# Patient Record
Sex: MALE | Race: WHITE | Employment: FULL TIME | URBAN - METROPOLITAN AREA
[De-identification: names, ages, dates, MRNs, and addresses within clinical notes are randomized per-mention and may not be internally consistent; named-entity substitution may affect disease eponyms.]

---

## 2018-06-15 ENCOUNTER — APPOINTMENT (OUTPATIENT)
Dept: GENERAL RADIOLOGY | Age: 58
End: 2018-06-15
Payer: COMMERCIAL

## 2018-06-15 ENCOUNTER — HOSPITAL ENCOUNTER (OUTPATIENT)
Age: 58
Setting detail: OBSERVATION
Discharge: HOME | End: 2018-06-16
Attending: EMERGENCY MEDICINE | Admitting: ORTHOPAEDIC SURGERY
Payer: COMMERCIAL

## 2018-06-15 ENCOUNTER — ANESTHESIA (OUTPATIENT)
Dept: OPERATING ROOM | Age: 58
End: 2018-06-15
Payer: COMMERCIAL

## 2018-06-15 ENCOUNTER — ANESTHESIA EVENT (OUTPATIENT)
Dept: OPERATING ROOM | Age: 58
End: 2018-06-15
Payer: COMMERCIAL

## 2018-06-15 VITALS — DIASTOLIC BLOOD PRESSURE: 63 MMHG | TEMPERATURE: 98.1 F | SYSTOLIC BLOOD PRESSURE: 117 MMHG | OXYGEN SATURATION: 100 %

## 2018-06-15 DIAGNOSIS — S82.851A CLOSED TRIMALLEOLAR FRACTURE OF RIGHT ANKLE, INITIAL ENCOUNTER: ICD-10-CM

## 2018-06-15 DIAGNOSIS — S82.851A TRIMALLEOLAR FRACTURE OF ANKLE, CLOSED, RIGHT, INITIAL ENCOUNTER: Primary | ICD-10-CM

## 2018-06-15 DIAGNOSIS — S93.04XA DISLOCATION OF RIGHT ANKLE JOINT, INITIAL ENCOUNTER: ICD-10-CM

## 2018-06-15 LAB
ABSOLUTE EOS #: 0.1 K/UL (ref 0–0.4)
ABSOLUTE IMMATURE GRANULOCYTE: ABNORMAL K/UL (ref 0–0.3)
ABSOLUTE LYMPH #: 1.3 K/UL (ref 1–4.8)
ABSOLUTE MONO #: 0.6 K/UL (ref 0.1–1.3)
ANION GAP SERPL CALCULATED.3IONS-SCNC: 13 MMOL/L (ref 9–17)
BASOPHILS # BLD: 1 % (ref 0–2)
BASOPHILS ABSOLUTE: 0.1 K/UL (ref 0–0.2)
BUN BLDV-MCNC: 11 MG/DL (ref 6–20)
BUN/CREAT BLD: ABNORMAL (ref 9–20)
CALCIUM SERPL-MCNC: 8.5 MG/DL (ref 8.6–10.4)
CHLORIDE BLD-SCNC: 101 MMOL/L (ref 98–107)
CO2: 23 MMOL/L (ref 20–31)
CREAT SERPL-MCNC: 0.76 MG/DL (ref 0.7–1.2)
DIFFERENTIAL TYPE: ABNORMAL
EOSINOPHILS RELATIVE PERCENT: 1 % (ref 0–4)
GFR AFRICAN AMERICAN: >60 ML/MIN
GFR NON-AFRICAN AMERICAN: >60 ML/MIN
GFR SERPL CREATININE-BSD FRML MDRD: ABNORMAL ML/MIN/{1.73_M2}
GFR SERPL CREATININE-BSD FRML MDRD: ABNORMAL ML/MIN/{1.73_M2}
GLUCOSE BLD-MCNC: 115 MG/DL (ref 70–99)
HCT VFR BLD CALC: 44.5 % (ref 41–53)
HEMOGLOBIN: 15.3 G/DL (ref 13.5–17.5)
IMMATURE GRANULOCYTES: ABNORMAL %
INR BLD: 1
LYMPHOCYTES # BLD: 17 % (ref 24–44)
MCH RBC QN AUTO: 33.4 PG (ref 26–34)
MCHC RBC AUTO-ENTMCNC: 34.3 G/DL (ref 31–37)
MCV RBC AUTO: 97.3 FL (ref 80–100)
MONOCYTES # BLD: 7 % (ref 1–7)
NRBC AUTOMATED: ABNORMAL PER 100 WBC
PDW BLD-RTO: 13.6 % (ref 11.5–14.9)
PLATELET # BLD: 236 K/UL (ref 150–450)
PLATELET ESTIMATE: ABNORMAL
PMV BLD AUTO: 8.2 FL (ref 6–12)
POTASSIUM SERPL-SCNC: 4.8 MMOL/L (ref 3.7–5.3)
PROTHROMBIN TIME: 10.3 SEC (ref 9.7–12)
RBC # BLD: 4.58 M/UL (ref 4.5–5.9)
RBC # BLD: ABNORMAL 10*6/UL
SEG NEUTROPHILS: 74 % (ref 36–66)
SEGMENTED NEUTROPHILS ABSOLUTE COUNT: 5.9 K/UL (ref 1.3–9.1)
SODIUM BLD-SCNC: 137 MMOL/L (ref 135–144)
WBC # BLD: 8 K/UL (ref 3.5–11)
WBC # BLD: ABNORMAL 10*3/UL

## 2018-06-15 PROCEDURE — 2500000003 HC RX 250 WO HCPCS: Performed by: ORTHOPAEDIC SURGERY

## 2018-06-15 PROCEDURE — 99284 EMERGENCY DEPT VISIT MOD MDM: CPT

## 2018-06-15 PROCEDURE — 3209999900 FLUORO FOR SURGICAL PROCEDURES

## 2018-06-15 PROCEDURE — 73590 X-RAY EXAM OF LOWER LEG: CPT

## 2018-06-15 PROCEDURE — 80048 BASIC METABOLIC PNL TOTAL CA: CPT

## 2018-06-15 PROCEDURE — 73610 X-RAY EXAM OF ANKLE: CPT

## 2018-06-15 PROCEDURE — 96365 THER/PROPH/DIAG IV INF INIT: CPT

## 2018-06-15 PROCEDURE — 96376 TX/PRO/DX INJ SAME DRUG ADON: CPT

## 2018-06-15 PROCEDURE — 99152 MOD SED SAME PHYS/QHP 5/>YRS: CPT

## 2018-06-15 PROCEDURE — 73600 X-RAY EXAM OF ANKLE: CPT

## 2018-06-15 PROCEDURE — 96375 TX/PRO/DX INJ NEW DRUG ADDON: CPT

## 2018-06-15 PROCEDURE — C1769 GUIDE WIRE: HCPCS | Performed by: ORTHOPAEDIC SURGERY

## 2018-06-15 PROCEDURE — G0378 HOSPITAL OBSERVATION PER HR: HCPCS

## 2018-06-15 PROCEDURE — 7100000000 HC PACU RECOVERY - FIRST 15 MIN: Performed by: ORTHOPAEDIC SURGERY

## 2018-06-15 PROCEDURE — 96374 THER/PROPH/DIAG INJ IV PUSH: CPT

## 2018-06-15 PROCEDURE — 2500000003 HC RX 250 WO HCPCS: Performed by: ANESTHESIOLOGY

## 2018-06-15 PROCEDURE — 3700000000 HC ANESTHESIA ATTENDED CARE: Performed by: ORTHOPAEDIC SURGERY

## 2018-06-15 PROCEDURE — 99218 PR INITIAL OBSERVATION CARE/DAY 30 MINUTES: CPT | Performed by: ORTHOPAEDIC SURGERY

## 2018-06-15 PROCEDURE — 27822 TREATMENT OF ANKLE FRACTURE: CPT | Performed by: ORTHOPAEDIC SURGERY

## 2018-06-15 PROCEDURE — C1713 ANCHOR/SCREW BN/BN,TIS/BN: HCPCS | Performed by: ORTHOPAEDIC SURGERY

## 2018-06-15 PROCEDURE — 6370000000 HC RX 637 (ALT 250 FOR IP): Performed by: ORTHOPAEDIC SURGERY

## 2018-06-15 PROCEDURE — 6360000002 HC RX W HCPCS: Performed by: ANESTHESIOLOGY

## 2018-06-15 PROCEDURE — 6360000002 HC RX W HCPCS: Performed by: ORTHOPAEDIC SURGERY

## 2018-06-15 PROCEDURE — 3600000014 HC SURGERY LEVEL 4 ADDTL 15MIN: Performed by: ORTHOPAEDIC SURGERY

## 2018-06-15 PROCEDURE — 85610 PROTHROMBIN TIME: CPT

## 2018-06-15 PROCEDURE — 36415 COLL VENOUS BLD VENIPUNCTURE: CPT

## 2018-06-15 PROCEDURE — 94770 HC ETCO2 MONITOR DAILY: CPT

## 2018-06-15 PROCEDURE — 2720000010 HC SURG SUPPLY STERILE: Performed by: ORTHOPAEDIC SURGERY

## 2018-06-15 PROCEDURE — 2580000003 HC RX 258: Performed by: ORTHOPAEDIC SURGERY

## 2018-06-15 PROCEDURE — 7100000001 HC PACU RECOVERY - ADDTL 15 MIN: Performed by: ORTHOPAEDIC SURGERY

## 2018-06-15 PROCEDURE — 85025 COMPLETE CBC W/AUTO DIFF WBC: CPT

## 2018-06-15 PROCEDURE — 29515 APPLICATION SHORT LEG SPLINT: CPT

## 2018-06-15 PROCEDURE — 2780000010 HC IMPLANT OTHER: Performed by: ORTHOPAEDIC SURGERY

## 2018-06-15 PROCEDURE — 6360000002 HC RX W HCPCS: Performed by: EMERGENCY MEDICINE

## 2018-06-15 PROCEDURE — 3600000004 HC SURGERY LEVEL 4 BASE: Performed by: ORTHOPAEDIC SURGERY

## 2018-06-15 PROCEDURE — A6402 STERILE GAUZE <= 16 SQ IN: HCPCS | Performed by: ORTHOPAEDIC SURGERY

## 2018-06-15 PROCEDURE — 3700000001 HC ADD 15 MINUTES (ANESTHESIA): Performed by: ORTHOPAEDIC SURGERY

## 2018-06-15 DEVICE — SCREW BNE L20MM DIA2.7MM CORT S STL ST FULL THRD FOR SM: Type: IMPLANTABLE DEVICE | Site: ANKLE | Status: FUNCTIONAL

## 2018-06-15 DEVICE — SCREW BNE L18MM DIA2.7MM CORT S STL ST FULL THRD FOR SM: Type: IMPLANTABLE DEVICE | Site: ANKLE | Status: FUNCTIONAL

## 2018-06-15 DEVICE — WASHER ORTH DIA7MM FOR CANN SCR: Type: IMPLANTABLE DEVICE | Site: ANKLE | Status: FUNCTIONAL

## 2018-06-15 DEVICE — PLATE BNE L99MM 5 H R DST LAT FIBULAR S STL LOK COMPR FOR: Type: IMPLANTABLE DEVICE | Site: ANKLE | Status: FUNCTIONAL

## 2018-06-15 DEVICE — K WIRE FIX L150MM DIA1.6MM S STL THRD TRCR PNT: Type: IMPLANTABLE DEVICE | Site: ANKLE | Status: FUNCTIONAL

## 2018-06-15 DEVICE — SCREW BNE L14MM DIA3.5MM CORT S STL ST LOK FULL THRD: Type: IMPLANTABLE DEVICE | Site: ANKLE | Status: FUNCTIONAL

## 2018-06-15 DEVICE — SCREW BNE L14MM DIA3.5MM CORT S STL ST NONCANNULATED LOK: Type: IMPLANTABLE DEVICE | Site: ANKLE | Status: FUNCTIONAL

## 2018-06-15 DEVICE — SCREW BNE L14MM DIA2.7MM CORT S STL ST LOK FULL THRD T8: Type: IMPLANTABLE DEVICE | Site: ANKLE | Status: FUNCTIONAL

## 2018-06-15 DEVICE — SCREW BNE L12MM DIA3.5MM CORT S STL ST LOK FULL THRD: Type: IMPLANTABLE DEVICE | Site: ANKLE | Status: FUNCTIONAL

## 2018-06-15 DEVICE — SCREW BNE L50MM DIA4MM S STL CANN LNG HALF THRD SM HEX SOCK: Type: IMPLANTABLE DEVICE | Site: ANKLE | Status: FUNCTIONAL

## 2018-06-15 DEVICE — SCREW BNE L18MM DIA2.7MM CORT S STL ST LOK FULL THRD T8: Type: IMPLANTABLE DEVICE | Site: ANKLE | Status: FUNCTIONAL

## 2018-06-15 RX ORDER — NEOSTIGMINE METHYLSULFATE 1 MG/ML
INJECTION, SOLUTION INTRAVENOUS PRN
Status: DISCONTINUED | OUTPATIENT
Start: 2018-06-15 | End: 2018-06-15 | Stop reason: SDUPTHER

## 2018-06-15 RX ORDER — ROCURONIUM BROMIDE 10 MG/ML
INJECTION, SOLUTION INTRAVENOUS PRN
Status: DISCONTINUED | OUTPATIENT
Start: 2018-06-15 | End: 2018-06-15 | Stop reason: SDUPTHER

## 2018-06-15 RX ORDER — OXYCODONE HYDROCHLORIDE AND ACETAMINOPHEN 5; 325 MG/1; MG/1
1 TABLET ORAL PRN
Status: DISCONTINUED | OUTPATIENT
Start: 2018-06-15 | End: 2018-06-15 | Stop reason: HOSPADM

## 2018-06-15 RX ORDER — HYDRALAZINE HYDROCHLORIDE 20 MG/ML
5 INJECTION INTRAMUSCULAR; INTRAVENOUS EVERY 10 MIN PRN
Status: DISCONTINUED | OUTPATIENT
Start: 2018-06-15 | End: 2018-06-15 | Stop reason: HOSPADM

## 2018-06-15 RX ORDER — DIPHENHYDRAMINE HYDROCHLORIDE 50 MG/ML
12.5 INJECTION INTRAMUSCULAR; INTRAVENOUS
Status: DISCONTINUED | OUTPATIENT
Start: 2018-06-15 | End: 2018-06-15 | Stop reason: HOSPADM

## 2018-06-15 RX ORDER — MORPHINE SULFATE 2 MG/ML
1 INJECTION, SOLUTION INTRAMUSCULAR; INTRAVENOUS EVERY 5 MIN PRN
Status: DISCONTINUED | OUTPATIENT
Start: 2018-06-15 | End: 2018-06-15 | Stop reason: HOSPADM

## 2018-06-15 RX ORDER — KETOROLAC TROMETHAMINE 30 MG/ML
30 INJECTION, SOLUTION INTRAMUSCULAR; INTRAVENOUS EVERY 8 HOURS
Status: DISCONTINUED | OUTPATIENT
Start: 2018-06-15 | End: 2018-06-16

## 2018-06-15 RX ORDER — KETOROLAC TROMETHAMINE 30 MG/ML
INJECTION, SOLUTION INTRAMUSCULAR; INTRAVENOUS PRN
Status: DISCONTINUED | OUTPATIENT
Start: 2018-06-15 | End: 2018-06-15 | Stop reason: SDUPTHER

## 2018-06-15 RX ORDER — OXYCODONE HYDROCHLORIDE 5 MG/1
5 TABLET ORAL EVERY 4 HOURS PRN
Status: DISCONTINUED | OUTPATIENT
Start: 2018-06-15 | End: 2018-06-16 | Stop reason: HOSPADM

## 2018-06-15 RX ORDER — MEPERIDINE HYDROCHLORIDE 50 MG/ML
12.5 INJECTION INTRAMUSCULAR; INTRAVENOUS; SUBCUTANEOUS EVERY 5 MIN PRN
Status: DISCONTINUED | OUTPATIENT
Start: 2018-06-15 | End: 2018-06-15 | Stop reason: HOSPADM

## 2018-06-15 RX ORDER — ONDANSETRON 2 MG/ML
4 INJECTION INTRAMUSCULAR; INTRAVENOUS EVERY 6 HOURS PRN
Status: DISCONTINUED | OUTPATIENT
Start: 2018-06-15 | End: 2018-06-16 | Stop reason: HOSPADM

## 2018-06-15 RX ORDER — SODIUM CHLORIDE, SODIUM LACTATE, POTASSIUM CHLORIDE, CALCIUM CHLORIDE 600; 310; 30; 20 MG/100ML; MG/100ML; MG/100ML; MG/100ML
INJECTION, SOLUTION INTRAVENOUS CONTINUOUS
Status: DISCONTINUED | OUTPATIENT
Start: 2018-06-15 | End: 2018-06-15

## 2018-06-15 RX ORDER — SODIUM CHLORIDE, SODIUM LACTATE, POTASSIUM CHLORIDE, CALCIUM CHLORIDE 600; 310; 30; 20 MG/100ML; MG/100ML; MG/100ML; MG/100ML
INJECTION, SOLUTION INTRAVENOUS CONTINUOUS
Status: DISCONTINUED | OUTPATIENT
Start: 2018-06-15 | End: 2018-06-16

## 2018-06-15 RX ORDER — SODIUM CHLORIDE 0.9 % (FLUSH) 0.9 %
10 SYRINGE (ML) INJECTION EVERY 12 HOURS SCHEDULED
Status: DISCONTINUED | OUTPATIENT
Start: 2018-06-15 | End: 2018-06-16 | Stop reason: HOSPADM

## 2018-06-15 RX ORDER — BUPIVACAINE HYDROCHLORIDE 2.5 MG/ML
INJECTION, SOLUTION EPIDURAL; INFILTRATION; INTRACAUDAL PRN
Status: DISCONTINUED | OUTPATIENT
Start: 2018-06-15 | End: 2018-06-15 | Stop reason: HOSPADM

## 2018-06-15 RX ORDER — OXYCODONE HYDROCHLORIDE AND ACETAMINOPHEN 5; 325 MG/1; MG/1
2 TABLET ORAL PRN
Status: DISCONTINUED | OUTPATIENT
Start: 2018-06-15 | End: 2018-06-15 | Stop reason: HOSPADM

## 2018-06-15 RX ORDER — PROMETHAZINE HYDROCHLORIDE 25 MG/ML
6.25 INJECTION, SOLUTION INTRAMUSCULAR; INTRAVENOUS
Status: DISCONTINUED | OUTPATIENT
Start: 2018-06-15 | End: 2018-06-15 | Stop reason: HOSPADM

## 2018-06-15 RX ORDER — ASPIRIN 325 MG
325 TABLET ORAL DAILY
Status: DISCONTINUED | OUTPATIENT
Start: 2018-06-16 | End: 2018-06-16 | Stop reason: HOSPADM

## 2018-06-15 RX ORDER — ACETAMINOPHEN 500 MG
1000 TABLET ORAL EVERY 6 HOURS SCHEDULED
Status: DISCONTINUED | OUTPATIENT
Start: 2018-06-15 | End: 2018-06-16 | Stop reason: HOSPADM

## 2018-06-15 RX ORDER — ONDANSETRON 4 MG/1
4 TABLET, ORALLY DISINTEGRATING ORAL EVERY 8 HOURS PRN
Status: DISCONTINUED | OUTPATIENT
Start: 2018-06-15 | End: 2018-06-15

## 2018-06-15 RX ORDER — FENTANYL CITRATE 50 UG/ML
25 INJECTION, SOLUTION INTRAMUSCULAR; INTRAVENOUS EVERY 5 MIN PRN
Status: DISCONTINUED | OUTPATIENT
Start: 2018-06-15 | End: 2018-06-15 | Stop reason: HOSPADM

## 2018-06-15 RX ORDER — FENTANYL CITRATE 50 UG/ML
75 INJECTION, SOLUTION INTRAMUSCULAR; INTRAVENOUS ONCE
Status: COMPLETED | OUTPATIENT
Start: 2018-06-15 | End: 2018-06-15

## 2018-06-15 RX ORDER — ACETAMINOPHEN 325 MG/1
650 TABLET ORAL EVERY 4 HOURS PRN
Status: DISCONTINUED | OUTPATIENT
Start: 2018-06-15 | End: 2018-06-15

## 2018-06-15 RX ORDER — PROPOFOL 10 MG/ML
INJECTION, EMULSION INTRAVENOUS PRN
Status: DISCONTINUED | OUTPATIENT
Start: 2018-06-15 | End: 2018-06-15 | Stop reason: SDUPTHER

## 2018-06-15 RX ORDER — HYDROCODONE BITARTRATE AND ACETAMINOPHEN 5; 325 MG/1; MG/1
1 TABLET ORAL PRN
Status: DISCONTINUED | OUTPATIENT
Start: 2018-06-15 | End: 2018-06-15 | Stop reason: HOSPADM

## 2018-06-15 RX ORDER — MORPHINE SULFATE 2 MG/ML
2 INJECTION, SOLUTION INTRAMUSCULAR; INTRAVENOUS EVERY 5 MIN PRN
Status: DISCONTINUED | OUTPATIENT
Start: 2018-06-15 | End: 2018-06-15 | Stop reason: HOSPADM

## 2018-06-15 RX ORDER — MORPHINE SULFATE 2 MG/ML
4 INJECTION, SOLUTION INTRAMUSCULAR; INTRAVENOUS
Status: DISCONTINUED | OUTPATIENT
Start: 2018-06-15 | End: 2018-06-16 | Stop reason: HOSPADM

## 2018-06-15 RX ORDER — ONDANSETRON 2 MG/ML
INJECTION INTRAMUSCULAR; INTRAVENOUS PRN
Status: DISCONTINUED | OUTPATIENT
Start: 2018-06-15 | End: 2018-06-15 | Stop reason: SDUPTHER

## 2018-06-15 RX ORDER — MORPHINE SULFATE 2 MG/ML
2 INJECTION, SOLUTION INTRAMUSCULAR; INTRAVENOUS
Status: DISCONTINUED | OUTPATIENT
Start: 2018-06-15 | End: 2018-06-16 | Stop reason: HOSPADM

## 2018-06-15 RX ORDER — TRANEXAMIC ACID 100 MG/ML
INJECTION, SOLUTION INTRAVENOUS PRN
Status: DISCONTINUED | OUTPATIENT
Start: 2018-06-15 | End: 2018-06-15 | Stop reason: SDUPTHER

## 2018-06-15 RX ORDER — HYDROCODONE BITARTRATE AND ACETAMINOPHEN 5; 325 MG/1; MG/1
2 TABLET ORAL PRN
Status: DISCONTINUED | OUTPATIENT
Start: 2018-06-15 | End: 2018-06-15 | Stop reason: HOSPADM

## 2018-06-15 RX ORDER — FENTANYL CITRATE 50 UG/ML
INJECTION, SOLUTION INTRAMUSCULAR; INTRAVENOUS PRN
Status: DISCONTINUED | OUTPATIENT
Start: 2018-06-15 | End: 2018-06-15 | Stop reason: SDUPTHER

## 2018-06-15 RX ORDER — 0.9 % SODIUM CHLORIDE 0.9 %
500 INTRAVENOUS SOLUTION INTRAVENOUS
Status: DISCONTINUED | OUTPATIENT
Start: 2018-06-15 | End: 2018-06-15 | Stop reason: HOSPADM

## 2018-06-15 RX ORDER — SODIUM CHLORIDE 0.9 % (FLUSH) 0.9 %
10 SYRINGE (ML) INJECTION PRN
Status: DISCONTINUED | OUTPATIENT
Start: 2018-06-15 | End: 2018-06-16 | Stop reason: HOSPADM

## 2018-06-15 RX ORDER — GLYCOPYRROLATE 1 MG/5 ML
SYRINGE (ML) INTRAVENOUS PRN
Status: DISCONTINUED | OUTPATIENT
Start: 2018-06-15 | End: 2018-06-15 | Stop reason: SDUPTHER

## 2018-06-15 RX ORDER — DEXAMETHASONE SODIUM PHOSPHATE 4 MG/ML
INJECTION, SOLUTION INTRA-ARTICULAR; INTRALESIONAL; INTRAMUSCULAR; INTRAVENOUS; SOFT TISSUE PRN
Status: DISCONTINUED | OUTPATIENT
Start: 2018-06-15 | End: 2018-06-15 | Stop reason: SDUPTHER

## 2018-06-15 RX ORDER — PROPOFOL 10 MG/ML
INJECTION, EMULSION INTRAVENOUS CONTINUOUS PRN
Status: COMPLETED | OUTPATIENT
Start: 2018-06-15 | End: 2018-06-15

## 2018-06-15 RX ORDER — PROPOFOL 10 MG/ML
100 INJECTION, EMULSION INTRAVENOUS ONCE
Status: DISCONTINUED | OUTPATIENT
Start: 2018-06-15 | End: 2018-06-15

## 2018-06-15 RX ORDER — CLINDAMYCIN PHOSPHATE 150 MG/ML
INJECTION, SOLUTION INTRAVENOUS
Status: DISCONTINUED
Start: 2018-06-15 | End: 2018-06-15

## 2018-06-15 RX ADMIN — PROPOFOL 150 MG: 10 INJECTION, EMULSION INTRAVENOUS at 14:37

## 2018-06-15 RX ADMIN — FENTANYL CITRATE 75 MCG: 50 INJECTION INTRAMUSCULAR; INTRAVENOUS at 11:26

## 2018-06-15 RX ADMIN — DEXAMETHASONE SODIUM PHOSPHATE 4 MG: 4 INJECTION, SOLUTION INTRAMUSCULAR; INTRAVENOUS at 14:50

## 2018-06-15 RX ADMIN — PROPOFOL 10 MCG/KG/MIN: 10 INJECTION, EMULSION INTRAVENOUS at 10:49

## 2018-06-15 RX ADMIN — FENTANYL CITRATE 50 MCG: 50 INJECTION, SOLUTION INTRAMUSCULAR; INTRAVENOUS at 16:45

## 2018-06-15 RX ADMIN — TRANEXAMIC ACID 1000 MG: 100 INJECTION, SOLUTION INTRAVENOUS at 16:41

## 2018-06-15 RX ADMIN — KETOROLAC TROMETHAMINE 30 MG: 30 INJECTION, SOLUTION INTRAMUSCULAR at 16:23

## 2018-06-15 RX ADMIN — DEXTROSE 900 MG: 50 INJECTION, SOLUTION INTRAVENOUS at 14:38

## 2018-06-15 RX ADMIN — KETOROLAC TROMETHAMINE 30 MG: 30 INJECTION, SOLUTION INTRAMUSCULAR at 18:34

## 2018-06-15 RX ADMIN — ONDANSETRON 4 MG: 2 INJECTION INTRAMUSCULAR; INTRAVENOUS at 16:23

## 2018-06-15 RX ADMIN — VANCOMYCIN HYDROCHLORIDE 1000 MG: 1 INJECTION, POWDER, LYOPHILIZED, FOR SOLUTION INTRAVENOUS at 21:22

## 2018-06-15 RX ADMIN — FENTANYL CITRATE 50 MCG: 50 INJECTION, SOLUTION INTRAMUSCULAR; INTRAVENOUS at 15:05

## 2018-06-15 RX ADMIN — FENTANYL CITRATE 100 MCG: 50 INJECTION, SOLUTION INTRAMUSCULAR; INTRAVENOUS at 14:37

## 2018-06-15 RX ADMIN — OXYCODONE HYDROCHLORIDE 5 MG: 5 TABLET ORAL at 21:22

## 2018-06-15 RX ADMIN — SODIUM CHLORIDE, POTASSIUM CHLORIDE, SODIUM LACTATE AND CALCIUM CHLORIDE: 600; 310; 30; 20 INJECTION, SOLUTION INTRAVENOUS at 14:15

## 2018-06-15 RX ADMIN — ROCURONIUM BROMIDE 40 MG: 10 INJECTION INTRAVENOUS at 14:37

## 2018-06-15 RX ADMIN — Medication 0.2 MG: at 16:31

## 2018-06-15 RX ADMIN — NEOSTIGMINE METHYLSULFATE 1 MG: 1 INJECTION, SOLUTION INTRAVENOUS at 16:30

## 2018-06-15 RX ADMIN — FENTANYL CITRATE 50 MCG: 50 INJECTION, SOLUTION INTRAMUSCULAR; INTRAVENOUS at 16:15

## 2018-06-15 RX ADMIN — FENTANYL CITRATE 75 MCG: 50 INJECTION INTRAMUSCULAR; INTRAVENOUS at 09:08

## 2018-06-15 RX ADMIN — SODIUM CHLORIDE, POTASSIUM CHLORIDE, SODIUM LACTATE AND CALCIUM CHLORIDE: 600; 310; 30; 20 INJECTION, SOLUTION INTRAVENOUS at 18:34

## 2018-06-15 RX ADMIN — FENTANYL CITRATE 50 MCG: 50 INJECTION, SOLUTION INTRAMUSCULAR; INTRAVENOUS at 17:00

## 2018-06-15 RX ADMIN — FENTANYL CITRATE 50 MCG: 50 INJECTION, SOLUTION INTRAMUSCULAR; INTRAVENOUS at 16:25

## 2018-06-15 ASSESSMENT — PULMONARY FUNCTION TESTS
PIF_VALUE: 2
PIF_VALUE: 18
PIF_VALUE: 17
PIF_VALUE: 16
PIF_VALUE: 16
PIF_VALUE: 2
PIF_VALUE: 2
PIF_VALUE: 17
PIF_VALUE: 16
PIF_VALUE: 17
PIF_VALUE: 17
PIF_VALUE: 20
PIF_VALUE: 2
PIF_VALUE: 18
PIF_VALUE: 18
PIF_VALUE: 2
PIF_VALUE: 2
PIF_VALUE: 16
PIF_VALUE: 2
PIF_VALUE: 16
PIF_VALUE: 16
PIF_VALUE: 18
PIF_VALUE: 16
PIF_VALUE: 17
PIF_VALUE: 18
PIF_VALUE: 16
PIF_VALUE: 18
PIF_VALUE: 7
PIF_VALUE: 17
PIF_VALUE: 2
PIF_VALUE: 16
PIF_VALUE: 17
PIF_VALUE: 2
PIF_VALUE: 18
PIF_VALUE: 17
PIF_VALUE: 16
PIF_VALUE: 16
PIF_VALUE: 19
PIF_VALUE: 1
PIF_VALUE: 17
PIF_VALUE: 18
PIF_VALUE: 16
PIF_VALUE: 1
PIF_VALUE: 17
PIF_VALUE: 18
PIF_VALUE: 17
PIF_VALUE: 16
PIF_VALUE: 2
PIF_VALUE: 16
PIF_VALUE: 1
PIF_VALUE: 2
PIF_VALUE: 17
PIF_VALUE: 17
PIF_VALUE: 18
PIF_VALUE: 2
PIF_VALUE: 2
PIF_VALUE: 17
PIF_VALUE: 2
PIF_VALUE: 16
PIF_VALUE: 17
PIF_VALUE: 17
PIF_VALUE: 20
PIF_VALUE: 2
PIF_VALUE: 1
PIF_VALUE: 19
PIF_VALUE: 16
PIF_VALUE: 19
PIF_VALUE: 17
PIF_VALUE: 2
PIF_VALUE: 17
PIF_VALUE: 16
PIF_VALUE: 18
PIF_VALUE: 2
PIF_VALUE: 17
PIF_VALUE: 2
PIF_VALUE: 35
PIF_VALUE: 17
PIF_VALUE: 18
PIF_VALUE: 17
PIF_VALUE: 16
PIF_VALUE: 2
PIF_VALUE: 18
PIF_VALUE: 16
PIF_VALUE: 25
PIF_VALUE: 2
PIF_VALUE: 16
PIF_VALUE: 1
PIF_VALUE: 2
PIF_VALUE: 16
PIF_VALUE: 16
PIF_VALUE: 18
PIF_VALUE: 16
PIF_VALUE: 2
PIF_VALUE: 17
PIF_VALUE: 2
PIF_VALUE: 22
PIF_VALUE: 15
PIF_VALUE: 17
PIF_VALUE: 16
PIF_VALUE: 18
PIF_VALUE: 16
PIF_VALUE: 18
PIF_VALUE: 17
PIF_VALUE: 15
PIF_VALUE: 16
PIF_VALUE: 1
PIF_VALUE: 16
PIF_VALUE: 16
PIF_VALUE: 2
PIF_VALUE: 18
PIF_VALUE: 2
PIF_VALUE: 18
PIF_VALUE: 16
PIF_VALUE: 2
PIF_VALUE: 1
PIF_VALUE: 2
PIF_VALUE: 17
PIF_VALUE: 2
PIF_VALUE: 3
PIF_VALUE: 18
PIF_VALUE: 17
PIF_VALUE: 15
PIF_VALUE: 16
PIF_VALUE: 2
PIF_VALUE: 17
PIF_VALUE: 16
PIF_VALUE: 17
PIF_VALUE: 18
PIF_VALUE: 16
PIF_VALUE: 1
PIF_VALUE: 2
PIF_VALUE: 18
PIF_VALUE: 16
PIF_VALUE: 2
PIF_VALUE: 17
PIF_VALUE: 1

## 2018-06-15 ASSESSMENT — PAIN DESCRIPTION - PAIN TYPE
TYPE: ACUTE PAIN
TYPE: ACUTE PAIN

## 2018-06-15 ASSESSMENT — PAIN DESCRIPTION - LOCATION
LOCATION: ANKLE
LOCATION: ANKLE

## 2018-06-15 ASSESSMENT — ENCOUNTER SYMPTOMS
ABDOMINAL PAIN: 0
VOMITING: 0
COUGH: 0
SHORTNESS OF BREATH: 0
NAUSEA: 0

## 2018-06-15 ASSESSMENT — PAIN SCALES - GENERAL
PAINLEVEL_OUTOF10: 10
PAINLEVEL_OUTOF10: 0
PAINLEVEL_OUTOF10: 0
PAINLEVEL_OUTOF10: 1
PAINLEVEL_OUTOF10: 4
PAINLEVEL_OUTOF10: 8
PAINLEVEL_OUTOF10: 1
PAINLEVEL_OUTOF10: 8
PAINLEVEL_OUTOF10: 7

## 2018-06-15 ASSESSMENT — PAIN DESCRIPTION - DESCRIPTORS: DESCRIPTORS: ACHING

## 2018-06-15 ASSESSMENT — PAIN DESCRIPTION - ORIENTATION
ORIENTATION: RIGHT
ORIENTATION: RIGHT

## 2018-06-15 ASSESSMENT — LIFESTYLE VARIABLES: SMOKING_STATUS: 1

## 2018-06-16 VITALS
DIASTOLIC BLOOD PRESSURE: 67 MMHG | TEMPERATURE: 97.9 F | SYSTOLIC BLOOD PRESSURE: 112 MMHG | OXYGEN SATURATION: 98 % | BODY MASS INDEX: 25.4 KG/M2 | HEIGHT: 67 IN | WEIGHT: 161.82 LBS | HEART RATE: 68 BPM | RESPIRATION RATE: 20 BRPM

## 2018-06-16 LAB
ANION GAP SERPL CALCULATED.3IONS-SCNC: 9 MMOL/L (ref 9–17)
BUN BLDV-MCNC: 10 MG/DL (ref 6–20)
BUN/CREAT BLD: ABNORMAL (ref 9–20)
CALCIUM SERPL-MCNC: 8.4 MG/DL (ref 8.6–10.4)
CHLORIDE BLD-SCNC: 98 MMOL/L (ref 98–107)
CO2: 27 MMOL/L (ref 20–31)
CREAT SERPL-MCNC: 0.85 MG/DL (ref 0.7–1.2)
GFR AFRICAN AMERICAN: >60 ML/MIN
GFR NON-AFRICAN AMERICAN: >60 ML/MIN
GFR SERPL CREATININE-BSD FRML MDRD: ABNORMAL ML/MIN/{1.73_M2}
GFR SERPL CREATININE-BSD FRML MDRD: ABNORMAL ML/MIN/{1.73_M2}
GLUCOSE BLD-MCNC: 128 MG/DL (ref 70–99)
HCT VFR BLD CALC: 37.5 % (ref 41–53)
HEMOGLOBIN: 12.9 G/DL (ref 13.5–17.5)
MCH RBC QN AUTO: 33.4 PG (ref 26–34)
MCHC RBC AUTO-ENTMCNC: 34.3 G/DL (ref 31–37)
MCV RBC AUTO: 97.5 FL (ref 80–100)
NRBC AUTOMATED: ABNORMAL PER 100 WBC
PDW BLD-RTO: 13.5 % (ref 11.5–14.9)
PLATELET # BLD: 209 K/UL (ref 150–450)
PMV BLD AUTO: 8.6 FL (ref 6–12)
POTASSIUM SERPL-SCNC: 4.2 MMOL/L (ref 3.7–5.3)
RBC # BLD: 3.85 M/UL (ref 4.5–5.9)
SODIUM BLD-SCNC: 134 MMOL/L (ref 135–144)
WBC # BLD: 7.7 K/UL (ref 3.5–11)

## 2018-06-16 PROCEDURE — G0378 HOSPITAL OBSERVATION PER HR: HCPCS

## 2018-06-16 PROCEDURE — 6360000002 HC RX W HCPCS: Performed by: ORTHOPAEDIC SURGERY

## 2018-06-16 PROCEDURE — 36415 COLL VENOUS BLD VENIPUNCTURE: CPT

## 2018-06-16 PROCEDURE — 97530 THERAPEUTIC ACTIVITIES: CPT

## 2018-06-16 PROCEDURE — 2580000003 HC RX 258: Performed by: ORTHOPAEDIC SURGERY

## 2018-06-16 PROCEDURE — 85027 COMPLETE CBC AUTOMATED: CPT

## 2018-06-16 PROCEDURE — 96376 TX/PRO/DX INJ SAME DRUG ADON: CPT

## 2018-06-16 PROCEDURE — 96366 THER/PROPH/DIAG IV INF ADDON: CPT

## 2018-06-16 PROCEDURE — 6370000000 HC RX 637 (ALT 250 FOR IP): Performed by: ORTHOPAEDIC SURGERY

## 2018-06-16 PROCEDURE — 96375 TX/PRO/DX INJ NEW DRUG ADDON: CPT

## 2018-06-16 PROCEDURE — 97161 PT EVAL LOW COMPLEX 20 MIN: CPT

## 2018-06-16 PROCEDURE — 99024 POSTOP FOLLOW-UP VISIT: CPT | Performed by: ORTHOPAEDIC SURGERY

## 2018-06-16 PROCEDURE — 80048 BASIC METABOLIC PNL TOTAL CA: CPT

## 2018-06-16 PROCEDURE — 97116 GAIT TRAINING THERAPY: CPT

## 2018-06-16 PROCEDURE — 96365 THER/PROPH/DIAG IV INF INIT: CPT

## 2018-06-16 RX ORDER — HYDROCODONE BITARTRATE AND ACETAMINOPHEN 7.5; 325 MG/1; MG/1
1 TABLET ORAL
Qty: 40 TABLET | Refills: 0 | Status: SHIPPED | OUTPATIENT
Start: 2018-06-16 | End: 2018-06-23

## 2018-06-16 RX ADMIN — OXYCODONE HYDROCHLORIDE 5 MG: 5 TABLET ORAL at 03:45

## 2018-06-16 RX ADMIN — ACETAMINOPHEN 1000 MG: 500 TABLET, FILM COATED ORAL at 12:33

## 2018-06-16 RX ADMIN — MORPHINE SULFATE 2 MG: 2 INJECTION, SOLUTION INTRAMUSCULAR; INTRAVENOUS at 01:12

## 2018-06-16 RX ADMIN — ASPIRIN 325 MG ORAL TABLET 325 MG: 325 PILL ORAL at 08:04

## 2018-06-16 RX ADMIN — VANCOMYCIN HYDROCHLORIDE 1000 MG: 1 INJECTION, POWDER, LYOPHILIZED, FOR SOLUTION INTRAVENOUS at 07:58

## 2018-06-16 RX ADMIN — ACETAMINOPHEN 1000 MG: 500 TABLET, FILM COATED ORAL at 04:02

## 2018-06-16 RX ADMIN — OXYCODONE HYDROCHLORIDE 5 MG: 5 TABLET ORAL at 11:47

## 2018-06-16 RX ADMIN — KETOROLAC TROMETHAMINE 30 MG: 30 INJECTION, SOLUTION INTRAMUSCULAR at 03:45

## 2018-06-16 RX ADMIN — OXYCODONE HYDROCHLORIDE 5 MG: 5 TABLET ORAL at 08:04

## 2018-06-16 RX ADMIN — OXYCODONE HYDROCHLORIDE 5 MG: 5 TABLET ORAL at 15:57

## 2018-06-16 RX ADMIN — SODIUM CHLORIDE, POTASSIUM CHLORIDE, SODIUM LACTATE AND CALCIUM CHLORIDE: 600; 310; 30; 20 INJECTION, SOLUTION INTRAVENOUS at 03:44

## 2018-06-16 ASSESSMENT — PAIN SCALES - GENERAL
PAINLEVEL_OUTOF10: 0
PAINLEVEL_OUTOF10: 6
PAINLEVEL_OUTOF10: 9
PAINLEVEL_OUTOF10: 7
PAINLEVEL_OUTOF10: 7
PAINLEVEL_OUTOF10: 6
PAINLEVEL_OUTOF10: 7
PAINLEVEL_OUTOF10: 8
PAINLEVEL_OUTOF10: 7
PAINLEVEL_OUTOF10: 8
PAINLEVEL_OUTOF10: 5
PAINLEVEL_OUTOF10: 0
PAINLEVEL_OUTOF10: 7
PAINLEVEL_OUTOF10: 6
PAINLEVEL_OUTOF10: 7

## 2018-06-16 ASSESSMENT — PAIN DESCRIPTION - DESCRIPTORS
DESCRIPTORS: ACHING;THROBBING
DESCRIPTORS: ACHING;THROBBING

## 2018-06-16 ASSESSMENT — PAIN DESCRIPTION - ORIENTATION
ORIENTATION: RIGHT

## 2018-06-16 ASSESSMENT — PAIN DESCRIPTION - LOCATION
LOCATION: ANKLE

## 2018-06-16 ASSESSMENT — PAIN DESCRIPTION - PAIN TYPE: TYPE: ACUTE PAIN;SURGICAL PAIN

## 2018-12-09 ENCOUNTER — ON CAMPUS - OUTPATIENT (OUTPATIENT)
Dept: URBAN - METROPOLITAN AREA HOSPITAL 77 | Facility: HOSPITAL | Age: 58
End: 2018-12-09
Payer: COMMERCIAL

## 2018-12-09 DIAGNOSIS — Z72.0 TOBACCO USE: ICD-10-CM

## 2018-12-09 DIAGNOSIS — J18.9 PNEUMONIA, UNSPECIFIED ORGANISM: ICD-10-CM

## 2018-12-09 DIAGNOSIS — R79.89 OTHER SPECIFIED ABNORMAL FINDINGS OF BLOOD CHEMISTRY: ICD-10-CM

## 2018-12-09 DIAGNOSIS — R11.2 NAUSEA WITH VOMITING, UNSPECIFIED: ICD-10-CM

## 2018-12-09 DIAGNOSIS — R10.9 UNSPECIFIED ABDOMINAL PAIN: ICD-10-CM

## 2018-12-09 PROCEDURE — 99243 OFF/OP CNSLTJ NEW/EST LOW 30: CPT | Performed by: INTERNAL MEDICINE

## 2018-12-09 PROCEDURE — 99203 OFFICE O/P NEW LOW 30 MIN: CPT | Performed by: INTERNAL MEDICINE

## 2018-12-10 ENCOUNTER — ON CAMPUS - OUTPATIENT (OUTPATIENT)
Dept: URBAN - METROPOLITAN AREA HOSPITAL 77 | Facility: HOSPITAL | Age: 58
End: 2018-12-10

## 2018-12-10 DIAGNOSIS — Z72.0 TOBACCO USE: ICD-10-CM

## 2018-12-10 DIAGNOSIS — R11.2 NAUSEA WITH VOMITING, UNSPECIFIED: ICD-10-CM

## 2018-12-10 DIAGNOSIS — J18.9 PNEUMONIA, UNSPECIFIED ORGANISM: ICD-10-CM

## 2018-12-10 DIAGNOSIS — F10.10 ALCOHOL ABUSE, UNCOMPLICATED: ICD-10-CM

## 2018-12-10 DIAGNOSIS — R79.89 OTHER SPECIFIED ABNORMAL FINDINGS OF BLOOD CHEMISTRY: ICD-10-CM

## 2018-12-10 DIAGNOSIS — R10.9 UNSPECIFIED ABDOMINAL PAIN: ICD-10-CM

## 2018-12-10 PROCEDURE — 99213 OFFICE O/P EST LOW 20 MIN: CPT | Performed by: NURSE PRACTITIONER

## 2021-05-06 ENCOUNTER — HOSPITAL ENCOUNTER (OUTPATIENT)
Dept: GENERAL RADIOLOGY | Facility: HOSPITAL | Age: 61
Discharge: HOME | End: 2021-05-06

## 2021-05-06 ENCOUNTER — TRANSCRIBE ORDERS (OUTPATIENT)
Dept: ADMINISTRATIVE | Facility: HOSPITAL | Age: 61
End: 2021-05-06

## 2021-05-06 DIAGNOSIS — Z02.71 ENCOUNTER FOR DISABILITY DETERMINATION: Primary | ICD-10-CM

## 2021-05-06 DIAGNOSIS — Z02.71 ENCOUNTER FOR DISABILITY DETERMINATION: ICD-10-CM

## 2021-05-06 PROCEDURE — 73560 X-RAY EXAM OF KNEE 1 OR 2: CPT

## 2021-05-06 PROCEDURE — 73600 X-RAY EXAM OF ANKLE: CPT

## 2022-05-21 ENCOUNTER — HOSPITAL ENCOUNTER (EMERGENCY)
Facility: HOSPITAL | Age: 62
Discharge: HOME | End: 2022-05-21
Attending: EMERGENCY MEDICINE | Admitting: EMERGENCY MEDICINE

## 2022-05-21 ENCOUNTER — APPOINTMENT (OUTPATIENT)
Dept: CT IMAGING | Facility: HOSPITAL | Age: 62
End: 2022-05-21

## 2022-05-21 VITALS
SYSTOLIC BLOOD PRESSURE: 167 MMHG | OXYGEN SATURATION: 100 % | RESPIRATION RATE: 18 BRPM | HEIGHT: 69 IN | WEIGHT: 145 LBS | TEMPERATURE: 98.2 F | DIASTOLIC BLOOD PRESSURE: 78 MMHG | HEART RATE: 71 BPM | BODY MASS INDEX: 21.48 KG/M2

## 2022-05-21 DIAGNOSIS — R56.9 SEIZURE: Primary | ICD-10-CM

## 2022-05-21 DIAGNOSIS — R11.2 NAUSEA AND VOMITING, UNSPECIFIED VOMITING TYPE: ICD-10-CM

## 2022-05-21 LAB
ANION GAP SERPL CALCULATED.3IONS-SCNC: 25 MMOL/L (ref 5–15)
BASOPHILS # BLD AUTO: 0.1 10*3/MM3 (ref 0–0.2)
BASOPHILS NFR BLD AUTO: 1.3 % (ref 0–1.5)
BUN SERPL-MCNC: 8 MG/DL (ref 8–23)
BUN/CREAT SERPL: 8.8 (ref 7–25)
CALCIUM SPEC-SCNC: 9.1 MG/DL (ref 8.6–10.5)
CHLORIDE SERPL-SCNC: 97 MMOL/L (ref 98–107)
CO2 SERPL-SCNC: 17 MMOL/L (ref 22–29)
CREAT SERPL-MCNC: 0.91 MG/DL (ref 0.76–1.27)
DEPRECATED RDW RBC AUTO: 57.8 FL (ref 37–54)
EGFRCR SERPLBLD CKD-EPI 2021: 95.3 ML/MIN/1.73
EOSINOPHIL # BLD AUTO: 0 10*3/MM3 (ref 0–0.4)
EOSINOPHIL NFR BLD AUTO: 0.2 % (ref 0.3–6.2)
ERYTHROCYTE [DISTWIDTH] IN BLOOD BY AUTOMATED COUNT: 16.4 % (ref 12.3–15.4)
GLUCOSE SERPL-MCNC: 260 MG/DL (ref 65–99)
HCT VFR BLD AUTO: 46.5 % (ref 37.5–51)
HGB BLD-MCNC: 15.9 G/DL (ref 13–17.7)
HOLD SPECIMEN: NORMAL
HOLD SPECIMEN: NORMAL
LYMPHOCYTES # BLD AUTO: 1.1 10*3/MM3 (ref 0.7–3.1)
LYMPHOCYTES NFR BLD AUTO: 10 % (ref 19.6–45.3)
MCH RBC QN AUTO: 34.6 PG (ref 26.6–33)
MCHC RBC AUTO-ENTMCNC: 34.1 G/DL (ref 31.5–35.7)
MCV RBC AUTO: 101.5 FL (ref 79–97)
MONOCYTES # BLD AUTO: 0.5 10*3/MM3 (ref 0.1–0.9)
MONOCYTES NFR BLD AUTO: 4.7 % (ref 5–12)
NEUTROPHILS NFR BLD AUTO: 83.8 % (ref 42.7–76)
NEUTROPHILS NFR BLD AUTO: 9.1 10*3/MM3 (ref 1.7–7)
NRBC BLD AUTO-RTO: 0.1 /100 WBC (ref 0–0.2)
PLATELET # BLD AUTO: 212 10*3/MM3 (ref 140–450)
PMV BLD AUTO: 8.2 FL (ref 6–12)
POTASSIUM SERPL-SCNC: 3.7 MMOL/L (ref 3.5–5.2)
RBC # BLD AUTO: 4.58 10*6/MM3 (ref 4.14–5.8)
SODIUM SERPL-SCNC: 139 MMOL/L (ref 136–145)
WBC NRBC COR # BLD: 10.8 10*3/MM3 (ref 3.4–10.8)
WHOLE BLOOD HOLD COAG: NORMAL
WHOLE BLOOD HOLD SPECIMEN: NORMAL

## 2022-05-21 PROCEDURE — 25010000002 MIDAZOLAM PER 1 MG: Performed by: EMERGENCY MEDICINE

## 2022-05-21 PROCEDURE — 80048 BASIC METABOLIC PNL TOTAL CA: CPT | Performed by: EMERGENCY MEDICINE

## 2022-05-21 PROCEDURE — 96374 THER/PROPH/DIAG INJ IV PUSH: CPT

## 2022-05-21 PROCEDURE — 99283 EMERGENCY DEPT VISIT LOW MDM: CPT

## 2022-05-21 PROCEDURE — 70450 CT HEAD/BRAIN W/O DYE: CPT

## 2022-05-21 PROCEDURE — 85025 COMPLETE CBC W/AUTO DIFF WBC: CPT | Performed by: EMERGENCY MEDICINE

## 2022-05-21 PROCEDURE — 99284 EMERGENCY DEPT VISIT MOD MDM: CPT

## 2022-05-21 RX ORDER — MIDAZOLAM HYDROCHLORIDE 1 MG/ML
2 INJECTION INTRAMUSCULAR; INTRAVENOUS ONCE
Status: COMPLETED | OUTPATIENT
Start: 2022-05-21 | End: 2022-05-21

## 2022-05-21 RX ORDER — SODIUM CHLORIDE 0.9 % (FLUSH) 0.9 %
10 SYRINGE (ML) INJECTION AS NEEDED
Status: DISCONTINUED | OUTPATIENT
Start: 2022-05-21 | End: 2022-05-21 | Stop reason: HOSPADM

## 2022-05-21 RX ORDER — ONDANSETRON 4 MG/1
4 TABLET, ORALLY DISINTEGRATING ORAL EVERY 8 HOURS PRN
Qty: 12 TABLET | Refills: 0 | Status: SHIPPED | OUTPATIENT
Start: 2022-05-21

## 2022-05-21 RX ORDER — PANTOPRAZOLE SODIUM 40 MG/1
40 TABLET, DELAYED RELEASE ORAL DAILY
Qty: 14 TABLET | Refills: 0 | Status: SHIPPED | OUTPATIENT
Start: 2022-05-21

## 2022-05-21 RX ADMIN — MIDAZOLAM 2 MG: 1 INJECTION INTRAMUSCULAR; INTRAVENOUS at 17:05

## 2022-05-21 NOTE — ED PROVIDER NOTES
Subjective   Patient is a 61-year-old male who reportedly had a seizure at home witnessed by family.  The patient confusion for approximately 20 to 30 minutes.  Patient states he does have a history of seizures but its been 15 years since he had his last seizure.  Patient states he does drink heavily approximately 3 times per week as well.  He has no complaints at this time other than mild nausea.          Review of Systems   Constitutional: Negative for chills and fever.   HENT: Negative for congestion and sore throat.    Eyes: Negative for visual disturbance.   Respiratory: Negative for cough, chest tightness and shortness of breath.    Cardiovascular: Negative for chest pain and palpitations.   Gastrointestinal: Positive for nausea. Negative for abdominal pain, diarrhea and vomiting.   Endocrine: Negative for polyuria.   Genitourinary: Negative for dysuria and flank pain.   Musculoskeletal: Negative for back pain.   Neurological: Positive for weakness. Negative for dizziness, light-headedness and headaches.   Psychiatric/Behavioral: Negative for confusion.   A complete review of systems was obtained and is otherwise negative    No past medical history on file.    Allergies   Allergen Reactions   • Penicillins Unknown - Low Severity     Childhood allergy; pt is unsure of reaction       No past surgical history on file.    No family history on file.    Social History     Socioeconomic History   • Marital status:            Objective   Physical Exam  Neurologic exam is nonfocal.  HEENT exam shows TMs to be clear.  Oropharynx clear moist.  Sclera is nonicteric.  Neck has no adenopathy JVD or bruits.  Lungs are clear.  Heart has a regular rate rhythm without murmur rub or gallop.  Chest is nontender.  Abdomen is soft nontender.  Patient has normal bowel sounds without rebound or guarding.  Back has no CVA tenderness.  Extremity exam is unremarkable.  Procedures           ED Course      Results for orders placed  or performed during the hospital encounter of 05/21/22   Basic Metabolic Panel    Specimen: Blood   Result Value Ref Range    Glucose 260 (H) 65 - 99 mg/dL    BUN 8 8 - 23 mg/dL    Creatinine 0.91 0.76 - 1.27 mg/dL    Sodium 139 136 - 145 mmol/L    Potassium 3.7 3.5 - 5.2 mmol/L    Chloride 97 (L) 98 - 107 mmol/L    CO2 17.0 (L) 22.0 - 29.0 mmol/L    Calcium 9.1 8.6 - 10.5 mg/dL    BUN/Creatinine Ratio 8.8 7.0 - 25.0    Anion Gap 25.0 (H) 5.0 - 15.0 mmol/L    eGFR 95.3 >60.0 mL/min/1.73   CBC Auto Differential    Specimen: Blood   Result Value Ref Range    WBC 10.80 3.40 - 10.80 10*3/mm3    RBC 4.58 4.14 - 5.80 10*6/mm3    Hemoglobin 15.9 13.0 - 17.7 g/dL    Hematocrit 46.5 37.5 - 51.0 %    .5 (H) 79.0 - 97.0 fL    MCH 34.6 (H) 26.6 - 33.0 pg    MCHC 34.1 31.5 - 35.7 g/dL    RDW 16.4 (H) 12.3 - 15.4 %    RDW-SD 57.8 (H) 37.0 - 54.0 fl    MPV 8.2 6.0 - 12.0 fL    Platelets 212 140 - 450 10*3/mm3    Neutrophil % 83.8 (H) 42.7 - 76.0 %    Lymphocyte % 10.0 (L) 19.6 - 45.3 %    Monocyte % 4.7 (L) 5.0 - 12.0 %    Eosinophil % 0.2 (L) 0.3 - 6.2 %    Basophil % 1.3 0.0 - 1.5 %    Neutrophils, Absolute 9.10 (H) 1.70 - 7.00 10*3/mm3    Lymphocytes, Absolute 1.10 0.70 - 3.10 10*3/mm3    Monocytes, Absolute 0.50 0.10 - 0.90 10*3/mm3    Eosinophils, Absolute 0.00 0.00 - 0.40 10*3/mm3    Basophils, Absolute 0.10 0.00 - 0.20 10*3/mm3    nRBC 0.1 0.0 - 0.2 /100 WBC   Green Top (Gel)   Result Value Ref Range    Extra Tube Done    Lavender Top   Result Value Ref Range    Extra Tube hold    Gold Top - SST   Result Value Ref Range    Extra Tube Hold for add-ons.    Light Blue Top   Result Value Ref Range    Extra Tube Hold for add-ons.      CT Head Without Contrast    Result Date: 5/21/2022  No acute intracranial abnormality.  Electronically Signed By-Xiang Tomas MD On:5/21/2022 4:24 PM This report was finalized on [id number] by  Xiang Tomas MD.                                               MDM  Number of Diagnoses or  Management Options  Diagnosis management comments: Patient has a nonfocal neurologic exam.  CT scan of his head without contrast shows no abnormality.  Patient did vomit 1 time and was given Zofran.  On reexam he feels improved but there is no evidence of acute infectious process or metabolic abnormality.  Patient will be discharged.  Will be placed on Protonix and Zofran.  Will follow with his MD for recheck this week.       Amount and/or Complexity of Data Reviewed  Clinical lab tests: reviewed  Tests in the radiology section of CPT®: reviewed    Risk of Complications, Morbidity, and/or Mortality  Presenting problems: high  Diagnostic procedures: high  Management options: high    Patient Progress  Patient progress: stable      Final diagnoses:   Seizure (HCC)   Nausea and vomiting, unspecified vomiting type       ED Disposition  ED Disposition     ED Disposition   Discharge    Condition   Stable    Comment   --             No follow-up provider specified.       Medication List      New Prescriptions    ondansetron ODT 4 MG disintegrating tablet  Commonly known as: ZOFRAN-ODT  Place 1 tablet on the tongue Every 8 (Eight) Hours As Needed for Vomiting.     pantoprazole 40 MG EC tablet  Commonly known as: PROTONIX  Take 1 tablet by mouth Daily.           Where to Get Your Medications      Information about where to get these medications is not yet available    Ask your nurse or doctor about these medications  · ondansetron ODT 4 MG disintegrating tablet  · pantoprazole 40 MG EC tablet          Carson Sanchez MD  05/21/22 1459

## 2024-04-22 ENCOUNTER — APPOINTMENT (OUTPATIENT)
Dept: CT IMAGING | Facility: HOSPITAL | Age: 64
End: 2024-04-22
Payer: MEDICARE

## 2024-04-22 ENCOUNTER — APPOINTMENT (OUTPATIENT)
Dept: GENERAL RADIOLOGY | Facility: HOSPITAL | Age: 64
End: 2024-04-22
Payer: MEDICARE

## 2024-04-22 ENCOUNTER — HOSPITAL ENCOUNTER (INPATIENT)
Facility: HOSPITAL | Age: 64
LOS: 8 days | Discharge: HOME | End: 2024-05-01
Attending: EMERGENCY MEDICINE | Admitting: INTERNAL MEDICINE
Payer: MEDICARE

## 2024-04-22 DIAGNOSIS — F10.10 CHRONIC ALCOHOL ABUSE: ICD-10-CM

## 2024-04-22 DIAGNOSIS — G40.901 STATUS EPILEPTICUS: Primary | ICD-10-CM

## 2024-04-22 DIAGNOSIS — J01.00 ACUTE NON-RECURRENT MAXILLARY SINUSITIS: ICD-10-CM

## 2024-04-22 DIAGNOSIS — K76.82 HEPATIC ENCEPHALOPATHY: ICD-10-CM

## 2024-04-22 DIAGNOSIS — F10.939 ALCOHOL WITHDRAWAL SYNDROME WITH COMPLICATION: ICD-10-CM

## 2024-04-22 DIAGNOSIS — F19.90 SUBSTANCE USE DISORDER: ICD-10-CM

## 2024-04-22 DIAGNOSIS — G40.909 SEIZURE DISORDER: ICD-10-CM

## 2024-04-22 LAB
ALBUMIN SERPL-MCNC: 4.7 G/DL (ref 3.5–5.2)
ALBUMIN/GLOB SERPL: 1.2 G/DL
ALP SERPL-CCNC: 117 U/L (ref 39–117)
ALT SERPL W P-5'-P-CCNC: 24 U/L (ref 1–41)
AMMONIA BLD-SCNC: 88 UMOL/L (ref 16–60)
AMPHET+METHAMPHET UR QL: NEGATIVE
ANION GAP SERPL CALCULATED.3IONS-SCNC: 20 MMOL/L (ref 5–15)
AST SERPL-CCNC: 34 U/L (ref 1–40)
BARBITURATES UR QL SCN: NEGATIVE
BASOPHILS # BLD MANUAL: 0.16 10*3/MM3 (ref 0–0.2)
BASOPHILS NFR BLD MANUAL: 1 % (ref 0–1.5)
BENZODIAZ UR QL SCN: NEGATIVE
BILIRUB SERPL-MCNC: 0.9 MG/DL (ref 0–1.2)
BUN SERPL-MCNC: 10 MG/DL (ref 8–23)
BUN/CREAT SERPL: 8.3 (ref 7–25)
CALCIUM SPEC-SCNC: 9.2 MG/DL (ref 8.6–10.5)
CANNABINOIDS SERPL QL: NEGATIVE
CHLORIDE SERPL-SCNC: 93 MMOL/L (ref 98–107)
CK SERPL-CCNC: 119 U/L (ref 20–200)
CO2 SERPL-SCNC: 22 MMOL/L (ref 22–29)
COCAINE UR QL: NEGATIVE
CREAT SERPL-MCNC: 1.2 MG/DL (ref 0.76–1.27)
DEPRECATED RDW RBC AUTO: 57.1 FL (ref 37–54)
EGFRCR SERPLBLD CKD-EPI 2021: 67.5 ML/MIN/1.73
ERYTHROCYTE [DISTWIDTH] IN BLOOD BY AUTOMATED COUNT: 14.5 % (ref 12.3–15.4)
ETHANOL UR QL: <0.01 %
GLOBULIN UR ELPH-MCNC: 4 GM/DL
GLUCOSE SERPL-MCNC: 237 MG/DL (ref 65–99)
HCT VFR BLD AUTO: 48.9 % (ref 37.5–51)
HGB BLD-MCNC: 15.8 G/DL (ref 13–17.7)
HOLD SPECIMEN: NORMAL
LYMPHOCYTES # BLD MANUAL: 5.65 10*3/MM3 (ref 0.7–3.1)
LYMPHOCYTES NFR BLD MANUAL: 14 % (ref 5–12)
MACROCYTES BLD QL SMEAR: ABNORMAL
MAGNESIUM SERPL-MCNC: 2 MG/DL (ref 1.6–2.4)
MCH RBC QN AUTO: 34.3 PG (ref 26.6–33)
MCHC RBC AUTO-ENTMCNC: 32.3 G/DL (ref 31.5–35.7)
MCV RBC AUTO: 106.1 FL (ref 79–97)
METHADONE UR QL SCN: NEGATIVE
MONOCYTES # BLD: 2.26 10*3/MM3 (ref 0.1–0.9)
NEUTROPHILS # BLD AUTO: 8.07 10*3/MM3 (ref 1.7–7)
NEUTROPHILS NFR BLD MANUAL: 50 % (ref 42.7–76)
OPIATES UR QL: NEGATIVE
OXYCODONE UR QL SCN: NEGATIVE
PHOSPHATE SERPL-MCNC: 5.7 MG/DL (ref 2.5–4.5)
PLAT MORPH BLD: NORMAL
PLATELET # BLD AUTO: 503 10*3/MM3 (ref 140–450)
PMV BLD AUTO: 9.2 FL (ref 6–12)
POTASSIUM SERPL-SCNC: 4.2 MMOL/L (ref 3.5–5.2)
PROT SERPL-MCNC: 8.7 G/DL (ref 6–8.5)
RBC # BLD AUTO: 4.61 10*6/MM3 (ref 4.14–5.8)
SCAN SLIDE: NORMAL
SODIUM SERPL-SCNC: 135 MMOL/L (ref 136–145)
VARIANT LYMPHS NFR BLD MANUAL: 3 % (ref 0–5)
VARIANT LYMPHS NFR BLD MANUAL: 32 % (ref 19.6–45.3)
WBC MORPH BLD: NORMAL
WBC NRBC COR # BLD AUTO: 16.14 10*3/MM3 (ref 3.4–10.8)
WHOLE BLOOD HOLD COAG: NORMAL

## 2024-04-22 PROCEDURE — 80307 DRUG TEST PRSMV CHEM ANLYZR: CPT | Performed by: EMERGENCY MEDICINE

## 2024-04-22 PROCEDURE — 25010000002 LABETALOL 5 MG/ML SOLUTION: Performed by: EMERGENCY MEDICINE

## 2024-04-22 PROCEDURE — 80053 COMPREHEN METABOLIC PANEL: CPT | Performed by: EMERGENCY MEDICINE

## 2024-04-22 PROCEDURE — 99291 CRITICAL CARE FIRST HOUR: CPT

## 2024-04-22 PROCEDURE — 85025 COMPLETE CBC W/AUTO DIFF WBC: CPT | Performed by: EMERGENCY MEDICINE

## 2024-04-22 PROCEDURE — 82140 ASSAY OF AMMONIA: CPT | Performed by: EMERGENCY MEDICINE

## 2024-04-22 PROCEDURE — 81003 URINALYSIS AUTO W/O SCOPE: CPT | Performed by: STUDENT IN AN ORGANIZED HEALTH CARE EDUCATION/TRAINING PROGRAM

## 2024-04-22 PROCEDURE — 82550 ASSAY OF CK (CPK): CPT | Performed by: EMERGENCY MEDICINE

## 2024-04-22 PROCEDURE — 25010000002 LEVETRIRACETAM PER 10 MG: Performed by: EMERGENCY MEDICINE

## 2024-04-22 PROCEDURE — 85007 BL SMEAR W/DIFF WBC COUNT: CPT | Performed by: EMERGENCY MEDICINE

## 2024-04-22 PROCEDURE — 84100 ASSAY OF PHOSPHORUS: CPT | Performed by: EMERGENCY MEDICINE

## 2024-04-22 PROCEDURE — 70450 CT HEAD/BRAIN W/O DYE: CPT

## 2024-04-22 PROCEDURE — 82077 ASSAY SPEC XCP UR&BREATH IA: CPT | Performed by: EMERGENCY MEDICINE

## 2024-04-22 PROCEDURE — 71045 X-RAY EXAM CHEST 1 VIEW: CPT

## 2024-04-22 PROCEDURE — 25010000002 LORAZEPAM PER 2 MG: Performed by: EMERGENCY MEDICINE

## 2024-04-22 PROCEDURE — 83735 ASSAY OF MAGNESIUM: CPT | Performed by: EMERGENCY MEDICINE

## 2024-04-22 PROCEDURE — 25010000002 THIAMINE PER 100 MG: Performed by: EMERGENCY MEDICINE

## 2024-04-22 RX ORDER — LORAZEPAM 2 MG/ML
1 INJECTION INTRAMUSCULAR ONCE
Status: COMPLETED | OUTPATIENT
Start: 2024-04-22 | End: 2024-04-22

## 2024-04-22 RX ORDER — LABETALOL HYDROCHLORIDE 5 MG/ML
40 INJECTION, SOLUTION INTRAVENOUS ONCE
Status: COMPLETED | OUTPATIENT
Start: 2024-04-22 | End: 2024-04-22

## 2024-04-22 RX ORDER — PANTOPRAZOLE SODIUM 40 MG/10ML
40 INJECTION, POWDER, LYOPHILIZED, FOR SOLUTION INTRAVENOUS ONCE
Status: COMPLETED | OUTPATIENT
Start: 2024-04-22 | End: 2024-04-22

## 2024-04-22 RX ORDER — LEVETIRACETAM 500 MG/5ML
1000 INJECTION, SOLUTION, CONCENTRATE INTRAVENOUS ONCE
Status: COMPLETED | OUTPATIENT
Start: 2024-04-22 | End: 2024-04-22

## 2024-04-22 RX ORDER — LORAZEPAM 2 MG/ML
INJECTION INTRAMUSCULAR
Status: ACTIVE
Start: 2024-04-22 | End: 2024-04-23

## 2024-04-22 RX ADMIN — Medication 40 MG: at 22:11

## 2024-04-22 RX ADMIN — THIAMINE HYDROCHLORIDE 250 MG: 100 INJECTION, SOLUTION INTRAMUSCULAR; INTRAVENOUS at 23:07

## 2024-04-22 RX ADMIN — LORAZEPAM 1 MG: 2 INJECTION INTRAMUSCULAR; INTRAVENOUS at 21:48

## 2024-04-22 RX ADMIN — PANTOPRAZOLE SODIUM 40 MG: 40 INJECTION, POWDER, FOR SOLUTION INTRAVENOUS at 23:08

## 2024-04-22 RX ADMIN — LEVETIRACETAM 1000 MG: 500 INJECTION, SOLUTION INTRAVENOUS at 21:53

## 2024-04-22 RX ADMIN — LORAZEPAM 1 MG: 2 INJECTION INTRAMUSCULAR; INTRAVENOUS at 21:43

## 2024-04-23 ENCOUNTER — APPOINTMENT (OUTPATIENT)
Dept: NEUROLOGY | Facility: HOSPITAL | Age: 64
End: 2024-04-23
Payer: MEDICARE

## 2024-04-23 ENCOUNTER — APPOINTMENT (OUTPATIENT)
Dept: GENERAL RADIOLOGY | Facility: HOSPITAL | Age: 64
End: 2024-04-23
Payer: MEDICARE

## 2024-04-23 ENCOUNTER — APPOINTMENT (OUTPATIENT)
Dept: ULTRASOUND IMAGING | Facility: HOSPITAL | Age: 64
End: 2024-04-23
Payer: MEDICARE

## 2024-04-23 PROBLEM — I21.9 MYOCARDIAL INFARCTION: Status: ACTIVE | Noted: 2020-10-08

## 2024-04-23 PROBLEM — F10.20 ALCOHOLISM: Status: ACTIVE | Noted: 2020-03-19

## 2024-04-23 PROBLEM — I71.20 ANEURYSM OF THORACIC AORTA: Status: RESOLVED | Noted: 2019-11-08 | Resolved: 2024-04-23

## 2024-04-23 PROBLEM — I71.20 ANEURYSM OF THORACIC AORTA: Status: ACTIVE | Noted: 2019-11-08

## 2024-04-23 PROBLEM — J44.9 CHRONIC OBSTRUCTIVE LUNG DISEASE: Chronic | Status: ACTIVE | Noted: 2020-10-08

## 2024-04-23 PROBLEM — F41.9 ANXIETY: Status: ACTIVE | Noted: 2020-03-19

## 2024-04-23 PROBLEM — I21.9 MYOCARDIAL INFARCTION: Status: RESOLVED | Noted: 2020-10-08 | Resolved: 2024-04-23

## 2024-04-23 PROBLEM — I10 HYPERTENSIVE DISORDER: Status: ACTIVE | Noted: 2020-11-23

## 2024-04-23 PROBLEM — K76.0 STEATOSIS OF LIVER: Status: ACTIVE | Noted: 2020-10-09

## 2024-04-23 PROBLEM — K76.0 STEATOSIS OF LIVER: Chronic | Status: ACTIVE | Noted: 2020-10-09

## 2024-04-23 PROBLEM — I10 HYPERTENSIVE DISORDER: Chronic | Status: ACTIVE | Noted: 2020-11-23

## 2024-04-23 PROBLEM — G40.909 SEIZURE DISORDER: Chronic | Status: ACTIVE | Noted: 2024-04-23

## 2024-04-23 PROBLEM — G40.901 STATUS EPILEPTICUS: Status: ACTIVE | Noted: 2024-04-23

## 2024-04-23 PROBLEM — E72.20 HYPERAMMONEMIA: Status: ACTIVE | Noted: 2024-04-23

## 2024-04-23 PROBLEM — F41.9 ANXIETY: Chronic | Status: ACTIVE | Noted: 2020-03-19

## 2024-04-23 PROBLEM — F10.20 ALCOHOLISM: Chronic | Status: ACTIVE | Noted: 2020-03-19

## 2024-04-23 PROBLEM — J44.9 CHRONIC OBSTRUCTIVE LUNG DISEASE: Status: ACTIVE | Noted: 2020-10-08

## 2024-04-23 LAB
ALBUMIN SERPL-MCNC: 4 G/DL (ref 3.5–5.2)
ALBUMIN/GLOB SERPL: 1.2 G/DL
ALP SERPL-CCNC: 101 U/L (ref 39–117)
ALT SERPL W P-5'-P-CCNC: 18 U/L (ref 1–41)
AMMONIA BLD-SCNC: 44 UMOL/L (ref 16–60)
ANION GAP SERPL CALCULATED.3IONS-SCNC: 7 MMOL/L (ref 5–15)
ARTERIAL PATENCY WRIST A: POSITIVE
ARTERIAL PATENCY WRIST A: POSITIVE
AST SERPL-CCNC: 25 U/L (ref 1–40)
ATMOSPHERIC PRESS: ABNORMAL MM[HG]
ATMOSPHERIC PRESS: ABNORMAL MM[HG]
BASE EXCESS BLDA CALC-SCNC: 3.8 MMOL/L (ref 0–3)
BASE EXCESS BLDA CALC-SCNC: 3.9 MMOL/L (ref 0–3)
BASOPHILS # BLD AUTO: 0.05 10*3/MM3 (ref 0–0.2)
BASOPHILS NFR BLD AUTO: 0.4 % (ref 0–1.5)
BDY SITE: ABNORMAL
BDY SITE: ABNORMAL
BILIRUB SERPL-MCNC: 0.4 MG/DL (ref 0–1.2)
BILIRUB UR QL STRIP: NEGATIVE
BUN SERPL-MCNC: 9 MG/DL (ref 8–23)
BUN/CREAT SERPL: 10.6 (ref 7–25)
CALCIUM SPEC-SCNC: 8.1 MG/DL (ref 8.6–10.5)
CHLORIDE SERPL-SCNC: 97 MMOL/L (ref 98–107)
CHOLEST SERPL-MCNC: 174 MG/DL (ref 0–200)
CLARITY UR: CLEAR
CO2 BLDA-SCNC: 35 MMOL/L (ref 22–29)
CO2 BLDA-SCNC: 37.1 MMOL/L (ref 22–29)
CO2 SERPL-SCNC: 29 MMOL/L (ref 22–29)
COLOR UR: YELLOW
CREAT SERPL-MCNC: 0.85 MG/DL (ref 0.76–1.27)
DEPRECATED RDW RBC AUTO: 55.5 FL (ref 37–54)
EGFRCR SERPLBLD CKD-EPI 2021: 97 ML/MIN/1.73
EOSINOPHIL # BLD AUTO: 0.01 10*3/MM3 (ref 0–0.4)
EOSINOPHIL NFR BLD AUTO: 0.1 % (ref 0.3–6.2)
ERYTHROCYTE [DISTWIDTH] IN BLOOD BY AUTOMATED COUNT: 14.4 % (ref 12.3–15.4)
FOLATE SERPL-MCNC: >20 NG/ML (ref 4.78–24.2)
GLOBULIN UR ELPH-MCNC: 3.4 GM/DL
GLUCOSE BLDC GLUCOMTR-MCNC: 138 MG/DL (ref 70–105)
GLUCOSE SERPL-MCNC: 172 MG/DL (ref 65–99)
GLUCOSE UR STRIP-MCNC: NEGATIVE MG/DL
HBA1C MFR BLD: 6.5 % (ref 4.8–5.6)
HCO3 BLDA-SCNC: 33 MMOL/L (ref 21–28)
HCO3 BLDA-SCNC: 34.6 MMOL/L (ref 21–28)
HCT VFR BLD AUTO: 44.4 % (ref 37.5–51)
HDLC SERPL-MCNC: 74 MG/DL (ref 40–60)
HEMODILUTION: NO
HEMODILUTION: NO
HGB BLD-MCNC: 14.6 G/DL (ref 13–17.7)
HGB UR QL STRIP.AUTO: NEGATIVE
IMM GRANULOCYTES # BLD AUTO: 0.08 10*3/MM3 (ref 0–0.05)
IMM GRANULOCYTES NFR BLD AUTO: 0.6 % (ref 0–0.5)
KETONES UR QL STRIP: NEGATIVE
LDLC SERPL CALC-MCNC: 84 MG/DL (ref 0–100)
LDLC/HDLC SERPL: 1.12 {RATIO}
LEUKOCYTE ESTERASE UR QL STRIP.AUTO: NEGATIVE
LYMPHOCYTES # BLD AUTO: 0.79 10*3/MM3 (ref 0.7–3.1)
LYMPHOCYTES NFR BLD AUTO: 5.5 % (ref 19.6–45.3)
MAGNESIUM SERPL-MCNC: 2.2 MG/DL (ref 1.6–2.4)
MCH RBC QN AUTO: 34.6 PG (ref 26.6–33)
MCHC RBC AUTO-ENTMCNC: 32.9 G/DL (ref 31.5–35.7)
MCV RBC AUTO: 105.2 FL (ref 79–97)
MODALITY: ABNORMAL
MODALITY: ABNORMAL
MONOCYTES # BLD AUTO: 0.93 10*3/MM3 (ref 0.1–0.9)
MONOCYTES NFR BLD AUTO: 6.5 % (ref 5–12)
NEUTROPHILS NFR BLD AUTO: 12.38 10*3/MM3 (ref 1.7–7)
NEUTROPHILS NFR BLD AUTO: 86.9 % (ref 42.7–76)
NITRITE UR QL STRIP: NEGATIVE
NRBC BLD AUTO-RTO: 0 /100 WBC (ref 0–0.2)
PCO2 BLDA: 66.6 MM HG (ref 35–48)
PCO2 BLDA: 79.9 MM HG (ref 35–48)
PH BLDA: 7.25 PH UNITS (ref 7.35–7.45)
PH BLDA: 7.3 PH UNITS (ref 7.35–7.45)
PH UR STRIP.AUTO: 6.5 [PH] (ref 5–8)
PHOSPHATE SERPL-MCNC: 4 MG/DL (ref 2.5–4.5)
PLATELET # BLD AUTO: 339 10*3/MM3 (ref 140–450)
PMV BLD AUTO: 9 FL (ref 6–12)
PO2 BLDA: 122.7 MM HG (ref 83–108)
PO2 BLDA: 74 MM HG (ref 83–108)
POTASSIUM SERPL-SCNC: 5.8 MMOL/L (ref 3.5–5.2)
PROT SERPL-MCNC: 7.4 G/DL (ref 6–8.5)
PROT UR QL STRIP: ABNORMAL
RBC # BLD AUTO: 4.22 10*6/MM3 (ref 4.14–5.8)
SAO2 % BLDCOA: 92.4 % (ref 94–98)
SAO2 % BLDCOA: 97.8 % (ref 94–98)
SODIUM SERPL-SCNC: 133 MMOL/L (ref 136–145)
SP GR UR STRIP: <=1.005 (ref 1–1.03)
TRIGL SERPL-MCNC: 87 MG/DL (ref 0–150)
TSH SERPL DL<=0.05 MIU/L-ACNC: 0.48 UIU/ML (ref 0.27–4.2)
URINE MYOGLOBIN, QUALITATIVE: POSITIVE
UROBILINOGEN UR QL STRIP: ABNORMAL
VIT B12 BLD-MCNC: 447 PG/ML (ref 211–946)
VLDLC SERPL-MCNC: 16 MG/DL (ref 5–40)
WBC NRBC COR # BLD AUTO: 14.24 10*3/MM3 (ref 3.4–10.8)

## 2024-04-23 PROCEDURE — 82140 ASSAY OF AMMONIA: CPT | Performed by: STUDENT IN AN ORGANIZED HEALTH CARE EDUCATION/TRAINING PROGRAM

## 2024-04-23 PROCEDURE — 85025 COMPLETE CBC W/AUTO DIFF WBC: CPT | Performed by: STUDENT IN AN ORGANIZED HEALTH CARE EDUCATION/TRAINING PROGRAM

## 2024-04-23 PROCEDURE — 25010000002 CALCIUM GLUCONATE-NACL 1-0.675 GM/50ML-% SOLUTION: Performed by: STUDENT IN AN ORGANIZED HEALTH CARE EDUCATION/TRAINING PROGRAM

## 2024-04-23 PROCEDURE — 94799 UNLISTED PULMONARY SVC/PX: CPT

## 2024-04-23 PROCEDURE — 82948 REAGENT STRIP/BLOOD GLUCOSE: CPT | Performed by: STUDENT IN AN ORGANIZED HEALTH CARE EDUCATION/TRAINING PROGRAM

## 2024-04-23 PROCEDURE — 36600 WITHDRAWAL OF ARTERIAL BLOOD: CPT | Performed by: INTERNAL MEDICINE

## 2024-04-23 PROCEDURE — 63710000001 INSULIN REGULAR HUMAN PER 5 UNITS: Performed by: STUDENT IN AN ORGANIZED HEALTH CARE EDUCATION/TRAINING PROGRAM

## 2024-04-23 PROCEDURE — 25010000002 CEFTRIAXONE PER 250 MG: Performed by: EMERGENCY MEDICINE

## 2024-04-23 PROCEDURE — 99222 1ST HOSP IP/OBS MODERATE 55: CPT

## 2024-04-23 PROCEDURE — 80061 LIPID PANEL: CPT | Performed by: STUDENT IN AN ORGANIZED HEALTH CARE EDUCATION/TRAINING PROGRAM

## 2024-04-23 PROCEDURE — 82607 VITAMIN B-12: CPT | Performed by: STUDENT IN AN ORGANIZED HEALTH CARE EDUCATION/TRAINING PROGRAM

## 2024-04-23 PROCEDURE — 25010000002 PHENOBARBITAL PER 120 MG: Performed by: STUDENT IN AN ORGANIZED HEALTH CARE EDUCATION/TRAINING PROGRAM

## 2024-04-23 PROCEDURE — 82746 ASSAY OF FOLIC ACID SERUM: CPT | Performed by: STUDENT IN AN ORGANIZED HEALTH CARE EDUCATION/TRAINING PROGRAM

## 2024-04-23 PROCEDURE — 76705 ECHO EXAM OF ABDOMEN: CPT

## 2024-04-23 PROCEDURE — 84100 ASSAY OF PHOSPHORUS: CPT | Performed by: STUDENT IN AN ORGANIZED HEALTH CARE EDUCATION/TRAINING PROGRAM

## 2024-04-23 PROCEDURE — 74018 RADEX ABDOMEN 1 VIEW: CPT

## 2024-04-23 PROCEDURE — 25010000002 PHENOBARBITAL PER 120 MG

## 2024-04-23 PROCEDURE — 25010000002 LEVETRIRACETAM PER 10 MG: Performed by: STUDENT IN AN ORGANIZED HEALTH CARE EDUCATION/TRAINING PROGRAM

## 2024-04-23 PROCEDURE — 83874 ASSAY OF MYOGLOBIN: CPT | Performed by: STUDENT IN AN ORGANIZED HEALTH CARE EDUCATION/TRAINING PROGRAM

## 2024-04-23 PROCEDURE — 80053 COMPREHEN METABOLIC PANEL: CPT | Performed by: STUDENT IN AN ORGANIZED HEALTH CARE EDUCATION/TRAINING PROGRAM

## 2024-04-23 PROCEDURE — 83036 HEMOGLOBIN GLYCOSYLATED A1C: CPT | Performed by: STUDENT IN AN ORGANIZED HEALTH CARE EDUCATION/TRAINING PROGRAM

## 2024-04-23 PROCEDURE — 95819 EEG AWAKE AND ASLEEP: CPT

## 2024-04-23 PROCEDURE — 82803 BLOOD GASES ANY COMBINATION: CPT

## 2024-04-23 PROCEDURE — 36600 WITHDRAWAL OF ARTERIAL BLOOD: CPT

## 2024-04-23 PROCEDURE — 25010000002 THIAMINE PER 100 MG: Performed by: STUDENT IN AN ORGANIZED HEALTH CARE EDUCATION/TRAINING PROGRAM

## 2024-04-23 PROCEDURE — 82803 BLOOD GASES ANY COMBINATION: CPT | Performed by: INTERNAL MEDICINE

## 2024-04-23 PROCEDURE — 25010000002 ENOXAPARIN PER 10 MG: Performed by: STUDENT IN AN ORGANIZED HEALTH CARE EDUCATION/TRAINING PROGRAM

## 2024-04-23 PROCEDURE — 83735 ASSAY OF MAGNESIUM: CPT | Performed by: STUDENT IN AN ORGANIZED HEALTH CARE EDUCATION/TRAINING PROGRAM

## 2024-04-23 PROCEDURE — 84443 ASSAY THYROID STIM HORMONE: CPT | Performed by: STUDENT IN AN ORGANIZED HEALTH CARE EDUCATION/TRAINING PROGRAM

## 2024-04-23 RX ORDER — PHENOBARBITAL 32.4 MG/1
32.4 TABLET ORAL ONCE
Qty: 1 TABLET | Refills: 0 | Status: DISCONTINUED | OUTPATIENT
Start: 2024-04-24 | End: 2024-04-23

## 2024-04-23 RX ORDER — PHENOBARBITAL SODIUM 130 MG/ML
65 INJECTION, SOLUTION INTRAMUSCULAR; INTRAVENOUS ONCE
Qty: 1 ML | Refills: 0 | Status: DISCONTINUED | OUTPATIENT
Start: 2024-04-24 | End: 2024-04-23

## 2024-04-23 RX ORDER — PHENOBARBITAL 32.4 MG/1
32.4 TABLET ORAL ONCE
Status: COMPLETED | OUTPATIENT
Start: 2024-04-25 | End: 2024-04-25

## 2024-04-23 RX ORDER — ACETAMINOPHEN 650 MG/1
650 SUPPOSITORY RECTAL EVERY 4 HOURS PRN
Status: DISCONTINUED | OUTPATIENT
Start: 2024-04-23 | End: 2024-05-01 | Stop reason: HOSPADM

## 2024-04-23 RX ORDER — PHENOBARBITAL 32.4 MG/1
32.4 TABLET ORAL ONCE
Qty: 1 TABLET | Refills: 0 | Status: DISCONTINUED | OUTPATIENT
Start: 2024-04-25 | End: 2024-04-23

## 2024-04-23 RX ORDER — SODIUM CHLORIDE 0.9 % (FLUSH) 0.9 %
10 SYRINGE (ML) INJECTION EVERY 12 HOURS SCHEDULED
Status: DISCONTINUED | OUTPATIENT
Start: 2024-04-23 | End: 2024-05-01 | Stop reason: HOSPADM

## 2024-04-23 RX ORDER — SODIUM CHLORIDE 0.9 % (FLUSH) 0.9 %
10 SYRINGE (ML) INJECTION AS NEEDED
Status: DISCONTINUED | OUTPATIENT
Start: 2024-04-23 | End: 2024-05-01 | Stop reason: HOSPADM

## 2024-04-23 RX ORDER — POLYETHYLENE GLYCOL 3350 17 G/17G
17 POWDER, FOR SOLUTION ORAL DAILY PRN
Status: DISCONTINUED | OUTPATIENT
Start: 2024-04-23 | End: 2024-05-01 | Stop reason: HOSPADM

## 2024-04-23 RX ORDER — LEVETIRACETAM 500 MG/5ML
500 INJECTION, SOLUTION, CONCENTRATE INTRAVENOUS EVERY 12 HOURS SCHEDULED
Status: DISCONTINUED | OUTPATIENT
Start: 2024-04-23 | End: 2024-05-01 | Stop reason: HOSPADM

## 2024-04-23 RX ORDER — AMOXICILLIN 250 MG
2 CAPSULE ORAL 2 TIMES DAILY
Status: DISCONTINUED | OUTPATIENT
Start: 2024-04-23 | End: 2024-05-01 | Stop reason: HOSPADM

## 2024-04-23 RX ORDER — ONDANSETRON 4 MG/1
4 TABLET, ORALLY DISINTEGRATING ORAL EVERY 6 HOURS PRN
Status: DISCONTINUED | OUTPATIENT
Start: 2024-04-23 | End: 2024-05-01 | Stop reason: HOSPADM

## 2024-04-23 RX ORDER — PHENOBARBITAL SODIUM 130 MG/ML
65 INJECTION, SOLUTION INTRAMUSCULAR; INTRAVENOUS ONCE
Qty: 1 ML | Refills: 0 | Status: DISCONTINUED | OUTPATIENT
Start: 2024-04-23 | End: 2024-04-23

## 2024-04-23 RX ORDER — PHENOBARBITAL SODIUM 65 MG/ML
65 INJECTION, SOLUTION INTRAMUSCULAR; INTRAVENOUS ONCE
Status: COMPLETED | OUTPATIENT
Start: 2024-04-24 | End: 2024-04-24

## 2024-04-23 RX ORDER — BISACODYL 5 MG/1
5 TABLET, DELAYED RELEASE ORAL DAILY PRN
Status: DISCONTINUED | OUTPATIENT
Start: 2024-04-23 | End: 2024-05-01 | Stop reason: HOSPADM

## 2024-04-23 RX ORDER — THIAMINE HYDROCHLORIDE 100 MG/ML
200 INJECTION, SOLUTION INTRAMUSCULAR; INTRAVENOUS EVERY 8 HOURS SCHEDULED
Status: COMPLETED | OUTPATIENT
Start: 2024-04-23 | End: 2024-04-27

## 2024-04-23 RX ORDER — CALCIUM GLUCONATE 20 MG/ML
1000 INJECTION, SOLUTION INTRAVENOUS ONCE
Status: COMPLETED | OUTPATIENT
Start: 2024-04-23 | End: 2024-04-23

## 2024-04-23 RX ORDER — LACTULOSE 10 G/15ML
30 SOLUTION ORAL 3 TIMES DAILY
Status: DISCONTINUED | OUTPATIENT
Start: 2024-04-23 | End: 2024-04-23

## 2024-04-23 RX ORDER — HYDRALAZINE HYDROCHLORIDE 20 MG/ML
10 INJECTION INTRAMUSCULAR; INTRAVENOUS EVERY 4 HOURS PRN
Status: DISCONTINUED | OUTPATIENT
Start: 2024-04-23 | End: 2024-05-01 | Stop reason: HOSPADM

## 2024-04-23 RX ORDER — PHENOBARBITAL 32.4 MG/1
32.4 TABLET ORAL ONCE
Status: COMPLETED | OUTPATIENT
Start: 2024-04-24 | End: 2024-04-24

## 2024-04-23 RX ORDER — LACTULOSE 10 G/15ML
30 SOLUTION ORAL DAILY
Status: DISCONTINUED | OUTPATIENT
Start: 2024-04-24 | End: 2024-04-28

## 2024-04-23 RX ORDER — CEFDINIR 300 MG/1
300 CAPSULE ORAL EVERY 12 HOURS SCHEDULED
Status: COMPLETED | OUTPATIENT
Start: 2024-04-24 | End: 2024-04-27

## 2024-04-23 RX ORDER — SODIUM CHLORIDE 9 MG/ML
40 INJECTION, SOLUTION INTRAVENOUS AS NEEDED
Status: DISCONTINUED | OUTPATIENT
Start: 2024-04-23 | End: 2024-05-01 | Stop reason: HOSPADM

## 2024-04-23 RX ORDER — DEXTROSE MONOHYDRATE 25 G/50ML
25 INJECTION, SOLUTION INTRAVENOUS ONCE
Status: COMPLETED | OUTPATIENT
Start: 2024-04-23 | End: 2024-04-23

## 2024-04-23 RX ORDER — IPRATROPIUM BROMIDE AND ALBUTEROL SULFATE 2.5; .5 MG/3ML; MG/3ML
3 SOLUTION RESPIRATORY (INHALATION) EVERY 6 HOURS PRN
Status: DISCONTINUED | OUTPATIENT
Start: 2024-04-23 | End: 2024-05-01 | Stop reason: HOSPADM

## 2024-04-23 RX ORDER — NITROGLYCERIN 0.4 MG/1
0.4 TABLET SUBLINGUAL
Status: DISCONTINUED | OUTPATIENT
Start: 2024-04-23 | End: 2024-05-01 | Stop reason: HOSPADM

## 2024-04-23 RX ORDER — ACETAMINOPHEN 325 MG/1
650 TABLET ORAL EVERY 4 HOURS PRN
Status: DISCONTINUED | OUTPATIENT
Start: 2024-04-23 | End: 2024-05-01 | Stop reason: HOSPADM

## 2024-04-23 RX ORDER — BISACODYL 10 MG
10 SUPPOSITORY, RECTAL RECTAL DAILY PRN
Status: DISCONTINUED | OUTPATIENT
Start: 2024-04-23 | End: 2024-05-01 | Stop reason: HOSPADM

## 2024-04-23 RX ORDER — PHENOBARBITAL SODIUM 65 MG/ML
65 INJECTION, SOLUTION INTRAMUSCULAR; INTRAVENOUS ONCE
Status: COMPLETED | OUTPATIENT
Start: 2024-04-23 | End: 2024-04-23

## 2024-04-23 RX ORDER — ONDANSETRON 2 MG/ML
4 INJECTION INTRAMUSCULAR; INTRAVENOUS EVERY 6 HOURS PRN
Status: DISCONTINUED | OUTPATIENT
Start: 2024-04-23 | End: 2024-05-01 | Stop reason: HOSPADM

## 2024-04-23 RX ORDER — ENOXAPARIN SODIUM 100 MG/ML
40 INJECTION SUBCUTANEOUS DAILY
Status: DISCONTINUED | OUTPATIENT
Start: 2024-04-23 | End: 2024-05-01 | Stop reason: HOSPADM

## 2024-04-23 RX ADMIN — CEFTRIAXONE 2000 MG: 2 INJECTION, POWDER, FOR SOLUTION INTRAMUSCULAR; INTRAVENOUS at 00:53

## 2024-04-23 RX ADMIN — THIAMINE HYDROCHLORIDE 200 MG: 100 INJECTION, SOLUTION INTRAMUSCULAR; INTRAVENOUS at 14:55

## 2024-04-23 RX ADMIN — ENOXAPARIN SODIUM 40 MG: 100 INJECTION SUBCUTANEOUS at 02:53

## 2024-04-23 RX ADMIN — THIAMINE HYDROCHLORIDE 200 MG: 100 INJECTION, SOLUTION INTRAMUSCULAR; INTRAVENOUS at 21:18

## 2024-04-23 RX ADMIN — THIAMINE HYDROCHLORIDE 200 MG: 100 INJECTION, SOLUTION INTRAMUSCULAR; INTRAVENOUS at 07:51

## 2024-04-23 RX ADMIN — SENNOSIDES AND DOCUSATE SODIUM 2 TABLET: 8.6; 5 TABLET ORAL at 09:06

## 2024-04-23 RX ADMIN — INSULIN HUMAN 10 UNITS: 100 INJECTION, SOLUTION PARENTERAL at 07:49

## 2024-04-23 RX ADMIN — PHENOBARBITAL SODIUM 211.9 MG: 130 INJECTION INTRAMUSCULAR at 05:31

## 2024-04-23 RX ADMIN — DEXTROSE MONOHYDRATE 25 G: 25 INJECTION, SOLUTION INTRAVENOUS at 07:49

## 2024-04-23 RX ADMIN — PHENOBARBITAL SODIUM 211.9 MG: 130 INJECTION INTRAMUSCULAR at 08:13

## 2024-04-23 RX ADMIN — CALCIUM GLUCONATE 1000 MG: 20 INJECTION, SOLUTION INTRAVENOUS at 07:44

## 2024-04-23 RX ADMIN — FOLIC ACID 1 MG: 5 INJECTION, SOLUTION INTRAMUSCULAR; INTRAVENOUS; SUBCUTANEOUS at 02:53

## 2024-04-23 RX ADMIN — LACTULOSE 30 G: 20 SOLUTION ORAL at 08:59

## 2024-04-23 RX ADMIN — Medication 10 ML: at 09:06

## 2024-04-23 RX ADMIN — LEVETIRACETAM 500 MG: 100 INJECTION, SOLUTION INTRAVENOUS at 02:53

## 2024-04-23 RX ADMIN — Medication 10 ML: at 21:18

## 2024-04-23 RX ADMIN — SODIUM BICARBONATE 50 MEQ: 84 INJECTION INTRAVENOUS at 07:49

## 2024-04-23 RX ADMIN — PHENOBARBITAL SODIUM 283.4 MG: 130 INJECTION INTRAMUSCULAR at 02:44

## 2024-04-23 RX ADMIN — LEVETIRACETAM 500 MG: 100 INJECTION, SOLUTION INTRAVENOUS at 21:18

## 2024-04-23 RX ADMIN — Medication 10 ML: at 00:54

## 2024-04-23 RX ADMIN — ENOXAPARIN SODIUM 40 MG: 100 INJECTION SUBCUTANEOUS at 17:45

## 2024-04-23 RX ADMIN — PHENOBARBITAL SODIUM 65 MG: 65 INJECTION INTRAMUSCULAR; INTRAVENOUS at 18:32

## 2024-04-23 NOTE — CONSULTS
"  Referring Provider: KAREL Olvera   Reason for Consultation: \"Patient threw away medication\"       Chief complaint \" Patient threw away medication.\"     Subjective .     History of present illness:  The patient is a 63 y.o. male who was admitted secondary to seizure.     PMH: hypertension, COPD, anxiety, steatosis of liver, seizure disorder, alcohol abuse.     Psych was consulted due to patient throwing away medication.     I saw the patient this afternoon. He would open his eyes, but was mute, and was not able to participate in the interview.     I called his wife, Lili, for collateral information.     She states that he has been drinking heavily for some time. She reports he drinks about one fifth per day. She reports that he threw his medications away about a year ago, stating he \"didn't need them\". She reports that he has stopped attending his doctor appointments. 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 She reports irritability, stating he will \"go off\" in a second. She states he has had some memory loss over the last few months. He will ask her questions she has recently answered for him, but he doesn't remember. She isn't sure if he is depressed, but does report his alcohol use has increased.     The memory lapse is likely alcohol related, but unclear at this time.     She states the patient has tried to do rehab for chemical dependency in the past, but checked himself out after 3 days. She states he hasn't expressed a desire to quit drinking.       Review of Systems   Review of systems could not be obtained due to   patient confusion.    The following portions of the patient's history were reviewed and updated as appropriate: allergies, current medications, past family history, past medical history, past social history, past surgical history and problem list.    History    Past psychiatric history: alcohol abuse     Past Medical " History:   Diagnosis Date    Alcoholism 03/19/2020    Aneurysm of thoracic aorta 11/08/2019    3.9 cm      Anxiety 03/19/2020    Chronic obstructive lung disease 10/08/2020    xray [facility] 12/9/18      Hypertensive disorder 11/23/2020    Myocardial infarction 10/08/2020    angioplasty      Seizure disorder 04/23/2024    Steatosis of liver 10/09/2020          Family History   Problem Relation Age of Onset    No Known Problems Mother     No Known Problems Father         Social History     Substance Use Topics    Alcohol use: Yes    Drug use: Never          No medications prior to admission.        Scheduled Meds:  [START ON 4/24/2024] cefdinir, 300 mg, Nasogastric, Q12H  enoxaparin, 40 mg, Subcutaneous, Daily  folic acid 1 mg in sodium chloride 0.9 % 50 mL IVPB, 1 mg, Intravenous, Daily  lactulose, 30 g, Oral, TID  levETIRAcetam, 500 mg, Intravenous, Q12H  PHENobarbital, 65 mg, Intravenous, Once   Followed by  [START ON 4/24/2024] PHENobarbital, 65 mg, Intravenous, Once   Followed by  [START ON 4/24/2024] PHENobarbital, 32.4 mg, Oral, Once   Followed by  [START ON 4/25/2024] PHENobarbital, 32.4 mg, Oral, Once   Followed by  [START ON 4/25/2024] PHENobarbital, 32.4 mg, Oral, Once  senna-docusate sodium, 2 tablet, Oral, BID  sodium chloride, 10 mL, Intravenous, Q12H  thiamine (B-1) IV, 200 mg, Intravenous, Q8H   Followed by  [START ON 4/28/2024] thiamine, 100 mg, Oral, Daily         Continuous Infusions:       PRN Meds:    acetaminophen **OR** acetaminophen    senna-docusate sodium **AND** polyethylene glycol **AND** bisacodyl **AND** bisacodyl    Calcium Replacement - Follow Nurse / BPA Driven Protocol    hydrALAZINE    ipratropium-albuterol    Magnesium Standard Dose Replacement - Follow Nurse / BPA Driven Protocol    nitroglycerin    ondansetron ODT **OR** ondansetron    Phosphorus Replacement - Follow Nurse / BPA Driven Protocol    Potassium Replacement - Follow Nurse / BPA Driven Protocol    sodium chloride    sodium  "chloride      Allergies:  Penicillins      Objective     Vital Signs   /94   Pulse 95   Temp 98.9 °F (37.2 °C) (Oral)   Resp 26   Ht 175.3 cm (69\")   Wt 66.2 kg (146 lb)   SpO2 96%   BMI 21.56 kg/m²     Physical Exam:    Musculoskeletal:   Muscle strength and tone: JOSÉ  Abnormal Movements: tremor  Gait: JOSÉ    Mental Status Exam:   Hygiene:   fair  Cooperation:   unable to cooperate   Eye Contact:  Closed  Psychomotor Behavior:  Restless  Affect:  Restricted  Mood: fluctates  Hopelessness: JOSÉ  Speech:  Mute  Thought Process:  Unable to demonstrate  Thought Content:  Unable to demonstrate  Suicidal:   JOSÉ  Homicidal:   JOSÉ  Hallucinations:  Not demonstrated today  Delusion:  Unable to demonstrate  Memory:  Unable to evaluate  Orientation:  Unable to evaluate  Reliability:  poor  Insight:  Poor  Judgement:  Poor  Impulse Control:  Impaired  Physical/Medical Issues:  Yes          Medications and allergies reviewed.    Result Review:  I have personally reviewed the results from the time of this admission to 4/23/2024 13:42 EDT and agree with these findings:  [x]  Laboratory  []  Microbiology  []  Radiology  [x]  EKG/Telemetry   []  Cardiology/Vascular   []  Pathology  []  Old records  []  Other:      Assessment & Plan       Status epilepticus    Alcoholism    Anxiety    Chronic obstructive lung disease    Hypertensive disorder    Steatosis of liver    Seizure disorder    Hyperammonemia     Assessment: alcohol withdrawal delirium   Treatment Plan: Patient presents with alcohol withdrawal with delirium. Patient was unable to participate in interview to assess for possibly depression or readiness for alcohol cessation.     Will reevaluate patient when he is more alert and able to participate.     Continue supportive treatment and CIWA protocol.     Will continue to follow.   Treatment Plan discussed with: Patient, Family, and nursing     I discussed the patients findings and my recommendations with patient, " family, and nursing staff    I have reviewed and approved the behavioral health treatment plans and problem list. Yes  Thank you for the consult   Referring MD has access to consult report and progress notes in EMR     KAREL Randhawa  04/23/24  13:42 EDT
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
## 2024-04-23 NOTE — PROCEDURES
Inpatient EEG.     Patient Name:   Emiliano Bateman  :    1960  Referring Physician:  Dr. Bobby  Sedation:   Phenobarbital per protocol     Clinical History: 62 yo M with alcoholism, admitted for multiple convulsive seizures, altered mental status.     Medications: Post load and maintenance dosing of levetiracetam and on CIWA with phenobarb.     Procedure:  This is a digitally recorded multi-montage EEG with leads placed according to the international 10/20 system.    Recording time:  29 minutes    Activation:  --Photic stimulation was not done.   --Hyperventilation was not done due to the state of the patient.     Findings:  There is no sustained normal posterior dominant rhythm. The study is notable for diffuse generalized slowing in delta/theta frequencies. There are occasional regions of possible cortical irritability (sharps) in the left frontocentral head regions. Artifact cannot be fully excluded.     Impression:    (Abnormal, diffuse slowing, possible focal sharps)    This is an abnormal EEG due to the absence of normal background rhythm and the presence of diffuse generalized slowing, and superimposed possible cortical irritability. This finding is nonspecific and reflective of an encephalopathy of any etiology, which can include medication or substance exposure, metabolic contributors, or other processes impacting brain function. There is no captured electrographic seizures or status epilepticus during this study. Careful clinical correlation is recommended.       Delroy Cade DO, PhD  Neurology on call

## 2024-04-23 NOTE — CONSULTS
Reason for Consultation: Status epilepticus    Subjective .     History of present illness:  64 yo male with history of alcoholism, HTN, and prior head trauma, who presented to [facility] for LOC at home concerning for seizure, and generalized convulsive seizure in the ED as well. Patient was treated with Ativan and Keppra load and admitted to ICU. Neurology was consulted. Initial report indicated patient had recently discontinued all outpatient including anti-seizure medications, but on my discussion with patient's family at bedside, it seems he has never been on maintenance anticonvulsants. Family said he was staring, then shaking, and they helped him to the bed, and arms were shaking with eyes open, then he was incontinent. They say he shakes all the time, and needs help getting food or drinks ready. Family also said the patient drinks what sounds like daily, and that he had not discontinued drinking recently to their knowledge. Patient was told he may have Parkinson's by his physician, which led him to self-discontinue all medications about a year ago, per family, but they deny any pre-existing known anticonvulsants. Awaiting off-site pharmacy query regarding prior / recent active Rx's.     Patient was treated with Levetiracetam 1000mg, Lorazepam 2mg, and started on Phenobarbital     Review of Systems  Unable to obtain due to the state of the patient.     Hospital problem list, generated from chart    Status epilepticus    Alcoholism    Anxiety    Chronic obstructive lung disease    Hypertensive disorder    Steatosis of liver    Seizure disorder    Hyperammonemia    History  Past Medical History:   Diagnosis Date    Alcoholism 03/19/2020    Aneurysm of thoracic aorta 11/08/2019    3.9 cm      Anxiety 03/19/2020    Chronic obstructive lung disease 10/08/2020    xray CM 12/9/18      Hypertensive disorder 11/23/2020    Myocardial infarction 10/08/2020    angioplasty      Seizure disorder 04/23/2024     Steatosis of liver 10/09/2020   , History reviewed. No pertinent surgical history.,   Family History   Problem Relation Age of Onset    No Known Problems Mother     No Known Problems Father    ,   Social History     Substance Use Topics    Alcohol use: Yes    Drug use: Never   ,   No medications prior to admission.   , Scheduled Meds:  [START ON 4/24/2024] cefdinir, 300 mg, Nasogastric, Q12H  enoxaparin, 40 mg, Subcutaneous, Daily  folic acid 1 mg in sodium chloride 0.9 % 50 mL IVPB, 1 mg, Intravenous, Daily  lactulose, 30 g, Oral, TID  levETIRAcetam, 500 mg, Intravenous, Q12H  PHENobarbital, 65 mg, Intravenous, Once   Followed by  [START ON 4/24/2024] PHENobarbital, 65 mg, Intravenous, Once   Followed by  [START ON 4/24/2024] PHENobarbital, 32.4 mg, Oral, Once   Followed by  [START ON 4/25/2024] PHENobarbital, 32.4 mg, Oral, Once   Followed by  [START ON 4/25/2024] PHENobarbital, 32.4 mg, Oral, Once  senna-docusate sodium, 2 tablet, Oral, BID  sodium chloride, 10 mL, Intravenous, Q12H  thiamine (B-1) IV, 200 mg, Intravenous, Q8H   Followed by  [START ON 4/28/2024] thiamine, 100 mg, Oral, Daily    , Continuous Infusions:   , PRN Meds:    acetaminophen **OR** acetaminophen    senna-docusate sodium **AND** polyethylene glycol **AND** bisacodyl **AND** bisacodyl    Calcium Replacement - Follow Nurse / BPA Driven Protocol    hydrALAZINE    ipratropium-albuterol    Magnesium Standard Dose Replacement - Follow Nurse / BPA Driven Protocol    nitroglycerin    ondansetron ODT **OR** ondansetron    Phosphorus Replacement - Follow Nurse / BPA Driven Protocol    Potassium Replacement - Follow Nurse / BPA Driven Protocol    sodium chloride    sodium chloride and Allergies:  Penicillins    Objective     Vital Signs   Temp:  [98.4 °F (36.9 °C)-98.9 °F (37.2 °C)] 98.9 °F (37.2 °C)  Heart Rate:  [] 95  Resp:  [23-26] 26  BP: (130-210)/() 138/94    Intake & Output (last day)         04/22 0701  04/23 0700 04/23  0701  04/24 0700          Urine Unmeasured Occurrence 1 x              Physical Exam:     Mental status: Patient does not respond to voice, resting with non-labored breathing other than occasional snoring.   Cranial nerves: pupils equal, round, sluggish to light; No obvious facial droop or asymmetry.   Motor: Some paratonia versus mild to moderate cogwheeling in the LUE.   Reflexes: 2+/4 at bicep/triceps/brachioradialis/patella/Achilles bilaterally.   Other: 2 episodes of brief shaking in hands during my evaluation this morning, no alarm on Ceribell and events were self-limited. No significant change in vitals during these episodes.     Results Review:   I reviewed the patient's new clinical results.    Lab Results (last 24 hours)       Procedure Component Value Units Date/Time    Blood Gas, Arterial -Obtain on: Current Settings [[id number]]  (Abnormal) Collected: 04/23/24 1243    Specimen: Arterial Blood Updated: 04/23/24 1246     Site Left Radial     Nimesh's Test Positive     pH, Arterial 7.302 pH units      pCO2, Arterial 66.6 mm Hg      pO2, Arterial 74.0 mm Hg      HCO3, Arterial 33.0 mmol/L      Base Excess, Arterial 3.9 mmol/L      Comment: Serial Number: [id number]:  [id number]        O2 Saturation, Arterial 92.4 %      CO2 Content 35.0 mmol/L      Barometric Pressure for Blood Gas --     Comment: N/A        Modality Cannula     Hemodilution No    Myoglobin Screen, Urine - Straight Cath [[id number]] Collected: 04/23/24 1126    Specimen: Urine from Straight Cath Updated: 04/23/24 1129    Vitamin B12 [[id number]]  (Normal) Collected: 04/23/24 0530    Specimen: Blood Updated: 04/23/24 1106     Vitamin B-12 447 pg/mL     Narrative:      Results may be falsely increased if patient taking Biotin.      Folate [[id number]]  (Normal) Collected: 04/23/24 0530    Specimen: Blood Updated: 04/23/24 1106     Folate >20.00 ng/mL     Narrative:      Results may be falsely increased if patient taking Biotin.      POC Glucose Q1H  [[id number]]  (Abnormal) Collected: 04/23/24 1056    Specimen: Blood Updated: 04/23/24 1058     Glucose 138 mg/dL      Comment: Serial Number: [id number]:  [id number]       Blood Gas, Arterial - [[id number]]  (Abnormal) Collected: 04/23/24 1021    Specimen: Arterial Blood Updated: 04/23/24 1044     Site Right Radial     Nimesh's Test Positive     pH, Arterial 7.245 pH units      pCO2, Arterial 79.9 mm Hg      pO2, Arterial 122.7 mm Hg      HCO3, Arterial 34.6 mmol/L      Base Excess, Arterial 3.8 mmol/L      Comment: Serial Number: [id number]:  [id number]        O2 Saturation, Arterial 97.8 %      CO2 Content 37.1 mmol/L      Barometric Pressure for Blood Gas --     Comment: N/A        Modality Cannula     Hemodilution No    Hemoglobin A1c [[id number]]  (Abnormal) Collected: 04/23/24 0530    Specimen: Blood Updated: 04/23/24 0619     Hemoglobin A1C 6.50 %     Comprehensive Metabolic Panel [[id number]]  (Abnormal) Collected: 04/23/24 0530    Specimen: Blood Updated: 04/23/24 0612     Glucose 172 mg/dL      BUN 9 mg/dL      Creatinine 0.85 mg/dL      Sodium 133 mmol/L      Potassium 5.8 mmol/L      Chloride 97 mmol/L      CO2 29.0 mmol/L      Calcium 8.1 mg/dL      Total Protein 7.4 g/dL      Albumin 4.0 g/dL      ALT (SGPT) 18 U/L      AST (SGOT) 25 U/L      Alkaline Phosphatase 101 U/L      Total Bilirubin 0.4 mg/dL      Globulin 3.4 gm/dL      A/G Ratio 1.2 g/dL      BUN/Creatinine Ratio 10.6     Anion Gap 7.0 mmol/L      eGFR 97.0 mL/min/1.73     Narrative:      GFR Normal >60  Chronic Kidney Disease <60  Kidney Failure <15      Magnesium [[id number]]  (Normal) Collected: 04/23/24 0530    Specimen: Blood Updated: 04/23/24 0612     Magnesium 2.2 mg/dL     Phosphorus [[id number]]  (Normal) Collected: 04/23/24 0530    Specimen: Blood Updated: 04/23/24 0612     Phosphorus 4.0 mg/dL     TSH [[id number]]  (Normal) Collected: 04/23/24 0530    Specimen: Blood Updated: 04/23/24 0610     TSH 0.476 uIU/mL     Lipid Panel  [[id number]]  (Abnormal) Collected: 04/23/24 0530    Specimen: Blood Updated: 04/23/24 0608     Total Cholesterol 174 mg/dL      Triglycerides 87 mg/dL      HDL Cholesterol 74 mg/dL      LDL Cholesterol  84 mg/dL      VLDL Cholesterol 16 mg/dL      LDL/HDL Ratio 1.12    Narrative:      Cholesterol Reference Ranges  (U.S. Department of Health and Human Services ATP III Classifications)    Desirable          <200 mg/dL  Borderline High    200-239 mg/dL  High Risk          >240 mg/dL      Triglyceride Reference Ranges  (U.S. Department of Health and Human Services ATP III Classifications)    Normal           <150 mg/dL  Borderline High  150-199 mg/dL  High             200-499 mg/dL  Very High        >500 mg/dL    HDL Reference Ranges  (U.S. Department of Health and Human Services ATP III Classifications)    Low     <40 mg/dl (major risk factor for CHD)  High    >60 mg/dl ('negative' risk factor for CHD)        LDL Reference Ranges  (U.S. Department of Health and Human Services ATP III Classifications)    Optimal          <100 mg/dL  Near Optimal     100-129 mg/dL  Borderline High  130-159 mg/dL  High             160-189 mg/dL  Very High        >189 mg/dL    Ammonia [[id number]]  (Normal) Collected: 04/23/24 0530    Specimen: Blood Updated: 04/23/24 0605     Ammonia 44 umol/L     CBC & Differential [[id number]]  (Abnormal) Collected: 04/23/24 0530    Specimen: Blood Updated: 04/23/24 0558    Narrative:      The following orders were created for panel order CBC & Differential.  Procedure                               Abnormality         Status                     ---------                               -----------         ------                     CBC Auto Differential[[id number]]        Abnormal            Final result               Scan Slide[[id number]]                                                                    Please view results for these tests on the individual orders.    CBC Auto Differential [[id number]]   (Abnormal) Collected: 04/23/24 0530    Specimen: Blood Updated: 04/23/24 0558     WBC 14.24 10*3/mm3      RBC 4.22 10*6/mm3      Hemoglobin 14.6 g/dL      Hematocrit 44.4 %      .2 fL      MCH 34.6 pg      MCHC 32.9 g/dL      RDW 14.4 %      RDW-SD 55.5 fl      MPV 9.0 fL      Platelets 339 10*3/mm3      Neutrophil % 86.9 %      Lymphocyte % 5.5 %      Monocyte % 6.5 %      Eosinophil % 0.1 %      Basophil % 0.4 %      Immature Grans % 0.6 %      Neutrophils, Absolute 12.38 10*3/mm3      Lymphocytes, Absolute 0.79 10*3/mm3      Monocytes, Absolute 0.93 10*3/mm3      Eosinophils, Absolute 0.01 10*3/mm3      Basophils, Absolute 0.05 10*3/mm3      Immature Grans, Absolute 0.08 10*3/mm3      nRBC 0.0 /100 WBC     Urinalysis With Culture If Indicated - Urine, Clean Catch [[id number]]  (Abnormal) Collected: 04/22/24 2154    Specimen: Urine, Clean Catch Updated: 04/23/24 0103     Color, UA Yellow     Appearance, UA Clear     pH, UA 6.5     Specific Gravity, UA <=1.005     Glucose, UA Negative     Ketones, UA Negative     Bilirubin, UA Negative     Blood, UA Negative     Protein, UA Trace     Leuk Esterase, UA Negative     Nitrite, UA Negative     Urobilinogen, UA 1.0 E.U./dL    Narrative:      In absence of clinical symptoms, the presence of pyuria, bacteria, and/or nitrites on the urinalysis result does not correlate with infection.  Urine microscopic not indicated.    Extra Tubes [[id number]] Collected: 04/22/24 2151    Specimen: Blood, Venous Line Updated: 04/22/24 2300    Narrative:      The following orders were created for panel order Extra Tubes.  Procedure                               Abnormality         Status                     ---------                               -----------         ------                     Gold Top - SST[[id number]]                                   Final result                 Please view results for these tests on the individual orders.    Gold Top - SST [[id number]] Collected:  04/22/24 2151    Specimen: Blood Updated: 04/22/24 2300     Extra Tube Hold for add-ons.     Comment: Auto resulted.       Extra Tubes [[id number]] Collected: 04/22/24 2157    Specimen: Blood, Venous Line Updated: 04/22/24 2300    Narrative:      The following orders were created for panel order Extra Tubes.  Procedure                               Abnormality         Status                     ---------                               -----------         ------                     Light Blue Top[[id number]]                                   Final result                 Please view results for these tests on the individual orders.    Light Blue Top [[id number]] Collected: 04/22/24 2157    Specimen: Blood Updated: 04/22/24 2300     Extra Tube Hold for add-ons.     Comment: Auto resulted       Urine Drug Screen - Urine, Clean Catch [[id number]]  (Normal) Collected: 04/22/24 2154    Specimen: Urine, Clean Catch Updated: 04/22/24 2248     Amphet/Methamphet, Screen Negative     Barbiturates Screen, Urine Negative     Benzodiazepine Screen, Urine Negative     Cocaine Screen, Urine Negative     Opiate Screen Negative     THC, Screen, Urine Negative     Methadone Screen, Urine Negative     Oxycodone Screen, Urine Negative    Narrative:      Negative Thresholds Per Drugs Screened:    Amphetamines                 500 ng/ml  Barbiturates                 200 ng/ml  Benzodiazepines              100 ng/ml  Cocaine                      300 ng/ml  Methadone                    300 ng/ml  Opiates                      300 ng/ml  Oxycodone                    100 ng/ml  THC                           50 ng/ml    The Normal Value for all drugs tested is negative. This report includes final unconfirmed screening results to be used for medical treatment purposes only. Unconfirmed results must not be used for non-medical purposes such as employment or legal testing. Clinical consideration should be applied to any drug of abuse test, particularly  when unconfirmed results are used.          All urine drugs of abuse requests without chain of custody are for medical screening purposes only.  False positives are possible.      Comprehensive Metabolic Panel [[id number]]  (Abnormal) Collected: 04/22/24 2151    Specimen: Blood Updated: 04/22/24 2224     Glucose 237 mg/dL      BUN 10 mg/dL      Creatinine 1.20 mg/dL      Sodium 135 mmol/L      Potassium 4.2 mmol/L      Chloride 93 mmol/L      CO2 22.0 mmol/L      Calcium 9.2 mg/dL      Total Protein 8.7 g/dL      Albumin 4.7 g/dL      ALT (SGPT) 24 U/L      AST (SGOT) 34 U/L      Alkaline Phosphatase 117 U/L      Total Bilirubin 0.9 mg/dL      Globulin 4.0 gm/dL      A/G Ratio 1.2 g/dL      BUN/Creatinine Ratio 8.3     Anion Gap 20.0 mmol/L      eGFR 67.5 mL/min/1.73     Narrative:      GFR Normal >60  Chronic Kidney Disease <60  Kidney Failure <15      Magnesium [[id number]]  (Normal) Collected: 04/22/24 2151    Specimen: Blood Updated: 04/22/24 2224     Magnesium 2.0 mg/dL     Ethanol [[id number]] Collected: 04/22/24 2151    Specimen: Blood Updated: 04/22/24 2224     Ethanol % <0.010 %     Narrative:      Plasma Ethanol Clinical Symptoms:    ETOH (%)               Clinical Symptom  .01-.05              No apparent influence  .03-.12              Euphoria, Diminished judgment and attention   .09-.25              Impaired comprehension, Muscle incoordination  .18-.30              Confusion, Staggered gait, Slurred speech  .25-.40              Markedly decreased response to stimuli, unable to stand or                        walk, vomitting, sleep or stupor  .35-.50              Comatose, Anesthesia, Subnormal body temperature        CK [[id number]]  (Normal) Collected: 04/22/24 2151    Specimen: Blood Updated: 04/22/24 2224     Creatine Kinase 119 U/L     Phosphorus [[id number]]  (Abnormal) Collected: 04/22/24 2151    Specimen: Blood Updated: 04/22/24 2224     Phosphorus 5.7 mg/dL     CBC & Differential [[id number]]   (Abnormal) Collected: 04/22/24 2151    Specimen: Blood Updated: 04/22/24 2222    Narrative:      The following orders were created for panel order CBC & Differential.  Procedure                               Abnormality         Status                     ---------                               -----------         ------                     CBC Auto Differential[[id number]]        Abnormal            Final result               Scan Slide[[id number]]                                       Final result                 Please view results for these tests on the individual orders.    CBC Auto Differential [[id number]]  (Abnormal) Collected: 04/22/24 2151    Specimen: Blood Updated: 04/22/24 2222     WBC 16.14 10*3/mm3      RBC 4.61 10*6/mm3      Hemoglobin 15.8 g/dL      Hematocrit 48.9 %      .1 fL      MCH 34.3 pg      MCHC 32.3 g/dL      RDW 14.5 %      RDW-SD 57.1 fl      MPV 9.2 fL      Platelets 503 10*3/mm3     Scan Slide [[id number]] Collected: 04/22/24 2151    Specimen: Blood Updated: 04/22/24 2222     Scan Slide --     Comment: See Manual Differential Results       Manual Differential [[id number]]  (Abnormal) Collected: 04/22/24 2151    Specimen: Blood Updated: 04/22/24 2222     Neutrophil % 50.0 %      Lymphocyte % 32.0 %      Monocyte % 14.0 %      Basophil % 1.0 %      Atypical Lymphocyte % 3.0 %      Neutrophils Absolute 8.07 10*3/mm3      Lymphocytes Absolute 5.65 10*3/mm3      Monocytes Absolute 2.26 10*3/mm3      Basophils Absolute 0.16 10*3/mm3      Macrocytes Slight/1+     WBC Morphology Normal     Platelet Morphology Normal    Ammonia [[id number]]  (Abnormal) Collected: 04/22/24 2157    Specimen: Blood Updated: 04/22/24 2220     Ammonia 88 umol/L            Imaging Results (Last 24 Hours)       Procedure Component Value Units Date/Time    XR Abdomen KUB [[id number]] Collected: 04/23/24 0832     Updated: 04/23/24 0835    Narrative:      XR ABDOMEN KUB    Date of Exam: 4/23/2024 8:15 AM  EDT    Indication: NGT PLACEMENT    Comparison: 4/22/2024 radiographs    Findings:  Nasogastric tube distal tip overlies the stomach with sideport at the gastroesophageal junction. Nonobstructive bowel gas pattern. Included lung fields are unchanged. No acute osseous abnormality.      Impression:      Impression:  Nasogastric tube sideport overlies the gastroesophageal junction. Consider advancement up to 5 cm.      Electronically Signed: Paulo Rao MD    4/23/2024 8:33 AM EDT    Workstation ID: [id number]    US Liver [[id number]] Collected: 04/23/24 0713     Updated: 04/23/24 0717    Narrative:      US LIVER    Date of Exam: 4/23/2024 1:23 AM EDT    Indication: Elevated ammonia level.    Comparison: No comparisons available.    Technique: Grayscale and color Doppler ultrasound evaluation of the right upper quadrant was performed.      Findings:  Liver: No focal hepatic lesions seen within the imaged liver. Liver measures up to 16.9 cm. Hepatic vein is patent with phasic flow. Portal vein is patent with hepatopetal flow.    Common bile duct: Common bile duct measures up to 0.3 cm.      Impression:      Impression:  Limited right upper quadrant ultrasound of the liver and common bile duct demonstrates no sonographic abnormalities.        Electronically Signed: Juan Arzola MD    4/23/2024 7:15 AM EDT    Workstation ID: [id number]    XR Chest 1 View [[id number]] Collected: 04/22/24 2259     Updated: 04/22/24 2305    Narrative:      XR CHEST 1 VW    Date of Exam: 4/22/2024 10:48 PM EDT    Indication: seizure vomiting    Comparison: 5/24/2021    Findings:  Study is limited by overlying support and monitoring apparatus. The heart and mediastinal contours appear stable. Mild pulmonary vascular congestion noted. Lung volumes are relatively low compared to prior. No definite focal consolidation noted.   Multilevel degenerative changes noted of the spine.      Impression:      Impression:  Mild pulmonary vascular congestion  accentuated by relatively low lung volumes      Electronically Signed: Kapil Lopez MD    4/22/2024 11:02 PM EDT    Workstation ID: [id number]    CT Head Without Contrast [[id number]] Collected: 04/22/24 2228     Updated: 04/22/24 2233    Narrative:      CT HEAD WO CONTRAST    Date of Exam: 4/22/2024 10:10 PM EDT    Indication: seizures, status. hx recent parkinson's dx.    Comparison: Head CT 5/21/2022    Technique: Axial CT images were obtained of the head without contrast administration.  Coronal reconstructions were performed.  Automated exposure control and iterative reconstruction methods were used.      Findings:  Mild motion degradation. Within limitations no evidence of large territory loss of gray-white differentiation, acute intracranial hemorrhage, large mass lesion, midline shift or hydrocephalus. Mild global parenchymal volume loss similar to prior. No   large extra-axial collection. Orbits appear symmetric. No large mastoid effusion. Complete opacification of right maxillary sinus. Moderate opacification of left maxillary sinus with septated or frothy secretions. Negative for depressed skull fracture.      Impression:      Impression:  1. No acute intracranial abnormality by CT on this limited exam.  2. Moderate left and complete right opacification of maxillary sinuses. Correlate for clinical signs of acute sinusitis.      Electronically Signed: Kostas Nelson MD    4/22/2024 10:31 PM EDT    Workstation ID: [id number]               Assessment & Plan     Recurrent seizures without return to baseline   Encephalopathy  Alcoholism    Current delay to returning to interaction /  baseline may be due to acute medical therapies, but agree patient arrived altered after reported event concerning for seizure, then had another generailzed event. Ceribell in place and no alarms to indicate high concern for NCSE. As we have conflicting reports about the reported seizure history, I recommend continuing Keppra at  current dosing for now, while covering for alcohol withdrawal (which is very possible given the provided history) with CIWA/phenobarb, until any more info can be gleaned from prior medication history. Formal EEG pending.     Neurology following.     I discussed the patient's findings and my recommendations with family, nursing staff, and consulting provider    Medical Decision Making for this neurology consultation consists of the following:  Review of previous chart, including H/P, provider and nursing notes as applicable.  Review of medications and vitals.  Review of previous labs, neuroimaging, and additional relevant diagnostics, as applicable.   Interpretation of laboratory, imaging, and other diagnostic results, as applicable.   Total face-to-face/floor time: 60 minutes.       This note contains portions which were generated via Dragon dictation software (voice to written text).    Delroy Cade DO  04/23/24  13:14 EDT

## 2024-04-23 NOTE — ED NOTES
"Family reports the patient is a daily drinker, they are unsure of the last time he drank. They states he thew away all his medications a few weeks ago stating, \"I don't need them anymore.\" Tonight the patient threw up and when the family was cleaning him up they report he stood there sweating and had a blank stare. Family states he had a seizure at home. Pt does have a history of seizures and possibly parkinsons disease, family reports they were running test to confirm the diagnosis.  "

## 2024-04-23 NOTE — CASE MANAGEMENT/SOCIAL WORK
Discharge Planning Assessment  [facility]     Patient Name: Emiliano Bateman  MRN: [id number]  Today's Date: 4/23/2024    Admit Date: 4/22/2024    Plan: DC Plan: From home with spouse and daughter. Watch for IP ETOH treatment needs.   Discharge Needs Assessment       Row Name 04/23/24 1647       Living Environment    People in Home spouse    Name(s) of People in Home Lili Bateman    Current Living Arrangements home    Potentially Unsafe Housing Conditions none    In the past 12 months has the electric, gas, oil, or water company threatened to shut off services in your home? No    Primary Care Provided by self    Provides Primary Care For no one, unable/limited ability to care for self    Family Caregiver if Needed spouse    Family Caregiver Names Lili Bateman and adult daughter    Quality of Family Relationships helpful;involved;supportive    Able to Return to Prior Arrangements yes       Resource/Environmental Concerns    Resource/Environmental Concerns none    Transportation Concerns none       Transportation Needs    In the past 12 months, has lack of transportation kept you from medical appointments or from getting medications? no    In the past 12 months, has lack of transportation kept you from meetings, work, or from getting things needed for daily living? No       Food Insecurity    Within the past 12 months, you worried that your food would run out before you got the money to buy more. Never true    Within the past 12 months, the food you bought just didn't last and you didn't have money to get more. Never true       Transition Planning    Patient/Family Anticipates Transition to home with family    Patient/Family Anticipated Services at Transition other (see comments)  potential need for ETOH treatment IP.    Transportation Anticipated family or friend will provide       Discharge Needs Assessment    Readmission Within the Last 30 Days no previous admission in last 30 days    Equipment Currently Used  at Home walker, rolling;oxygen    Concerns to be Addressed discharge planning;substance/tobacco abuse/use    Anticipated Changes Related to Illness none    Equipment Needed After Discharge none    Provided Post Acute Provider List? N/A    Provided Post Acute Provider Quality & Resource List? N/A    Current Discharge Risk physical impairment;substance use/abuse                   Discharge Plan       Row Name 04/23/24 4361       Plan    Plan DC Plan: From home with spouse and daughter. Watch for IP ETOH treatment needs.    Provided Post Acute Provider List? N/A    Provided Post Acute Provider Quality & Resource List? N/A    Plan Comments CM spoke with patient spouse Lili at bedside to discuss admission assessment and discharge planning. Lili is confused. Patient confirms PCP and pharmacy. Lili confirms she is agreeable to enrolling patient in meds to bed program. CM enrolled patient and updated pharmacy in [facility]. Lili confirms medication for herself is difficult as they recently lost their Medicaid, but she is working to get it reinstated. Patient stopped taking all of his medication about one year ago per spouses report.Lili denies any additional needs for services or DME at this time. Lili will provide transportation when ready for DC. CM will continue to follow for any further needs and adjust discharge plan accordingly. DC Barriers: Cardiac monitoring, O2@10L nc, NGT, IV thiamine/folic acid/Keppra, CIWA elevated K+, Neurology consult, Psychiatry consult, Pending EEG results, and monitor labs.                 Expected Discharge Date and Time       Expected Discharge Date Expected Discharge Time    Apr 26, 2024            Demographic Summary       Row Name 04/23/24 9911       General Information    Admission Type inpatient    Arrived From emergency department;home    Required Notices Provided Important Message from Medicare    Referral Source admission list    Reason for Consult discharge  planning    Preferred Language English       Contact Information    Permission Granted to Share Info With                    Functional Status       Row Name 04/23/24 1646       Functional Status    Usual Activity Tolerance moderate    Current Activity Tolerance other (see comments)  JOSÉ    Functional Status Comments All admission information obtained from patient spouse Lili via telephone. Patient confused.       Physical Activity    On average, how many days per week do you engage in moderate to strenuous exercise (like a brisk walk)? 0 days    On average, how many minutes do you engage in exercise at this level? 0 min    Number of minutes of exercise per week 0       Functional Status, IADL    Medications independent    Meal Preparation assistive person    Housekeeping assistive person    Laundry assistive person    Shopping assistive person    IADL Comments Spouse states patient requires assistance with most tasks.       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    Conchita Bone RN      Office Phone: [phone]  Office Cell:     [phone]

## 2024-04-23 NOTE — H&P
"Critical Care History and Physical     Emiliano Bateman : 1960 MRN:[id number] LOS:0 ROOM:      Reason for admission: Status epilepticus     Assessment / Plan     Status epilepticus    --Known seizure disorder  -Low threshold for intubation  -Continue Keppra  -Seizure precautions  -Consult neurology    Alcoholism  Hyperammonemia  -Ultrasound liver  -Recommend complete cessation when medically appropriate  -Encourage lifestyle modifications  -CIWA protocol with phenobarbital  -Falls precautions  -Seizure precautions  -Thiamine, Folate  -Ammonia level 88  -Urine myoglobin  - ,Ethanol level negative  -Continuous cardiac monitoring  -Case management consult    ?  Parkinson's  -Consult psychiatry due to \"throwing away\" home medications with potential diagnosis    Leukocytosis    --Likely sinusitis  -WBC 16.14, trend  -Afebrile  -CXR reviewed   -UA and culture pending  -Blood cultures pending  -Continue ceftriaxone    Elevated blood glucose level  -Glucose 237  -A1C in a.m.  -Repeat BMP in am    COPD, not in exacerbation  -Continue home inhalers as tolerated  -Incentive spirometry  -Titrate O2 for sats > 90%    Essential Hypertension, Chronic  -Continue home medications as appropriate   - Monitor with routine vital signs     Anxiety/Depression  -Continue home meds as appropriate      Code Status (Patient has no pulse and is not breathing): CPR (Attempt to Resuscitate)  Medical Interventions (Patient has pulse or is breathing): Full Support       Nutrition:   NPO Diet NPO Type: Strict NPO     DVT prophylaxis:  Medical and mechanical DVT prophylaxis orders are present.         History of Present illness     Emiliano Bateman is a 63 y.o. male with PMH of Hypertension, COPD, anxiety, steatosis of liver, seizure disorder, and alcoholism presented to the hospital for tonic-clonic seizures, and was admitted with a principal diagnosis of Status epilepticus.  HPI information is limited due to patient postictal " "following seizure activity; information obtained from ER report and family at bedside.  Patient reportedly stopped taking all of his medications several days ago following a diagnosis of Parkinson's disease.  Patient has a known history of seizure disorder and had a generalized tonic-clonic seizure that lasted several minutes without return to normal mental status near [facility].  Patient was brought to our ER via ambulance where he had additional tonic-clonic seizure.  Patient was given Ativan and Keppra in the emergency department however continued to have \"twitching\".  Patient was brought to the intensive care unit for further evaluation and treatment.    ACP: Patient unable to answer questions at this time and wishes patient remain full code with full intervention; his wife is his decision-maker if he is unable.    Patient was seen and examined on 04/23/24 at 00:53 EDT .    Subjective / Review of systems     Review of Systems   Unable to perform ROS: Acuity of condition        Past Medical/Surgical/Social/Family History & Allergies     Past Medical History:   Diagnosis Date    Alcoholism 03/19/2020    Aneurysm of thoracic aorta 11/08/2019    3.9 cm      Anxiety 03/19/2020    Chronic obstructive lung disease 10/08/2020    xray [facility] 12/9/18      Hypertensive disorder 11/23/2020    Myocardial infarction 10/08/2020    angioplasty      Seizure disorder 04/23/2024    Steatosis of liver 10/09/2020      History reviewed. No pertinent surgical history.   Social History     Socioeconomic History    Marital status:    Substance and Sexual Activity    Alcohol use: Yes    Drug use: Never    Sexual activity: Defer      Family History   Problem Relation Age of Onset    No Known Problems Mother     No Known Problems Father       Allergies   Allergen Reactions    Penicillins Unknown - Low Severity     Childhood allergy; pt is unsure of reaction      Social Determinants of Health     Tobacco Use: High Risk " (7/31/2019)    Received from [facility]    Patient History     Smoking Tobacco Use: Every Day     Smokeless Tobacco Use: Unknown     Passive Exposure: Not on file   Alcohol Use: Not on file   Financial Resource Strain: Not on file   Food Insecurity: Not on file   Transportation Needs: Not on file   Physical Activity: Not on file   Stress: Not on file   Social Connections: Unknown (10/12/2023)    Family and Community Support     Help with Day-to-Day Activities: Not on file     Lonely or Isolated: Not on file   Interpersonal Safety: Unknown (10/12/2023)    Abuse Screen     Unsafe at Home or Work/School: Not on file     Feels Threatened by Someone?: Not on file     Does Anyone Keep You from Contacting Others or Doint Things Outside the Home?: Not on file     Physical Sign of Abuse Present: Not on file   Depression: Not on file   Housing Stability: Unknown (10/12/2023)    Housing Stability     Current Living Arrangements: Not on file     Potentially Unsafe Housing Conditions: Not on file   Utilities: Not on file   Health Literacy: Unknown (10/12/2023)    Education     Help with school or training?: Not on file     Preferred Language: Not on file   Employment: Unknown (10/12/2023)    Employment     Do you want help finding or keeping work or a job?: Not on file   Disabilities: Unknown (10/12/2023)    Disabilities     Concentrating, Remembering, or Making Decisions Difficulty: Not on file     Doing Errands Independently Difficulty: Not on file        Home Medications     Prior to Admission medications    Medication Sig Start Date End Date Taking? Authorizing Provider   ondansetron ODT (ZOFRAN-ODT) 4 MG disintegrating tablet Place 1 tablet on the tongue Every 8 (Eight) Hours As Needed for Vomiting. 5/21/22   Carson Sanchez MD   pantoprazole (PROTONIX) 40 MG EC tablet Take 1 tablet by mouth Daily. 5/21/22   Carson Sanchez MD        Objective / Physical Exam     Vital signs:  Temp: 98.4 °F (36.9 °C)  BP:  142/96  Heart Rate: 91  Resp: 23  SpO2: 97 %  Weight: 66.2 kg (146 lb)    Admission Weight: Weight: 66.2 kg (146 lb)    Physical Exam  Vitals and nursing note reviewed.   Constitutional:       Appearance: Normal appearance.   HENT:      Head: Normocephalic.      Nose: Nose normal.      Mouth/Throat:      Mouth: Mucous membranes are moist.      Pharynx: Oropharynx is clear.   Eyes:      Pupils: Pupils are equal, round, and reactive to light.   Cardiovascular:      Rate and Rhythm: Normal rate and regular rhythm.      Pulses: Normal pulses.      Heart sounds: Normal heart sounds.   Pulmonary:      Effort: Pulmonary effort is normal.      Breath sounds: Normal breath sounds.   Abdominal:      General: Bowel sounds are normal.      Palpations: Abdomen is soft.   Musculoskeletal:         General: Normal range of motion.      Cervical back: Normal range of motion.   Skin:     General: Skin is warm and dry.      Capillary Refill: Capillary refill takes 2 to 3 seconds.   Neurological:      General: No focal deficit present.      Mental Status: He is alert.      Comments: Postictal   Psychiatric:      Comments: Postictal          Labs     Results from last 7 days   Lab Units 04/22/24  2151   WBC 10*3/mm3 16.14*   HEMATOCRIT % 48.9   PLATELETS 10*3/mm3 503*      Results from last 7 days   Lab Units 04/22/24  2151   SODIUM mmol/L 135*   POTASSIUM mmol/L 4.2   CHLORIDE mmol/L 93*   CO2 mmol/L 22.0   BUN mg/dL 10   CREATININE mg/dL 1.20        Imaging     XR Chest 1 View    Result Date: 4/22/2024  Impression: Mild pulmonary vascular congestion accentuated by relatively low lung volumes Electronically Signed: Kapil Lopez MD  4/22/2024 11:02 PM EDT  Workstation ID: [id number]    CT Head Without Contrast    Result Date: 4/22/2024  Impression: 1. No acute intracranial abnormality by CT on this limited exam. 2. Moderate left and complete right opacification of maxillary sinuses. Correlate for clinical signs of acute sinusitis.  Electronically Signed: Kostas Nelson MD  4/22/2024 10:31 PM EDT  Workstation ID: [id number]       Chest X ray: My independent assessment showed infiltrates      Current Medications     Scheduled Meds:  cefTRIAXone, 2,000 mg, Intravenous, Once  cefTRIAXone, 2,000 mg, Intravenous, Q24H  enoxaparin, 40 mg, Subcutaneous, Daily  folic acid 1 mg in sodium chloride 0.9 % 50 mL IVPB, 1 mg, Intravenous, Daily  lactulose, 30 g, Oral, TID  levETIRAcetam, 500 mg, Intravenous, Q12H  PHENobarbital, 4 mg/kg (Ideal), Intravenous, Once   Followed by  PHENobarbital, 3 mg/kg (Ideal), Intravenous, Once   Followed by  PHENobarbital, 3 mg/kg (Ideal), Intravenous, Once  PHENobarbital, 65 mg, Intravenous, Once   Followed by  [START ON 4/24/2024] PHENobarbital, 65 mg, Intravenous, Once   Followed by  [START ON 4/24/2024] PHENobarbital, 32.4 mg, Oral, Once   Followed by  [START ON 4/25/2024] PHENobarbital, 32.4 mg, Oral, Once   Followed by  [START ON 4/25/2024] PHENobarbital, 32.4 mg, Oral, Once  senna-docusate sodium, 2 tablet, Oral, BID  sodium chloride, 10 mL, Intravenous, Q12H  thiamine (B-1) IV, 200 mg, Intravenous, Q8H   Followed by  [START ON 4/28/2024] thiamine, 100 mg, Oral, Daily         Continuous Infusions:        Plan discussed with RN. Reviewed all other data in the last 24 hours, including but not limited to vitals, labs, microbiology, imaging and pertinent notes from other providers.  Plan also discussed with patient and his family at the bedside.      KAREL Olvera   Critical Care  04/23/24   00:53 EDT

## 2024-04-24 ENCOUNTER — APPOINTMENT (OUTPATIENT)
Dept: GENERAL RADIOLOGY | Facility: HOSPITAL | Age: 64
End: 2024-04-24
Payer: MEDICARE

## 2024-04-24 LAB
ALBUMIN SERPL-MCNC: 3.6 G/DL (ref 3.5–5.2)
ALBUMIN/GLOB SERPL: 1.2 G/DL
ALP SERPL-CCNC: 87 U/L (ref 39–117)
ALT SERPL W P-5'-P-CCNC: 13 U/L (ref 1–41)
ANION GAP SERPL CALCULATED.3IONS-SCNC: 9 MMOL/L (ref 5–15)
AST SERPL-CCNC: 21 U/L (ref 1–40)
BASOPHILS # BLD AUTO: 0.07 10*3/MM3 (ref 0–0.2)
BASOPHILS NFR BLD AUTO: 0.7 % (ref 0–1.5)
BILIRUB SERPL-MCNC: 0.6 MG/DL (ref 0–1.2)
BUN SERPL-MCNC: 12 MG/DL (ref 8–23)
BUN/CREAT SERPL: 15.2 (ref 7–25)
CA-I SERPL ISE-MCNC: 1.22 MMOL/L (ref 1.2–1.3)
CALCIUM SPEC-SCNC: 9.2 MG/DL (ref 8.6–10.5)
CHLORIDE SERPL-SCNC: 97 MMOL/L (ref 98–107)
CO2 SERPL-SCNC: 31 MMOL/L (ref 22–29)
CREAT SERPL-MCNC: 0.79 MG/DL (ref 0.76–1.27)
DEPRECATED RDW RBC AUTO: 55.3 FL (ref 37–54)
EGFRCR SERPLBLD CKD-EPI 2021: 99.2 ML/MIN/1.73
EOSINOPHIL # BLD AUTO: 0.04 10*3/MM3 (ref 0–0.4)
EOSINOPHIL NFR BLD AUTO: 0.4 % (ref 0.3–6.2)
ERYTHROCYTE [DISTWIDTH] IN BLOOD BY AUTOMATED COUNT: 14.2 % (ref 12.3–15.4)
GLOBULIN UR ELPH-MCNC: 3.1 GM/DL
GLUCOSE SERPL-MCNC: 125 MG/DL (ref 65–99)
HCT VFR BLD AUTO: 43.1 % (ref 37.5–51)
HGB BLD-MCNC: 14.3 G/DL (ref 13–17.7)
IMM GRANULOCYTES # BLD AUTO: 0.03 10*3/MM3 (ref 0–0.05)
IMM GRANULOCYTES NFR BLD AUTO: 0.3 % (ref 0–0.5)
LYMPHOCYTES # BLD AUTO: 2.28 10*3/MM3 (ref 0.7–3.1)
LYMPHOCYTES NFR BLD AUTO: 21.8 % (ref 19.6–45.3)
MAGNESIUM SERPL-MCNC: 2.2 MG/DL (ref 1.6–2.4)
MCH RBC QN AUTO: 35 PG (ref 26.6–33)
MCHC RBC AUTO-ENTMCNC: 33.2 G/DL (ref 31.5–35.7)
MCV RBC AUTO: 105.4 FL (ref 79–97)
MONOCYTES # BLD AUTO: 0.99 10*3/MM3 (ref 0.1–0.9)
MONOCYTES NFR BLD AUTO: 9.5 % (ref 5–12)
NEUTROPHILS NFR BLD AUTO: 67.3 % (ref 42.7–76)
NEUTROPHILS NFR BLD AUTO: 7.03 10*3/MM3 (ref 1.7–7)
NRBC BLD AUTO-RTO: 0 /100 WBC (ref 0–0.2)
PHOSPHATE SERPL-MCNC: 2.1 MG/DL (ref 2.5–4.5)
PHOSPHATE SERPL-MCNC: 2.7 MG/DL (ref 2.5–4.5)
PLATELET # BLD AUTO: 262 10*3/MM3 (ref 140–450)
PMV BLD AUTO: 9.4 FL (ref 6–12)
POTASSIUM SERPL-SCNC: 4.1 MMOL/L (ref 3.5–5.2)
PROT SERPL-MCNC: 6.7 G/DL (ref 6–8.5)
RBC # BLD AUTO: 4.09 10*6/MM3 (ref 4.14–5.8)
SODIUM SERPL-SCNC: 137 MMOL/L (ref 136–145)
WBC NRBC COR # BLD AUTO: 10.44 10*3/MM3 (ref 3.4–10.8)

## 2024-04-24 PROCEDURE — 25010000002 LORAZEPAM PER 2 MG: Performed by: STUDENT IN AN ORGANIZED HEALTH CARE EDUCATION/TRAINING PROGRAM

## 2024-04-24 PROCEDURE — 83735 ASSAY OF MAGNESIUM: CPT | Performed by: STUDENT IN AN ORGANIZED HEALTH CARE EDUCATION/TRAINING PROGRAM

## 2024-04-24 PROCEDURE — 93010 ELECTROCARDIOGRAM REPORT: CPT | Performed by: STUDENT IN AN ORGANIZED HEALTH CARE EDUCATION/TRAINING PROGRAM

## 2024-04-24 PROCEDURE — 25010000002 ENOXAPARIN PER 10 MG: Performed by: STUDENT IN AN ORGANIZED HEALTH CARE EDUCATION/TRAINING PROGRAM

## 2024-04-24 PROCEDURE — 84100 ASSAY OF PHOSPHORUS: CPT | Performed by: STUDENT IN AN ORGANIZED HEALTH CARE EDUCATION/TRAINING PROGRAM

## 2024-04-24 PROCEDURE — 99231 SBSQ HOSP IP/OBS SF/LOW 25: CPT | Performed by: PSYCHIATRY & NEUROLOGY

## 2024-04-24 PROCEDURE — 25010000002 THIAMINE PER 100 MG: Performed by: STUDENT IN AN ORGANIZED HEALTH CARE EDUCATION/TRAINING PROGRAM

## 2024-04-24 PROCEDURE — 25010000002 THIAMINE HCL 200 MG/2ML SOLUTION: Performed by: STUDENT IN AN ORGANIZED HEALTH CARE EDUCATION/TRAINING PROGRAM

## 2024-04-24 PROCEDURE — 85025 COMPLETE CBC W/AUTO DIFF WBC: CPT | Performed by: STUDENT IN AN ORGANIZED HEALTH CARE EDUCATION/TRAINING PROGRAM

## 2024-04-24 PROCEDURE — 25810000003 SODIUM CHLORIDE 0.9 % SOLUTION: Performed by: STUDENT IN AN ORGANIZED HEALTH CARE EDUCATION/TRAINING PROGRAM

## 2024-04-24 PROCEDURE — 80053 COMPREHEN METABOLIC PANEL: CPT | Performed by: STUDENT IN AN ORGANIZED HEALTH CARE EDUCATION/TRAINING PROGRAM

## 2024-04-24 PROCEDURE — 25010000002 PHENOBARBITAL PER 120 MG

## 2024-04-24 PROCEDURE — 93005 ELECTROCARDIOGRAM TRACING: CPT

## 2024-04-24 PROCEDURE — 99232 SBSQ HOSP IP/OBS MODERATE 35: CPT

## 2024-04-24 PROCEDURE — 25010000002 LEVETRIRACETAM PER 10 MG: Performed by: STUDENT IN AN ORGANIZED HEALTH CARE EDUCATION/TRAINING PROGRAM

## 2024-04-24 PROCEDURE — 74018 RADEX ABDOMEN 1 VIEW: CPT

## 2024-04-24 PROCEDURE — 82330 ASSAY OF CALCIUM: CPT | Performed by: STUDENT IN AN ORGANIZED HEALTH CARE EDUCATION/TRAINING PROGRAM

## 2024-04-24 RX ORDER — RISPERIDONE 0.5 MG/1
0.25 TABLET, ORALLY DISINTEGRATING ORAL 2 TIMES DAILY
Status: DISCONTINUED | OUTPATIENT
Start: 2024-04-24 | End: 2024-04-25

## 2024-04-24 RX ORDER — AMLODIPINE BESYLATE 5 MG/1
5 TABLET ORAL
Status: DISCONTINUED | OUTPATIENT
Start: 2024-04-24 | End: 2024-04-28

## 2024-04-24 RX ORDER — LORAZEPAM 2 MG/ML
2 INJECTION INTRAMUSCULAR ONCE
Status: COMPLETED | OUTPATIENT
Start: 2024-04-24 | End: 2024-04-24

## 2024-04-24 RX ORDER — FOLIC ACID 1 MG/1
1 TABLET ORAL DAILY
Status: DISCONTINUED | OUTPATIENT
Start: 2024-04-25 | End: 2024-04-28

## 2024-04-24 RX ADMIN — PHENOBARBITAL 32.4 MG: 32.4 TABLET ORAL at 18:28

## 2024-04-24 RX ADMIN — Medication 10 ML: at 23:29

## 2024-04-24 RX ADMIN — CEFDINIR 300 MG: 300 CAPSULE ORAL at 23:29

## 2024-04-24 RX ADMIN — FOLIC ACID 1 MG: 5 INJECTION, SOLUTION INTRAMUSCULAR; INTRAVENOUS; SUBCUTANEOUS at 08:06

## 2024-04-24 RX ADMIN — SODIUM PHOSPHATE, MONOBASIC, MONOHYDRATE AND SODIUM PHOSPHATE, DIBASIC, ANHYDROUS 15 MMOL: 142; 276 INJECTION, SOLUTION INTRAVENOUS at 08:05

## 2024-04-24 RX ADMIN — ENOXAPARIN SODIUM 40 MG: 100 INJECTION SUBCUTANEOUS at 15:06

## 2024-04-24 RX ADMIN — LACTULOSE 30 G: 20 SOLUTION ORAL at 09:14

## 2024-04-24 RX ADMIN — LEVETIRACETAM 500 MG: 100 INJECTION, SOLUTION INTRAVENOUS at 08:04

## 2024-04-24 RX ADMIN — LORAZEPAM 2 MG: 2 INJECTION INTRAMUSCULAR; INTRAVENOUS at 03:08

## 2024-04-24 RX ADMIN — PHENOBARBITAL SODIUM 65 MG: 65 INJECTION INTRAMUSCULAR; INTRAVENOUS at 06:08

## 2024-04-24 RX ADMIN — Medication 10 ML: at 08:05

## 2024-04-24 RX ADMIN — AMLODIPINE BESYLATE 5 MG: 5 TABLET ORAL at 11:59

## 2024-04-24 RX ADMIN — RISPERIDONE 0.25 MG: 0.5 TABLET, ORALLY DISINTEGRATING ORAL at 23:28

## 2024-04-24 RX ADMIN — LEVETIRACETAM 500 MG: 100 INJECTION, SOLUTION INTRAVENOUS at 23:28

## 2024-04-24 RX ADMIN — CEFDINIR 300 MG: 300 CAPSULE ORAL at 09:14

## 2024-04-24 RX ADMIN — THIAMINE HYDROCHLORIDE 200 MG: 100 INJECTION, SOLUTION INTRAMUSCULAR; INTRAVENOUS at 14:50

## 2024-04-24 RX ADMIN — THIAMINE HYDROCHLORIDE 200 MG: 100 INJECTION, SOLUTION INTRAMUSCULAR; INTRAVENOUS at 06:08

## 2024-04-24 RX ADMIN — THIAMINE HYDROCHLORIDE 200 MG: 100 INJECTION, SOLUTION INTRAMUSCULAR; INTRAVENOUS at 23:28

## 2024-04-24 RX ADMIN — SENNOSIDES AND DOCUSATE SODIUM 2 TABLET: 8.6; 5 TABLET ORAL at 09:14

## 2024-04-24 NOTE — PLAN OF CARE
Goal Outcome Evaluation:  Plan of Care Reviewed With: patient, spouse     Patient remains confused and lethargic throughout the day, continuing to pull lines and attempt getting OOB.  Phos replaced.  Several bowel movements and adequate urine output.  NGT replaced this am, tube feeds ordered but not started to due to patient refusal to sit upright.  No seizure activity noted.       Progress: no change

## 2024-04-24 NOTE — PROGRESS NOTES
LOS: 1 day     Reason for consultation: Seizures, status    Subjective     Interval History: No seizures overnight, significant agitation, required restraints and Ativan. Psychiatry consulted for acute alcohol withdrawal and disengagement in personal healthcare.       Review of Systems:   Unable to obtain due to the state of the patient.     Active problems:    Status epilepticus    Alcoholism    Anxiety    Chronic obstructive lung disease    Hypertensive disorder    Steatosis of liver    Seizure disorder    Hyperammonemia      Medications:  amLODIPine, 5 mg, Nasogastric, Q24H  cefdinir, 300 mg, Nasogastric, Q12H  enoxaparin, 40 mg, Subcutaneous, Daily  [START ON 4/25/2024] folic acid, 1 mg, Nasogastric, Daily  lactulose, 30 g, Nasogastric, Daily  levETIRAcetam, 500 mg, Intravenous, Q12H  PHENobarbital, 32.4 mg, Oral, Once  [START ON 4/25/2024] PHENobarbital, 32.4 mg, Oral, Once  [START ON 4/25/2024] PHENobarbital, 32.4 mg, Oral, Once  senna-docusate sodium, 2 tablet, Oral, BID  sodium chloride, 10 mL, Intravenous, Q12H  thiamine (B-1) IV, 200 mg, Intravenous, Q8H   Followed by  [START ON 4/28/2024] thiamine, 100 mg, Oral, Daily    , Continuous Infusions:   , PRN Meds:    acetaminophen **OR** acetaminophen    senna-docusate sodium **AND** polyethylene glycol **AND** bisacodyl **AND** bisacodyl    Calcium Replacement - Follow Nurse / BPA Driven Protocol    hydrALAZINE    ipratropium-albuterol    Magnesium Standard Dose Replacement - Follow Nurse / BPA Driven Protocol    nitroglycerin    ondansetron ODT **OR** ondansetron    Phosphorus Replacement - Follow Nurse / BPA Driven Protocol    Potassium Replacement - Follow Nurse / BPA Driven Protocol    sodium chloride    sodium chloride and Allergies:  Penicillins    Objective     Vital Signs  Temp:  [98.1 °F (36.7 °C)-98.8 °F (37.1 °C)] 98.8 °F (37.1 °C)  Heart Rate:  [] 95  Resp:  [19-26] 26  BP: (117-161)/() 135/90  Intake & Output (last day)         04/23  0701  04/24 0700 04/24 0701 04/25 0700          Urine Unmeasured Occurrence 3 x     Stool Unmeasured Occurrence 2 x              Physical Exam:     Patient is resting in bed in soft restraints. No involuntary movements.      Results Review:     I reviewed the patient's new clinical results.    Lab Results (last 72 hours)       Procedure Component Value Units Date/Time    Phosphorus [[id number]]  (Abnormal) Collected: 04/24/24 0523    Specimen: Blood from Hand, Left Updated: 04/24/24 0611     Phosphorus 2.1 mg/dL     Magnesium [[id number]]  (Normal) Collected: 04/24/24 0523    Specimen: Blood from Hand, Left Updated: 04/24/24 0610     Magnesium 2.2 mg/dL     Comprehensive Metabolic Panel [[id number]]  (Abnormal) Collected: 04/24/24 0523    Specimen: Blood from Hand, Left Updated: 04/24/24 0610     Glucose 125 mg/dL      BUN 12 mg/dL      Creatinine 0.79 mg/dL      Sodium 137 mmol/L      Potassium 4.1 mmol/L      Comment: Slight hemolysis detected by analyzer. Result may be falsely elevated.        Chloride 97 mmol/L      CO2 31.0 mmol/L      Calcium 9.2 mg/dL      Total Protein 6.7 g/dL      Albumin 3.6 g/dL      ALT (SGPT) 13 U/L      AST (SGOT) 21 U/L      Alkaline Phosphatase 87 U/L      Total Bilirubin 0.6 mg/dL      Globulin 3.1 gm/dL      A/G Ratio 1.2 g/dL      BUN/Creatinine Ratio 15.2     Anion Gap 9.0 mmol/L      eGFR 99.2 mL/min/1.73     Narrative:      GFR Normal >60  Chronic Kidney Disease <60  Kidney Failure <15      Calcium, Ionized [[id number]]  (Normal) Collected: 04/24/24 0523    Specimen: Blood from Arm, Right Updated: 04/24/24 0551     Ionized Calcium 1.22 mmol/L     CBC & Differential [[id number]]  (Abnormal) Collected: 04/24/24 0523    Specimen: Blood from Hand, Left Updated: 04/24/24 0530    Narrative:      The following orders were created for panel order CBC & Differential.  Procedure                               Abnormality         Status                     ---------                                -----------         ------                     CBC Auto Differential[[id number]]        Abnormal            Final result               Scan Slide[[id number]]                                                                    Please view results for these tests on the individual orders.    CBC Auto Differential [[id number]]  (Abnormal) Collected: 04/24/24 0523    Specimen: Blood from Hand, Left Updated: 04/24/24 0530     WBC 10.44 10*3/mm3      RBC 4.09 10*6/mm3      Hemoglobin 14.3 g/dL      Hematocrit 43.1 %      .4 fL      MCH 35.0 pg      MCHC 33.2 g/dL      RDW 14.2 %      RDW-SD 55.3 fl      MPV 9.4 fL      Platelets 262 10*3/mm3      Neutrophil % 67.3 %      Lymphocyte % 21.8 %      Monocyte % 9.5 %      Eosinophil % 0.4 %      Basophil % 0.7 %      Immature Grans % 0.3 %      Neutrophils, Absolute 7.03 10*3/mm3      Lymphocytes, Absolute 2.28 10*3/mm3      Monocytes, Absolute 0.99 10*3/mm3      Eosinophils, Absolute 0.04 10*3/mm3      Basophils, Absolute 0.07 10*3/mm3      Immature Grans, Absolute 0.03 10*3/mm3      nRBC 0.0 /100 WBC     Myoglobin Screen, Urine - Straight Cath [[id number]]  (Abnormal) Collected: 04/23/24 1126    Specimen: Urine from Straight Cath Updated: 04/23/24 1626     Myoglobin, Qualitative Positive    Narrative:      Due to the similarities in the chemical properties of Hemoglobin and and Myoglobin, the presence of either will yield a positive Myoglobin Screen.    Blood Gas, Arterial -Obtain on: Current Settings [[id number]]  (Abnormal) Collected: 04/23/24 1243    Specimen: Arterial Blood Updated: 04/23/24 1246     Site Left Radial     Nimesh's Test Positive     pH, Arterial 7.302 pH units      pCO2, Arterial 66.6 mm Hg      pO2, Arterial 74.0 mm Hg      HCO3, Arterial 33.0 mmol/L      Base Excess, Arterial 3.9 mmol/L      Comment: Serial Number: [id number]:  [id number]        O2 Saturation, Arterial 92.4 %      CO2 Content 35.0 mmol/L      Barometric Pressure for Blood Gas --      Comment: N/A        Modality Cannula     Hemodilution No    Vitamin B12 [[id number]]  (Normal) Collected: 04/23/24 0530    Specimen: Blood Updated: 04/23/24 1106     Vitamin B-12 447 pg/mL     Narrative:      Results may be falsely increased if patient taking Biotin.      Folate [[id number]]  (Normal) Collected: 04/23/24 0530    Specimen: Blood Updated: 04/23/24 1106     Folate >20.00 ng/mL     Narrative:      Results may be falsely increased if patient taking Biotin.      POC Glucose Q1H [[id number]]  (Abnormal) Collected: 04/23/24 1056    Specimen: Blood Updated: 04/23/24 1058     Glucose 138 mg/dL      Comment: Serial Number: [id number]:  [id number]       Blood Gas, Arterial - [[id number]]  (Abnormal) Collected: 04/23/24 1021    Specimen: Arterial Blood Updated: 04/23/24 1044     Site Right Radial     Nimesh's Test Positive     pH, Arterial 7.245 pH units      pCO2, Arterial 79.9 mm Hg      pO2, Arterial 122.7 mm Hg      HCO3, Arterial 34.6 mmol/L      Base Excess, Arterial 3.8 mmol/L      Comment: Serial Number: [id number]:  [id number]        O2 Saturation, Arterial 97.8 %      CO2 Content 37.1 mmol/L      Barometric Pressure for Blood Gas --     Comment: N/A        Modality Cannula     Hemodilution No    Hemoglobin A1c [[id number]]  (Abnormal) Collected: 04/23/24 0530    Specimen: Blood Updated: 04/23/24 0619     Hemoglobin A1C 6.50 %     Comprehensive Metabolic Panel [[id number]]  (Abnormal) Collected: 04/23/24 0530    Specimen: Blood Updated: 04/23/24 0612     Glucose 172 mg/dL      BUN 9 mg/dL      Creatinine 0.85 mg/dL      Sodium 133 mmol/L      Potassium 5.8 mmol/L      Chloride 97 mmol/L      CO2 29.0 mmol/L      Calcium 8.1 mg/dL      Total Protein 7.4 g/dL      Albumin 4.0 g/dL      ALT (SGPT) 18 U/L      AST (SGOT) 25 U/L      Alkaline Phosphatase 101 U/L      Total Bilirubin 0.4 mg/dL      Globulin 3.4 gm/dL      A/G Ratio 1.2 g/dL      BUN/Creatinine Ratio 10.6     Anion Gap 7.0 mmol/L      eGFR 97.0  mL/min/1.73     Narrative:      GFR Normal >60  Chronic Kidney Disease <60  Kidney Failure <15      Magnesium [[id number]]  (Normal) Collected: 04/23/24 0530    Specimen: Blood Updated: 04/23/24 0612     Magnesium 2.2 mg/dL     Phosphorus [[id number]]  (Normal) Collected: 04/23/24 0530    Specimen: Blood Updated: 04/23/24 0612     Phosphorus 4.0 mg/dL     TSH [[id number]]  (Normal) Collected: 04/23/24 0530    Specimen: Blood Updated: 04/23/24 0610     TSH 0.476 uIU/mL     Lipid Panel [[id number]]  (Abnormal) Collected: 04/23/24 0530    Specimen: Blood Updated: 04/23/24 0608     Total Cholesterol 174 mg/dL      Triglycerides 87 mg/dL      HDL Cholesterol 74 mg/dL      LDL Cholesterol  84 mg/dL      VLDL Cholesterol 16 mg/dL      LDL/HDL Ratio 1.12    Narrative:      Cholesterol Reference Ranges  (U.[place] Department of Health and Human Services ATP III Classifications)    Desirable          <200 mg/dL  Borderline High    200-239 mg/dL  High Risk          >240 mg/dL      Triglyceride Reference Ranges  (U.S. Department of Health and Human Services ATP III Classifications)    Normal           <150 mg/dL  Borderline High  150-199 mg/dL  High             200-499 mg/dL  Very High        >500 mg/dL    HDL Reference Ranges  ([place].S. Department of Health and Human Services ATP III Classifications)    Low     <40 mg/dl (major risk factor for CHD)  High    >60 mg/dl ('negative' risk factor for CHD)        LDL Reference Ranges  (U.S. Department of Health and Human Services ATP III Classifications)    Optimal          <100 mg/dL  Near Optimal     100-129 mg/dL  Borderline High  130-159 mg/dL  High             160-189 mg/dL  Very High        >189 mg/dL    Ammonia [[id number]]  (Normal) Collected: 04/23/24 0530    Specimen: Blood Updated: 04/23/24 0605     Ammonia 44 umol/L     CBC & Differential [[id number]]  (Abnormal) Collected: 04/23/24 0530    Specimen: Blood Updated: 04/23/24 0558    Narrative:      The following orders were created for  panel order CBC & Differential.  Procedure                               Abnormality         Status                     ---------                               -----------         ------                     CBC Auto Differential[[id number]]        Abnormal            Final result               Scan Slide[[id number]]                                                                    Please view results for these tests on the individual orders.    CBC Auto Differential [[id number]]  (Abnormal) Collected: 04/23/24 0530    Specimen: Blood Updated: 04/23/24 0558     WBC 14.24 10*3/mm3      RBC 4.22 10*6/mm3      Hemoglobin 14.6 g/dL      Hematocrit 44.4 %      .2 fL      MCH 34.6 pg      MCHC 32.9 g/dL      RDW 14.4 %      RDW-SD 55.5 fl      MPV 9.0 fL      Platelets 339 10*3/mm3      Neutrophil % 86.9 %      Lymphocyte % 5.5 %      Monocyte % 6.5 %      Eosinophil % 0.1 %      Basophil % 0.4 %      Immature Grans % 0.6 %      Neutrophils, Absolute 12.38 10*3/mm3      Lymphocytes, Absolute 0.79 10*3/mm3      Monocytes, Absolute 0.93 10*3/mm3      Eosinophils, Absolute 0.01 10*3/mm3      Basophils, Absolute 0.05 10*3/mm3      Immature Grans, Absolute 0.08 10*3/mm3      nRBC 0.0 /100 WBC     Urinalysis With Culture If Indicated - Urine, Clean Catch [[id number]]  (Abnormal) Collected: 04/22/24 2154    Specimen: Urine, Clean Catch Updated: 04/23/24 0103     Color, UA Yellow     Appearance, UA Clear     pH, UA 6.5     Specific Gravity, UA <=1.005     Glucose, UA Negative     Ketones, UA Negative     Bilirubin, UA Negative     Blood, UA Negative     Protein, UA Trace     Leuk Esterase, UA Negative     Nitrite, UA Negative     Urobilinogen, UA 1.0 E.U./dL    Narrative:      In absence of clinical symptoms, the presence of pyuria, bacteria, and/or nitrites on the urinalysis result does not correlate with infection.  Urine microscopic not indicated.    Extra Tubes [[id number]] Collected: 04/22/24 2151    Specimen: Blood,  Venous Line Updated: 04/22/24 2300    Narrative:      The following orders were created for panel order Extra Tubes.  Procedure                               Abnormality         Status                     ---------                               -----------         ------                     Gold Top - SST[[id number]]                                   Final result                 Please view results for these tests on the individual orders.    Gold Top - SST [[id number]] Collected: 04/22/24 2151    Specimen: Blood Updated: 04/22/24 2300     Extra Tube Hold for add-ons.     Comment: Auto resulted.       Extra Tubes [[id number]] Collected: 04/22/24 2157    Specimen: Blood, Venous Line Updated: 04/22/24 2300    Narrative:      The following orders were created for panel order Extra Tubes.  Procedure                               Abnormality         Status                     ---------                               -----------         ------                     Light Blue Top[[id number]]                                   Final result                 Please view results for these tests on the individual orders.    Light Blue Top [[id number]] Collected: 04/22/24 2157    Specimen: Blood Updated: 04/22/24 2300     Extra Tube Hold for add-ons.     Comment: Auto resulted       Urine Drug Screen - Urine, Clean Catch [[id number]]  (Normal) Collected: 04/22/24 2154    Specimen: Urine, Clean Catch Updated: 04/22/24 2248     Amphet/Methamphet, Screen Negative     Barbiturates Screen, Urine Negative     Benzodiazepine Screen, Urine Negative     Cocaine Screen, Urine Negative     Opiate Screen Negative     THC, Screen, Urine Negative     Methadone Screen, Urine Negative     Oxycodone Screen, Urine Negative    Narrative:      Negative Thresholds Per Drugs Screened:    Amphetamines                 500 ng/ml  Barbiturates                 200 ng/ml  Benzodiazepines              100 ng/ml  Cocaine                      300 ng/ml  Methadone                     300 ng/ml  Opiates                      300 ng/ml  Oxycodone                    100 ng/ml  THC                           50 ng/ml    The Normal Value for all drugs tested is negative. This report includes final unconfirmed screening results to be used for medical treatment purposes only. Unconfirmed results must not be used for non-medical purposes such as employment or legal testing. Clinical consideration should be applied to any drug of abuse test, particularly when unconfirmed results are used.          All urine drugs of abuse requests without chain of custody are for medical screening purposes only.  False positives are possible.      Comprehensive Metabolic Panel [[id number]]  (Abnormal) Collected: 04/22/24 2151    Specimen: Blood Updated: 04/22/24 2224     Glucose 237 mg/dL      BUN 10 mg/dL      Creatinine 1.20 mg/dL      Sodium 135 mmol/L      Potassium 4.2 mmol/L      Chloride 93 mmol/L      CO2 22.0 mmol/L      Calcium 9.2 mg/dL      Total Protein 8.7 g/dL      Albumin 4.7 g/dL      ALT (SGPT) 24 U/L      AST (SGOT) 34 U/L      Alkaline Phosphatase 117 U/L      Total Bilirubin 0.9 mg/dL      Globulin 4.0 gm/dL      A/G Ratio 1.2 g/dL      BUN/Creatinine Ratio 8.3     Anion Gap 20.0 mmol/L      eGFR 67.5 mL/min/1.73     Narrative:      GFR Normal >60  Chronic Kidney Disease <60  Kidney Failure <15      Magnesium [[id number]]  (Normal) Collected: 04/22/24 2151    Specimen: Blood Updated: 04/22/24 2224     Magnesium 2.0 mg/dL     Ethanol [[id number]] Collected: 04/22/24 2151    Specimen: Blood Updated: 04/22/24 2224     Ethanol % <0.010 %     Narrative:      Plasma Ethanol Clinical Symptoms:    ETOH (%)               Clinical Symptom  .01-.05              No apparent influence  .03-.12              Euphoria, Diminished judgment and attention   .09-.25              Impaired comprehension, Muscle incoordination  .18-.30              Confusion, Staggered gait, Slurred speech  .25-.40               Markedly decreased response to stimuli, unable to stand or                        walk, vomitting, sleep or stupor  .35-.50              Comatose, Anesthesia, Subnormal body temperature        CK [[id number]]  (Normal) Collected: 04/22/24 2151    Specimen: Blood Updated: 04/22/24 2224     Creatine Kinase 119 U/L     Phosphorus [[id number]]  (Abnormal) Collected: 04/22/24 2151    Specimen: Blood Updated: 04/22/24 2224     Phosphorus 5.7 mg/dL     CBC & Differential [[id number]]  (Abnormal) Collected: 04/22/24 2151    Specimen: Blood Updated: 04/22/24 2222    Narrative:      The following orders were created for panel order CBC & Differential.  Procedure                               Abnormality         Status                     ---------                               -----------         ------                     CBC Auto Differential[[id number]]        Abnormal            Final result               Scan Slide[[id number]]                                       Final result                 Please view results for these tests on the individual orders.    CBC Auto Differential [[id number]]  (Abnormal) Collected: 04/22/24 2151    Specimen: Blood Updated: 04/22/24 2222     WBC 16.14 10*3/mm3      RBC 4.61 10*6/mm3      Hemoglobin 15.8 g/dL      Hematocrit 48.9 %      .1 fL      MCH 34.3 pg      MCHC 32.3 g/dL      RDW 14.5 %      RDW-SD 57.1 fl      MPV 9.2 fL      Platelets 503 10*3/mm3     Scan Slide [[id number]] Collected: 04/22/24 2151    Specimen: Blood Updated: 04/22/24 2222     Scan Slide --     Comment: See Manual Differential Results       Manual Differential [[id number]]  (Abnormal) Collected: 04/22/24 2151    Specimen: Blood Updated: 04/22/24 2222     Neutrophil % 50.0 %      Lymphocyte % 32.0 %      Monocyte % 14.0 %      Basophil % 1.0 %      Atypical Lymphocyte % 3.0 %      Neutrophils Absolute 8.07 10*3/mm3      Lymphocytes Absolute 5.65 10*3/mm3      Monocytes Absolute 2.26 10*3/mm3      Basophils Absolute  0.16 10*3/mm3      Macrocytes Slight/1+     WBC Morphology Normal     Platelet Morphology Normal    Ammonia [[id number]]  (Abnormal) Collected: 04/22/24 2157    Specimen: Blood Updated: 04/22/24 2220     Ammonia 88 umol/L            Imaging Results (Last 72 Hours)       Procedure Component Value Units Date/Time    XR Abdomen KUB [[id number]] Collected: 04/24/24 0855     Updated: 04/24/24 0858    Narrative:      XR ABDOMEN KUB    Date of Exam: 4/24/2024 8:42 AM EDT    Indication: NGT placement    Comparison: KUB 4/23/2024    Findings:  NG tube tip projects to the upper gastric body level. Sidehole projects past the level of the EG junction.    Mild to moderate generalized gas within large and small bowel. Small amount of air is seen within the stomach. No pneumatosis or gross free intraperitoneal air is identified. Imaged lung bases are clear. Heart size is normal.      Impression:      Impression:  NG tube extends to the proximal gastric body level. The sidehole is located 3 cm distal to the EG junction.      Electronically Signed: Ramona Truong MD    4/24/2024 8:56 AM EDT    Workstation ID: [id number]    XR Abdomen KUB [[id number]] Collected: 04/23/24 0832     Updated: 04/23/24 0835    Narrative:      XR ABDOMEN KUB    Date of Exam: 4/23/2024 8:15 AM EDT    Indication: NGT PLACEMENT    Comparison: 4/22/2024 radiographs    Findings:  Nasogastric tube distal tip overlies the stomach with sideport at the gastroesophageal junction. Nonobstructive bowel gas pattern. Included lung fields are unchanged. No acute osseous abnormality.      Impression:      Impression:  Nasogastric tube sideport overlies the gastroesophageal junction. Consider advancement up to 5 cm.      Electronically Signed: Paulo Rao MD    4/23/2024 8:33 AM EDT    Workstation ID: [id number]    US Liver [[id number]] Collected: 04/23/24 0713     Updated: 04/23/24 0717    Narrative:      US LIVER    Date of Exam: 4/23/2024 1:23 AM EDT    Indication: Elevated  ammonia level.    Comparison: No comparisons available.    Technique: Grayscale and color Doppler ultrasound evaluation of the right upper quadrant was performed.      Findings:  Liver: No focal hepatic lesions seen within the imaged liver. Liver measures up to 16.9 cm. Hepatic vein is patent with phasic flow. Portal vein is patent with hepatopetal flow.    Common bile duct: Common bile duct measures up to 0.3 cm.      Impression:      Impression:  Limited right upper quadrant ultrasound of the liver and common bile duct demonstrates no sonographic abnormalities.        Electronically Signed: Juan Arzola MD    4/23/2024 7:15 AM EDT    Workstation ID: [id number]    XR Chest 1 View [[id number]] Collected: 04/22/24 2259     Updated: 04/22/24 2305    Narrative:      XR CHEST 1 VW    Date of Exam: 4/22/2024 10:48 PM EDT    Indication: seizure vomiting    Comparison: 5/24/2021    Findings:  Study is limited by overlying support and monitoring apparatus. The heart and mediastinal contours appear stable. Mild pulmonary vascular congestion noted. Lung volumes are relatively low compared to prior. No definite focal consolidation noted.   Multilevel degenerative changes noted of the spine.      Impression:      Impression:  Mild pulmonary vascular congestion accentuated by relatively low lung volumes      Electronically Signed: Kapil Lopez MD    4/22/2024 11:02 PM EDT    Workstation ID: [id number]    CT Head Without Contrast [[id number]] Collected: 04/22/24 2228     Updated: 04/22/24 2233    Narrative:      CT HEAD WO CONTRAST    Date of Exam: 4/22/2024 10:10 PM EDT    Indication: seizures, status. hx recent parkinson's dx.    Comparison: Head CT 5/21/2022    Technique: Axial CT images were obtained of the head without contrast administration.  Coronal reconstructions were performed.  Automated exposure control and iterative reconstruction methods were used.      Findings:  Mild motion degradation. Within limitations no  evidence of large territory loss of gray-white differentiation, acute intracranial hemorrhage, large mass lesion, midline shift or hydrocephalus. Mild global parenchymal volume loss similar to prior. No   large extra-axial collection. Orbits appear symmetric. No large mastoid effusion. Complete opacification of right maxillary sinus. Moderate opacification of left maxillary sinus with septated or frothy secretions. Negative for depressed skull fracture.      Impression:      Impression:  1. No acute intracranial abnormality by CT on this limited exam.  2. Moderate left and complete right opacification of maxillary sinuses. Correlate for clinical signs of acute sinusitis.      Electronically Signed: Kostas Nelson MD    4/22/2024 10:31 PM EDT    Workstation ID: [id number]               Assessment & Plan     Alcohol withdrawal with seizures    No recurrence, no status on EEG. Agree with management to treat EtOH dependence. Typically no need for Keppra (or other antiseizure med) long-term in setting of withdrawal seizures. Family told me he had never been on meds for seizures. Unless more convincing history can be agreed upon and confirmed, he probably can stop Keppra. Defer CIWA to primary. No further neurological testing indicated at this time.     Regarding potential diagnosis of parkinson's, this would be an outpatient workup. It is difficult to determine from history if he truly has an independent parkinsonism, as the reported symptoms (tremors, even when not at rest, symmetric; sleep disturbance, etc.) are nonspecific. He can follow up with neurology clinic of his choice if desired. Parkinson's itself would not cause this current presentation.     Neurology will sign off, thank you for the consult. Please do not hesitate to call with questions or concerns.     Time: 25 minutes were spent on this encounter, to include face to face / floor time, coordination of care, review of records, and documentation.     This  note contains portions which were generated via Dragon Software (voice to written text).      Delroy Cade DO  04/24/24  12:53 EDT

## 2024-04-24 NOTE — SIGNIFICANT NOTE
04/24/24 1338   OTHER   Discipline physical therapist   Rehab Time/Intention   Session Not Performed unable to evaluate, medical status change  (spoke with RN re: PT orders, reports not appropriate for therapy today, combative/confused.  Will f/u 4/25 as appropriate.)   Recommendation   PT - Next Appointment 04/25/24

## 2024-04-24 NOTE — PROGRESS NOTES
[facility] MEDICINE SERVICE  TRANSFER OF CARE/ACCEPTANCE NOTE    PATIENT NAME: Emiliano Bateman  : 1960  MRN: [id number]     Active Hospital Problems    Diagnosis  POA    **Status epilepticus [G40.901]  Yes    Seizure disorder [G40.909]  Yes    Hyperammonemia [E72.20]  Yes    Hypertensive disorder [I10]  Yes    Steatosis of liver [K76.0]  Yes    Chronic obstructive lung disease [J44.9]  Yes    Alcoholism [F10.20]  Yes    Anxiety [F41.9]  Yes      Resolved Hospital Problems   No resolved problems to display.       Patient seen and examined by me on 24 at 1330.  63 year old male, lives at home with wife. PMH HTN, COPD, anxiety, seizures disorder, alcoholism presented to ED on  for status epilepticus. Was admitted to ICU under intensivist service with consults from psych and neurology.   On exam, remains with agitation with staff. NGT clamped and in bilateral wrist restraints due to pulling NGT out. Hospitalist service resume care as will be down graded to PCU.   Current VS: 90-/0-96% 1 lpm/NC.     Will continue current plan of care with support from psych. Neurology has signed off case as of . Will continue CIWA protocol with phenobarbital.     I have reviewed the H&P, diagnostic data and plan of care for Emiliano Bateman.  I will be taking over care of this patient during the current hospitalization.        Signature: Electronically signed by KAREL Anaya, 24, 15:50 EDT.  [facility] Hospitalist Team

## 2024-04-24 NOTE — PROGRESS NOTES
"  Chief complaint : ETOH withdrawal    Subjective .     History of present illness:  The patient is a 63 y.o. male who was admitted secondary to seizure.     PMH: hypertension, COPD, anxiety, steatosis of liver, seizure disorder, alcohol abuse.      Psych was consulted due to patient throwing away medication.     I saw the patient this afternoon. He would open his eyes, but was mute, and was not able to participate in the interview.      I called his wife, Lili, for collateral information.     She states that he has been drinking heavily for some time. She reports he drinks about one fifth per day. She reports that he threw his medications away about a year ago, stating he \"didn't need them\". She reports that he has stopped attending his doctor appointments. 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 She reports irritability, stating he will \"go off\" in a second. She states he has had some memory loss over the last few months. He will ask her questions she has recently answered for him, but he doesn't remember. She isn't sure if he is depressed, but does report his alcohol use has increased.      The memory lapse is likely alcohol related, but unclear at this time.      She states the patient has tried to do rehab for chemical dependency in the past, but checked himself out after 3 days. She states he hasn't expressed a desire to quit drinking. .    4/24/24: Patient seen today. He has required restraints. Notes reviewed state he has been more agitated and restless. He is still not able to participate in interview.     Review of Systems   Review of systems could not be obtained due to   patient confusion.    The following portions of the patient's history were reviewed and updated as appropriate: allergies, current medications, past family history, past medical history, past social history, past surgical history and problem list.    History    Past " "psychiatric history: alcohol abuse       No medications prior to admission.        Scheduled Meds:  amLODIPine, 5 mg, Nasogastric, Q24H  cefdinir, 300 mg, Nasogastric, Q12H  enoxaparin, 40 mg, Subcutaneous, Daily  [START ON 4/25/2024] folic acid, 1 mg, Nasogastric, Daily  lactulose, 30 g, Nasogastric, Daily  levETIRAcetam, 500 mg, Intravenous, Q12H  PHENobarbital, 32.4 mg, Oral, Once  [START ON 4/25/2024] PHENobarbital, 32.4 mg, Oral, Once  [START ON 4/25/2024] PHENobarbital, 32.4 mg, Oral, Once  senna-docusate sodium, 2 tablet, Oral, BID  sodium chloride, 10 mL, Intravenous, Q12H  thiamine (B-1) IV, 200 mg, Intravenous, Q8H   Followed by  [START ON 4/28/2024] thiamine, 100 mg, Oral, Daily         Continuous Infusions:       PRN Meds:    acetaminophen **OR** acetaminophen    senna-docusate sodium **AND** polyethylene glycol **AND** bisacodyl **AND** bisacodyl    Calcium Replacement - Follow Nurse / BPA Driven Protocol    hydrALAZINE    ipratropium-albuterol    Magnesium Standard Dose Replacement - Follow Nurse / BPA Driven Protocol    nitroglycerin    ondansetron ODT **OR** ondansetron    Phosphorus Replacement - Follow Nurse / BPA Driven Protocol    Potassium Replacement - Follow Nurse / BPA Driven Protocol    sodium chloride    sodium chloride      Allergies:  Penicillins      Objective     Vital Signs   BP (!) 161/106   Pulse 97   Temp 98.5 °F (36.9 °C) (Axillary)   Resp 26   Ht 175.3 cm (69\")   Wt 67.8 kg (149 lb 7.6 oz)   SpO2 98%   BMI 22.07 kg/m²     Physical Exam:    Musculoskeletal:   Muscle strength and tone: JOSÉ  Abnormal Movements: tremor  Gait: JOSÉ     Mental Status Exam:   Hygiene:   fair  Cooperation:   unable to cooperate   Eye Contact:  Closed  Psychomotor Behavior:  Restless  Affect:  Restricted  Mood: fluctates  Hopelessness: JOSÉ  Speech:  Mute  Thought Process:  Unable to demonstrate  Thought Content:  Unable to demonstrate  Suicidal:   JOSÉ  Homicidal:   JOSÉ  Hallucinations:  Not " demonstrated today  Delusion:  Unable to demonstrate  Memory:  Unable to evaluate  Orientation:  Unable to evaluate  Reliability:  poor  Insight:  Poor  Judgement:  Poor  Impulse Control:  Impaired  Physical/Medical Issues:  Yes            Medications and allergies reviewed.     Result Review:  I have personally reviewed the results from the time of this admission to 4/23/2024 13:42 EDT and agree with these findings:  [x]  Laboratory  []  Microbiology  []  Radiology  [x]  EKG/Telemetry   []  Cardiology/Vascular   []  Pathology  []  Old records  []  Other:       Assessment & Plan       Status epilepticus    Alcoholism    Anxiety    Chronic obstructive lung disease    Hypertensive disorder    Steatosis of liver    Seizure disorder    Hyperammonemia       Assessment: alcohol withdrawal delirium   Treatment Plan: Patient presents with alcohol withdrawal with delirium. Patient was unable to participate in interview to assess for possibly depression or readiness for alcohol cessation.      Will reevaluate patient when he is more alert and able to participate.      Continue supportive treatment and CIWA protocol.     Ordered EKG to assess QT measurement. If appropriate, will order risperidone BID for delirium.      Will continue to follow.   Treatment Plan discussed with: Patient    I discussed the patients findings and my recommendations with patient    I have reviewed and approved the behavioral health treatment plans and problem list. Yes     Referring MD has access to consult report and progress notes in EMR     KAREL Randhawa  04/24/24  11:34 EDT

## 2024-04-24 NOTE — CASE MANAGEMENT/SOCIAL WORK
Continued Stay Note   [facility]     Patient Name: Emiliano Bateman  MRN: [id number]  Today's Date: 4/24/2024    Admit Date: 4/22/2024    Plan: DC Plan: From home with spouse and daughter. Watch for IP ETOH treatment needs.   Discharge Plan       Row Name 04/24/24 1343       Plan    Plan DC Plan: From home with spouse and daughter. Watch for IP ETOH treatment needs.    Provided Post Acute Provider List? N/A    Provided Post Acute Provider Quality & Resource List? N/A    Plan Comments CM spoke with intensivist, nursing, and NP to obtain clinical upates in morning rounds. Patient had increased confusion overnight. Patient pulled out NGT, but it has now been replaced. Patient placed in restraints for medical safety. Patient downgraded to PCU level of care. CM will continue to follow for any further needs and adjust discharge plan accordingly. DC Barriers: Cardiac monitoring, confusion, O2@2L nc, NGT, IV electrolytes/thiamine/folic acid/keppra, PT/OT pending, and Restraints.                 Expected Discharge Date and Time       Expected Discharge Date Expected Discharge Time    Apr 27, 2024           Phone communication or documentation only- no physical contact with patient or family.      Conchita Bone RN    Office Phone: [phone]  Office Cell:     [phone]

## 2024-04-24 NOTE — CONSULTS
"Nutrition Services    Patient Name: Emiliano Bateman  YOB: 1960  MRN: [id number]  Admission date: 4/22/2024    Comment:  -- Begin Nutren 1.5 at 20mL/hr + 10mL/hr water flush       CLINICAL NUTRITION ASSESSMENT      Reason for Assessment 4/24: Consult for tube feeding      H&P      Past Medical History:   Diagnosis Date    Alcoholism 03/19/2020    Aneurysm of thoracic aorta 11/08/2019    3.9 cm      Anxiety 03/19/2020    Chronic obstructive lung disease 10/08/2020    xray CM 12/9/18      Hypertensive disorder 11/23/2020    Myocardial infarction 10/08/2020    angioplasty      Seizure disorder 04/23/2024    Steatosis of liver 10/09/2020       History reviewed. No pertinent surgical history.     Current Problems   Status epilepticus  -Neurology following  -S/P EEG 4/23    Alcoholism  Hyperammonemia  -CIWA protocol   -Case management consult     ?  Parkinson's  -Psychiatry following due to \"throwing away\" home medications      Leukocytosis     Elevated blood glucose level  -A1C 6.5% on 4/23/24     COPD     Essential Hypertension     Anxiety/Depression       Encounter Information        Trending Narrative     4/24: Admitted for  tonic-clonic seizures, and was admitted with a principal diagnosis of status epilepticus.  Patient discussed in AM rounds.  Noted with NG tube in place, though had to be replaced due to patient pulling out.  Not awake enough for PO intake at this time per RN.  MD consulted for tube feeding.  Noted with plans to transfer out of ICU level of care.  RD visited patient at bedside.  Patient not appropriate for interview.  Squirming in bed, does not sit up long before moving flat per RN at bedside.  RD to begin trickle tube feeding.  NFPE completed and not consistent with nutrition diagnosis of malnutrition at this time using AND/ASPEN criteria.         Anthropometrics        Current Height, Weight Height: 175.3 cm (69\")  Weight: 67.8 kg (149 lb 7.6 oz) (04/24/24 0515)       Usual Body " Weight (UBW) Unable to obtain from patient        Trending Weight Hx     This admission: 4/24: 149#             PTA: No recent weights to note     Wt Readings from Last 30 Encounters:   04/24/24 0515 67.8 kg (149 lb 7.6 oz)   04/22/24 2154 66.2 kg (146 lb)   05/21/22 1446 65.8 kg (145 lb)      BMI kg/m2 Body mass index is 22.07 kg/m².       Labs        Pertinent Labs Hypophosphatemia - replaced today      Results from last 7 days   Lab Units 04/24/24  0523 04/23/24  0530 04/22/24 2151   SODIUM mmol/L 137 133* 135*   POTASSIUM mmol/L 4.1 5.8* 4.2   CHLORIDE mmol/L 97* 97* 93*   CO2 mmol/L 31.0* 29.0 22.0   BUN mg/dL 12 9 10   CREATININE mg/dL 0.79 0.85 1.20   CALCIUM mg/dL 9.2 8.1* 9.2   BILIRUBIN mg/dL 0.6 0.4 0.9   ALK PHOS U/L 87 101 117   ALT (SGPT) U/L 13 18 24   AST (SGOT) U/L 21 25 34   GLUCOSE mg/dL 125* 172* 237*     Results from last 7 days   Lab Units 04/24/24 0523 04/23/24 0530 04/22/24 2151   MAGNESIUM mg/dL 2.2 2.2 2.0   PHOSPHORUS mg/dL 2.1* 4.0 5.7*   HEMOGLOBIN g/dL 14.3 14.6 15.8   HEMATOCRIT % 43.1 44.4 48.9   TRIGLYCERIDES mg/dL  --  87  --      Lab Results   Component Value Date    HGBA1C 6.50 (H) 04/23/2024        Medications    Scheduled Medications amLODIPine, 5 mg, Nasogastric, Q24H  cefdinir, 300 mg, Nasogastric, Q12H  enoxaparin, 40 mg, Subcutaneous, Daily  [START ON 4/25/2024] folic acid, 1 mg, Nasogastric, Daily  lactulose, 30 g, Nasogastric, Daily  levETIRAcetam, 500 mg, Intravenous, Q12H  PHENobarbital, 32.4 mg, Oral, Once  [START ON 4/25/2024] PHENobarbital, 32.4 mg, Oral, Once  [START ON 4/25/2024] PHENobarbital, 32.4 mg, Oral, Once  senna-docusate sodium, 2 tablet, Oral, BID  sodium chloride, 10 mL, Intravenous, Q12H  sodium phosphate, 15 mmol, Intravenous, Once  thiamine (B-1) IV, 200 mg, Intravenous, Q8H   Followed by  [START ON 4/28/2024] thiamine, 100 mg, Oral, Daily        Infusions      PRN Medications   acetaminophen **OR** acetaminophen    senna-docusate sodium **AND**  polyethylene glycol **AND** bisacodyl **AND** bisacodyl    Calcium Replacement - Follow Nurse / BPA Driven Protocol    hydrALAZINE    ipratropium-albuterol    Magnesium Standard Dose Replacement - Follow Nurse / BPA Driven Protocol    nitroglycerin    ondansetron ODT **OR** ondansetron    Phosphorus Replacement - Follow Nurse / BPA Driven Protocol    Potassium Replacement - Follow Nurse / BPA Driven Protocol    sodium chloride    sodium chloride     Physical Findings        Trending Physical   Appearance, NFPE 4/24: NFPE completed and not consistent with nutrition diagnosis of malnutrition at this time using AND/ASPEN criteria though knees very prominent.       --  Edema  No edema documented      Bowel Function Last documented BM 4/24 (today)     Tubes NG tube      Chewing/Swallowing No known issues      Skin MASD   No WOCN at this time      --  Estimated/Assessed Needs    Estimated 4/24/24   Energy Requirements    EST Needs, Method, Wt used 8258-8212 kcal/day (25-30 kcal/kg CBW 67.8 kg)       Protein Requirements    EST Needs, Method, Wt used  g/day (1.2-1.5 g/kg of CBW 67.8 kg)       Fluid Requirements     Estimated Needs (mL/day) 1 mL/kcal, will monitor hydration status      Fluid Deficit    Current Na Level (mEq/L)    Desired Na Level (mEq/L)    Estimated Fluid Deficit (L)       Current Nutrition Orders & Evaluation of Intake       Oral Nutrition     Food Allergies NKFA   Current PO Diet NPO Diet NPO Type: Strict NPO   Supplement None ordered    PO Evaluation     Trending % PO Intake 4/24: NPO   --  Enteral Nutrition    Enteral Route    Order, Modulars, Flushes    Residual/Tolerance    TF Observation         Parenteral Nutrition     TPN Route    Total # Days on TPN    TPN Order, Lipid Details    MVI & Trace Element Freq    TPN Observation       Nutritional Risk Screening        NRS-2002 Score          Nutrition Diagnosis         Nutrition Dx Problem 1 Inadequate energy intake related to clinical course as  evidenced by NPO.        Nutrition Dx Problem 2        Intervention Goal         Intervention Goal(s) Begin and tolerate EN  No weight loss      Nutrition Intervention        RD Action Order EN     Nutrition Prescription          Diet Prescription NPO   Supplement Prescription    --  Enteral Prescription Initial Goal:  *initial goal conservative d/t risk of RFS     Nutren 1.5 at 20mL/hr + 10mL/hr water flush      End Goal:    Nutren 1.5 at 60 mL/hr + water flush per clinical picture      Calories  1980 kcals (98% upper end)    Protein  90 g (in range)    Free water  1003 mL   Flushes  Will monitor hydration status      The above end goal rate is for 22 hrs/day to assume interruptions for ADLs. Water flushes adjusted based on clinical picture + Rx flushes/IV fluids          TPN Prescription      Monitor/Evaluation        Monitor Per protocol, I&O, Supplement intake, Pertinent labs, EN delivery/tolerance, Weight, Skin status, GI status, Symptoms, Swallow function       Electronically signed by:  Olive Maher RD  04/24/24 10:46 EDT

## 2024-04-24 NOTE — PLAN OF CARE
Goal Outcome Evaluation:  Plan of Care Reviewed With: patient        Progress: no change  Outcome Evaluation: patient had no seizures overnight. has been extremely agitated most of the night. was given a one-time dose of Ativan 2mg IV at 0308, which helped the agitation. patient more alert as the night went on, but is yet to follow any commands. vss. remains on 2L nc. patient was placed in restraints earlier in the shift d/t agitation, trying to get out of bed, pulling at all his lines and equipment. remains in restraints at this time. will continue to monitor.

## 2024-04-24 NOTE — PROGRESS NOTES
"Critical Care Progress Note     Emiliano Bateman : 1960 MRN:[id number] LOS:1  Rm: 2304/1     Principal Problem: Status epilepticus     Reason for follow up: All the medical problems listed below    Summary     Emiliano Bateman is a 63 y.o. male with PMH of Hypertension, COPD, anxiety, steatosis of liver, seizure disorder, and alcoholism presented to the hospital for tonic-clonic seizures, and was admitted with a principal diagnosis of Status epilepticus.  HPI information is limited due to patient postictal following seizure activity; information obtained from ER report and family at bedside.  Patient reportedly stopped taking all of his medications several days ago following a diagnosis of Parkinson's disease.  Patient has a known history of seizure disorder and had a generalized tonic-clonic seizure that lasted several minutes without return to normal mental status near [facility].  Patient was brought to our ER via ambulance where he had additional tonic-clonic seizure.  Patient was given Ativan and Keppra in the emergency department however continued to have \"twitching\".  Patient was brought to the intensive care unit for further evaluation and treatment.     Neurology and psychiatry consulted.  Patient had a Ceribell that did not alarm.  EEG completed with no captured seizures but was consistent with diffuse generalized slowing.  No seizures visualized since admission.  Downgrade to PCU, hospitalist team consulted.      Significant Events     24 : PT Eval.  Nutrition consulted for tube feeding, more awake but still not appropriate to assess for swallowing.  Neurology and Psychiatry following.  Started amlodipine.  Downgrade to PCU, consult hospitalist team.    Assessment / Plan     Recurrent seizures without return to baseline / Acute encephalopathy  Questionable history of seizures versus alcohol withdrawal seizures.  Ceribell did not alarm.  EEG with diffuse generalized slowing and " superimposed possible cortical irritability, no captured seizures.  Continue Keppra.  CT Head: No acute intracranial abnormalities, moderate left incomplete right opacification of maxillary sinuses.  Seizure precautions.  Neurology following.  Starting nutrition per NGT as patient not yet appropriate for oral tube feedings.  Ammonia 88 on admission, continue Lactulose daily.    Delirium tremens / History of alcohol abuse  [facility] protocol in place.    Safety/Seizure precautions in place.  Currently undergoing phenobarb taper.  Continue Thiamine / Folic acid.  Psychiatry following.  Recommend complete cessation of alcohol.    Putative Sinusitis  CT Head reviewed.  Continue omnicef per tube as ordered.    Possible Parkinson's disease  Reviewed previous documentation.  Possible REM movement disorder per Dr. Bobby.  Psychiatry following.    Essential hypertension: not well controlled.   Stopped taking all medications at home, Bps elevating, added Norvasc per tube.  Titrate medications as needed.    Anxiety/Depression: not on home medications, psychiatry consulted.  COPD: not in exacerbation, duonebs as needed.    Disposition:  Stable for downgrade out of ICU, still encephalopathic, discharge date likely greater than 3 days.  PT Eval.    Code status:   Code Status (Patient has no pulse and is not breathing): CPR (Attempt to Resuscitate)  Medical Interventions (Patient has pulse or is breathing): Full Support       Nutrition:   NPO Diet NPO Type: Strict NPO   Patient isn't on Tube Feeding     DVT prophylaxis:  Medical and mechanical DVT prophylaxis orders are present.         Subjective / Review of systems     Review of Systems   Unable to perform ROS: Mental status change        Objective / Physical Exam     Vital signs:  Temp: 98.5 °F (36.9 °C)  BP: (!) 161/106  Heart Rate: 97  Resp: 26  SpO2: 98 %  Weight: 67.8 kg (149 lb 7.6 oz)    Admission Weight: Weight: 66.2 kg (146 lb)  Current Weight: Weight: 67.8 kg (149 lb 7.6  oz)    Input/Output in last 24 hours:  No intake or output data in the 24 hours ending 04/24/24 1026   Net IO Since Admission: No IO data has been entered for this period [04/24/24 1026]     Physical Exam  Vitals and nursing note reviewed.   Constitutional:       General: He is sleeping. He is not in acute distress.     Appearance: He is normal weight. He is ill-appearing. He is not toxic-appearing or diaphoretic.      Interventions: He is restrained.      Comments: Drowsy, anxious/restless at times.  Compulsive, attempting to get out of bed and pull at tubes intermittently.   HENT:      Head: Normocephalic and atraumatic.      Nose: Nose normal. No congestion.      Comments: NGT in place.     Mouth/Throat:      Mouth: Mucous membranes are moist.      Pharynx: Oropharynx is clear. No oropharyngeal exudate.   Eyes:      General: No scleral icterus.     Extraocular Movements: Extraocular movements intact.      Conjunctiva/sclera: Conjunctivae normal.      Pupils: Pupils are equal, round, and reactive to light.   Cardiovascular:      Rate and Rhythm: Normal rate and regular rhythm.      Pulses: Normal pulses.      Heart sounds: Normal heart sounds.   Pulmonary:      Effort: Pulmonary effort is normal. No respiratory distress.      Breath sounds: Normal breath sounds.   Abdominal:      General: Bowel sounds are normal. There is no distension.      Palpations: Abdomen is soft.      Tenderness: There is no abdominal tenderness. There is no guarding or rebound.   Musculoskeletal:         General: No swelling.      Cervical back: Neck supple.      Right lower leg: No edema.      Left lower leg: No edema.   Skin:     General: Skin is warm and dry.      Findings: No rash.   Neurological:      Mental Status: He is lethargic.      Comments: Spontaneously moves all extremities, doesn't consistently follow directions, unable to assess orientation, no obvious focal deficits noted.   Psychiatric:      Comments: Anxious/restless at  times.          Radiology and Labs     Results from last 7 days   Lab Units 04/24/24  0523 04/23/24  0530 04/22/24  2151   WBC 10*3/mm3 10.44 14.24* 16.14*   HEMOGLOBIN g/dL 14.3 14.6 15.8   HEMATOCRIT % 43.1 44.4 48.9   PLATELETS 10*3/mm3 262 339 503*           Results from last 7 days   Lab Units 04/24/24  0523 04/23/24  0530 04/22/24  2151   SODIUM mmol/L 137 133* 135*   POTASSIUM mmol/L 4.1 5.8* 4.2   CHLORIDE mmol/L 97* 97* 93*   CO2 mmol/L 31.0* 29.0 22.0   BUN mg/dL 12 9 10   CREATININE mg/dL 0.79 0.85 1.20   GLUCOSE mg/dL 125* 172* 237*   MAGNESIUM mg/dL 2.2 2.2 2.0   PHOSPHORUS mg/dL 2.1* 4.0 5.7*      Results from last 7 days   Lab Units 04/24/24  0523 04/23/24  0530 04/22/24  2151   ALK PHOS U/L 87 101 117   AST (SGOT) U/L 21 25 34   ALT (SGPT) U/L 13 18 24     Results from last 7 days   Lab Units 04/23/24  1243 04/23/24  1021   PH, ARTERIAL pH units 7.302* 7.245*   PCO2, ARTERIAL mm Hg 66.6* 79.9*   PO2 ART mm Hg 74.0* 122.7*   O2 SATURATION ART % 92.4* 97.8   HCO3 ART mmol/L 33.0* 34.6*   BASE EXCESS ART mmol/L 3.9* 3.8*     XR Abdomen KUB    Result Date: 4/24/2024  Impression: NG tube extends to the proximal gastric body level. The sidehole is located 3 cm distal to the EG junction. Electronically Signed: Ramona Truong MD  4/24/2024 8:56 AM EDT  Workstation ID: [id number]    XR Abdomen KUB    Result Date: 4/23/2024  Impression: Nasogastric tube sideport overlies the gastroesophageal junction. Consider advancement up to 5 cm. Electronically Signed: Paulo Rao MD  4/23/2024 8:33 AM EDT  Workstation ID: [id number]    US Liver    Result Date: 4/23/2024  Impression: Limited right upper quadrant ultrasound of the liver and common bile duct demonstrates no sonographic abnormalities. Electronically Signed: Juan Arzola MD  4/23/2024 7:15 AM EDT  Workstation ID: [id number]    XR Chest 1 View    Result Date: 4/22/2024  Impression: Mild pulmonary vascular congestion accentuated by relatively low lung volumes  Electronically Signed: Kapil Lopez MD  4/22/2024 11:02 PM EDT  Workstation ID: [id number]    CT Head Without Contrast    Result Date: 4/22/2024  Impression: 1. No acute intracranial abnormality by CT on this limited exam. 2. Moderate left and complete right opacification of maxillary sinuses. Correlate for clinical signs of acute sinusitis. Electronically Signed: Kostas Nelson MD  4/22/2024 10:31 PM EDT  Workstation ID: [id number]       Current medications     Scheduled Meds:   amLODIPine, 5 mg, Nasogastric, Q24H  cefdinir, 300 mg, Nasogastric, Q12H  enoxaparin, 40 mg, Subcutaneous, Daily  [START ON 4/25/2024] folic acid, 1 mg, Nasogastric, Daily  lactulose, 30 g, Nasogastric, Daily  levETIRAcetam, 500 mg, Intravenous, Q12H  PHENobarbital, 32.4 mg, Oral, Once  [START ON 4/25/2024] PHENobarbital, 32.4 mg, Oral, Once  [START ON 4/25/2024] PHENobarbital, 32.4 mg, Oral, Once  senna-docusate sodium, 2 tablet, Oral, BID  sodium chloride, 10 mL, Intravenous, Q12H  thiamine (B-1) IV, 200 mg, Intravenous, Q8H   Followed by  [START ON 4/28/2024] thiamine, 100 mg, Oral, Daily        Continuous Infusions:          Plan discussed with RN. Reviewed all other data in the last 24 hours, including but not limited to vitals, labs, microbiology, imaging and pertinent notes from other providers.        KAREL Miles   Critical Care  04/24/24   10:26 EDT

## 2024-04-25 ENCOUNTER — APPOINTMENT (OUTPATIENT)
Dept: GENERAL RADIOLOGY | Facility: HOSPITAL | Age: 64
End: 2024-04-25
Payer: MEDICARE

## 2024-04-25 LAB
ALBUMIN SERPL-MCNC: 3.6 G/DL (ref 3.5–5.2)
ALBUMIN/GLOB SERPL: 1.1 G/DL
ALP SERPL-CCNC: 86 U/L (ref 39–117)
ALT SERPL W P-5'-P-CCNC: 14 U/L (ref 1–41)
ANION GAP SERPL CALCULATED.3IONS-SCNC: 14 MMOL/L (ref 5–15)
AST SERPL-CCNC: 24 U/L (ref 1–40)
BASOPHILS # BLD AUTO: 0.07 10*3/MM3 (ref 0–0.2)
BASOPHILS NFR BLD AUTO: 0.8 % (ref 0–1.5)
BILIRUB SERPL-MCNC: 0.6 MG/DL (ref 0–1.2)
BUN SERPL-MCNC: 11 MG/DL (ref 8–23)
BUN/CREAT SERPL: 15.9 (ref 7–25)
CA-I SERPL ISE-MCNC: 1.2 MMOL/L (ref 1.2–1.3)
CALCIUM SPEC-SCNC: 9.1 MG/DL (ref 8.6–10.5)
CHLORIDE SERPL-SCNC: 93 MMOL/L (ref 98–107)
CO2 SERPL-SCNC: 27 MMOL/L (ref 22–29)
CREAT SERPL-MCNC: 0.69 MG/DL (ref 0.76–1.27)
DEPRECATED RDW RBC AUTO: 51.8 FL (ref 37–54)
EGFRCR SERPLBLD CKD-EPI 2021: 103.3 ML/MIN/1.73
EOSINOPHIL # BLD AUTO: 0.14 10*3/MM3 (ref 0–0.4)
EOSINOPHIL NFR BLD AUTO: 1.7 % (ref 0.3–6.2)
ERYTHROCYTE [DISTWIDTH] IN BLOOD BY AUTOMATED COUNT: 13.8 % (ref 12.3–15.4)
GLOBULIN UR ELPH-MCNC: 3.3 GM/DL
GLUCOSE BLDC GLUCOMTR-MCNC: 123 MG/DL (ref 70–105)
GLUCOSE SERPL-MCNC: 124 MG/DL (ref 65–99)
HCT VFR BLD AUTO: 44.8 % (ref 37.5–51)
HGB BLD-MCNC: 14.8 G/DL (ref 13–17.7)
IMM GRANULOCYTES # BLD AUTO: 0.02 10*3/MM3 (ref 0–0.05)
IMM GRANULOCYTES NFR BLD AUTO: 0.2 % (ref 0–0.5)
LYMPHOCYTES # BLD AUTO: 2.6 10*3/MM3 (ref 0.7–3.1)
LYMPHOCYTES NFR BLD AUTO: 31.2 % (ref 19.6–45.3)
MAGNESIUM SERPL-MCNC: 2.3 MG/DL (ref 1.6–2.4)
MCH RBC QN AUTO: 33.5 PG (ref 26.6–33)
MCHC RBC AUTO-ENTMCNC: 33 G/DL (ref 31.5–35.7)
MCV RBC AUTO: 101.4 FL (ref 79–97)
MONOCYTES # BLD AUTO: 0.77 10*3/MM3 (ref 0.1–0.9)
MONOCYTES NFR BLD AUTO: 9.2 % (ref 5–12)
NEUTROPHILS NFR BLD AUTO: 4.73 10*3/MM3 (ref 1.7–7)
NEUTROPHILS NFR BLD AUTO: 56.9 % (ref 42.7–76)
NRBC BLD AUTO-RTO: 0 /100 WBC (ref 0–0.2)
PHOSPHATE SERPL-MCNC: 2.5 MG/DL (ref 2.5–4.5)
PLATELET # BLD AUTO: 257 10*3/MM3 (ref 140–450)
PMV BLD AUTO: 9.2 FL (ref 6–12)
POTASSIUM SERPL-SCNC: 3.8 MMOL/L (ref 3.5–5.2)
PROT SERPL-MCNC: 6.9 G/DL (ref 6–8.5)
QT INTERVAL: 386 MS
QTC INTERVAL: 469 MS
RBC # BLD AUTO: 4.42 10*6/MM3 (ref 4.14–5.8)
SODIUM SERPL-SCNC: 134 MMOL/L (ref 136–145)
WBC NRBC COR # BLD AUTO: 8.33 10*3/MM3 (ref 3.4–10.8)

## 2024-04-25 PROCEDURE — 25010000002 LORAZEPAM PER 2 MG: Performed by: INTERNAL MEDICINE

## 2024-04-25 PROCEDURE — 82330 ASSAY OF CALCIUM: CPT | Performed by: STUDENT IN AN ORGANIZED HEALTH CARE EDUCATION/TRAINING PROGRAM

## 2024-04-25 PROCEDURE — 85025 COMPLETE CBC W/AUTO DIFF WBC: CPT | Performed by: STUDENT IN AN ORGANIZED HEALTH CARE EDUCATION/TRAINING PROGRAM

## 2024-04-25 PROCEDURE — 82948 REAGENT STRIP/BLOOD GLUCOSE: CPT

## 2024-04-25 PROCEDURE — 36415 COLL VENOUS BLD VENIPUNCTURE: CPT | Performed by: STUDENT IN AN ORGANIZED HEALTH CARE EDUCATION/TRAINING PROGRAM

## 2024-04-25 PROCEDURE — 99232 SBSQ HOSP IP/OBS MODERATE 35: CPT

## 2024-04-25 PROCEDURE — 83735 ASSAY OF MAGNESIUM: CPT | Performed by: STUDENT IN AN ORGANIZED HEALTH CARE EDUCATION/TRAINING PROGRAM

## 2024-04-25 PROCEDURE — 84100 ASSAY OF PHOSPHORUS: CPT | Performed by: STUDENT IN AN ORGANIZED HEALTH CARE EDUCATION/TRAINING PROGRAM

## 2024-04-25 PROCEDURE — 25010000002 THIAMINE HCL 200 MG/2ML SOLUTION: Performed by: STUDENT IN AN ORGANIZED HEALTH CARE EDUCATION/TRAINING PROGRAM

## 2024-04-25 PROCEDURE — 25010000002 ENOXAPARIN PER 10 MG: Performed by: STUDENT IN AN ORGANIZED HEALTH CARE EDUCATION/TRAINING PROGRAM

## 2024-04-25 PROCEDURE — 25010000002 THIAMINE PER 100 MG: Performed by: STUDENT IN AN ORGANIZED HEALTH CARE EDUCATION/TRAINING PROGRAM

## 2024-04-25 PROCEDURE — 25010000002 LEVETRIRACETAM PER 10 MG: Performed by: STUDENT IN AN ORGANIZED HEALTH CARE EDUCATION/TRAINING PROGRAM

## 2024-04-25 PROCEDURE — 74018 RADEX ABDOMEN 1 VIEW: CPT

## 2024-04-25 PROCEDURE — 80053 COMPREHEN METABOLIC PANEL: CPT | Performed by: STUDENT IN AN ORGANIZED HEALTH CARE EDUCATION/TRAINING PROGRAM

## 2024-04-25 PROCEDURE — 25010000002 LORAZEPAM PER 2 MG

## 2024-04-25 RX ORDER — LORAZEPAM 2 MG/ML
1 INJECTION INTRAMUSCULAR EVERY 6 HOURS PRN
Status: DISCONTINUED | OUTPATIENT
Start: 2024-04-25 | End: 2024-04-27

## 2024-04-25 RX ORDER — LORAZEPAM 2 MG/ML
2 INJECTION INTRAMUSCULAR ONCE
Status: COMPLETED | OUTPATIENT
Start: 2024-04-25 | End: 2024-04-25

## 2024-04-25 RX ORDER — RISPERIDONE 0.5 MG/1
0.5 TABLET, ORALLY DISINTEGRATING ORAL 2 TIMES DAILY
Status: DISCONTINUED | OUTPATIENT
Start: 2024-04-25 | End: 2024-04-29

## 2024-04-25 RX ADMIN — LEVETIRACETAM 500 MG: 100 INJECTION, SOLUTION INTRAVENOUS at 08:01

## 2024-04-25 RX ADMIN — Medication 10 ML: at 20:36

## 2024-04-25 RX ADMIN — PHENOBARBITAL 32.4 MG: 32.4 TABLET ORAL at 20:26

## 2024-04-25 RX ADMIN — ENOXAPARIN SODIUM 40 MG: 100 INJECTION SUBCUTANEOUS at 15:34

## 2024-04-25 RX ADMIN — RISPERIDONE 0.25 MG: 0.5 TABLET, ORALLY DISINTEGRATING ORAL at 08:01

## 2024-04-25 RX ADMIN — FOLIC ACID 1 MG: 1 TABLET ORAL at 08:01

## 2024-04-25 RX ADMIN — THIAMINE HYDROCHLORIDE 200 MG: 100 INJECTION, SOLUTION INTRAMUSCULAR; INTRAVENOUS at 14:01

## 2024-04-25 RX ADMIN — LORAZEPAM 1 MG: 2 INJECTION INTRAMUSCULAR; INTRAVENOUS at 20:25

## 2024-04-25 RX ADMIN — CEFDINIR 300 MG: 300 CAPSULE ORAL at 08:01

## 2024-04-25 RX ADMIN — LORAZEPAM 2 MG: 2 INJECTION INTRAMUSCULAR; INTRAVENOUS at 08:01

## 2024-04-25 RX ADMIN — Medication 10 ML: at 08:02

## 2024-04-25 RX ADMIN — PHENOBARBITAL 32.4 MG: 32.4 TABLET ORAL at 05:06

## 2024-04-25 RX ADMIN — THIAMINE HYDROCHLORIDE 200 MG: 100 INJECTION, SOLUTION INTRAMUSCULAR; INTRAVENOUS at 05:06

## 2024-04-25 RX ADMIN — RISPERIDONE 0.5 MG: 0.5 TABLET, ORALLY DISINTEGRATING ORAL at 20:26

## 2024-04-25 RX ADMIN — CEFDINIR 300 MG: 300 CAPSULE ORAL at 20:26

## 2024-04-25 RX ADMIN — THIAMINE HYDROCHLORIDE 200 MG: 100 INJECTION, SOLUTION INTRAMUSCULAR; INTRAVENOUS at 20:25

## 2024-04-25 RX ADMIN — LEVETIRACETAM 500 MG: 100 INJECTION, SOLUTION INTRAVENOUS at 20:26

## 2024-04-25 RX ADMIN — AMLODIPINE BESYLATE 5 MG: 5 TABLET ORAL at 08:01

## 2024-04-25 NOTE — SIGNIFICANT NOTE
04/25/24 0934   OTHER   Discipline physical therapist   Rehab Time/Intention   Session Not Performed unable to evaluate, medical status change  (spoke with RN, reports pt still agitated, restless and not appropriate for therapy eval at this time, continuing to follow.)   Recommendation   PT - Next Appointment 04/26/24

## 2024-04-25 NOTE — PROGRESS NOTES
"  Chief complaint : ETOH withdrawal    Subjective .     History of present illness:  The patient is a 63 y.o. male who was admitted secondary to seizure.     PMH: hypertension, COPD, anxiety, steatosis of liver, seizure disorder, alcohol abuse.      Psych was consulted due to patient throwing away medication.     I saw the patient this afternoon. He would open his eyes, but was mute, and was not able to participate in the interview.      I called his wife, Lili, for collateral information.     She states that he has been drinking heavily for some time. She reports he drinks about one fifth per day. She reports that he threw his medications away about a year ago, stating he \"didn't need them\". She reports that he has stopped attending his doctor appointments. 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 She reports irritability, stating he will \"go off\" in a second. She states he has had some memory loss over the last few months. He will ask her questions she has recently answered for him, but he doesn't remember. She isn't sure if he is depressed, but does report his alcohol use has increased.      The memory lapse is likely alcohol related, but unclear at this time.      She states the patient has tried to do rehab for chemical dependency in the past, but checked himself out after 3 days. She states he hasn't expressed a desire to quit drinking. .    4/24/24: Patient seen today. He has required restraints. Notes reviewed state he has been more agitated and restless. He is still not able to participate in interview.     4/25/24: Nursing reports patient still agitated and combative this morning. Required PRN ativan at around 8am. Patient receiving phenobarbital for withdrawal.     Review of Systems   Review of systems could not be obtained due to   patient confusion.    The following portions of the patient's history were reviewed and updated as " "appropriate: allergies, current medications, past family history, past medical history, past social history, past surgical history and problem list.    History    Past psychiatric history: alcohol abuse       No medications prior to admission.        Scheduled Meds:  amLODIPine, 5 mg, Nasogastric, Q24H  cefdinir, 300 mg, Nasogastric, Q12H  enoxaparin, 40 mg, Subcutaneous, Daily  folic acid, 1 mg, Nasogastric, Daily  lactulose, 30 g, Nasogastric, Daily  levETIRAcetam, 500 mg, Intravenous, Q12H  PHENobarbital, 32.4 mg, Oral, Once  risperiDONE, 0.5 mg, Oral, BID  senna-docusate sodium, 2 tablet, Oral, BID  sodium chloride, 10 mL, Intravenous, Q12H  thiamine (B-1) IV, 200 mg, Intravenous, Q8H   Followed by  [START ON 4/28/2024] thiamine, 100 mg, Oral, Daily         Continuous Infusions:       PRN Meds:    acetaminophen **OR** acetaminophen    senna-docusate sodium **AND** polyethylene glycol **AND** bisacodyl **AND** bisacodyl    Calcium Replacement - Follow Nurse / BPA Driven Protocol    hydrALAZINE    ipratropium-albuterol    Magnesium Standard Dose Replacement - Follow Nurse / BPA Driven Protocol    nitroglycerin    ondansetron ODT **OR** ondansetron    Phosphorus Replacement - Follow Nurse / BPA Driven Protocol    Potassium Replacement - Follow Nurse / BPA Driven Protocol    sodium chloride    sodium chloride      Allergies:  Penicillins      Objective     Vital Signs   /86   Pulse 95   Temp 98.6 °F (37 °C) (Oral)   Resp 23   Ht 175.3 cm (69\")   Wt 67.8 kg (149 lb 7.6 oz)   SpO2 96%   BMI 22.07 kg/m²     Physical Exam:    Musculoskeletal:   Muscle strength and tone: JOSÉ  Abnormal Movements: tremor  Gait: JOSÉ     Mental Status Exam:   Hygiene:   fair  Cooperation:   unable to cooperate   Eye Contact:  Closed  Psychomotor Behavior:  Restless  Affect:  Restricted  Mood: fluctates  Hopelessness: JOSÉ  Speech:  Mute  Thought Process:  Unable to demonstrate  Thought Content:  Unable to demonstrate  Suicidal:   " JOSÉ  Homicidal:   JOSÉ  Hallucinations:  Not demonstrated today  Delusion:  Unable to demonstrate  Memory:  Unable to evaluate  Orientation:  Unable to evaluate  Reliability:  poor  Insight:  Poor  Judgement:  Poor  Impulse Control:  Impaired  Physical/Medical Issues:  Yes            Medications and allergies reviewed.     Result Review:  I have personally reviewed the results from the time of this admission to 4/23/2024 13:42 EDT and agree with these findings:  [x]  Laboratory  []  Microbiology  []  Radiology  [x]  EKG/Telemetry   []  Cardiology/Vascular   []  Pathology  []  Old records  []  Other:   .     Assessment & Plan       Status epilepticus    Alcoholism    Anxiety    Chronic obstructive lung disease    Hypertensive disorder    Steatosis of liver    Seizure disorder    Hyperammonemia       Assessment: alcohol withdrawal delirium   Treatment Plan: Patient presents with alcohol withdrawal with delirium. Patient was unable to participate in interview to assess for possibly depression or readiness for alcohol cessation.      Will reevaluate patient when he is more alert and able to participate.      Continue supportive treatment and CIWA protocol.     Will add lorazepam PRN agitation. Also increased risperidone to 0.5mg BID.      Will continue to follow.   Treatment Plan discussed with: Patient    I discussed the patients findings and my recommendations with patient    I have reviewed and approved the behavioral health treatment plans and problem list. Yes     Referring MD has access to consult report and progress notes in EMR     KAREL Randhawa  04/25/24  12:49 EDT

## 2024-04-25 NOTE — PROGRESS NOTES
[facility] MEDICINE SERVICE  DAILY PROGRESS NOTE    NAME: Emiliano Bateman  : 1960  MRN: [id number]      LOS: 2 days     PROVIDER OF SERVICE: Chase Strickland MD    Chief Complaint: Status epilepticus    Subjective:     Interval History: Patient is currently resting after receiving IV Ativan for agitation.    Review of Systems:   Review of Systems    Objective:     Vital Signs  Temp:  [97.8 °F (36.6 °C)-99.2 °F (37.3 °C)] 98.6 °F (37 °C)  Heart Rate:  [] 95  Resp:  [20-28] 23  BP: (104-165)/() 117/86  Flow (L/min):  [2] 2   Body mass index is 22.07 kg/m².    Physical Exam  Physical Exam  General Appearance: Agitated but resting comfortably  Head:  Normocephalic, without obvious abnormality, atraumatic  Eyes:  PERRL, conjunctiva/corneas clear, EOM's intact, fundi benign, both eyes  Ears:  Normal TM's and external ear canals, both ears  Nose: Nares normal, septum midline, mucosa normal, no drainage or sinus tenderness  Throat: Lips, mucosa, and tongue normal; teeth and gums normal  Neck: Supple, symmetrical, trachea midline, no adenopathy, thyroid: not enlarged, symmetric, no tenderness/mass/nodules, no carotid bruit or JVD  Lungs:   Clear to auscultation bilaterally, respirations unlabored  Heart:  Regular rate and rhythm, S1, S2 normal, no murmur, rub or gallop  Abdomen:  Soft, non-tender, bowel sounds active all four quadrants,  no masses, no organomegaly  Extremities: Extremities normal, atraumatic, no cyanosis or edema  Pulses: 2+ and symmetric  Skin: Skin color, texture, turgor normal, no rashes or lesions  Neurologic: Unable to perform      Scheduled Meds   amLODIPine, 5 mg, Nasogastric, Q24H  cefdinir, 300 mg, Nasogastric, Q12H  enoxaparin, 40 mg, Subcutaneous, Daily  folic acid, 1 mg, Nasogastric, Daily  lactulose, 30 g, Nasogastric, Daily  levETIRAcetam, 500 mg, Intravenous, Q12H  PHENobarbital, 32.4 mg, Oral, Once  risperiDONE, 0.25 mg, Oral, BID  senna-docusate sodium, 2 tablet,  Oral, BID  sodium chloride, 10 mL, Intravenous, Q12H  thiamine (B-1) IV, 200 mg, Intravenous, Q8H   Followed by  [START ON 4/28/2024] thiamine, 100 mg, Oral, Daily       PRN Meds     acetaminophen **OR** acetaminophen    senna-docusate sodium **AND** polyethylene glycol **AND** bisacodyl **AND** bisacodyl    Calcium Replacement - Follow Nurse / BPA Driven Protocol    hydrALAZINE    ipratropium-albuterol    Magnesium Standard Dose Replacement - Follow Nurse / BPA Driven Protocol    nitroglycerin    ondansetron ODT **OR** ondansetron    Phosphorus Replacement - Follow Nurse / BPA Driven Protocol    Potassium Replacement - Follow Nurse / BPA Driven Protocol    sodium chloride    sodium chloride   Infusions         Diagnostic Data    Results from last 7 days   Lab Units 04/25/24  0410   WBC 10*3/mm3 8.33   HEMOGLOBIN g/dL 14.8   HEMATOCRIT % 44.8   PLATELETS 10*3/mm3 257   GLUCOSE mg/dL 124*   CREATININE mg/dL 0.69*   BUN mg/dL 11   SODIUM mmol/L 134*   POTASSIUM mmol/L 3.8   AST (SGOT) U/L 24   ALT (SGPT) U/L 14   ALK PHOS U/L 86   BILIRUBIN mg/dL 0.6   ANION GAP mmol/L 14.0       XR Abdomen KUB    Result Date: 4/24/2024  Impression: NG tube extends to the proximal gastric body level. The sidehole is located 3 cm distal to the EG junction. Electronically Signed: Ramona Truong MD  4/24/2024 8:56 AM EDT  Workstation ID: [id number]       I reviewed the patient's new clinical results.    Assessment/Plan:     Active and Resolved Problems  Active Hospital Problems    Diagnosis  POA    **Status epilepticus [G40.901]  Yes    Seizure disorder [G40.909]  Yes    Hyperammonemia [E72.20]  Yes    Hypertensive disorder [I10]  Yes    Steatosis of liver [K76.0]  Yes    Chronic obstructive lung disease [J44.9]  Yes    Alcoholism [F10.20]  Yes    Anxiety [F41.9]  Yes      Resolved Hospital Problems   No resolved problems to display.       Status epilepticus with probable alcohol withdrawal seizures  -Patient was previously on Keppra prior  to admission and this has been continued although he may not need to continue Keppra on discharge as his seizures are most likely recurrent due to alcohol withdrawal symptoms  -Continue CIWA protocol with Ativan  -If patient can tolerate p.o. intake after SLP evaluation, I would add Librium for additional withdrawal symptom control    Hypertension  -Continue amlodipine    Anxiety with depression  -No acute issues  -Appreciate psychiatry evaluation    COPD  Continue nebulizer therapy as needed    Possible bacterial sinusitis  -Continue cefdinir    Dysphagia  -Patient had NG tube but has pulled out twice now  SLP evaluation hopefully today    DVT prophylaxis:  Medical and mechanical DVT prophylaxis orders are present.         Code status is   Code Status and Medical Interventions:   Ordered at: 04/23/24 0045     Code Status (Patient has no pulse and is not breathing):    CPR (Attempt to Resuscitate)     Medical Interventions (Patient has pulse or is breathing):    Full Support       Plan for disposition: Likely discharge on 4/29.    Time: 30 minutes    Signature: Electronically signed by Chase Strickland MD, 04/25/24, 12:39 EDT.  [facility] Hospitalist Team

## 2024-04-25 NOTE — PLAN OF CARE
Goal Outcome Evaluation:     Orders received for dysphagia evaluation.  ST attempt x 2 this date.  Pt did not awaken nor respond to verbal stimuli upon sternal rub.  Pt is not alert to participate in evaluation at this time. ST will f/u next date.  Thank you.

## 2024-04-25 NOTE — PROGRESS NOTES
Nutrition Services    Patient Name: Emiliano Bateman  YOB: 1960  MRN: [id number]  Admission date: 4/22/2024    PROGRESS NOTE      Encounter Information: Progress note to check on plan for nutrition. Pt with ongoing NPO status, but pulled out his NG today. ST saw pt for swallow eval, but pt would not respond to ST so eval was unable to be completed. At this point, it is uncertain how things will proceed with regard to nutrition -- may require NG to be replaced. Will keep current TF order active in case enteral access is secured this evening -- pt could re-start prior feeding if access obtained.       PO Diet: NPO Diet NPO Type: Strict NPO   PO Supplements: None    PO Intake:  NPO       Current nutrition support: Nutren 1.5 at 20mL/hr + 10mL/hr water flush    Nutrition support review: Not infusing -- no access at this time        Labs (reviewed below): Mild hyponatremia - not currently on water flushes due to lack of access; will monitor  Mild to moderate hyperglycemia - monitor diet vs TF resumes       GI Function:  Stool Output  Stool Unmeasured Occurrence: 1 (04/25/24 0735)  Bowel Incontinence: Yes (04/25/24 0735)  Stool Amount: small (04/25/24 0800)          Nutrition Intervention Updates: If enteral access obtained, re-start prior feeding: Nutren 1.5 at 20mL/hr + 10mL/hr water flush        Results from last 7 days   Lab Units 04/25/24  0410 04/24/24  0523 04/23/24  0530   SODIUM mmol/L 134* 137 133*   POTASSIUM mmol/L 3.8 4.1 5.8*   CHLORIDE mmol/L 93* 97* 97*   CO2 mmol/L 27.0 31.0* 29.0   BUN mg/dL 11 12 9   CREATININE mg/dL 0.69* 0.79 0.85   CALCIUM mg/dL 9.1 9.2 8.1*   BILIRUBIN mg/dL 0.6 0.6 0.4   ALK PHOS U/L 86 87 101   ALT (SGPT) U/L 14 13 18   AST (SGOT) U/L 24 21 25   GLUCOSE mg/dL 124* 125* 172*     Results from last 7 days   Lab Units 04/25/24  0410 04/24/24  1636 04/24/24  0523 04/23/24  0530   MAGNESIUM mg/dL 2.3  --  2.2 2.2   PHOSPHORUS mg/dL 2.5   < > 2.1* 4.0   HEMOGLOBIN g/dL  "14.8  --  14.3 14.6   HEMATOCRIT % 44.8  --  43.1 44.4   TRIGLYCERIDES mg/dL  --   --   --  87    < > = values in this interval not displayed.     No results found for: \"COVID19\"  Lab Results   Component Value Date    HGBA1C 6.50 (H) 04/23/2024       RD to follow up per protocol.    Electronically signed by:  Radha Ryder RD  04/25/24     "

## 2024-04-25 NOTE — PLAN OF CARE
Goal Outcome Evaluation:  Plan of Care Reviewed With: patient, spouse     Patient remains confused throughout the day, extremely agitated and combative this morning requiring one time Ativan.  Patient remained restless on and off throughout the day, pulled NGT out and pulling at other lines despite being restrains.  VSS on room air. PCU overflow awaiting transfer/discharge.     Progress: no change

## 2024-04-26 ENCOUNTER — APPOINTMENT (OUTPATIENT)
Dept: GENERAL RADIOLOGY | Facility: HOSPITAL | Age: 64
End: 2024-04-26
Payer: MEDICARE

## 2024-04-26 LAB
ALBUMIN SERPL-MCNC: 3.7 G/DL (ref 3.5–5.2)
ALBUMIN/GLOB SERPL: 1.2 G/DL
ALP SERPL-CCNC: 81 U/L (ref 39–117)
ALT SERPL W P-5'-P-CCNC: 14 U/L (ref 1–41)
ANION GAP SERPL CALCULATED.3IONS-SCNC: 13 MMOL/L (ref 5–15)
AST SERPL-CCNC: 29 U/L (ref 1–40)
BASOPHILS # BLD AUTO: 0.09 10*3/MM3 (ref 0–0.2)
BASOPHILS # BLD AUTO: 0.1 10*3/MM3 (ref 0–0.2)
BASOPHILS NFR BLD AUTO: 1.1 % (ref 0–1.5)
BASOPHILS NFR BLD AUTO: 1.3 % (ref 0–1.5)
BILIRUB SERPL-MCNC: 0.6 MG/DL (ref 0–1.2)
BUN SERPL-MCNC: 15 MG/DL (ref 8–23)
BUN/CREAT SERPL: 21.4 (ref 7–25)
CA-I SERPL ISE-MCNC: 1.21 MMOL/L (ref 1.2–1.3)
CA-I SERPL ISE-MCNC: 1.21 MMOL/L (ref 1.2–1.3)
CALCIUM SPEC-SCNC: 9.2 MG/DL (ref 8.6–10.5)
CHLORIDE SERPL-SCNC: 92 MMOL/L (ref 98–107)
CO2 SERPL-SCNC: 29 MMOL/L (ref 22–29)
CREAT SERPL-MCNC: 0.7 MG/DL (ref 0.76–1.27)
DEPRECATED RDW RBC AUTO: 50.8 FL (ref 37–54)
DEPRECATED RDW RBC AUTO: 51.8 FL (ref 37–54)
EGFRCR SERPLBLD CKD-EPI 2021: 102.9 ML/MIN/1.73
EOSINOPHIL # BLD AUTO: 0.16 10*3/MM3 (ref 0–0.4)
EOSINOPHIL # BLD AUTO: 0.18 10*3/MM3 (ref 0–0.4)
EOSINOPHIL NFR BLD AUTO: 2 % (ref 0.3–6.2)
EOSINOPHIL NFR BLD AUTO: 2.3 % (ref 0.3–6.2)
ERYTHROCYTE [DISTWIDTH] IN BLOOD BY AUTOMATED COUNT: 13.8 % (ref 12.3–15.4)
ERYTHROCYTE [DISTWIDTH] IN BLOOD BY AUTOMATED COUNT: 14 % (ref 12.3–15.4)
GLOBULIN UR ELPH-MCNC: 3.2 GM/DL
GLUCOSE BLDC GLUCOMTR-MCNC: 140 MG/DL (ref 70–105)
GLUCOSE BLDC GLUCOMTR-MCNC: 142 MG/DL (ref 70–105)
GLUCOSE SERPL-MCNC: 130 MG/DL (ref 65–99)
HCT VFR BLD AUTO: 43.8 % (ref 37.5–51)
HCT VFR BLD AUTO: 44.1 % (ref 37.5–51)
HGB BLD-MCNC: 15 G/DL (ref 13–17.7)
HGB BLD-MCNC: 15 G/DL (ref 13–17.7)
IMM GRANULOCYTES # BLD AUTO: 0.02 10*3/MM3 (ref 0–0.05)
IMM GRANULOCYTES # BLD AUTO: 0.03 10*3/MM3 (ref 0–0.05)
IMM GRANULOCYTES NFR BLD AUTO: 0.3 % (ref 0–0.5)
IMM GRANULOCYTES NFR BLD AUTO: 0.4 % (ref 0–0.5)
LYMPHOCYTES # BLD AUTO: 1.87 10*3/MM3 (ref 0.7–3.1)
LYMPHOCYTES # BLD AUTO: 2.26 10*3/MM3 (ref 0.7–3.1)
LYMPHOCYTES NFR BLD AUTO: 23.8 % (ref 19.6–45.3)
LYMPHOCYTES NFR BLD AUTO: 28.3 % (ref 19.6–45.3)
MAGNESIUM SERPL-MCNC: 2.3 MG/DL (ref 1.6–2.4)
MCH RBC QN AUTO: 34.2 PG (ref 26.6–33)
MCH RBC QN AUTO: 34.2 PG (ref 26.6–33)
MCHC RBC AUTO-ENTMCNC: 34 G/DL (ref 31.5–35.7)
MCHC RBC AUTO-ENTMCNC: 34.2 G/DL (ref 31.5–35.7)
MCV RBC AUTO: 100 FL (ref 79–97)
MCV RBC AUTO: 100.7 FL (ref 79–97)
MONOCYTES # BLD AUTO: 0.79 10*3/MM3 (ref 0.1–0.9)
MONOCYTES # BLD AUTO: 0.81 10*3/MM3 (ref 0.1–0.9)
MONOCYTES NFR BLD AUTO: 10.3 % (ref 5–12)
MONOCYTES NFR BLD AUTO: 9.9 % (ref 5–12)
NEUTROPHILS NFR BLD AUTO: 4.64 10*3/MM3 (ref 1.7–7)
NEUTROPHILS NFR BLD AUTO: 4.9 10*3/MM3 (ref 1.7–7)
NEUTROPHILS NFR BLD AUTO: 58.1 % (ref 42.7–76)
NEUTROPHILS NFR BLD AUTO: 62.2 % (ref 42.7–76)
NRBC BLD AUTO-RTO: 0 /100 WBC (ref 0–0.2)
NRBC BLD AUTO-RTO: 0 /100 WBC (ref 0–0.2)
PHOSPHATE SERPL-MCNC: 2.4 MG/DL (ref 2.5–4.5)
PHOSPHATE SERPL-MCNC: 2.6 MG/DL (ref 2.5–4.5)
PLATELET # BLD AUTO: 243 10*3/MM3 (ref 140–450)
PLATELET # BLD AUTO: 251 10*3/MM3 (ref 140–450)
PMV BLD AUTO: 9.6 FL (ref 6–12)
PMV BLD AUTO: 9.7 FL (ref 6–12)
POTASSIUM SERPL-SCNC: 3.5 MMOL/L (ref 3.5–5.2)
PROT SERPL-MCNC: 6.9 G/DL (ref 6–8.5)
RBC # BLD AUTO: 4.38 10*6/MM3 (ref 4.14–5.8)
RBC # BLD AUTO: 4.38 10*6/MM3 (ref 4.14–5.8)
SODIUM SERPL-SCNC: 134 MMOL/L (ref 136–145)
WBC NRBC COR # BLD AUTO: 7.87 10*3/MM3 (ref 3.4–10.8)
WBC NRBC COR # BLD AUTO: 7.98 10*3/MM3 (ref 3.4–10.8)

## 2024-04-26 PROCEDURE — 82330 ASSAY OF CALCIUM: CPT | Performed by: STUDENT IN AN ORGANIZED HEALTH CARE EDUCATION/TRAINING PROGRAM

## 2024-04-26 PROCEDURE — 25010000002 THIAMINE HCL 200 MG/2ML SOLUTION: Performed by: STUDENT IN AN ORGANIZED HEALTH CARE EDUCATION/TRAINING PROGRAM

## 2024-04-26 PROCEDURE — 71045 X-RAY EXAM CHEST 1 VIEW: CPT

## 2024-04-26 PROCEDURE — 85025 COMPLETE CBC W/AUTO DIFF WBC: CPT | Performed by: STUDENT IN AN ORGANIZED HEALTH CARE EDUCATION/TRAINING PROGRAM

## 2024-04-26 PROCEDURE — 74018 RADEX ABDOMEN 1 VIEW: CPT

## 2024-04-26 PROCEDURE — 82948 REAGENT STRIP/BLOOD GLUCOSE: CPT

## 2024-04-26 PROCEDURE — 99232 SBSQ HOSP IP/OBS MODERATE 35: CPT

## 2024-04-26 PROCEDURE — 25010000002 LORAZEPAM PER 2 MG

## 2024-04-26 PROCEDURE — 84100 ASSAY OF PHOSPHORUS: CPT | Performed by: STUDENT IN AN ORGANIZED HEALTH CARE EDUCATION/TRAINING PROGRAM

## 2024-04-26 PROCEDURE — 25010000002 LEVETRIRACETAM PER 10 MG: Performed by: STUDENT IN AN ORGANIZED HEALTH CARE EDUCATION/TRAINING PROGRAM

## 2024-04-26 PROCEDURE — 25010000002 THIAMINE PER 100 MG: Performed by: STUDENT IN AN ORGANIZED HEALTH CARE EDUCATION/TRAINING PROGRAM

## 2024-04-26 PROCEDURE — 83735 ASSAY OF MAGNESIUM: CPT | Performed by: STUDENT IN AN ORGANIZED HEALTH CARE EDUCATION/TRAINING PROGRAM

## 2024-04-26 PROCEDURE — 36415 COLL VENOUS BLD VENIPUNCTURE: CPT | Performed by: STUDENT IN AN ORGANIZED HEALTH CARE EDUCATION/TRAINING PROGRAM

## 2024-04-26 PROCEDURE — 25010000002 ENOXAPARIN PER 10 MG: Performed by: STUDENT IN AN ORGANIZED HEALTH CARE EDUCATION/TRAINING PROGRAM

## 2024-04-26 PROCEDURE — 80053 COMPREHEN METABOLIC PANEL: CPT | Performed by: STUDENT IN AN ORGANIZED HEALTH CARE EDUCATION/TRAINING PROGRAM

## 2024-04-26 RX ORDER — NICOTINE 21 MG/24HR
1 PATCH, TRANSDERMAL 24 HOURS TRANSDERMAL
Status: DISCONTINUED | OUTPATIENT
Start: 2024-04-26 | End: 2024-05-01 | Stop reason: HOSPADM

## 2024-04-26 RX ORDER — POTASSIUM CHLORIDE 1.5 G/1.58G
40 POWDER, FOR SOLUTION ORAL EVERY 4 HOURS
Qty: 4 PACKET | Refills: 0 | Status: COMPLETED | OUTPATIENT
Start: 2024-04-26 | End: 2024-04-26

## 2024-04-26 RX ADMIN — POTASSIUM CHLORIDE 40 MEQ: 1.5 POWDER, FOR SOLUTION ORAL at 05:08

## 2024-04-26 RX ADMIN — Medication 10 ML: at 21:29

## 2024-04-26 RX ADMIN — THIAMINE HYDROCHLORIDE 200 MG: 100 INJECTION, SOLUTION INTRAMUSCULAR; INTRAVENOUS at 05:08

## 2024-04-26 RX ADMIN — AMLODIPINE BESYLATE 5 MG: 5 TABLET ORAL at 09:08

## 2024-04-26 RX ADMIN — LEVETIRACETAM 500 MG: 100 INJECTION, SOLUTION INTRAVENOUS at 09:08

## 2024-04-26 RX ADMIN — THIAMINE HYDROCHLORIDE 200 MG: 100 INJECTION, SOLUTION INTRAMUSCULAR; INTRAVENOUS at 21:27

## 2024-04-26 RX ADMIN — LORAZEPAM 1 MG: 2 INJECTION INTRAMUSCULAR; INTRAVENOUS at 21:28

## 2024-04-26 RX ADMIN — THIAMINE HYDROCHLORIDE 200 MG: 100 INJECTION, SOLUTION INTRAMUSCULAR; INTRAVENOUS at 14:36

## 2024-04-26 RX ADMIN — LORAZEPAM 1 MG: 2 INJECTION INTRAMUSCULAR; INTRAVENOUS at 06:00

## 2024-04-26 RX ADMIN — RISPERIDONE 0.5 MG: 0.5 TABLET, ORALLY DISINTEGRATING ORAL at 09:08

## 2024-04-26 RX ADMIN — Medication 1 PATCH: at 21:42

## 2024-04-26 RX ADMIN — ENOXAPARIN SODIUM 40 MG: 100 INJECTION SUBCUTANEOUS at 18:34

## 2024-04-26 RX ADMIN — CEFDINIR 300 MG: 300 CAPSULE ORAL at 09:08

## 2024-04-26 RX ADMIN — Medication 10 ML: at 09:09

## 2024-04-26 RX ADMIN — LEVETIRACETAM 500 MG: 100 INJECTION, SOLUTION INTRAVENOUS at 21:27

## 2024-04-26 RX ADMIN — POTASSIUM CHLORIDE 40 MEQ: 1.5 POWDER, FOR SOLUTION ORAL at 09:08

## 2024-04-26 RX ADMIN — FOLIC ACID 1 MG: 1 TABLET ORAL at 09:08

## 2024-04-26 NOTE — CASE MANAGEMENT/SOCIAL WORK
Continued Stay Note   [facility]     Patient Name: Emiliano Bateman  MRN: [id number]  Today's Date: 4/26/2024    Admit Date: 4/22/2024    Plan: DC Plan: From home with spouse and daughter. Potential recommendation for IOP.   Discharge Plan       Row Name 04/26/24 1616       Plan    Plan DC Plan: From home with spouse and daughter. Potential recommendation for IOP.    Provided Post Acute Provider List? N/A    Provided Post Acute Provider Quality & Resource List? N/A    Plan Comments CM reviewed chart documentation to obtain clinical updates. CM will continue to follow for any further needs and adjust discharge plan accordingly. DC Barriers: Cardiac monitoring, O2@2L nc, NGT, IV Thiamine/folic acid/Keppra, confusion, and restraints.               Expected Discharge Date and Time       Expected Discharge Date Expected Discharge Time    Apr 30, 2024           Phone communication or documentation only- no physical contact with patient or family.      Conchita Bone RN     Office Phone: [phone]  Office Cell:     [phone]

## 2024-04-26 NOTE — PLAN OF CARE
Goal Outcome Evaluation:  ST following for swallowing. Spoke to pt's RN and pt not appropriate for ST services this date d/t lethargy. Will continue to follow.

## 2024-04-26 NOTE — SIGNIFICANT NOTE
04/26/24 1103   OTHER   Discipline physical therapist   Rehab Time/Intention   Session Not Performed unable to evaluate, medical status change  (Pt required Ativan this AM and remains lethargic at this time. RN reports continued agitation and combativeness when pt is awake.)   Therapy Assessment/Plan (PT)   Criteria for Skilled Interventions Met (PT) yes;meets criteria   Recommendation   PT - Next Appointment 04/27/24

## 2024-04-26 NOTE — PROGRESS NOTES
Nutrition Services    Patient Name: Emiliano Bateman  YOB: 1960  MRN: [id number]  Admission date: 4/22/2024    Nutren 1.5 at 60mL/hr + 10mL water flush every 2 hours    PROGRESS NOTE      Encounter Information: Checking in on Nutrition POC and TF tolerance. Pt with ongoing NPO status, but pulled out his NG yesterday. NGT replaced early  this am and EN was resumed. SLP attempted to see patient today and reported that they were unable to related to lethargy. Patient is currently tolerating TF without noted issues at this time. Confirmed TF tolerance with RN via secure message chat.  No residuals currently. RD will begin rate advancement towards goal rate.        PO Diet: NPO Diet NPO Type: Strict NPO   PO Supplements: None    PO Intake:  NPO       Current nutrition support: Nutren 1.5 at 20mL/hr + 10mL/hr water flush    Nutrition support review: Infusing as ordered above per EMR       Labs (reviewed below): Hyponatremia - Decreasing water flushes        GI Function:  Last documented BM 4/26  Residuals - WNL       Nutrition Intervention Updates: Begin advancing TF towards goal rate.    Advance by 10 mL/hr q 4 hours until goal rate of 60 mL/hr    Decrease water flushes to 10 mL every 2 hours.      Results from last 7 days   Lab Units 04/26/24  0305 04/25/24  0410 04/24/24  0523   SODIUM mmol/L 134* 134* 137   POTASSIUM mmol/L 3.5 3.8 4.1   CHLORIDE mmol/L 92* 93* 97*   CO2 mmol/L 29.0 27.0 31.0*   BUN mg/dL 15 11 12   CREATININE mg/dL 0.70* 0.69* 0.79   CALCIUM mg/dL 9.2 9.1 9.2   BILIRUBIN mg/dL 0.6 0.6 0.6   ALK PHOS U/L 81 86 87   ALT (SGPT) U/L 14 14 13   AST (SGOT) U/L 29 24 21   GLUCOSE mg/dL 130* 124* 125*     Results from last 7 days   Lab Units 04/26/24  0305 04/25/24  0410 04/24/24  1636 04/24/24  0523 04/23/24  0530   MAGNESIUM mg/dL 2.3 2.3  --  2.2 2.2   PHOSPHORUS mg/dL 2.6 2.5   < > 2.1* 4.0   HEMOGLOBIN g/dL 15.0 14.8  --  14.3 14.6   HEMATOCRIT % 44.1 44.8  --  43.1 44.4   TRIGLYCERIDES  "mg/dL  --   --   --   --  87    < > = values in this interval not displayed.     No results found for: \"COVID19\"  Lab Results   Component Value Date    HGBA1C 6.50 (H) 04/23/2024       RD to follow up per protocol.    Electronically signed by:  Brie Church, LAWRENCE  04/26/24     "

## 2024-04-26 NOTE — CASE MANAGEMENT/SOCIAL WORK
"Social Work Assessment  [facility]     Patient Name: Emiliano Bateman  MRN: [id number]  Today's Date: 4/26/2024    Admit Date: 4/22/2024     Psychosocial       Row Name 04/26/24 1426       Values/Beliefs    Spiritual, Cultural Beliefs, Denominational Practices, Values that Affect Care no    Values/Beliefs Comment Pt is not Yazdanism.       Mental Health    Little Interest or Pleasure in Doing Things 98-->patient unable to answer    Feeling Down, Depressed or Hopeless 98-->patient unable to answer       Intellectual Performance WDL    Level of Consciousness Confusion       Stress    Do you feel stress - tense, restless, nervous, or anxious, or unable to sleep at night because your mind is troubled all the time - these days? Pt Unable       Coping/Stress    Major Change/Loss/Stressor medical condition/diagnosis;chemical dependency/abuse    Patient Personal Strengths expressive of needs;expressive of emotions    Sources of Support spouse;other family members;adult child(nathan)    Techniques to [place] with Loss/Stress/Change substance use  \"Drink,\" per spouse. Pt drinks a fifth per day.    Reaction to Health Status unable to assess    Understanding of Condition and Treatment unable to assess       Developmental Stage (Eriksson's)    Developmental Stage Stage 7 (35-65 years/Middle Adulthood) Generativity vs. Stagnation                   Abuse/Neglect       Row Name 04/26/24 1430       Personal Safety    Feels Unsafe at Home or Work/School unable to answer (comment required)    Feels Threatened by Someone unable to answer (comment required)    Does Anyone Try to Keep You From Having Contact with Others or Doing Things Outside Your Home? unable to answer (comment required)    Physical Signs of Abuse Present no    Personal Safety Comments Pt lives at home with spouse and 17 y.o. granddtr.                   Legal       Row Name 04/26/24 1431       Financial Resource Strain    How hard is it for you to pay for the very basics like food, " housing, medical care, and heating? Not hard       Financial/Legal    Source of Income disability    Who Manages Finances if Patient Unable Spouse    Finance Comments Spouse reports they have a leak from outside spicket that a friend is coming to fix. She is working on getting caught up via a payment plan with the  company.       Legal    Criminal Activity/Legal Involvement none                   Substance Abuse       Row Name 04/26/24 1434       Substance Use    Substance Use Comment Pt smokes 2ppd, having started at 11 y.o. Never attempted to quit. Denies illicit substance use. Pt drinks a fifth of whiskey per day for 2-3 years. Previously drank on the weekends only while employed as a .       AUDIT-C (Alcohol Use Disorders ID Test)    Q1: How often do you have a drink containing alcohol? 4 or more ti    Q2: How many drinks containing alcohol do you have on a typical day when you are drinking? 10 or more    Q3: How often do you have six or more drinks on one occasion? Daily    Audit-C Score 12       Family Member Substance Use (#4)    Family History of Substance Use none             Pt confused, spouse assisted with LACE.    Michelle Chang MSW, W  Medical Social Worker  Ph [phone]  Fax [phone]  [email or web address]

## 2024-04-26 NOTE — PROGRESS NOTES
[facility]     Progress Note    Patient Name: Emiliano Bateman  : 1960  MRN: [id number]  Primary Care Physician:  Meryl Thomas MD  Date of admission: 2024  Service date and time: 24 15:00 EDT  Subjective   Subjective     Chief Complaint:   Status epilepticus   HPI:  Patient is currently sleeping, on restraints.      Objective   Objective     Vitals:   Temp:  [98 °F (36.7 °C)-99.3 °F (37.4 °C)] 98.4 °F (36.9 °C)  Heart Rate:  [] 101  Resp:  [19-] 25  BP: (101-139)/(71-97) 139/93  Flow (L/min):  [2] 2  Physical Exam    Constitutional: lethargic minimally responsive    Eyes: PERRLA, sclerae anicteric, no conjunctival injection   HENT: NCAT, mucous membranes moist   Neck: Supple, no thyromegaly, no lymphadenopathy, trachea midline   Respiratory: Clear to auscultation bilaterally, nonlabored respirations    Cardiovascular: RRR, no murmurs, rubs, or gallops, palpable pedal pulses bilaterally   Gastrointestinal: Positive bowel sounds, soft, nontender, nondistended   Musculoskeletal: No bilateral ankle edema, no clubbing or cyanosis to extremities   Psychiatric: unable to assess.   Neurologic: Oriented x 3, strength symmetric in all extremities, Cranial Nerves grossly intact to confrontation, speech clear   Skin: No rashes     Result Review    Result Review:  I have personally reviewed the results from the time of this admission to 2024 15:00 EDT and agree with these findings:  [x]  Laboratory list / accordion  []  Microbiology  []  Radiology  []  EKG/Telemetry   []  Cardiology/Vascular   []  Pathology  []  Old records  []  Other:  Most notable findings include: Glucose 130, BUN 15, creatinine 0.7, sodium 134, potassium 3.5, chloride 92, CO2 29, calcium 9.2, total protein of 6.9, albumin 3.7, ALT 14, AST 29, alkaline phosphatase of 81, magnesium 2.3, phosphorus 2.6      Assessment & Plan   Assessment / Plan       Active Hospital Problems:  Active Hospital Problems    Diagnosis      **Status epilepticus     Seizure disorder     Hyperammonemia     Hypertensive disorder     Steatosis of liver     Chronic obstructive lung disease     Alcoholism     Anxiety      Plan:    Status epilepticus with probable alcohol withdrawal seizures  -Patient was previously on Keppra prior to admission and this has been continued although he may not need to continue Keppra on discharge as his seizures are most likely recurrent due to alcohol withdrawal symptoms  -Continue CIWA protocol with Ativan  -If patient can tolerate p.o. intake after SLP evaluation, I would add Librium for additional withdrawal symptom control  Patient is currently on physical restraints.     Hypertension  -Continue amlodipine     Anxiety with depression  -No acute issues  -Appreciate psychiatry evaluation     COPD  Continue nebulizer therapy as needed     Possible bacterial sinusitis  -Continue cefdinir     Dysphagia  -Patient had NG tube but has pulled out twice now  SLP evaluation hopefully today    DVT prophylaxis:  Medical and mechanical DVT prophylaxis orders are present.        CODE STATUS:   Code Status (Patient has no pulse and is not breathing): CPR (Attempt to Resuscitate)  Medical Interventions (Patient has pulse or is breathing): Full Support    Disposition:  I expect patient to be discharged on 4/28/2024.    Linden Cat MD

## 2024-04-26 NOTE — PROGRESS NOTES
"    Chief complaint patient unable to voice complaints due to fatigue, somnolence.    Subjective .     History of present illness:   The patient is a 63 y.o. male who was admitted secondary to seizure.     PMH: hypertension, COPD, anxiety, steatosis of liver, seizure disorder, alcohol abuse.      Psych was consulted due to patient throwing away medication.     I saw the patient this afternoon. He would open his eyes, but was mute, and was not able to participate in the interview.      I called his wife, Lili, for collateral information.     She states that he has been drinking heavily for some time. She reports he drinks about one fifth per day. She reports that he threw his medications away about a year ago, stating he \"didn't need them\". She reports that he has stopped attending his doctor appointments. 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 She reports irritability, stating he will \"go off\" in a second. She states he has had some memory loss over the last few months. He will ask her questions she has recently answered for him, but he doesn't remember. She isn't sure if he is depressed, but does report his alcohol use has increased.      The memory lapse is likely alcohol related, but unclear at this time.      She states the patient has tried to do rehab for chemical dependency in the past, but checked himself out after 3 days. She states he hasn't expressed a desire to quit drinking. .     4/24/24: Patient seen today. He has required restraints. Notes reviewed state he has been more agitated and restless. He is still not able to participate in interview.      4/25/24: Nursing reports patient still agitated and combative this morning. Required PRN ativan at around 8am. Patient receiving phenobarbital for withdrawal.      Today  Patient is somnolent, did not open eyes to voice  Did not respond to stimulation  Per nursing patient was agitated this " "morning, still requiring as needed medications  No events overnight      The following portions of the patient's history were reviewed and updated as appropriate: allergies, current medications, past family history, past medical history, past social history, past surgical history and problem list.    History    Objective     Vital Signs   /87 (BP Location: Left arm, Patient Position: Lying)   Pulse 100   Temp 98.5 °F (36.9 °C) (Oral)   Resp 21   Ht 175.3 cm (69\")   Wt 69.4 kg (153 lb)   SpO2 96%   BMI 22.59 kg/m²         MENTAL STATUS EXAM   General Appearance:  Cleanly groomed and dressed  Eye Contact:  Closed  Motor Activity:  Other  Other Comment:  No psychomotor abnormality noted  Mood and affect:  Other  Other Comment:  Unable to assess  Thought Process:  Other  Other Comment:  Unable to assess  Associations/ Thought Content:  Other  Other Comment:  Unable to assess  Hallucinations:  Other  Other Comment:  Did not appear to be responding to internal and external stimuli  Suicidal Ideations:  Other  Other Comment:  Unable to assess  Homicidal Ideation:  Other  Other Comment:  Unable to assess  Sensorium:  Other  Other Comment:  Somnolent         Assessment & Plan       Status epilepticus    Alcoholism    Anxiety    Chronic obstructive lung disease    Hypertensive disorder    Steatosis of liver    Seizure disorder    Hyperammonemia       Assessment:  alcohol withdrawal delirium   1. Status epilepticus    2. Alcohol withdrawal syndrome with complication    3. Seizure disorder    4. Hepatic encephalopathy    5. Chronic alcohol abuse    6. Substance use disorder    7. Acute non-recurrent maxillary sinusitis      Treatment Plan: Patient presented with alcohol withdrawal with delirium. Patient was unable to participate in interview to assess for possibly depression or readiness for alcohol cessation.   Intermittent agitation, requiring PRNs  Continue supportive treatment and WA protocol.   Continue " lorazepam PRN agitation.  Continue risperidone 0.5mg BID, recently increased due to agitation     Will continue to follow  Treatment Plan discussed with: Patient    I discussed the patients findings and my recommendations with patient and nursing staff    I have reviewed and approved the behavioral health treatment plans and problem list. Yes  Thank you for the consult   Referring MD has access to consult report and progress notes in EMR     Susan Canchola DNP, APRN  04/26/24  17:38 EDT

## 2024-04-27 LAB
ALBUMIN SERPL-MCNC: 3.7 G/DL (ref 3.5–5.2)
ALBUMIN/GLOB SERPL: 1.1 G/DL
ALP SERPL-CCNC: 77 U/L (ref 39–117)
ALT SERPL W P-5'-P-CCNC: 22 U/L (ref 1–41)
ANION GAP SERPL CALCULATED.3IONS-SCNC: 10 MMOL/L (ref 5–15)
AST SERPL-CCNC: 30 U/L (ref 1–40)
BILIRUB SERPL-MCNC: 0.5 MG/DL (ref 0–1.2)
BUN SERPL-MCNC: 13 MG/DL (ref 8–23)
BUN/CREAT SERPL: 18.8 (ref 7–25)
CALCIUM SPEC-SCNC: 9.2 MG/DL (ref 8.6–10.5)
CHLORIDE SERPL-SCNC: 93 MMOL/L (ref 98–107)
CO2 SERPL-SCNC: 30 MMOL/L (ref 22–29)
CREAT SERPL-MCNC: 0.69 MG/DL (ref 0.76–1.27)
EGFRCR SERPLBLD CKD-EPI 2021: 103.3 ML/MIN/1.73
GLOBULIN UR ELPH-MCNC: 3.3 GM/DL
GLUCOSE BLDC GLUCOMTR-MCNC: 119 MG/DL (ref 70–105)
GLUCOSE BLDC GLUCOMTR-MCNC: 149 MG/DL (ref 70–105)
GLUCOSE BLDC GLUCOMTR-MCNC: 181 MG/DL (ref 70–105)
GLUCOSE SERPL-MCNC: 122 MG/DL (ref 65–99)
MAGNESIUM SERPL-MCNC: 2.1 MG/DL (ref 1.6–2.4)
POTASSIUM SERPL-SCNC: 4.4 MMOL/L (ref 3.5–5.2)
PROT SERPL-MCNC: 7 G/DL (ref 6–8.5)
SODIUM SERPL-SCNC: 133 MMOL/L (ref 136–145)

## 2024-04-27 PROCEDURE — 82948 REAGENT STRIP/BLOOD GLUCOSE: CPT

## 2024-04-27 PROCEDURE — 25010000002 ENOXAPARIN PER 10 MG: Performed by: STUDENT IN AN ORGANIZED HEALTH CARE EDUCATION/TRAINING PROGRAM

## 2024-04-27 PROCEDURE — 25010000002 THIAMINE HCL 200 MG/2ML SOLUTION: Performed by: STUDENT IN AN ORGANIZED HEALTH CARE EDUCATION/TRAINING PROGRAM

## 2024-04-27 PROCEDURE — 83735 ASSAY OF MAGNESIUM: CPT | Performed by: STUDENT IN AN ORGANIZED HEALTH CARE EDUCATION/TRAINING PROGRAM

## 2024-04-27 PROCEDURE — 25010000002 THIAMINE PER 100 MG: Performed by: STUDENT IN AN ORGANIZED HEALTH CARE EDUCATION/TRAINING PROGRAM

## 2024-04-27 PROCEDURE — 82948 REAGENT STRIP/BLOOD GLUCOSE: CPT | Performed by: INTERNAL MEDICINE

## 2024-04-27 PROCEDURE — 25010000002 LORAZEPAM PER 2 MG

## 2024-04-27 PROCEDURE — 99232 SBSQ HOSP IP/OBS MODERATE 35: CPT

## 2024-04-27 PROCEDURE — 80053 COMPREHEN METABOLIC PANEL: CPT | Performed by: STUDENT IN AN ORGANIZED HEALTH CARE EDUCATION/TRAINING PROGRAM

## 2024-04-27 PROCEDURE — 25010000002 LEVETRIRACETAM PER 10 MG: Performed by: STUDENT IN AN ORGANIZED HEALTH CARE EDUCATION/TRAINING PROGRAM

## 2024-04-27 RX ORDER — LORAZEPAM 2 MG/ML
0.5 INJECTION INTRAMUSCULAR EVERY 6 HOURS PRN
Status: DISPENSED | OUTPATIENT
Start: 2024-04-27 | End: 2024-04-30

## 2024-04-27 RX ADMIN — THIAMINE HYDROCHLORIDE 200 MG: 100 INJECTION, SOLUTION INTRAMUSCULAR; INTRAVENOUS at 05:01

## 2024-04-27 RX ADMIN — CEFDINIR 300 MG: 300 CAPSULE ORAL at 00:45

## 2024-04-27 RX ADMIN — LORAZEPAM 1 MG: 2 INJECTION INTRAMUSCULAR; INTRAVENOUS at 05:01

## 2024-04-27 RX ADMIN — RISPERIDONE 0.5 MG: 0.5 TABLET, ORALLY DISINTEGRATING ORAL at 20:35

## 2024-04-27 RX ADMIN — ENOXAPARIN SODIUM 40 MG: 100 INJECTION SUBCUTANEOUS at 16:43

## 2024-04-27 RX ADMIN — SENNOSIDES AND DOCUSATE SODIUM 2 TABLET: 8.6; 5 TABLET ORAL at 08:47

## 2024-04-27 RX ADMIN — Medication 1 PATCH: at 09:00

## 2024-04-27 RX ADMIN — CEFDINIR 300 MG: 300 CAPSULE ORAL at 20:36

## 2024-04-27 RX ADMIN — THIAMINE HYDROCHLORIDE 200 MG: 100 INJECTION, SOLUTION INTRAMUSCULAR; INTRAVENOUS at 13:54

## 2024-04-27 RX ADMIN — NYSTATIN 500000 UNITS: 100000 SUSPENSION ORAL at 20:43

## 2024-04-27 RX ADMIN — AMLODIPINE BESYLATE 5 MG: 5 TABLET ORAL at 08:47

## 2024-04-27 RX ADMIN — LACTULOSE 30 G: 20 SOLUTION ORAL at 08:47

## 2024-04-27 RX ADMIN — SERTRALINE 50 MG: 50 TABLET, FILM COATED ORAL at 20:36

## 2024-04-27 RX ADMIN — RISPERIDONE 0.5 MG: 0.5 TABLET, ORALLY DISINTEGRATING ORAL at 00:46

## 2024-04-27 RX ADMIN — LEVETIRACETAM 500 MG: 100 INJECTION, SOLUTION INTRAVENOUS at 08:47

## 2024-04-27 RX ADMIN — Medication 10 ML: at 05:01

## 2024-04-27 RX ADMIN — RISPERIDONE 0.5 MG: 0.5 TABLET, ORALLY DISINTEGRATING ORAL at 08:47

## 2024-04-27 RX ADMIN — Medication 10 ML: at 08:56

## 2024-04-27 RX ADMIN — Medication 10 ML: at 22:00

## 2024-04-27 RX ADMIN — THIAMINE HYDROCHLORIDE 200 MG: 100 INJECTION, SOLUTION INTRAMUSCULAR; INTRAVENOUS at 20:37

## 2024-04-27 RX ADMIN — LEVETIRACETAM 500 MG: 100 INJECTION, SOLUTION INTRAVENOUS at 20:37

## 2024-04-27 RX ADMIN — CEFDINIR 300 MG: 300 CAPSULE ORAL at 08:47

## 2024-04-27 RX ADMIN — FOLIC ACID 1 MG: 1 TABLET ORAL at 08:47

## 2024-04-27 NOTE — PROGRESS NOTES
"  Chief complaint : ETOH withdrawal    Subjective .     History of present illness:  The patient is a 63 y.o. male who was admitted secondary to seizure.     PMH: hypertension, COPD, anxiety, steatosis of liver, seizure disorder, alcohol abuse.      Psych was consulted due to patient throwing away medication.     I saw the patient this afternoon. He would open his eyes, but was mute, and was not able to participate in the interview.      I called his wife, Lili, for collateral information.     She states that he has been drinking heavily for some time. She reports he drinks about one fifth per day. She reports that he threw his medications away about a year ago, stating he \"didn't need them\". She reports that he has stopped attending his doctor appointments. 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 She reports irritability, stating he will \"go off\" in a second. She states he has had some memory loss over the last few months. He will ask her questions she has recently answered for him, but he doesn't remember. She isn't sure if he is depressed, but does report his alcohol use has increased.      The memory lapse is likely alcohol related, but unclear at this time.      She states the patient has tried to do rehab for chemical dependency in the past, but checked himself out after 3 days. She states he hasn't expressed a desire to quit drinking. .    4/24/24: Patient seen today. He has required restraints. Notes reviewed state he has been more agitated and restless. He is still not able to participate in interview.     4/25/24: Nursing reports patient still agitated and combative this morning. Required PRN ativan at around 8am. Patient receiving phenobarbital for withdrawal.     4/27/24: Patient seen today with sitter present at bedside. He is still in restraints but nursing states he has been calmer today. He was not able to answer all my orientation " "questions today, but was improved from a couple days ago. He did report some depression, but was not able to really narrow down his symptoms or feelings. He states he is depressed because he is \"bored\". He did deny any suicidal thoughts. He was agreeable to starting an antidepressant. I asked him about alcohol cessation, but he did not admit to the level of drinking his family states he drinks. States he \"doesn't drink that much\".     Review of Systems   Review of systems could not be obtained due to   patient confusion.    The following portions of the patient's history were reviewed and updated as appropriate: allergies, current medications, past family history, past medical history, past social history, past surgical history and problem list.    History    Past psychiatric history: alcohol abuse       No medications prior to admission.        Scheduled Meds:  amLODIPine, 5 mg, Nasogastric, Q24H  cefdinir, 300 mg, Nasogastric, Q12H  enoxaparin, 40 mg, Subcutaneous, Daily  folic acid, 1 mg, Nasogastric, Daily  lactulose, 30 g, Nasogastric, Daily  levETIRAcetam, 500 mg, Intravenous, Q12H  nicotine, 1 patch, Transdermal, Q24H  risperiDONE, 0.5 mg, Oral, BID  senna-docusate sodium, 2 tablet, Oral, BID  sertraline, 50 mg, Oral, Nightly  sodium chloride, 10 mL, Intravenous, Q12H  thiamine (B-1) IV, 200 mg, Intravenous, Q8H   Followed by  [START ON 4/28/2024] thiamine, 100 mg, Oral, Daily         Continuous Infusions:       PRN Meds:    acetaminophen **OR** acetaminophen    senna-docusate sodium **AND** polyethylene glycol **AND** bisacodyl **AND** bisacodyl    Calcium Replacement - Follow Nurse / BPA Driven Protocol    hydrALAZINE    ipratropium-albuterol    LORazepam    Magnesium Standard Dose Replacement - Follow Nurse / BPA Driven Protocol    nitroglycerin    ondansetron ODT **OR** ondansetron    Phosphorus Replacement - Follow Nurse / BPA Driven Protocol    Potassium Replacement - Follow Nurse / BPA Driven " "Protocol    sodium chloride    sodium chloride      Allergies:  Penicillins      Objective     Vital Signs   /80 (BP Location: Left arm, Patient Position: Lying)   Pulse 106   Temp 98.1 °F (36.7 °C) (Oral)   Resp 25   Ht 175.3 cm (69\")   Wt 67.4 kg (148 lb 9.4 oz)   SpO2 99%   BMI 21.94 kg/m²     Physical Exam:    Musculoskeletal:   Muscle strength and tone: JOSÉ  Abnormal Movements: tremor  Gait: JOSÉ     Mental Status Exam:   Hygiene:   fair  Cooperation:   unable to cooperate   Eye Contact:  Closed  Psychomotor Behavior:  Restless  Affect:  Restricted  Mood: fluctates  Hopelessness: JOSÉ  Speech:  Mute  Thought Process:  Unable to demonstrate  Thought Content:  Unable to demonstrate  Suicidal:   JOSÉ  Homicidal:   JOSÉ  Hallucinations:  Not demonstrated today  Delusion:  Unable to demonstrate  Memory:  Unable to evaluate  Orientation:  Unable to evaluate  Reliability:  poor  Insight:  Poor  Judgement:  Poor  Impulse Control:  Impaired  Physical/Medical Issues:  Yes            Medications and allergies reviewed.     Result Review:  I have personally reviewed the results from the time of this admission to 4/23/2024 13:42 EDT and agree with these findings:  [x]  Laboratory  []  Microbiology  []  Radiology  [x]  EKG/Telemetry   []  Cardiology/Vascular   []  Pathology  []  Old records  []  Other:   .     Assessment & Plan       Status epilepticus    Alcoholism    Anxiety    Chronic obstructive lung disease    Hypertensive disorder    Steatosis of liver    Seizure disorder    Hyperammonemia       Assessment: alcohol withdrawal delirium   Treatment Plan: Patient presents with alcohol withdrawal with delirium. Patient was confused, in restraints, but was somewhat improved from previous assessment. Patient did admit to some depression, and was agreeable to starting sertraline. He did not endorse needing any help with alcohol cessation, but did not admit to the level of alcohol use that was reported by " family.      Ordered sertraline 50mg nightly. Continue risperisone 0.5mg BID. Continue lorazepam PRN agitation.      Continue supportive treatment.    Will continue to follow.   Treatment Plan discussed with: Patient    I discussed the patients findings and my recommendations with patient    I have reviewed and approved the behavioral health treatment plans and problem list. Yes     Referring MD has access to consult report and progress notes in EMR     KAREL Randhawa  04/27/24  13:40 EDT

## 2024-04-27 NOTE — SIGNIFICANT NOTE
04/27/24 1542   OTHER   Discipline physical therapist   Rehab Time/Intention   Session Not Performed other (see comments)  (Pt currently in restraints--will f/u on.)   Recommendation   PT - Next Appointment 04/28/24

## 2024-04-27 NOTE — PROGRESS NOTES
"Nutrition Services    Patient Name: Emiliano Bateman  YOB: 1960  MRN: [id number]  Admission date: 4/22/2024    PROGRESS NOTE      Encounter Information: Checking in on Nutrition POC and TF tolerance. Pt once again pulled NG earlier today but this has since been replaced and TF now running at 30mL/hour, working toward goal rate 60mL/hour.       PO Diet: NPO Diet NPO Type: Strict NPO   PO Supplements: None    PO Intake:  NPO       Current nutrition support: Nutren 1.5 at 60mL/hr + 10m: q 2 hours water flush    Nutrition support review: Infusing at 30mL/hour presently; secure message sent to give clearance to advance to goal        Labs (reviewed below): Hyponatremia - ongoing; water flushes to be minimized further        GI Function:  Last documented BM 4/27  Residuals - WNL       Nutrition Intervention Updates: Increase TF to goal rate 60mL/hour.     Reduce water flush to 10mL q 4 hours.     Results from last 7 days   Lab Units 04/27/24  0120 04/26/24  0305 04/25/24  0410   SODIUM mmol/L 133* 134* 134*   POTASSIUM mmol/L 4.4 3.5 3.8   CHLORIDE mmol/L 93* 92* 93*   CO2 mmol/L 30.0* 29.0 27.0   BUN mg/dL 13 15 11   CREATININE mg/dL 0.69* 0.70* 0.69*   CALCIUM mg/dL 9.2 9.2 9.1   BILIRUBIN mg/dL 0.5 0.6 0.6   ALK PHOS U/L 77 81 86   ALT (SGPT) U/L 22 14 14   AST (SGOT) U/L 30 29 24   GLUCOSE mg/dL 122* 130* 124*     Results from last 7 days   Lab Units 04/27/24  0120 04/26/24  2245 04/26/24  0305 04/25/24  0410 04/24/24  0523 04/23/24  0530   MAGNESIUM mg/dL 2.1  --  2.3 2.3   < > 2.2   PHOSPHORUS mg/dL  --  2.4* 2.6 2.5   < > 4.0   HEMOGLOBIN g/dL  --  15.0 15.0 14.8   < > 14.6   HEMATOCRIT %  --  43.8 44.1 44.8   < > 44.4   TRIGLYCERIDES mg/dL  --   --   --   --   --  87    < > = values in this interval not displayed.     No results found for: \"COVID19\"  Lab Results   Component Value Date    HGBA1C 6.50 (H) 04/23/2024       RD to follow up per protocol.    Electronically signed by:  Radha Ryder, " RD  04/27/24

## 2024-04-27 NOTE — PLAN OF CARE
Problem: Skin Injury Risk Increased  Goal: Skin Health and Integrity  Outcome: Ongoing, Progressing  Intervention: Optimize Skin Protection  Recent Flowsheet Documentation  Taken 4/27/2024 0400 by Darlene Thorne RN  Pressure Reduction Devices: pressure-redistributing mattress utilized  Taken 4/27/2024 0000 by Darlene Thorne RN  Pressure Reduction Techniques:   frequent weight shift encouraged   weight shift assistance provided  Head of Bed (HOB) Positioning: HOB at 45 degrees  Pressure Reduction Devices: pressure-redistributing mattress utilized  Skin Protection:   adhesive use limited   incontinence pads utilized   tubing/devices free from skin contact  Taken 4/26/2024 2030 by Darlene Thorne RN  Pressure Reduction Techniques:   frequent weight shift encouraged   weight shift assistance provided  Head of Bed (HOB) Positioning: HOB at 30-45 degrees  Pressure Reduction Devices: pressure-redistributing mattress utilized  Skin Protection:   adhesive use limited   incontinence pads utilized   tubing/devices free from skin contact     Problem: Adult Inpatient Plan of Care  Goal: Plan of Care Review  Outcome: Ongoing, Progressing  Flowsheets (Taken 4/27/2024 0634)  Progress: improving  Plan of Care Reviewed With: patient  Outcome Evaluation: Patient had no seizure activity during the night. NG tube was reinserted this shift and tube feeds restarted. Patient remains in restraints and now has a one on one sitter because he was able to scoot down in the bed and pulled NG tube out initially at the start of the shift. Remains restless at this time.  Goal: Patient-Specific Goal (Individualized)  Outcome: Ongoing, Progressing  Goal: Absence of Hospital-Acquired Illness or Injury  Outcome: Ongoing, Progressing  Intervention: Identify and Manage Fall Risk  Recent Flowsheet Documentation  Taken 4/27/2024 0400 by Darlene Thorne RN  Safety Promotion/Fall Prevention: safety round/check completed  Taken 4/27/2024 0200 by  Boniey, Darlene, RN  Safety Promotion/Fall Prevention: safety round/check completed  Taken 4/27/2024 0000 by Darlene Thorne RN  Safety Promotion/Fall Prevention: safety round/check completed  Taken 4/26/2024 2200 by Darlene Thorne RN  Safety Promotion/Fall Prevention: safety round/check completed  Taken 4/26/2024 2030 by Darlene Thorne RN  Safety Promotion/Fall Prevention: safety round/check completed  Intervention: Prevent Skin Injury  Recent Flowsheet Documentation  Taken 4/27/2024 0400 by Darlene Thorne RN  Body Position: weight shifting  Taken 4/27/2024 0200 by Darlene Thorne RN  Body Position: position changed independently  Taken 4/27/2024 0000 by Darlene Thorne RN  Body Position: turned  Skin Protection:   adhesive use limited   incontinence pads utilized   tubing/devices free from skin contact  Taken 4/26/2024 2030 by Darlene Thorne RN  Body Position: turned  Skin Protection:   adhesive use limited   incontinence pads utilized   tubing/devices free from skin contact  Intervention: Prevent and Manage VTE (Venous Thromboembolism) Risk  Recent Flowsheet Documentation  Taken 4/26/2024 2030 by Darlene Thorne RN  VTE Prevention/Management:   bilateral   sequential compression devices on  Range of Motion: active ROM (range of motion) encouraged  Intervention: Prevent Infection  Recent Flowsheet Documentation  Taken 4/27/2024 0400 by Darlene Thorne RN  Infection Prevention:   equipment surfaces disinfected   hand hygiene promoted   rest/sleep promoted   single patient room provided  Taken 4/27/2024 0200 by Darlene Thorne RN  Infection Prevention:   equipment surfaces disinfected   hand hygiene promoted   rest/sleep promoted   single patient room provided  Taken 4/27/2024 0000 by Darlene Thorne RN  Infection Prevention:   equipment surfaces disinfected   hand hygiene promoted   rest/sleep promoted   single patient room provided  Taken 4/26/2024 2200 by Darlene Thorne RN  Infection  Prevention:   equipment surfaces disinfected   hand hygiene promoted   rest/sleep promoted   single patient room provided  Goal: Optimal Comfort and Wellbeing  Outcome: Ongoing, Progressing  Intervention: Provide Person-Centered Care  Recent Flowsheet Documentation  Taken 4/26/2024 2030 by Darlene Thorne RN  Trust Relationship/Rapport:   care explained   choices provided  Goal: Readiness for Transition of Care  Outcome: Ongoing, Progressing     Problem: Seizure, Active Management  Goal: Absence of Seizure/Seizure-Related Injury  Outcome: Ongoing, Progressing  Intervention: Prevent Seizure-Related Injury  Recent Flowsheet Documentation  Taken 4/27/2024 0400 by Darlene Thorne RN  Seizure Precautions:   activity supervised   clutter-free environment maintained   emergency equipment at bedside   side rails padded   soft boundaries provided  Taken 4/27/2024 0200 by Darlene Thorne RN  Seizure Precautions:   activity supervised   clutter-free environment maintained   emergency equipment at bedside   soft boundaries provided   side rails padded  Taken 4/27/2024 0000 by Darlene Thorne RN  Seizure Precautions:   activity supervised   clutter-free environment maintained   emergency equipment at bedside   side rails padded   soft boundaries provided  Taken 4/26/2024 2200 by Darlene Thorne RN  Seizure Precautions:   activity supervised   clutter-free environment maintained   emergency equipment at bedside   side rails padded   soft boundaries provided  Taken 4/26/2024 2030 by Darlene Thorne RN  Seizure Precautions:   activity supervised   clutter-free environment maintained   emergency equipment at bedside   side rails padded   soft boundaries provided     Problem: Fall Injury Risk  Goal: Absence of Fall and Fall-Related Injury  Outcome: Ongoing, Progressing  Intervention: Identify and Manage Contributors  Recent Flowsheet Documentation  Taken 4/27/2024 0200 by Darlene Thorne RN  Medication Review/Management:  medications reviewed  Taken 4/26/2024 2030 by Darlene Thorne RN  Self-Care Promotion:   independence encouraged   BADL personal objects within reach   BADL personal routines maintained  Intervention: Promote Injury-Free Environment  Recent Flowsheet Documentation  Taken 4/27/2024 0400 by Darlene Thorne RN  Safety Promotion/Fall Prevention: safety round/check completed  Taken 4/27/2024 0200 by Darlene Thorne RN  Safety Promotion/Fall Prevention: safety round/check completed  Taken 4/27/2024 0000 by Darlene Thorne RN  Safety Promotion/Fall Prevention: safety round/check completed  Taken 4/26/2024 2200 by Darlene Thorne RN  Safety Promotion/Fall Prevention: safety round/check completed  Taken 4/26/2024 2030 by Darlene Thorne RN  Safety Promotion/Fall Prevention: safety round/check completed     Problem: Restraint, Nonviolent  Goal: Absence of Harm or Injury  Outcome: Ongoing, Progressing  Intervention: Implement Least Restrictive Safety Strategies  Recent Flowsheet Documentation  Taken 4/27/2024 0600 by Darlene Thorne RN  Medical Device Protection:   tubing secured   torso covered  Less Restrictive Alternative:   bed alarm in use   calming techniques promoted   emotional support provided   environment adjusted   safety enhancements provided   sensory stimulation limited   surveillance provided   1:1 observation maintained  De-Escalation Techniques:   appropriate behavior reinforced   reoriented   verbally redirected  Diversional Activities: television  Taken 4/27/2024 0400 by Darlene Thorne RN  Medical Device Protection:   torso covered   tubing secured  Less Restrictive Alternative:   bed alarm in use   calming techniques promoted   emotional support provided   environment adjusted   safety enhancements provided   sensory stimulation limited   surveillance provided   1:1 observation maintained  De-Escalation Techniques:   appropriate behavior reinforced   increased round frequency   verbally  redirected   stimulation decreased  Diversional Activities: television  Taken 4/27/2024 0200 by Darlene Thorne RN  Medical Device Protection:   torso covered   tubing secured  Less Restrictive Alternative:   bed alarm in use   calming techniques promoted   emotional support provided   environment adjusted   safety enhancements provided   sensory stimulation limited   surveillance provided   1:1 observation maintained  De-Escalation Techniques:   appropriate behavior reinforced   diversional activity encouraged   increased round frequency   verbally redirected  Diversional Activities: television  Taken 4/27/2024 0000 by Darlene Thorne RN  Medical Device Protection:   tubing secured   torso covered  Less Restrictive Alternative:   bed alarm in use   calming techniques promoted   emotional support provided   environment adjusted   safety enhancements provided   sensory stimulation limited   surveillance provided   1:1 observation maintained  De-Escalation Techniques:   verbally redirected   reoriented   increased round frequency  Diversional Activities: television  Taken 4/26/2024 2200 by Darlene Thorne RN  Medical Device Protection:   tubing secured   torso covered  Less Restrictive Alternative:   bed alarm in use   calming techniques promoted   emotional support provided   environment adjusted   safety enhancements provided   sensory stimulation limited   surveillance provided   1:1 observation maintained  De-Escalation Techniques:   appropriate behavior reinforced   stimulation decreased  Diversional Activities: television  Taken 4/26/2024 2030 by Darlene Thorne RN  Diversional Activities: television  Taken 4/26/2024 2000 by Darlene Thorne RN  Medical Device Protection:   tubing secured   torso covered  Less Restrictive Alternative:   bed alarm in use   calming techniques promoted   emotional support provided   environment adjusted   safety enhancements provided   sensory stimulation limited   surveillance  provided  De-Escalation Techniques:   1:1 observation initiated   appropriate behavior reinforced   diversional activity encouraged   reoriented   verbally redirected  Diversional Activities: television  Intervention: Protect Dignity, Rights, and Personal Wellbeing  Recent Flowsheet Documentation  Taken 4/26/2024 2030 by Darlene Thorne RN  Trust Relationship/Rapport:   care explained   choices provided  Intervention: Protect Skin and Joint Integrity  Recent Flowsheet Documentation  Taken 4/27/2024 0400 by Darlene Thorne RN  Body Position: weight shifting  Taken 4/27/2024 0200 by Darlene Thorne RN  Body Position: position changed independently  Taken 4/27/2024 0000 by Darlene Thorne RN  Body Position: turned  Taken 4/26/2024 2030 by Darlene Thorne RN  Body Position: turned  Range of Motion: active ROM (range of motion) encouraged     Problem: Aspiration (Enteral Nutrition)  Goal: Absence of Aspiration Signs and Symptoms  Outcome: Ongoing, Progressing  Intervention: Minimize Aspiration Risk  Recent Flowsheet Documentation  Taken 4/27/2024 0400 by Darlene Thorne RN  Oral Care: suction provided  Enteral Feeding Safety:   tubing labeled as enteral feeding   placement verified by x-ray   tube marked at exit site  Taken 4/27/2024 0000 by Darlene Thorne RN  Head of Bed (HOB) Positioning: HOB at 45 degrees  Taken 4/26/2024 2030 by Darlene Thorne RN  Head of Bed (HOB) Positioning: HOB at 30-45 degrees  Oral Care: swabbed with sterile water     Problem: Device-Related Complication Risk (Enteral Nutrition)  Goal: Safe, Effective Therapy Delivery  Outcome: Ongoing, Progressing  Intervention: Prevent Feeding-Related Adverse Events  Recent Flowsheet Documentation  Taken 4/27/2024 0400 by Darlene Thorne RN  Enteral Feeding Safety:   tubing labeled as enteral feeding   placement verified by x-ray   tube marked at exit site     Problem: Feeding Intolerance (Enteral Nutrition)  Goal: Feeding Tolerance  Outcome:  Ongoing, Progressing     Problem: COPD (Chronic Obstructive Pulmonary Disease) Comorbidity  Goal: Maintenance of COPD Symptom Control  Outcome: Ongoing, Progressing  Intervention: Maintain COPD-Symptom Control  Recent Flowsheet Documentation  Taken 4/27/2024 0200 by Darlene Thorne RN  Medication Review/Management: medications reviewed  Taken 4/26/2024 2030 by Darlene Thorne RN  Supportive Measures: active listening utilized     Problem: Hypertension Comorbidity  Goal: Blood Pressure in Desired Range  Outcome: Ongoing, Progressing  Intervention: Maintain Blood Pressure Management  Recent Flowsheet Documentation  Taken 4/27/2024 0200 by Darlene Thorne RN  Medication Review/Management: medications reviewed  Taken 4/26/2024 2030 by Darlene Thorne RN  Syncope Management: position changed slowly   Goal Outcome Evaluation:  Plan of Care Reviewed With: patient        Progress: improving  Outcome Evaluation: Patient had no seizure activity during the night. NG tube was reinserted this shift and tube feeds restarted. Patient remains in restraints and now has a one on one sitter because he was able to scoot down in the bed and pulled NG tube out initially at the start of the shift. Remains restless at this time.

## 2024-04-27 NOTE — PROGRESS NOTES
[facility] MEDICINE SERVICE  DAILY PROGRESS NOTE    NAME: Emiliano Bateman  : 1960  MRN: [id number]      LOS: 4 days     PROVIDER OF SERVICE: Esvin Vasquez MD    Chief Complaint: Status epilepticus    Subjective:     Interval History:      Sleepy, Dobbhoff in place, soft restraint in hand, sitter at bedside      Objective:     Vital Signs  Temp:  [96.9 °F (36.1 °C)-98.8 °F (37.1 °C)] 98.1 °F (36.7 °C)  Heart Rate:  [] 106  Resp:  [16-32] 25  BP: (107-138)/(80-92) 107/80  Flow (L/min):  [2-3] 3   Body mass index is 21.94 kg/m².    Physical Exam  General Appearance: Drowsy, not in any distress, Dobbhoff in place  Head:   Normocephalic, without obvious abnormality, atraumatic  Eyes:   PERRL, conjunctiva/corneas clear, EOM's intact, fundi benign, both eyes  Ears:    Normal TM's and external ear canals, both ears  Nose:   Nares normal, septum midline, mucosa normal, no drainage or sinus tenderness  Throat: Lips, mucosa, and tongue normal; teeth and gums normal  Neck:   Supple, symmetrical, trachea midline, no adenopathy, thyroid: not enlarged, symmetric, no tenderness/mass/nodules, no carotid bruit or JVD  Lungs:             Clear to auscultation bilaterally, respirations unlabored  Heart:  Regular rate and rhythm, S1, S2 normal, no murmur, rub or gallop  Abdomen:  Soft, non-tender, bowel sounds active all four quadrants,  no masses, no organomegaly  Extremities:     Extremities normal, atraumatic, no cyanosis or edema  Pulses:            2+ and symmetric  Skin:    Skin color, texture, turgor normal, no rashes or lesions  Neurologic: Normal    Scheduled Meds   amLODIPine, 5 mg, Nasogastric, Q24H  cefdinir, 300 mg, Nasogastric, Q12H  enoxaparin, 40 mg, Subcutaneous, Daily  folic acid, 1 mg, Nasogastric, Daily  lactulose, 30 g, Nasogastric, Daily  levETIRAcetam, 500 mg, Intravenous, Q12H  nicotine, 1 patch, Transdermal, Q24H  risperiDONE, 0.5 mg, Oral, BID  senna-docusate sodium, 2 tablet, Oral,  BID  sertraline, 50 mg, Oral, Nightly  sodium chloride, 10 mL, Intravenous, Q12H  thiamine (B-1) IV, 200 mg, Intravenous, Q8H   Followed by  [START ON 4/28/2024] thiamine, 100 mg, Oral, Daily       PRN Meds     acetaminophen **OR** acetaminophen    senna-docusate sodium **AND** polyethylene glycol **AND** bisacodyl **AND** bisacodyl    Calcium Replacement - Follow Nurse / BPA Driven Protocol    hydrALAZINE    ipratropium-albuterol    LORazepam    Magnesium Standard Dose Replacement - Follow Nurse / BPA Driven Protocol    nitroglycerin    ondansetron ODT **OR** ondansetron    Phosphorus Replacement - Follow Nurse / BPA Driven Protocol    Potassium Replacement - Follow Nurse / BPA Driven Protocol    sodium chloride    sodium chloride   Infusions         Diagnostic Data    Results from last 7 days   Lab Units 04/27/24  0120 04/26/24  2245   WBC 10*3/mm3  --  7.87   HEMOGLOBIN g/dL  --  15.0   HEMATOCRIT %  --  43.8   PLATELETS 10*3/mm3  --  243   GLUCOSE mg/dL 122*  --    CREATININE mg/dL 0.69*  --    BUN mg/dL 13  --    SODIUM mmol/L 133*  --    POTASSIUM mmol/L 4.4  --    AST (SGOT) U/L 30  --    ALT (SGPT) U/L 22  --    ALK PHOS U/L 77  --    BILIRUBIN mg/dL 0.5  --    ANION GAP mmol/L 10.0  --        XR Abdomen KUB    Result Date: 4/26/2024  Impression: Tip of feeding tube projects over the expected position of the body of the stomach Electronically Signed: Kapil Lopez MD  4/26/2024 10:23 PM EDT  Workstation ID: [id number]    XR Abdomen KUB    Result Date: 4/26/2024  Impression: Feeding tube at or just below the GE junction as above. Recommend further advancement Electronically Signed: Kapil Lopez MD  4/26/2024 9:09 PM EDT  Workstation ID: [id number]    XR Chest 1 View    Result Date: 4/26/2024  Impression: No active cardiopulmonary disease Electronically Signed: Jarrod Hein  4/26/2024 8:30 PM EDT  Workstation ID: [id number]    XR Abdomen KUB    Result Date: 4/25/2024  Impression: Advancement of the  nasogastric tube now partially coiled and oriented retrograde within the distal stomach. Electronically Signed: Kostas Nelson MD  4/25/2024 8:30 PM EDT  Workstation ID: [id number]           Assessment/Plan:     Active and Resolved Problems  Active Hospital Problems    Diagnosis  POA    **Status epilepticus [G40.901]  Yes    Seizure disorder [G40.909]  Yes    Hyperammonemia [E72.20]  Yes    Hypertensive disorder [I10]  Yes    Steatosis of liver [K76.0]  Yes    Chronic obstructive lung disease [J44.9]  Yes    Alcoholism [F10.20]  Yes    Anxiety [F41.9]  Yes      Resolved Hospital Problems   No resolved problems to display.       Status epilepticus with probable alcohol withdrawal seizures  -Patient was previously on Keppra prior to admission and this has been continued although he may not need to continue Keppra on discharge as his seizures are most likely recurrent due to alcohol withdrawal symptoms  -Continue CIWA protocol with Ativan  -once pass Speech eval, can consider PO librium      Hypertension  -Continue amlodipine     Anxiety with depression  -No acute issues  -Appreciate psychiatry evaluation     COPD  Continue nebulizer therapy as needed     Possible bacterial sinusitis  -Continue cefdinir     Dysphagia  -Patient had NG tube but has pulled out twice so far  SLP evaluation once pt more awake and able to cooperate      DVT prophylaxis:  Medical and mechanical DVT prophylaxis orders are present.    DVT prophylaxis:  Medical and mechanical DVT prophylaxis orders are present.         Code status is   Code Status and Medical Interventions:   Ordered at: 04/23/24 0045     Code Status (Patient has no pulse and is not breathing):    CPR (Attempt to Resuscitate)     Medical Interventions (Patient has pulse or is breathing):    Full Support       Plan for disposition: 3-4 days     Time: 30 minutes    Signature: Electronically signed by Esvin Vasquez MD, 04/27/24, 14:06 EDT.  [facility] Hospitalist Team

## 2024-04-28 ENCOUNTER — APPOINTMENT (OUTPATIENT)
Dept: GENERAL RADIOLOGY | Facility: HOSPITAL | Age: 64
End: 2024-04-28
Payer: MEDICARE

## 2024-04-28 LAB
ALBUMIN SERPL-MCNC: 3.8 G/DL (ref 3.5–5.2)
ALBUMIN/GLOB SERPL: 1.1 G/DL
ALP SERPL-CCNC: 79 U/L (ref 39–117)
ALT SERPL W P-5'-P-CCNC: 30 U/L (ref 1–41)
ANION GAP SERPL CALCULATED.3IONS-SCNC: 10 MMOL/L (ref 5–15)
AST SERPL-CCNC: 39 U/L (ref 1–40)
BASOPHILS # BLD AUTO: 0.09 10*3/MM3 (ref 0–0.2)
BASOPHILS NFR BLD AUTO: 1.3 % (ref 0–1.5)
BILIRUB SERPL-MCNC: 0.4 MG/DL (ref 0–1.2)
BUN SERPL-MCNC: 17 MG/DL (ref 8–23)
BUN/CREAT SERPL: 24.3 (ref 7–25)
CA-I SERPL ISE-MCNC: 1.24 MMOL/L (ref 1.2–1.3)
CALCIUM SPEC-SCNC: 9.7 MG/DL (ref 8.6–10.5)
CHLORIDE SERPL-SCNC: 97 MMOL/L (ref 98–107)
CO2 SERPL-SCNC: 31 MMOL/L (ref 22–29)
CREAT SERPL-MCNC: 0.7 MG/DL (ref 0.76–1.27)
DEPRECATED RDW RBC AUTO: 53.2 FL (ref 37–54)
EGFRCR SERPLBLD CKD-EPI 2021: 102.9 ML/MIN/1.73
EOSINOPHIL # BLD AUTO: 0.27 10*3/MM3 (ref 0–0.4)
EOSINOPHIL NFR BLD AUTO: 3.8 % (ref 0.3–6.2)
ERYTHROCYTE [DISTWIDTH] IN BLOOD BY AUTOMATED COUNT: 14.2 % (ref 12.3–15.4)
GLOBULIN UR ELPH-MCNC: 3.4 GM/DL
GLUCOSE BLDC GLUCOMTR-MCNC: 195 MG/DL (ref 70–105)
GLUCOSE SERPL-MCNC: 168 MG/DL (ref 65–99)
HCT VFR BLD AUTO: 43 % (ref 37.5–51)
HGB BLD-MCNC: 14.7 G/DL (ref 13–17.7)
IMM GRANULOCYTES # BLD AUTO: 0.02 10*3/MM3 (ref 0–0.05)
IMM GRANULOCYTES NFR BLD AUTO: 0.3 % (ref 0–0.5)
LYMPHOCYTES # BLD AUTO: 1.8 10*3/MM3 (ref 0.7–3.1)
LYMPHOCYTES NFR BLD AUTO: 25.1 % (ref 19.6–45.3)
MAGNESIUM SERPL-MCNC: 2.4 MG/DL (ref 1.6–2.4)
MCH RBC QN AUTO: 34.5 PG (ref 26.6–33)
MCHC RBC AUTO-ENTMCNC: 34.2 G/DL (ref 31.5–35.7)
MCV RBC AUTO: 100.9 FL (ref 79–97)
MONOCYTES # BLD AUTO: 0.84 10*3/MM3 (ref 0.1–0.9)
MONOCYTES NFR BLD AUTO: 11.7 % (ref 5–12)
NEUTROPHILS NFR BLD AUTO: 4.16 10*3/MM3 (ref 1.7–7)
NEUTROPHILS NFR BLD AUTO: 57.8 % (ref 42.7–76)
NRBC BLD AUTO-RTO: 0 /100 WBC (ref 0–0.2)
PHOSPHATE SERPL-MCNC: 2.8 MG/DL (ref 2.5–4.5)
PLATELET # BLD AUTO: 242 10*3/MM3 (ref 140–450)
PMV BLD AUTO: 9.5 FL (ref 6–12)
POTASSIUM SERPL-SCNC: 4.1 MMOL/L (ref 3.5–5.2)
PROT SERPL-MCNC: 7.2 G/DL (ref 6–8.5)
RBC # BLD AUTO: 4.26 10*6/MM3 (ref 4.14–5.8)
SODIUM SERPL-SCNC: 138 MMOL/L (ref 136–145)
WBC NRBC COR # BLD AUTO: 7.18 10*3/MM3 (ref 3.4–10.8)

## 2024-04-28 PROCEDURE — 83735 ASSAY OF MAGNESIUM: CPT | Performed by: STUDENT IN AN ORGANIZED HEALTH CARE EDUCATION/TRAINING PROGRAM

## 2024-04-28 PROCEDURE — 36415 COLL VENOUS BLD VENIPUNCTURE: CPT | Performed by: STUDENT IN AN ORGANIZED HEALTH CARE EDUCATION/TRAINING PROGRAM

## 2024-04-28 PROCEDURE — 99232 SBSQ HOSP IP/OBS MODERATE 35: CPT

## 2024-04-28 PROCEDURE — 25010000002 ENOXAPARIN PER 10 MG: Performed by: STUDENT IN AN ORGANIZED HEALTH CARE EDUCATION/TRAINING PROGRAM

## 2024-04-28 PROCEDURE — 82330 ASSAY OF CALCIUM: CPT | Performed by: STUDENT IN AN ORGANIZED HEALTH CARE EDUCATION/TRAINING PROGRAM

## 2024-04-28 PROCEDURE — 92610 EVALUATE SWALLOWING FUNCTION: CPT

## 2024-04-28 PROCEDURE — 80053 COMPREHEN METABOLIC PANEL: CPT | Performed by: STUDENT IN AN ORGANIZED HEALTH CARE EDUCATION/TRAINING PROGRAM

## 2024-04-28 PROCEDURE — 82948 REAGENT STRIP/BLOOD GLUCOSE: CPT | Performed by: INTERNAL MEDICINE

## 2024-04-28 PROCEDURE — 25010000002 LORAZEPAM PER 2 MG

## 2024-04-28 PROCEDURE — 74018 RADEX ABDOMEN 1 VIEW: CPT

## 2024-04-28 PROCEDURE — 25010000002 LEVETRIRACETAM PER 10 MG: Performed by: STUDENT IN AN ORGANIZED HEALTH CARE EDUCATION/TRAINING PROGRAM

## 2024-04-28 PROCEDURE — 84100 ASSAY OF PHOSPHORUS: CPT | Performed by: STUDENT IN AN ORGANIZED HEALTH CARE EDUCATION/TRAINING PROGRAM

## 2024-04-28 PROCEDURE — 85025 COMPLETE CBC W/AUTO DIFF WBC: CPT | Performed by: STUDENT IN AN ORGANIZED HEALTH CARE EDUCATION/TRAINING PROGRAM

## 2024-04-28 RX ORDER — LACTULOSE 10 G/15ML
30 SOLUTION ORAL DAILY
Status: DISCONTINUED | OUTPATIENT
Start: 2024-04-29 | End: 2024-05-01 | Stop reason: HOSPADM

## 2024-04-28 RX ORDER — AMLODIPINE BESYLATE 5 MG/1
5 TABLET ORAL
Status: DISCONTINUED | OUTPATIENT
Start: 2024-04-29 | End: 2024-05-01 | Stop reason: HOSPADM

## 2024-04-28 RX ORDER — FOLIC ACID 1 MG/1
1 TABLET ORAL DAILY
Status: DISCONTINUED | OUTPATIENT
Start: 2024-04-29 | End: 2024-05-01 | Stop reason: HOSPADM

## 2024-04-28 RX ADMIN — Medication 1 PATCH: at 10:00

## 2024-04-28 RX ADMIN — LEVETIRACETAM 500 MG: 100 INJECTION, SOLUTION INTRAVENOUS at 09:58

## 2024-04-28 RX ADMIN — LORAZEPAM 0.5 MG: 2 INJECTION INTRAMUSCULAR; INTRAVENOUS at 05:01

## 2024-04-28 RX ADMIN — Medication 10 ML: at 22:16

## 2024-04-28 RX ADMIN — NYSTATIN 500000 UNITS: 100000 SUSPENSION ORAL at 09:55

## 2024-04-28 RX ADMIN — RISPERIDONE 0.5 MG: 0.5 TABLET, ORALLY DISINTEGRATING ORAL at 09:59

## 2024-04-28 RX ADMIN — Medication 100 MG: at 09:59

## 2024-04-28 RX ADMIN — Medication 10 ML: at 09:59

## 2024-04-28 RX ADMIN — SENNOSIDES AND DOCUSATE SODIUM 2 TABLET: 8.6; 5 TABLET ORAL at 09:59

## 2024-04-28 RX ADMIN — LEVETIRACETAM 500 MG: 100 INJECTION, SOLUTION INTRAVENOUS at 22:13

## 2024-04-28 RX ADMIN — NYSTATIN 500000 UNITS: 100000 SUSPENSION ORAL at 18:10

## 2024-04-28 RX ADMIN — NYSTATIN 500000 UNITS: 100000 SUSPENSION ORAL at 12:33

## 2024-04-28 RX ADMIN — ENOXAPARIN SODIUM 40 MG: 100 INJECTION SUBCUTANEOUS at 16:28

## 2024-04-28 NOTE — PROGRESS NOTES
"  Chief complaint : ETOH withdrawal    Subjective .     History of present illness:  The patient is a 63 y.o. male who was admitted secondary to seizure.     PMH: hypertension, COPD, anxiety, steatosis of liver, seizure disorder, alcohol abuse.      Psych was consulted due to patient throwing away medication.     I saw the patient this afternoon. He would open his eyes, but was mute, and was not able to participate in the interview.      I called his wife, Lili, for collateral information.     She states that he has been drinking heavily for some time. She reports he drinks about one fifth per day. She reports that he threw his medications away about a year ago, stating he \"didn't need them\". She reports that he has stopped attending his doctor appointments. 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 She reports irritability, stating he will \"go off\" in a second. She states he has had some memory loss over the last few months. He will ask her questions she has recently answered for him, but he doesn't remember. She isn't sure if he is depressed, but does report his alcohol use has increased.      The memory lapse is likely alcohol related, but unclear at this time.      She states the patient has tried to do rehab for chemical dependency in the past, but checked himself out after 3 days. She states he hasn't expressed a desire to quit drinking. .    4/24/24: Patient seen today. He has required restraints. Notes reviewed state he has been more agitated and restless. He is still not able to participate in interview.     4/25/24: Nursing reports patient still agitated and combative this morning. Required PRN ativan at around 8am. Patient receiving phenobarbital for withdrawal.     4/27/24: Patient seen today with sitter present at bedside. He is still in restraints but nursing states he has been calmer today. He was not able to answer all my orientation " "questions today, but was improved from a couple days ago. He did report some depression, but was not able to really narrow down his symptoms or feelings. He states he is depressed because he is \"bored\". He did deny any suicidal thoughts. He was agreeable to starting an antidepressant. I asked him about alcohol cessation, but he did not admit to the level of drinking his family states he drinks. States he \"doesn't drink that much\".     4/28/24: Patient seen today. He has gotten his Dobhoff removed, and was sitting up in bed. No longer requiring a sitter or restraints. He was still somewhat disoriented, but calm.     Review of Systems   Review of systems could not be obtained due to   patient confusion.    The following portions of the patient's history were reviewed and updated as appropriate: allergies, current medications, past family history, past medical history, past social history, past surgical history and problem list.    History    Past psychiatric history: alcohol abuse       No medications prior to admission.        Scheduled Meds:  [START ON 4/29/2024] amLODIPine, 5 mg, Oral, Q24H  enoxaparin, 40 mg, Subcutaneous, Daily  [START ON 4/29/2024] folic acid, 1 mg, Oral, Daily  [START ON 4/29/2024] lactulose, 30 g, Oral, Daily  levETIRAcetam, 500 mg, Intravenous, Q12H  nicotine, 1 patch, Transdermal, Q24H  nystatin, 5 mL, Swish & Spit, 4x Daily  risperiDONE, 0.5 mg, Oral, BID  senna-docusate sodium, 2 tablet, Oral, BID  sertraline, 50 mg, Oral, Nightly  sodium chloride, 10 mL, Intravenous, Q12H  thiamine, 100 mg, Oral, Daily         Continuous Infusions:       PRN Meds:    acetaminophen **OR** acetaminophen    senna-docusate sodium **AND** polyethylene glycol **AND** bisacodyl **AND** bisacodyl    Calcium Replacement - Follow Nurse / BPA Driven Protocol    hydrALAZINE    ipratropium-albuterol    LORazepam    Magnesium Standard Dose Replacement - Follow Nurse / BPA Driven Protocol    nitroglycerin    ondansetron " "ODT **OR** ondansetron    Phosphorus Replacement - Follow Nurse / BPA Driven Protocol    Potassium Replacement - Follow Nurse / BPA Driven Protocol    sodium chloride    sodium chloride      Allergies:  Penicillins      Objective     Vital Signs   /88 (BP Location: Left arm, Patient Position: Lying)   Pulse 112   Temp 97.9 °F (36.6 °C) (Axillary)   Resp (!) 29   Ht 175.3 cm (69\")   Wt 65.3 kg (143 lb 15.4 oz)   SpO2 98%   BMI 21.26 kg/m²     Physical Exam:    Musculoskeletal:   Muscle strength and tone: JOSÉ  Abnormal Movements: tremor  Gait: JOSÉ     Mental Status Exam:   Hygiene:   fair  Cooperation:   unable to cooperate   Eye Contact:  Closed  Psychomotor Behavior:  Restless  Affect:  Restricted  Mood: fluctates  Hopelessness: JOSÉ  Speech:  Mute  Thought Process:  Unable to demonstrate  Thought Content:  Unable to demonstrate  Suicidal:   JOSÉ  Homicidal:   JOSÉ  Hallucinations:  Not demonstrated today  Delusion:  Unable to demonstrate  Memory:  Unable to evaluate  Orientation:  Unable to evaluate  Reliability:  poor  Insight:  Poor  Judgement:  Poor  Impulse Control:  Impaired  Physical/Medical Issues:  Yes            Medications and allergies reviewed.     Result Review:  I have personally reviewed the results from the time of this admission to 4/23/2024 13:42 EDT and agree with these findings:  [x]  Laboratory  []  Microbiology  []  Radiology  [x]  EKG/Telemetry   []  Cardiology/Vascular   []  Pathology  []  Old records  []  Other:   .     Assessment & Plan       Status epilepticus    Alcoholism    Anxiety    Chronic obstructive lung disease    Hypertensive disorder    Steatosis of liver    Seizure disorder    Hyperammonemia       Assessment: alcohol withdrawal delirium   Treatment Plan: Patient presents with alcohol withdrawal with delirium. Patient was confused, in restraints, but was somewhat improved from previous assessment. Patient did admit to some depression, and was agreeable to " starting sertraline. He did not endorse needing any help with alcohol cessation, but did not admit to the level of alcohol use that was reported by family.      Continue sertraline 50mg nightly. Continue risperisone 0.5mg BID. Continue lorazepam PRN agitation.      Continue supportive treatment.    Will continue to follow.   Treatment Plan discussed with: Patient    I discussed the patients findings and my recommendations with patient    I have reviewed and approved the behavioral health treatment plans and problem list. Yes     Referring MD has access to consult report and progress notes in EMR     KAREL Randhawa  04/28/24  11:50 EDT

## 2024-04-28 NOTE — CASE MANAGEMENT/SOCIAL WORK
Discharge Planning Assessment  [facility]     Patient Name: Emiliano Bateman  MRN: [id number]  Today's Date: 4/28/2024    Admit Date: 4/22/2024    Plan: DC Plan: From home with spouse and daughter. Potential recommendation for IOP.       Discharge Plan       Row Name 04/28/24 1911       Plan    Plan Comments SW spoke with Pt at bedside. Pt declined CD treatment and did not want a list of resources.             Katie Mabry LCSW, APHSW-C  Medical Social Worker  [facility]  [address] [place] [address]  Sat-Mon Office:[phone]  Tues-Fri Office: [phone]  Fax: [phone]

## 2024-04-28 NOTE — THERAPY EVALUATION
Acute Care - Speech Language Pathology   Swallow Initial Evaluation  [facility]     Patient Name: Emiliano Bateman  : 1960  MRN: [id number]  Today's Date: 2024               Admit Date: 2024    Visit Dx:     ICD-10-CM ICD-9-CM   1. Status epilepticus  G40.901 345.3   2. Alcohol withdrawal syndrome with complication  F10.939 291.81   3. Seizure disorder  G40.909 345.90   4. Hepatic encephalopathy  K76.82 572.2   5. Chronic alcohol abuse  F10.10 305.00   6. Substance use disorder  F19.90 305.90   7. Acute non-recurrent maxillary sinusitis  J01.00 461.0     Patient Active Problem List   Diagnosis    Alcoholism    Anxiety    Chronic obstructive lung disease    Hypertensive disorder    Steatosis of liver    Status epilepticus    Seizure disorder    Hyperammonemia     Past Medical History:   Diagnosis Date    Alcoholism 2020    Aneurysm of thoracic aorta 2019    3.9 cm      Anxiety 2020    Chronic obstructive lung disease 10/08/2020    xray [facility] 18      Hypertensive disorder 2020    Myocardial infarction 10/08/2020    angioplasty      Seizure disorder 2024    Steatosis of liver 10/09/2020     History reviewed. No pertinent surgical history.    SLP Recommendation and Plan  SLP Swallowing Diagnosis: functional oral phase, R/O pharyngeal dysphagia (given ongoing meal assessments.) (24 1200)  SLP Diet Recommendation: puree, thin liquids (24 1200)  Recommended Precautions and Strategies: upright posture during/after eating, small bites of food and sips of liquid, alternate between small bites of food and sips of liquid, general aspiration precautions, fatigue precautions (24 1200)  SLP Rec. for Method of Medication Administration: meds crushed, with puree (24 1200)     Monitor for Signs of Aspiration: yes, notify SLP if any concerns (24 1200)     Swallow Criteria for Skilled Therapeutic Interventions Met: demonstrates skilled criteria (24  "1200)     Rehab Potential/Prognosis, Swallowing: good, to achieve stated therapy goals (04/28/24 1200)  Therapy Frequency (Swallow): PRN (04/28/24 1200)  Predicted Duration Therapy Intervention (Days): until discharge (04/28/24 1200)  Oral Care Recommendations: Oral Care BID/PRN, Swab (04/28/24 1200)        SWALLOW EVALUATION (Last 72 Hours)       SLP Adult Swallow Evaluation       Row Name 04/28/24 1200       Rehab Evaluation    Document Type evaluation  -CB    Subjective Information no complaints  -CB    Patient Observations alert;cooperative  -CB    Patient/Family/Caregiver Comments/Observations Patient able to follow simple directives.  -CB    Patient Effort good  -CB       General Information    Patient Profile Reviewed yes  -CB    Pertinent History Of Current Problem Emiliano Bateman is a 63 y.o. male with PMH of Hypertension, COPD, anxiety, steatosis of liver, seizure disorder, and alcoholism presented to the hospital for tonic-clonic seizures, and was admitted with a principal diagnosis of Status epilepticus.  HPI information is limited due to patient postictal following seizure activity; information obtained from ER report and family at bedside.  Patient reportedly stopped taking all of his medications several days ago following a diagnosis of Parkinson's disease.  Patient has a known history of seizure disorder and had a generalized tonic-clonic seizure that lasted several minutes without return to normal mental status near [facility].  Patient was brought to our ER via ambulance where he had additional tonic-clonic seizure.  Patient was given Ativan and Keppra in the emergency department however continued to have \"twitching\".  Patient was brought to the intensive care unit for further evaluation and treatment.  -CB    Current Method of Nutrition NPO  -CB       Pain    Additional Documentation Pain Scale: FACES Pre/Post-Treatment (Group)  -CB       Pain Scale: FACES Pre/Post-Treatment    Pain: " FACES Scale, Pretreatment 0-->no hurt  -CB    Posttreatment Pain Rating 0-->no hurt  -CB       Oral Motor Structure and Function    Dentition Assessment edentulous  -CB    Secretion Management WNL/WFL  -CB    Mucosal Quality moist, healthy  -CB    Gag Response absent or diminished  -CB       Oral Musculature and Cranial Nerve Assessment    Oral Motor General Assessment WFL  -CB    Oral Motor, Comment Oral mechanism examination revealed patient demonstrated full ROM and mobility of lingual structures with exception of reduce labial protrusion/retraction. Patient demonstrated adequate jaw strength but noted reduce lingual strength. Palatal movement was present. Noted diminished gag reflex.  -CB       General Eating/Swallowing Observations    Respiratory Support Currently in Use nasal cannula  -CB    O2 Liters 3L  -CB    Eating/Swallowing Skills fed by SLP  -CB    Positioning During Eating upright in bed  -CB    Utensils Used spoon;cup;straw  -CB    Consistencies Trialed mixed consistency;pureed;ice chips;thin liquids  -CB       Clinical Swallow Eval    Clinical Swallow Evaluation Summary Patient was seen for dysphagia evaluation per MD order d/t failed swallow screen. Most recent chest x-ray revealed no active cardiopulmonary disease. Patient was reported to pull NG tube x 2 and suspect aspiration after removal of NG tube. Noted coughing at rest. Patient reports no history of CVA, GERD, PNA and/or esophageal stricture. Patient currently receiving nourishment via NG tube.  Patient is edentulous. Oral mechanism examination revealed patient demonstrated full ROM and mobility of lingua structure but noted reduce labial protrusion/retraction.  Lingual strength was reduced. Jaw strength was adequate. Palatal movement was present. Noted diminished gag reflex. Patient noted to have a gravely vocal quality which patient  reports is baseline. Properly positioned patient upright near 90 degree hip flexion in bed prior to trials.  Patient was provided ice chip x 1. No overt s/s of aspiration was observed. Provided trials of thins via spoon x 3, Given digital palpation, swallow response was timely. Noted coughing x 1. Initially vocal quality was wet but once he coughed vocal quality was clear. Provided trials of thins via cup x 3. No clearing of throat, cough and/or vocal changes were identified. Swallow was timely. Provided trials of thins via straw x 5. No overt s/s of aspiration was evident. No wet vocal quality was detected.  Provided trials of puree x 3. Oral transit was approximately 3 seconds in duration. Noted coughing x 1. Vocal quality remained clear. Patient did not completely clear bolus from spoon d/t incomplete seal around the spoon. Provided trials of mixed consistency x 1. Patient displayed adequate munching/chewing pattern. However, patient c/o it was hard to munch. No overt s/s of aspiration was observed.  Given patient c/o toughness with munching pieces of peach, it is recommended that patient receive a puree diet at this time. Suspect that occasional coughing noted during assessment may improve once NG tube is removed as it may impede bolus to hang up on tubing and/or alter otherwise normal swallow function to some degree.  ST will follow closely to assure safety and tolerance with recommended diet and/or make any further recommendation as indicated.  -CB       SLP Evaluation Clinical Impression    SLP Swallowing Diagnosis functional oral phase;R/O pharyngeal dysphagia  given ongoing meal assessments.  -CB    Functional Impact risk of aspiration/pneumonia  -CB    Rehab Potential/Prognosis, Swallowing good, to achieve stated therapy goals  -CB    Swallow Criteria for Skilled Therapeutic Interventions Met demonstrates skilled criteria  -CB       Recommendations    Therapy Frequency (Swallow) PRN  -CB    Predicted Duration Therapy Intervention (Days) until discharge  -CB    SLP Diet Recommendation puree;thin liquids  -CB     Recommended Precautions and Strategies upright posture during/after eating;small bites of food and sips of liquid;alternate between small bites of food and sips of liquid;general aspiration precautions;fatigue precautions  -CB    Oral Care Recommendations Oral Care BID/PRN;Swab  -CB    SLP Rec. for Method of Medication Administration meds crushed;with puree  -CB    Monitor for Signs of Aspiration yes;notify SLP if any concerns  -CB       Swallow Goals (SLP)    Swallow LTGs Swallow Long Term Goal (free text)  -CB    Swallow STGs diet tolerance goal selection (SLP)  -CB    Diet Tolerance Goal Selection (SLP) Swallow Short Term Goal 1  -CB       (LTG) Swallow    (LTG) Swallow Patient will tolerate safest and least restrictive diet without complications from aspiration.  -CB    Time Frame (Swallow Long Term Goal) by discharge  -CB    Progress/Outcomes (Swallow Long Term Goal) new goal  -CB       (STG) Swallow 1    (STG) Swallow 1 Patient will participate in full meal assessment to assure safety and adequacy of recommended diet and independent use of safe swallow strategies.  -CB    Time Frame (Swallow Short Term Goal 1) 1 week  -CB    Progress/Outcomes (Swallow Short Term Goal 1) new goal  -CB              User Key  (r) = Recorded By, (t) = Taken By, (c) = Cosigned By      Initials Name Effective Dates    Wendy Ponce, SLP 09/21/21 -                     EDUCATION  The patient has been educated in the following areas:   Dysphagia (Swallowing Impairment) Oral Care/Hydration.        SLP GOALS       Row Name 04/28/24 1200       (LTG) Swallow    (LTG) Swallow Patient will tolerate safest and least restrictive diet without complications from aspiration.  -CB    Time Frame (Swallow Long Term Goal) by discharge  -CB    Progress/Outcomes (Swallow Long Term Goal) new goal  -CB       (STG) Swallow 1    (STG) Swallow 1 Patient will participate in full meal assessment to assure safety and adequacy of recommended diet and  independent use of safe swallow strategies.  -CB    Time Frame (Swallow Short Term Goal 1) 1 week  -CB    Progress/Outcomes (Swallow Short Term Goal 1) new goal  -CB              User Key  (r) = Recorded By, (t) = Taken By, (c) = Cosigned By      Initials Name Provider Type    CB Wendy Gómez, SLP Speech and Language Pathologist                       Time Calculation:                DEBORA Cardoza  4/28/2024

## 2024-04-28 NOTE — PLAN OF CARE
Problem: Skin Injury Risk Increased  Goal: Skin Health and Integrity  Outcome: Ongoing, Progressing     Problem: Adult Inpatient Plan of Care  Goal: Plan of Care Review  Outcome: Ongoing, Progressing  Goal: Patient-Specific Goal (Individualized)  Outcome: Ongoing, Progressing  Goal: Absence of Hospital-Acquired Illness or Injury  Outcome: Ongoing, Progressing  Intervention: Identify and Manage Fall Risk  Recent Flowsheet Documentation  Taken 4/28/2024 0852 by Adia Camacho LPN  Safety Promotion/Fall Prevention: safety round/check completed  Intervention: Prevent Infection  Recent Flowsheet Documentation  Taken 4/28/2024 0852 by Adia Camacho LPN  Infection Prevention:   hand hygiene promoted   rest/sleep promoted   single patient room provided  Goal: Optimal Comfort and Wellbeing  Outcome: Ongoing, Progressing  Goal: Readiness for Transition of Care  Outcome: Ongoing, Progressing     Problem: Seizure, Active Management  Goal: Absence of Seizure/Seizure-Related Injury  Outcome: Ongoing, Progressing     Problem: Fall Injury Risk  Goal: Absence of Fall and Fall-Related Injury  Outcome: Ongoing, Progressing  Intervention: Identify and Manage Contributors  Recent Flowsheet Documentation  Taken 4/28/2024 0852 by Adia Camacho LPN  Medication Review/Management: medications reviewed  Intervention: Promote Injury-Free Environment  Recent Flowsheet Documentation  Taken 4/28/2024 0852 by Adia Camacho LPN  Safety Promotion/Fall Prevention: safety round/check completed     Problem: Restraint, Nonviolent  Goal: Absence of Harm or Injury  Outcome: Ongoing, Progressing  Intervention: Implement Least Restrictive Safety Strategies  Recent Flowsheet Documentation  Taken 4/28/2024 1000 by Adia Camacho LPN  Medical Device Protection: IV pole/bag removed from visual field  Less Restrictive Alternative: bed alarm in use  De-Escalation Techniques: stimulation decreased  Diversional Activities: television  Taken 4/28/2024 0852 by  Camacho, Adia, LPN  Medical Device Protection: IV pole/bag removed from visual field  Taken 4/28/2024 0800 by Adia Camacho LPN  Medical Device Protection: IV pole/bag removed from visual field  Less Restrictive Alternative:   bed alarm in use   1:1 observation maintained  De-Escalation Techniques: stimulation decreased  Diversional Activities: music     Problem: Aspiration (Enteral Nutrition)  Goal: Absence of Aspiration Signs and Symptoms  Outcome: Ongoing, Progressing     Problem: Device-Related Complication Risk (Enteral Nutrition)  Goal: Safe, Effective Therapy Delivery  Outcome: Ongoing, Progressing     Problem: Feeding Intolerance (Enteral Nutrition)  Goal: Feeding Tolerance  Outcome: Ongoing, Progressing     Problem: COPD (Chronic Obstructive Pulmonary Disease) Comorbidity  Goal: Maintenance of COPD Symptom Control  Outcome: Ongoing, Progressing  Intervention: Maintain COPD-Symptom Control  Recent Flowsheet Documentation  Taken 4/28/2024 0852 by Adia Camacho LPN  Medication Review/Management: medications reviewed     Problem: Hypertension Comorbidity  Goal: Blood Pressure in Desired Range  Outcome: Ongoing, Progressing  Intervention: Maintain Blood Pressure Management  Recent Flowsheet Documentation  Taken 4/28/2024 0852 by Adia Camacho LPN  Medication Review/Management: medications reviewed   Goal Outcome Evaluation:

## 2024-04-28 NOTE — PROGRESS NOTES
[facility] MEDICINE SERVICE  DAILY PROGRESS NOTE    NAME: Emiliano Bateman  : 1960  MRN: [id number]      LOS: 5 days     PROVIDER OF SERVICE: Esvin Vasquez MD    Chief Complaint: Status epilepticus    Subjective:     Interval History:      Dobbhoff in place  Was awake and finally working with speech therapy      Objective:     Vital Signs  Temp:  [97.9 °F (36.6 °C)-98.2 °F (36.8 °C)] 97.9 °F (36.6 °C)  Heart Rate:  [] 112  Resp:  [22-29] 29  BP: (115-134)/(76-89) 125/88  Flow (L/min):  [3] 3   Body mass index is 21.26 kg/m².    Physical Exam  General Appearance: Drowsy, not in any distress, Dobbhoff in place  Head:   Normocephalic, without obvious abnormality, atraumatic  Eyes:   PERRL, conjunctiva/corneas clear, EOM's intact, fundi benign, both eyes  Ears:    Normal TM's and external ear canals, both ears  Nose:   Nares normal, septum midline, mucosa normal, no drainage or sinus tenderness  Throat: Lips, mucosa, and tongue normal; teeth and gums normal  Neck:   Supple, symmetrical, trachea midline, no adenopathy, thyroid: not enlarged, symmetric, no tenderness/mass/nodules, no carotid bruit or JVD  Lungs:             Clear to auscultation bilaterally, respirations unlabored  Heart:  Regular rate and rhythm, S1, S2 normal, no murmur, rub or gallop  Abdomen:  Soft, non-tender, bowel sounds active all four quadrants,  no masses, no organomegaly  Extremities:     Extremities normal, atraumatic, no cyanosis or edema  Pulses:            2+ and symmetric  Skin:    Skin color, texture, turgor normal, no rashes or lesions  Neurologic: Normal    Scheduled Meds   [START ON 2024] amLODIPine, 5 mg, Oral, Q24H  enoxaparin, 40 mg, Subcutaneous, Daily  [START ON 2024] folic acid, 1 mg, Oral, Daily  [START ON 2024] lactulose, 30 g, Oral, Daily  levETIRAcetam, 500 mg, Intravenous, Q12H  nicotine, 1 patch, Transdermal, Q24H  nystatin, 5 mL, Swish & Spit, 4x Daily  risperiDONE, 0.5 mg, Oral,  BID  senna-docusate sodium, 2 tablet, Oral, BID  sertraline, 50 mg, Oral, Nightly  sodium chloride, 10 mL, Intravenous, Q12H  thiamine, 100 mg, Oral, Daily       PRN Meds     acetaminophen **OR** acetaminophen    senna-docusate sodium **AND** polyethylene glycol **AND** bisacodyl **AND** bisacodyl    Calcium Replacement - Follow Nurse / BPA Driven Protocol    hydrALAZINE    ipratropium-albuterol    LORazepam    Magnesium Standard Dose Replacement - Follow Nurse / BPA Driven Protocol    nitroglycerin    ondansetron ODT **OR** ondansetron    Phosphorus Replacement - Follow Nurse / BPA Driven Protocol    Potassium Replacement - Follow Nurse / BPA Driven Protocol    sodium chloride    sodium chloride   Infusions         Diagnostic Data    Results from last 7 days   Lab Units 04/28/24  0002   WBC 10*3/mm3 7.18   HEMOGLOBIN g/dL 14.7   HEMATOCRIT % 43.0   PLATELETS 10*3/mm3 242   GLUCOSE mg/dL 168*   CREATININE mg/dL 0.70*   BUN mg/dL 17   SODIUM mmol/L 138   POTASSIUM mmol/L 4.1   AST (SGOT) U/L 39   ALT (SGPT) U/L 30   ALK PHOS U/L 79   BILIRUBIN mg/dL 0.4   ANION GAP mmol/L 10.0       XR Abdomen KUB    Result Date: 4/26/2024  Impression: Tip of feeding tube projects over the expected position of the body of the stomach Electronically Signed: Kapil Lopez MD  4/26/2024 10:23 PM EDT  Workstation ID: [id number]    XR Abdomen KUB    Result Date: 4/26/2024  Impression: Feeding tube at or just below the GE junction as above. Recommend further advancement Electronically Signed: Kapil Lopez MD  4/26/2024 9:09 PM EDT  Workstation ID: [id number]    XR Chest 1 View    Result Date: 4/26/2024  Impression: No active cardiopulmonary disease Electronically Signed: Jarrod Hein  4/26/2024 8:30 PM EDT  Workstation ID: [id number]           Assessment/Plan:     Active and Resolved Problems  Active Hospital Problems    Diagnosis  POA    **Status epilepticus [G40.901]  Yes    Seizure disorder [G40.909]  Yes    Hyperammonemia [E72.20]   Yes    Hypertensive disorder [I10]  Yes    Steatosis of liver [K76.0]  Yes    Chronic obstructive lung disease [J44.9]  Yes    Alcoholism [F10.20]  Yes    Anxiety [F41.9]  Yes      Resolved Hospital Problems   No resolved problems to display.   63-year-old patient history of alcohol abuse presented with seizure noted to be in status epilepticus, evaluated by neurology and likely the findings are consistent with withdrawal seizure  Dobbhoff had to be placed due to dysphagia, patient has been drowsy for the last 2 to 3 days and only today been able to work with speech therapy    Status epilepticus with probable alcohol withdrawal seizures  -Patient was previously on Keppra prior to admission and this has been continued although he may not need to continue Keppra on discharge as his seizures are most likely recurrent due to alcohol withdrawal symptoms  -Continue CIWA protocol with Ativan  -once pass Speech eval, can consider PO librium   -naty psych input      Hypertension  -Continue amlodipine     Anxiety with depression  -No acute issues  -Appreciate psychiatry evaluation     COPD  Continue nebulizer therapy as needed     Possible bacterial sinusitis  -Continue cefdinir     Dysphagia  -Patient had NG tube but has pulled out twice so far  -This morning speech therapy were evaluating the patient, awaiting final recommendation to hopefully if passes can start patient on a diet     DVT prophylaxis:  Medical and mechanical DVT prophylaxis orders are present.    DVT prophylaxis:  Medical and mechanical DVT prophylaxis orders are present.         Code status is   Code Status and Medical Interventions:   Ordered at: 04/23/24 0045     Code Status (Patient has no pulse and is not breathing):    CPR (Attempt to Resuscitate)     Medical Interventions (Patient has pulse or is breathing):    Full Support       Plan for disposition: 2-3 days days     Time: 30 minutes    Signature: Electronically signed by Esvin Vasquez MD, 04/28/24,  10:50 EDT.  [place] Hospitalist Team

## 2024-04-28 NOTE — PLAN OF CARE
Goal Outcome Evaluation:      Patient was seen for dysphagia evaluation per MD order d/t failed swallow screen. Most recent chest x-ray revealed no active cardiopulmonary disease. Patient was reported to pull NG tube x 2 and suspect aspiration after removal of NG tube. Noted coughing at rest. Patient reports no history of CVA, GERD, PNA and/or esophageal stricture. Patient currently receiving nourishment via NG tube.  Patient is edentulous. Oral mechanism examination revealed patient demonstrated full ROM and mobility of lingua structure but noted reduce labial protrusion/retraction.  Lingual strength was reduced. Jaw strength was adequate. Palatal movement was present. Noted diminished gag reflex. Patient noted to have a gravely vocal quality which patient  reports is baseline. Properly positioned patient upright near 90 degree hip flexion in bed prior to trials. Patient was provided ice chip x 1. No overt s/s of aspiration was observed. Provided trials of thins via spoon x 3, Given digital palpation, swallow response was timely. Noted coughing x 1. Initially vocal quality was wet but once he coughed vocal quality was clear. Provided trials of thins via cup x 3. No clearing of throat, cough and/or vocal changes were identified. Swallow was timely. Provided trials of thins via straw x 5. No overt s/s of aspiration was evident. No wet vocal quality was detected.  Provided trials of puree x 3. Oral transit was approximately 3 seconds in duration. Noted coughing x 1. Vocal quality remained clear. Patient did not completely clear bolus from spoon d/t incomplete seal around the spoon. Provided trials of mixed consistency x 1. Patient displayed adequate munching/chewing pattern. However, patient c/o it was hard to munch. No overt s/s of aspiration was observed.  Given patient c/o toughness with munching pieces of peach, it is recommended that patient receive a puree diet at this time. Suspect that occasional coughing  noted during assessment may improve once NG tube is removed as it may impede bolus to hang up on tubing and/or alter otherwise normal swallow function to some degree.  ST will follow closely to assure safety and tolerance with recommended diet and/or make any further recommendation as indicated                          SLP Swallowing Diagnosis: functional oral phase, R/O pharyngeal dysphagia (given ongoing meal assessments.) (04/28/24 1200)

## 2024-04-29 LAB
ALBUMIN SERPL-MCNC: 3.8 G/DL (ref 3.5–5.2)
ALBUMIN/GLOB SERPL: 1.2 G/DL
ALP SERPL-CCNC: 76 U/L (ref 39–117)
ALT SERPL W P-5'-P-CCNC: 32 U/L (ref 1–41)
ANION GAP SERPL CALCULATED.3IONS-SCNC: 9 MMOL/L (ref 5–15)
AST SERPL-CCNC: 35 U/L (ref 1–40)
BASOPHILS # BLD AUTO: 0.07 10*3/MM3 (ref 0–0.2)
BASOPHILS NFR BLD AUTO: 1.1 % (ref 0–1.5)
BILIRUB SERPL-MCNC: 0.5 MG/DL (ref 0–1.2)
BUN SERPL-MCNC: 17 MG/DL (ref 8–23)
BUN/CREAT SERPL: 23.6 (ref 7–25)
CA-I SERPL ISE-MCNC: 1.2 MMOL/L (ref 1.2–1.3)
CALCIUM SPEC-SCNC: 9.4 MG/DL (ref 8.6–10.5)
CHLORIDE SERPL-SCNC: 95 MMOL/L (ref 98–107)
CO2 SERPL-SCNC: 31 MMOL/L (ref 22–29)
CREAT SERPL-MCNC: 0.72 MG/DL (ref 0.76–1.27)
DEPRECATED RDW RBC AUTO: 53.1 FL (ref 37–54)
EGFRCR SERPLBLD CKD-EPI 2021: 102 ML/MIN/1.73
EOSINOPHIL # BLD AUTO: 0.35 10*3/MM3 (ref 0–0.4)
EOSINOPHIL NFR BLD AUTO: 5.5 % (ref 0.3–6.2)
ERYTHROCYTE [DISTWIDTH] IN BLOOD BY AUTOMATED COUNT: 14.3 % (ref 12.3–15.4)
GLOBULIN UR ELPH-MCNC: 3.2 GM/DL
GLUCOSE SERPL-MCNC: 148 MG/DL (ref 65–99)
HCT VFR BLD AUTO: 43 % (ref 37.5–51)
HGB BLD-MCNC: 14.7 G/DL (ref 13–17.7)
IMM GRANULOCYTES # BLD AUTO: 0.02 10*3/MM3 (ref 0–0.05)
IMM GRANULOCYTES NFR BLD AUTO: 0.3 % (ref 0–0.5)
LYMPHOCYTES # BLD AUTO: 1.92 10*3/MM3 (ref 0.7–3.1)
LYMPHOCYTES NFR BLD AUTO: 30.4 % (ref 19.6–45.3)
MAGNESIUM SERPL-MCNC: 2.3 MG/DL (ref 1.6–2.4)
MCH RBC QN AUTO: 34.7 PG (ref 26.6–33)
MCHC RBC AUTO-ENTMCNC: 34.2 G/DL (ref 31.5–35.7)
MCV RBC AUTO: 101.4 FL (ref 79–97)
MONOCYTES # BLD AUTO: 0.84 10*3/MM3 (ref 0.1–0.9)
MONOCYTES NFR BLD AUTO: 13.3 % (ref 5–12)
NEUTROPHILS NFR BLD AUTO: 3.11 10*3/MM3 (ref 1.7–7)
NEUTROPHILS NFR BLD AUTO: 49.4 % (ref 42.7–76)
NRBC BLD AUTO-RTO: 0 /100 WBC (ref 0–0.2)
PHOSPHATE SERPL-MCNC: 3.1 MG/DL (ref 2.5–4.5)
PLATELET # BLD AUTO: 221 10*3/MM3 (ref 140–450)
PMV BLD AUTO: 9.9 FL (ref 6–12)
POTASSIUM SERPL-SCNC: 4.1 MMOL/L (ref 3.5–5.2)
PROT SERPL-MCNC: 7 G/DL (ref 6–8.5)
RBC # BLD AUTO: 4.24 10*6/MM3 (ref 4.14–5.8)
SODIUM SERPL-SCNC: 135 MMOL/L (ref 136–145)
WBC NRBC COR # BLD AUTO: 6.31 10*3/MM3 (ref 3.4–10.8)

## 2024-04-29 PROCEDURE — 82330 ASSAY OF CALCIUM: CPT | Performed by: STUDENT IN AN ORGANIZED HEALTH CARE EDUCATION/TRAINING PROGRAM

## 2024-04-29 PROCEDURE — 25010000002 ENOXAPARIN PER 10 MG: Performed by: STUDENT IN AN ORGANIZED HEALTH CARE EDUCATION/TRAINING PROGRAM

## 2024-04-29 PROCEDURE — 84100 ASSAY OF PHOSPHORUS: CPT | Performed by: STUDENT IN AN ORGANIZED HEALTH CARE EDUCATION/TRAINING PROGRAM

## 2024-04-29 PROCEDURE — 80053 COMPREHEN METABOLIC PANEL: CPT | Performed by: STUDENT IN AN ORGANIZED HEALTH CARE EDUCATION/TRAINING PROGRAM

## 2024-04-29 PROCEDURE — 25010000002 LEVETRIRACETAM PER 10 MG: Performed by: STUDENT IN AN ORGANIZED HEALTH CARE EDUCATION/TRAINING PROGRAM

## 2024-04-29 PROCEDURE — 92526 ORAL FUNCTION THERAPY: CPT

## 2024-04-29 PROCEDURE — 85025 COMPLETE CBC W/AUTO DIFF WBC: CPT | Performed by: STUDENT IN AN ORGANIZED HEALTH CARE EDUCATION/TRAINING PROGRAM

## 2024-04-29 PROCEDURE — 99232 SBSQ HOSP IP/OBS MODERATE 35: CPT

## 2024-04-29 PROCEDURE — 83735 ASSAY OF MAGNESIUM: CPT | Performed by: STUDENT IN AN ORGANIZED HEALTH CARE EDUCATION/TRAINING PROGRAM

## 2024-04-29 PROCEDURE — 36415 COLL VENOUS BLD VENIPUNCTURE: CPT | Performed by: STUDENT IN AN ORGANIZED HEALTH CARE EDUCATION/TRAINING PROGRAM

## 2024-04-29 RX ORDER — CHLORDIAZEPOXIDE HYDROCHLORIDE 5 MG/1
5 CAPSULE, GELATIN COATED ORAL EVERY 8 HOURS SCHEDULED
Status: DISCONTINUED | OUTPATIENT
Start: 2024-04-29 | End: 2024-04-29

## 2024-04-29 RX ORDER — SERTRALINE HYDROCHLORIDE 25 MG/1
25 TABLET, FILM COATED ORAL NIGHTLY
Status: DISCONTINUED | OUTPATIENT
Start: 2024-04-29 | End: 2024-05-01 | Stop reason: HOSPADM

## 2024-04-29 RX ADMIN — NYSTATIN 500000 UNITS: 100000 SUSPENSION ORAL at 20:29

## 2024-04-29 RX ADMIN — SENNOSIDES AND DOCUSATE SODIUM 2 TABLET: 8.6; 5 TABLET ORAL at 08:56

## 2024-04-29 RX ADMIN — Medication 10 ML: at 09:08

## 2024-04-29 RX ADMIN — Medication 1 PATCH: at 09:10

## 2024-04-29 RX ADMIN — Medication 100 MG: at 08:56

## 2024-04-29 RX ADMIN — RISPERIDONE 0.5 MG: 0.5 TABLET, ORALLY DISINTEGRATING ORAL at 08:58

## 2024-04-29 RX ADMIN — LEVETIRACETAM 500 MG: 100 INJECTION, SOLUTION INTRAVENOUS at 20:31

## 2024-04-29 RX ADMIN — LACTULOSE 30 G: 10 SOLUTION ORAL at 08:55

## 2024-04-29 RX ADMIN — NYSTATIN 500000 UNITS: 100000 SUSPENSION ORAL at 18:11

## 2024-04-29 RX ADMIN — AMLODIPINE BESYLATE 5 MG: 5 TABLET ORAL at 08:58

## 2024-04-29 RX ADMIN — FOLIC ACID 1 MG: 1 TABLET ORAL at 08:56

## 2024-04-29 RX ADMIN — SERTRALINE HYDROCHLORIDE 25 MG: 25 TABLET ORAL at 20:29

## 2024-04-29 RX ADMIN — LEVETIRACETAM 500 MG: 100 INJECTION, SOLUTION INTRAVENOUS at 08:56

## 2024-04-29 RX ADMIN — ENOXAPARIN SODIUM 40 MG: 100 INJECTION SUBCUTANEOUS at 18:11

## 2024-04-29 RX ADMIN — Medication 10 ML: at 20:29

## 2024-04-29 NOTE — PROGRESS NOTES
[facility] MEDICINE SERVICE  DAILY PROGRESS NOTE    NAME: Emiliano Bateman  : 1960  MRN: [id number]      LOS: 6 days     PROVIDER OF SERVICE: KAREL Romero    Chief Complaint: Status epilepticus    Subjective:     Interval History:  History taken from: patient chart  Patient no longer in restraints, Dobbhoff is removed.  Patient is not very talkative this morning, however answers questions completely and appropriately.    Review of Systems:   Please see above, review of systems otherwise negative      Objective:     Vital Signs  Temp:  [97.7 °F (36.5 °C)-98.7 °F (37.1 °C)] 98 °F (36.7 °C)  Heart Rate:  [78-96] 84  Resp:  [12-20] 15  BP: (113-123)/(79-84) 113/84  Flow (L/min):  [3] 3   Body mass index is 21.65 kg/m².    Physical Exam  PHYSICAL EXAM  Constitutional:  Well developed, well nourished, no acute distress, non-toxic appearance   Eyes:  PERRL, conjunctiva normal, EOMI   HENT:  Atraumatic, external ears normal, nose normal, oropharynx moist, no pharyngeal exudates. Neck-normal range of motion, no tenderness, supple, trachea midline  Respiratory: CTAB, non-labored respirations without accessory muscle use  Cardiovascular:  Normal rate, normal rhythm, no murmurs, no gallops, no rubs   GI:  Soft, nondistended, normal bowel sounds, nontender, no organomegaly, no mass, no rebound, no guarding   :  No costovertebral angle tenderness   Musculoskeletal:  No edema, no tenderness, no deformities  Integument:  Well hydrated, no rash   Lymphatic:  No lymphadenopathy noted   Neurologic:  Alert & oriented x 3, CN 2-12 normal, normal motor function, normal sensory function, no focal deficits noted fine tremors noted  Psychiatric:  Speech and behavior appropriate      Scheduled Meds   amLODIPine, 5 mg, Oral, Q24H  enoxaparin, 40 mg, Subcutaneous, Daily  folic acid, 1 mg, Oral, Daily  lactulose, 30 g, Oral, Daily  levETIRAcetam, 500 mg, Intravenous, Q12H  nicotine, 1 patch, Transdermal,  Q24H  nystatin, 5 mL, Swish & Spit, 4x Daily  risperiDONE, 0.5 mg, Oral, BID  senna-docusate sodium, 2 tablet, Oral, BID  sertraline, 50 mg, Oral, Nightly  sodium chloride, 10 mL, Intravenous, Q12H  thiamine, 100 mg, Oral, Daily       PRN Meds     acetaminophen **OR** acetaminophen    senna-docusate sodium **AND** polyethylene glycol **AND** bisacodyl **AND** bisacodyl    Calcium Replacement - Follow Nurse / BPA Driven Protocol    hydrALAZINE    ipratropium-albuterol    LORazepam    Magnesium Standard Dose Replacement - Follow Nurse / BPA Driven Protocol    nitroglycerin    ondansetron ODT **OR** ondansetron    Phosphorus Replacement - Follow Nurse / BPA Driven Protocol    Potassium Replacement - Follow Nurse / BPA Driven Protocol    sodium chloride    sodium chloride   Infusions         Diagnostic Data    Results from last 7 days   Lab Units 04/29/24  0243   WBC 10*3/mm3 6.31   HEMOGLOBIN g/dL 14.7   HEMATOCRIT % 43.0   PLATELETS 10*3/mm3 221   GLUCOSE mg/dL 148*   CREATININE mg/dL 0.72*   BUN mg/dL 17   SODIUM mmol/L 135*   POTASSIUM mmol/L 4.1   AST (SGOT) U/L 35   ALT (SGPT) U/L 32   ALK PHOS U/L 76   BILIRUBIN mg/dL 0.5   ANION GAP mmol/L 9.0       XR Abdomen KUB    Result Date: 4/28/2024  Impression: Feeding tube in the distal stomach directed at the pylorus Electronically Signed: Jarrod Hein  4/28/2024 11:17 AM EDT  Workstation ID: [id number]       I reviewed the patient's new clinical results.  I reviewed the patient's new imaging results and agree with the interpretation.    Assessment/Plan:     Active and Resolved Problems  Active Hospital Problems    Diagnosis  POA    **Status epilepticus [G40.901]  Yes    Seizure disorder [G40.909]  Yes    Hyperammonemia [E72.20]  Yes    Hypertensive disorder [I10]  Yes    Steatosis of liver [K76.0]  Yes    Chronic obstructive lung disease [J44.9]  Yes    Alcoholism [F10.20]  Yes    Anxiety [F41.9]  Yes      Resolved Hospital Problems   No resolved problems to display.        Status epilepticus  - Continue Keppra  - DC CIWA protocol  - Patient is on regular diet.    EtOH misuse  Anxiety with depression  - CIWA score discontinued  - Swallow study successful for puréed diet.  - Continue folic acid, lactulose, risperidone, and thiamine.  - Psychiatry following patient    HTN  - Continue amlodipine    COPD  -Stable  - DuoNeb every 6 as needed    Hyponatremia  - Most recent sodium 135  - Will continue to monitor.  If patient continues to decrease, will start on IV fluids      Discussed plan of care with patient.  Patient is currently on puréed diet and plan to hopefully advance as tolerated.  Sodium is slightly decreased today.  Will continue to monitor and if trending down will restart on IV fluids.    DVT prophylaxis:  Medical and mechanical DVT prophylaxis orders are present.         Code status is   Code Status and Medical Interventions:   Ordered at: 04/23/24 0045     Code Status (Patient has no pulse and is not breathing):    CPR (Attempt to Resuscitate)     Medical Interventions (Patient has pulse or is breathing):    Full Support       Plan for disposition: 05/02/2024    Time: 30 minutes    Signature: Electronically signed by KAREL Romero, 04/29/24, 12:30 EDT.  [facility] Hospitalist Team    Addendum:    I saw and examined the patient in addition to the TREVOR.  I agree with assessment and plan with the following addendum:    General Appearance:  Alert, cooperative, no distress, appears stated age  Head:  Normocephalic, without obvious abnormality, atraumatic  Eyes:  PERRL, conjunctiva/corneas clear, EOM's intact, fundi benign, both eyes  Ears:  Normal TM's and external ear canals, both ears  Nose: Nares normal, septum midline, mucosa normal, no drainage or sinus tenderness  Throat: Lips, mucosa, and tongue normal; teeth and gums normal  Neck: Supple, symmetrical, trachea midline, no adenopathy, thyroid: not enlarged, symmetric, no tenderness/mass/nodules, no carotid  bruit or JVD  Lungs:   Clear to auscultation bilaterally, respirations unlabored  Heart: Regular rate and rhythm, S1, S2 normal, no murmur, rub or gallop  Abdomen:  Soft, non-tender, bowel sounds active all four quadrants,  no masses, no organomegaly  Extremities: Extremities normal, atraumatic, no cyanosis or edema  Pulses: 2+ and symmetric  Skin: Skin color, texture, turgor normal, no rashes or lesions  Neurologic: Normal    Patient was pleasant this morning.  He does still have some confusion.  At this time he is out of the window for any alcohol withdrawal symptoms.  CIWA has been discontinued.  Continue to encourage p.o. intake.  Continue Keppra for now, although if he would stop drinking, he would stop having alcohol withdrawal seizures and would likely not need antiepileptic drugs.  Hopefully DC home tomorrow.      Chase Strickland MD  [facility] Hospitalist Team  04/29/24  14:32 EDT

## 2024-04-29 NOTE — PLAN OF CARE
Problem: Skin Injury Risk Increased  Goal: Skin Health and Integrity  Outcome: Ongoing, Progressing  Intervention: Optimize Skin Protection  Recent Flowsheet Documentation  Taken 4/28/2024 2000 by Essie Schulte LPN  Pressure Reduction Techniques:   frequent weight shift encouraged   pressure points protected   weight shift assistance provided  Head of Bed (HOB) Positioning: HOB elevated  Pressure Reduction Devices:   alternating pressure pump (ADD)   pressure-redistributing mattress utilized   chair cushion utilized  Skin Protection:   adhesive use limited   tubing/devices free from skin contact     Problem: Adult Inpatient Plan of Care  Goal: Plan of Care Review  Outcome: Ongoing, Progressing  Goal: Patient-Specific Goal (Individualized)  Outcome: Ongoing, Progressing  Goal: Absence of Hospital-Acquired Illness or Injury  Outcome: Ongoing, Progressing  Intervention: Identify and Manage Fall Risk  Recent Flowsheet Documentation  Taken 4/29/2024 0000 by Essie Schulte LPN  Safety Promotion/Fall Prevention: assistive device/personal items within reach  Taken 4/28/2024 2000 by Essie Schulte LPN  Safety Promotion/Fall Prevention:   activity supervised   assistive device/personal items within reach  Intervention: Prevent Skin Injury  Recent Flowsheet Documentation  Taken 4/29/2024 0000 by Essie Schulte LPN  Body Position: position changed independently  Taken 4/28/2024 2000 by Essie Schulte LPN  Body Position: position changed independently  Skin Protection:   adhesive use limited   tubing/devices free from skin contact  Intervention: Prevent and Manage VTE (Venous Thromboembolism) Risk  Recent Flowsheet Documentation  Taken 4/29/2024 0000 by Essie Schulte LPN  Activity Management: activity encouraged  Range of Motion: active ROM (range of motion) encouraged  Taken 4/28/2024 2000 by Essie Schulte LPN  Activity Management: activity encouraged  VTE Prevention/Management:   bilateral   sequential  compression devices off   patient refused intervention  Range of Motion: active ROM (range of motion) encouraged  Intervention: Prevent Infection  Recent Flowsheet Documentation  Taken 4/29/2024 0000 by Essie Schulte LPN  Infection Prevention:   environmental surveillance performed   equipment surfaces disinfected  Taken 4/28/2024 2000 by Essie Schulte LPN  Infection Prevention:   environmental surveillance performed   equipment surfaces disinfected  Goal: Optimal Comfort and Wellbeing  Outcome: Ongoing, Progressing  Intervention: Provide Person-Centered Care  Recent Flowsheet Documentation  Taken 4/28/2024 2000 by Essie Schulte LPN  Trust Relationship/Rapport:   care explained   empathic listening provided   questions answered   questions encouraged   reassurance provided   thoughts/feelings acknowledged  Goal: Readiness for Transition of Care  Outcome: Ongoing, Progressing     Problem: Seizure, Active Management  Goal: Absence of Seizure/Seizure-Related Injury  Outcome: Ongoing, Progressing     Problem: Fall Injury Risk  Goal: Absence of Fall and Fall-Related Injury  Outcome: Ongoing, Progressing  Intervention: Identify and Manage Contributors  Recent Flowsheet Documentation  Taken 4/29/2024 0000 by Essie Schulte LPN  Medication Review/Management: medications reviewed  Taken 4/28/2024 2000 by Essie Schulte LPN  Medication Review/Management: medications reviewed  Intervention: Promote Injury-Free Environment  Recent Flowsheet Documentation  Taken 4/29/2024 0000 by Essie Schulte LPN  Safety Promotion/Fall Prevention: assistive device/personal items within reach  Taken 4/28/2024 2000 by Essie Schulte LPN  Safety Promotion/Fall Prevention:   activity supervised   assistive device/personal items within reach     Problem: Restraint, Nonviolent  Goal: Absence of Harm or Injury  Outcome: Ongoing, Progressing  Intervention: Implement Least Restrictive Safety Strategies  Recent Flowsheet  Documentation  Taken 4/29/2024 0000 by Essie Schulte LPN  Medical Device Protection: IV pole/bag removed from visual field  Taken 4/28/2024 2000 by Essie Schulte LPN  Medical Device Protection: IV pole/bag removed from visual field  Diversional Activities: television  Intervention: Protect Dignity, Rights, and Personal Wellbeing  Recent Flowsheet Documentation  Taken 4/28/2024 2000 by Essie Schulte LPN  Trust Relationship/Rapport:   care explained   empathic listening provided   questions answered   questions encouraged   reassurance provided   thoughts/feelings acknowledged  Intervention: Protect Skin and Joint Integrity  Recent Flowsheet Documentation  Taken 4/29/2024 0000 by Essie Schulte LPN  Body Position: position changed independently  Range of Motion: active ROM (range of motion) encouraged  Taken 4/28/2024 2000 by Essie Schulte LPN  Body Position: position changed independently  Range of Motion: active ROM (range of motion) encouraged     Problem: Aspiration (Enteral Nutrition)  Goal: Absence of Aspiration Signs and Symptoms  Outcome: Ongoing, Progressing  Intervention: Minimize Aspiration Risk  Recent Flowsheet Documentation  Taken 4/28/2024 2000 by Essie Schulte LPN  Head of Bed (HOB) Positioning: HOB elevated  Oral Care: patient refused intervention     Problem: Device-Related Complication Risk (Enteral Nutrition)  Goal: Safe, Effective Therapy Delivery  Outcome: Ongoing, Progressing     Problem: Feeding Intolerance (Enteral Nutrition)  Goal: Feeding Tolerance  Outcome: Ongoing, Progressing     Problem: COPD (Chronic Obstructive Pulmonary Disease) Comorbidity  Goal: Maintenance of COPD Symptom Control  Outcome: Ongoing, Progressing  Intervention: Maintain COPD-Symptom Control  Recent Flowsheet Documentation  Taken 4/29/2024 0000 by Essie Schulte LPN  Medication Review/Management: medications reviewed  Taken 4/28/2024 2000 by Essie Schulte LPN  Medication Review/Management:  medications reviewed     Problem: Hypertension Comorbidity  Goal: Blood Pressure in Desired Range  Outcome: Ongoing, Progressing  Intervention: Maintain Blood Pressure Management  Recent Flowsheet Documentation  Taken 4/29/2024 0000 by Essie Schulte LPN  Medication Review/Management: medications reviewed  Taken 4/28/2024 2000 by Essie Schulte LPN  Medication Review/Management: medications reviewed   Goal Outcome Evaluation:

## 2024-04-29 NOTE — PROGRESS NOTES
"  Chief complaint : ETOH withdrawal    Subjective .     History of present illness:  The patient is a 63 y.o. male who was admitted secondary to seizure.     PMH: hypertension, COPD, anxiety, steatosis of liver, seizure disorder, alcohol abuse.      Psych was consulted due to patient throwing away medication.     I saw the patient this afternoon. He would open his eyes, but was mute, and was not able to participate in the interview.      I called his wife, Lili, for collateral information.     She states that he has been drinking heavily for some time. She reports he drinks about one fifth per day. She reports that he threw his medications away about a year ago, stating he \"didn't need them\". She reports that he has stopped attending his doctor appointments. 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 She reports irritability, stating he will \"go off\" in a second. She states he has had some memory loss over the last few months. He will ask her questions she has recently answered for him, but he doesn't remember. She isn't sure if he is depressed, but does report his alcohol use has increased.      The memory lapse is likely alcohol related, but unclear at this time.      She states the patient has tried to do rehab for chemical dependency in the past, but checked himself out after 3 days. She states he hasn't expressed a desire to quit drinking. .    4/24/24: Patient seen today. He has required restraints. Notes reviewed state he has been more agitated and restless. He is still not able to participate in interview.     4/25/24: Nursing reports patient still agitated and combative this morning. Required PRN ativan at around 8am. Patient receiving phenobarbital for withdrawal.     4/27/24: Patient seen today with sitter present at bedside. He is still in restraints but nursing states he has been calmer today. He was not able to answer all my orientation " "questions today, but was improved from a couple days ago. He did report some depression, but was not able to really narrow down his symptoms or feelings. He states he is depressed because he is \"bored\". He did deny any suicidal thoughts. He was agreeable to starting an antidepressant. I asked him about alcohol cessation, but he did not admit to the level of drinking his family states he drinks. States he \"doesn't drink that much\".     4/28/24: Patient seen today. He has gotten his Dobhoff removed, and was sitting up in bed. No longer requiring a sitter or restraints. He was still somewhat disoriented, but calm.     4/29/24: Patient seen today, with wife present at bedside. Patient was more oriented. Wife states he was asking what brought him into the hospital. I discussed the alcohol use with him again, and he states he does need to quit. We discussed that he may need physical rehab before he could do any rehab for the alcohol use. We also discussed possible AA meetings, which he has never tried before.     Review of Systems   Review of systems could not be obtained due to   patient confusion.    The following portions of the patient's history were reviewed and updated as appropriate: allergies, current medications, past family history, past medical history, past social history, past surgical history and problem list.    History    Past psychiatric history: alcohol abuse       No medications prior to admission.        Scheduled Meds:  amLODIPine, 5 mg, Oral, Q24H  enoxaparin, 40 mg, Subcutaneous, Daily  folic acid, 1 mg, Oral, Daily  lactulose, 30 g, Oral, Daily  levETIRAcetam, 500 mg, Intravenous, Q12H  nicotine, 1 patch, Transdermal, Q24H  nystatin, 5 mL, Swish & Spit, 4x Daily  senna-docusate sodium, 2 tablet, Oral, BID  sertraline, 50 mg, Oral, Nightly  sodium chloride, 10 mL, Intravenous, Q12H  thiamine, 100 mg, Oral, Daily         Continuous Infusions:       PRN Meds:    acetaminophen **OR** acetaminophen    " "senna-docusate sodium **AND** polyethylene glycol **AND** bisacodyl **AND** bisacodyl    Calcium Replacement - Follow Nurse / BPA Driven Protocol    hydrALAZINE    ipratropium-albuterol    LORazepam    Magnesium Standard Dose Replacement - Follow Nurse / BPA Driven Protocol    nitroglycerin    ondansetron ODT **OR** ondansetron    Phosphorus Replacement - Follow Nurse / BPA Driven Protocol    Potassium Replacement - Follow Nurse / BPA Driven Protocol    sodium chloride    sodium chloride      Allergies:  Penicillins      Objective     Vital Signs   /84 (BP Location: Left arm, Patient Position: Lying)   Pulse 84   Temp 98 °F (36.7 °C) (Oral)   Resp 15   Ht 175.3 cm (69\")   Wt 66.5 kg (146 lb 9.7 oz)   SpO2 91%   BMI 21.65 kg/m²     Physical Exam:    Musculoskeletal:   Muscle strength and tone: JOSÉ  Abnormal Movements: tremor  Gait: JOSÉ     Mental Status Exam:   Hygiene:  good  Cooperation:  cooperative   Eye Contact:  open   Psychomotor Behavior: calm   Affect:  appropriate   Mood: depressed  Hopelessness: denies   Speech:  Normal   Thought Process: appropriate   Thought Content:  mood congruent   Suicidal:   denies   Homicidal:  denies   Hallucinations:  denies   Delusion: none   Memory:  Some deficits   Orientation:  Person, place, time.   Reliability:  fair   Insight:  fair   Judgement:  fair   Impulse Control: fair   Physical/Medical Issues:  Yes            Medications and allergies reviewed.     Result Review:  I have personally reviewed the results from the time of this admission to 4/23/2024 13:42 EDT and agree with these findings:  [x]  Laboratory  []  Microbiology  []  Radiology  [x]  EKG/Telemetry   []  Cardiology/Vascular   []  Pathology  []  Old records  []  Other:   .     Assessment & Plan       Status epilepticus    Alcoholism    Anxiety    Chronic obstructive lung disease    Hypertensive disorder    Steatosis of liver    Seizure disorder    Hyperammonemia       Assessment: alcohol " withdrawal delirium   Treatment Plan: Patient presents with alcohol withdrawal with delirium. Patient was confused, in restraints, but mental status has improved. He is out of restraints now and oriented. He just does not remember what brought him to the hospital. Patient did admit to some depression, and was agreeable to starting sertraline.     Patient was started on sertraline 50mg nightly. He is having some hyponatremia, so will decrease from 50mg to 25mg and continue to monitor. Will discontinue risperidone and see how patient responds since he is no longer agitated or confused.     Continue supportive treatment.    Will continue to follow.   Treatment Plan discussed with: Patient    I discussed the patients findings and my recommendations with patient    I have reviewed and approved the behavioral health treatment plans and problem list. Yes     Referring MD has access to consult report and progress notes in EMR     KAREL Randhawa  04/29/24  13:27 EDT

## 2024-04-29 NOTE — PROGRESS NOTES
Nutrition Services    Patient Name:  Emiliano Bateman  YOB: 1960  MRN: [id number]  Admit Date:  4/22/2024    Brief progress note - pt no longer has enteral access. ST assessed today and approved for pureed foods and thin liquids, which are in place. Tube feeding order has been cancelled. RD will continue to follow for further monitoring and plan of care.    Electronically signed by:  Radha Ryder RD  04/28/24 17:49 EDT

## 2024-04-29 NOTE — THERAPY TREATMENT NOTE
Acute Care - Speech Language Pathology   Swallow Treatment Note  [facility]     Patient Name: Emiliano Bateman  : 1960  MRN: [id number]  Today's Date: 2024               Admit Date: 2024    Visit Dx:     ICD-10-CM ICD-9-CM   1. Status epilepticus  G40.901 345.3   2. Alcohol withdrawal syndrome with complication  F10.939 291.81   3. Seizure disorder  G40.909 345.90   4. Hepatic encephalopathy  K76.82 572.2   5. Chronic alcohol abuse  F10.10 305.00   6. Substance use disorder  F19.90 305.90   7. Acute non-recurrent maxillary sinusitis  J01.00 461.0     Patient Active Problem List   Diagnosis    Alcoholism    Anxiety    Chronic obstructive lung disease    Hypertensive disorder    Steatosis of liver    Status epilepticus    Seizure disorder    Hyperammonemia     Past Medical History:   Diagnosis Date    Alcoholism 2020    Aneurysm of thoracic aorta 2019    3.9 cm      Anxiety 2020    Chronic obstructive lung disease 10/08/2020    xray [facility] 18      Hypertensive disorder 2020    Myocardial infarction 10/08/2020    angioplasty      Seizure disorder 2024    Steatosis of liver 10/09/2020     History reviewed. No pertinent surgical history.    SLP Recommendation and Plan                EDUCATION  The patient has been educated in the following areas:   Dysphagia (Swallowing Impairment) Oral Care/Hydration.        SLP GOALS       Row Name 24 1200       (LTG) Swallow    (LTG) Swallow Patient will tolerate safest and least restrictive diet without complications from aspiration.  -CB    Time Frame (Swallow Long Term Goal) by discharge  -CB    Progress/Outcomes (Swallow Long Term Goal) goal ongoing  -CB       (STG) Swallow 1    (STG) Swallow 1 Patient will participate in full meal assessment to assure safety and adequacy of recommended diet and independent use of safe swallow strategies.  -CB    Time Frame (Swallow Short Term Goal 1) 1 week  -CB    Progress/Outcomes (Swallow  Short Term Goal 1) goal ongoing  -CB    Comment (Swallow Short Term Goal 1) Patient was seen for DT/meal at breakfast. Patient continues to be on room air. Patient was sitting upright in bed near 90 degree hip flexion prior to meal.  Patient self fed. Noted some trembling of hands. Patient has gravely vocal quality. Patient is currently on a puree/thin liquid diet. Patient took sips of thins via cup  and straw without evidence of s/s of aspiration. No wet vocal quality was detected. Patient noted to exhibit intermittent coughing not directly related to  eating. Patient reports that he is a smoker which may contribute to the coughing observed. Patient took bites of puree x 3. Noted cleared throat x 1. ST will continue to follow to assure safety and tolerance with recommended diet.  -CB              User Key  (r) = Recorded By, (t) = Taken By, (c) = Cosigned By      Initials Name Provider Type    Wendy Ponce SLP Speech and Language Pathologist                       Time Calculation:       Therapy Charges for Today       Code Description Service Date Service Provider Modifiers Qty    [id number] [facility] EVAL ORAL PHARYNG SWALLOW 7 4/28/2024 Wendy Gómez SLP  1                 DEBORA Cardoza  4/29/2024

## 2024-04-29 NOTE — CASE MANAGEMENT/SOCIAL WORK
Social Work Assessment   [facility]     Patient Name: Emiliano Bateman  MRN: [id number]  Today's Date: 4/29/2024    Admit Date: 4/22/2024     Discharge Plan       Row Name 04/29/24 1427       Plan    Plan Comments SW met with pt today and he declined a list of Substance Abuse resources to include IP or OP Tx.              KAITLIN Owusu MSW    Phone: [phone]  Cell: [phone]  Fax: [phone]  [email or web address]

## 2024-04-29 NOTE — SIGNIFICANT NOTE
04/29/24 1548   OTHER   Discipline physical therapist   Rehab Time/Intention   Session Not Performed patient unavailable for evaluation  (Pt off-floor this PM for PT evaluation. PT will follow-up tomorrow, 4/30/24 for evaluation.)   Therapy Assessment/Plan (PT)   Criteria for Skilled Interventions Met (PT) yes;meets criteria   Recommendation   PT - Next Appointment 04/30/24

## 2024-04-30 LAB
ALBUMIN SERPL-MCNC: 4.3 G/DL (ref 3.5–5.2)
ALBUMIN/GLOB SERPL: 1.1 G/DL
ALP SERPL-CCNC: 82 U/L (ref 39–117)
ALT SERPL W P-5'-P-CCNC: 49 U/L (ref 1–41)
ANION GAP SERPL CALCULATED.3IONS-SCNC: 12 MMOL/L (ref 5–15)
AST SERPL-CCNC: 50 U/L (ref 1–40)
BASOPHILS # BLD AUTO: 0.11 10*3/MM3 (ref 0–0.2)
BASOPHILS NFR BLD AUTO: 1.6 % (ref 0–1.5)
BILIRUB SERPL-MCNC: 0.6 MG/DL (ref 0–1.2)
BUN SERPL-MCNC: 12 MG/DL (ref 8–23)
BUN/CREAT SERPL: 13.5 (ref 7–25)
CA-I SERPL ISE-MCNC: 1.2 MMOL/L (ref 1.2–1.3)
CALCIUM SPEC-SCNC: 9.8 MG/DL (ref 8.6–10.5)
CHLORIDE SERPL-SCNC: 93 MMOL/L (ref 98–107)
CO2 SERPL-SCNC: 28 MMOL/L (ref 22–29)
CREAT SERPL-MCNC: 0.89 MG/DL (ref 0.76–1.27)
DEPRECATED RDW RBC AUTO: 54 FL (ref 37–54)
EGFRCR SERPLBLD CKD-EPI 2021: 95.7 ML/MIN/1.73
EOSINOPHIL # BLD AUTO: 0.3 10*3/MM3 (ref 0–0.4)
EOSINOPHIL NFR BLD AUTO: 4.3 % (ref 0.3–6.2)
ERYTHROCYTE [DISTWIDTH] IN BLOOD BY AUTOMATED COUNT: 14.4 % (ref 12.3–15.4)
GLOBULIN UR ELPH-MCNC: 3.8 GM/DL
GLUCOSE SERPL-MCNC: 125 MG/DL (ref 65–99)
HCT VFR BLD AUTO: 44.4 % (ref 37.5–51)
HGB BLD-MCNC: 15 G/DL (ref 13–17.7)
IMM GRANULOCYTES # BLD AUTO: 0.02 10*3/MM3 (ref 0–0.05)
IMM GRANULOCYTES NFR BLD AUTO: 0.3 % (ref 0–0.5)
LYMPHOCYTES # BLD AUTO: 1.98 10*3/MM3 (ref 0.7–3.1)
LYMPHOCYTES NFR BLD AUTO: 28.7 % (ref 19.6–45.3)
MAGNESIUM SERPL-MCNC: 2.3 MG/DL (ref 1.6–2.4)
MCH RBC QN AUTO: 34.5 PG (ref 26.6–33)
MCHC RBC AUTO-ENTMCNC: 33.8 G/DL (ref 31.5–35.7)
MCV RBC AUTO: 102.1 FL (ref 79–97)
MONOCYTES # BLD AUTO: 0.95 10*3/MM3 (ref 0.1–0.9)
MONOCYTES NFR BLD AUTO: 13.7 % (ref 5–12)
NEUTROPHILS NFR BLD AUTO: 3.55 10*3/MM3 (ref 1.7–7)
NEUTROPHILS NFR BLD AUTO: 51.4 % (ref 42.7–76)
NRBC BLD AUTO-RTO: 0 /100 WBC (ref 0–0.2)
PHOSPHATE SERPL-MCNC: 3.5 MG/DL (ref 2.5–4.5)
PLATELET # BLD AUTO: 261 10*3/MM3 (ref 140–450)
PMV BLD AUTO: 10.3 FL (ref 6–12)
POTASSIUM SERPL-SCNC: 3.5 MMOL/L (ref 3.5–5.2)
PROT SERPL-MCNC: 8.1 G/DL (ref 6–8.5)
RBC # BLD AUTO: 4.35 10*6/MM3 (ref 4.14–5.8)
SODIUM SERPL-SCNC: 133 MMOL/L (ref 136–145)
WBC NRBC COR # BLD AUTO: 6.91 10*3/MM3 (ref 3.4–10.8)

## 2024-04-30 PROCEDURE — 83735 ASSAY OF MAGNESIUM: CPT | Performed by: STUDENT IN AN ORGANIZED HEALTH CARE EDUCATION/TRAINING PROGRAM

## 2024-04-30 PROCEDURE — 84100 ASSAY OF PHOSPHORUS: CPT | Performed by: STUDENT IN AN ORGANIZED HEALTH CARE EDUCATION/TRAINING PROGRAM

## 2024-04-30 PROCEDURE — 85025 COMPLETE CBC W/AUTO DIFF WBC: CPT | Performed by: STUDENT IN AN ORGANIZED HEALTH CARE EDUCATION/TRAINING PROGRAM

## 2024-04-30 PROCEDURE — 25010000002 ENOXAPARIN PER 10 MG: Performed by: STUDENT IN AN ORGANIZED HEALTH CARE EDUCATION/TRAINING PROGRAM

## 2024-04-30 PROCEDURE — 25010000002 LEVETRIRACETAM PER 10 MG: Performed by: STUDENT IN AN ORGANIZED HEALTH CARE EDUCATION/TRAINING PROGRAM

## 2024-04-30 PROCEDURE — 97162 PT EVAL MOD COMPLEX 30 MIN: CPT

## 2024-04-30 PROCEDURE — 92526 ORAL FUNCTION THERAPY: CPT

## 2024-04-30 PROCEDURE — 80053 COMPREHEN METABOLIC PANEL: CPT | Performed by: STUDENT IN AN ORGANIZED HEALTH CARE EDUCATION/TRAINING PROGRAM

## 2024-04-30 PROCEDURE — 82330 ASSAY OF CALCIUM: CPT | Performed by: STUDENT IN AN ORGANIZED HEALTH CARE EDUCATION/TRAINING PROGRAM

## 2024-04-30 RX ADMIN — SERTRALINE HYDROCHLORIDE 25 MG: 25 TABLET ORAL at 21:02

## 2024-04-30 RX ADMIN — LEVETIRACETAM 500 MG: 100 INJECTION, SOLUTION INTRAVENOUS at 09:07

## 2024-04-30 RX ADMIN — FOLIC ACID 1 MG: 1 TABLET ORAL at 09:07

## 2024-04-30 RX ADMIN — Medication 100 MG: at 09:07

## 2024-04-30 RX ADMIN — NYSTATIN 500000 UNITS: 100000 SUSPENSION ORAL at 09:07

## 2024-04-30 RX ADMIN — LEVETIRACETAM 500 MG: 100 INJECTION, SOLUTION INTRAVENOUS at 21:02

## 2024-04-30 RX ADMIN — Medication 10 ML: at 21:02

## 2024-04-30 RX ADMIN — AMLODIPINE BESYLATE 5 MG: 5 TABLET ORAL at 09:07

## 2024-04-30 RX ADMIN — LACTULOSE 30 G: 10 SOLUTION ORAL at 09:07

## 2024-04-30 RX ADMIN — NYSTATIN 500000 UNITS: 100000 SUSPENSION ORAL at 21:02

## 2024-04-30 RX ADMIN — SENNOSIDES AND DOCUSATE SODIUM 2 TABLET: 8.6; 5 TABLET ORAL at 09:07

## 2024-04-30 RX ADMIN — ENOXAPARIN SODIUM 40 MG: 100 INJECTION SUBCUTANEOUS at 18:12

## 2024-04-30 RX ADMIN — Medication 10 ML: at 09:07

## 2024-04-30 RX ADMIN — NYSTATIN 500000 UNITS: 100000 SUSPENSION ORAL at 18:12

## 2024-04-30 RX ADMIN — Medication 1 PATCH: at 09:13

## 2024-04-30 NOTE — THERAPY EVALUATION
Patient Name: Emiliano Bateman  : 1960    MRN: [id number]                              Today's Date: 2024       Admit Date: 2024    Visit Dx:     ICD-10-CM ICD-9-CM   1. Status epilepticus  G40.901 345.3   2. Alcohol withdrawal syndrome with complication  F10.939 291.81   3. Seizure disorder  G40.909 345.90   4. Hepatic encephalopathy  K76.82 572.2   5. Chronic alcohol abuse  F10.10 305.00   6. Substance use disorder  F19.90 305.90   7. Acute non-recurrent maxillary sinusitis  J01.00 461.0     Patient Active Problem List   Diagnosis    Alcoholism    Anxiety    Chronic obstructive lung disease    Hypertensive disorder    Steatosis of liver    Status epilepticus    Seizure disorder    Hyperammonemia     Past Medical History:   Diagnosis Date    Alcoholism 2020    Aneurysm of thoracic aorta 2019    3.9 cm      Anxiety 2020    Chronic obstructive lung disease 10/08/2020    xray [facility] 18      Hypertensive disorder 2020    Myocardial infarction 10/08/2020    angioplasty      Seizure disorder 2024    Steatosis of liver 10/09/2020     History reviewed. No pertinent surgical history.   General Information       Row Name 24 0905          Physical Therapy Time and Intention    Document Type evaluation  -RM     Mode of Treatment physical therapy  -       Row Name 24 0905          General Information    Patient Profile Reviewed yes  -RM     Prior Level of Function --  Mod I with functional mobility/ ADLs at PLOF without AD  declining use of rollator per wife. Wife completes household tasks, community errands, and transportation needs. Wife reports pt confused at baseline.  -RM     Existing Precautions/Restrictions seizures;fall;other (see comments)  [facility] protocol  -RM       Row Name 24 0905          Living Environment    People in Home --  Pt was living at home with wife with 3 MP with HR. No steps needs to navigate within home. Wife previously providing 24  hr care at baseline other than leaving for 20 min to take family member to work.  -RM       Row Name 04/30/24 0905          Cognition    Orientation Status (Cognition) oriented to;person  -RM       Row Name 04/30/24 0905          Safety Issues, Functional Mobility    Impairments Affecting Function (Mobility) cognition;strength;endurance/activity tolerance  -RM               User Key  (r) = Recorded By, (t) = Taken By, (c) = Cosigned By      Initials Name Provider Type    RM Marimar Mosqueda, PT Physical Therapist                   Mobility       Row Name 04/30/24 0941          Bed Mobility    Bed Mobility supine-sit  -RM     All Activities, [place] (Bed Mobility) supervision  -RM     Comment, (Bed Mobility) No PT cuing needed  -RM       Row Name 04/30/24 0941          Bed-Chair Transfer    Bed-Chair [place] (Transfers) contact guard  -RM     Assistive Device (Bed-Chair Transfers) walker, front-wheeled  -RM     Comment, (Bed-Chair Transfer) CGA to ensure safety  -RM       Row Name 04/30/24 0941          Sit-Stand Transfer    Sit-Stand [place] (Transfers) contact guard  -RM     Assistive Device (Sit-Stand Transfers) walker, front-wheeled  -RM     Comment, (Sit-Stand Transfer) CGA to ensure safety  -RM       Row Name 04/30/24 0941          Gait/Stairs (Locomotion)    [place] Level (Gait) contact guard;standby assist  -RM     Assistive Device (Gait) walker, front-wheeled  -RM     Comment, (Gait/Stairs) x 150 ft with FWW and CGA/SBA; cues needed for direction d/t cognitive deficits  -RM               User Key  (r) = Recorded By, (t) = Taken By, (c) = Cosigned By      Initials Name Provider Type    RM Marimar Mosqueda, PT Physical Therapist                   Obj/Interventions       Row Name 04/30/24 0943          Range of Motion Comprehensive    General Range of Motion bilateral lower extremity ROM WNL  -RM       Row Name 04/30/24 0943          Strength Comprehensive (MMT)    Comment, General Manual Muscle  Testing (MMT) Assessment MMT of BLE grossly 4/5  -RM       Row Name 04/30/24 0943          Balance    Comment, Balance No balance deficits observed  -RM               User Key  (r) = Recorded By, (t) = Taken By, (c) = Cosigned By      Initials Name Provider Type    Marimar Sanz, PT Physical Therapist                   Goals/Plan       Row Name 04/30/24 1002          Bed Mobility Goal 1 (PT)    Activity/Assistive Device (Bed Mobility Goal 1, PT) bed mobility activities, all  -RM     [place] Level/Cues Needed (Bed Mobility Goal 1, PT) modified independence  -RM     Time Frame (Bed Mobility Goal 1, PT) long term goal (LTG);2 weeks  -RM       Row Name 04/30/24 1002          Transfer Goal 1 (PT)    Activity/Assistive Device (Transfer Goal 1, PT) transfers, all  -RM     [place] Level/Cues Needed (Transfer Goal 1, PT) modified independence  -RM     Time Frame (Transfer Goal 1, PT) long term goal (LTG);2 weeks  -RM       Row Name 04/30/24 1002          Gait Training Goal 1 (PT)    Activity/Assistive Device (Gait Training Goal 1, PT) gait (walking locomotion)  -RM     [place] Level (Gait Training Goal 1, PT) modified independence  -RM     Distance (Gait Training Goal 1, PT) x 250 ft; use of most appropriate or no AD  -RM     Time Frame (Gait Training Goal 1, PT) 2 weeks  -RM       Row Name 04/30/24 1002          Stairs Goal 1 (PT)    Activity/Assistive Device (Stairs Goal 1, PT) stairs, all skills;ascending stairs;descending stairs  -RM     [place] Level/Cues Needed (Stairs Goal 1, PT) modified independence  -RM     Number of Stairs (Stairs Goal 1, PT) x 3 steps with HR  -RM     Time Frame (Stairs Goal 1, PT) long term goal (LTG);2 weeks  -RM       Row Name 04/30/24 1002          Therapy Assessment/Plan (PT)    Planned Therapy Interventions (PT) bed mobility training;gait training;patient/family education;stair training;strengthening;transfer training  -RM               User Key  (r) = Recorded By,  (t) = Taken By, (c) = Cosigned By      Initials Name Provider Type    RM Marimar Mosqueda, PT Physical Therapist                   Clinical Impression       Row Name 04/30/24 0943          Pain    Pretreatment Pain Rating 0/10 - no pain  -RM     Posttreatment Pain Rating 0/10 - no pain  -RM       Row Name 04/30/24 0943          Plan of Care Review    Plan of Care Reviewed With patient;spouse  -RM     Outcome Evaluation 62 y/o M with h/o COPD, steatosis of liver, seizure d/o, dysphagia, and alcoholism presented to the hospital for tonic-clonic seizures admitted with a principal dx of status epilepticus.  Patient reportedly stopped taking all of his medications several days ago following a dx of Parkinson's disease. Other hospital dx include: leukocytosis, hyperammonemia, elevated blood glucose level, acute encephalopathy, and putative sinusitis. At baseline, pit lives at home with wife and is Mod I with functional mobility/ ADLs without AD (declines using rollator) with wife reporting pt is confused at baseline providing 24 hr care other than leaving home for 20 min to take family member to work. This date, pt requires SUP with bed mobility, CGA for functional transfers, and CGA/SBA with gait training for 150 ft with FWW. Anticipated PT d/c recommendation of returning home with 24 hr care from family and HH PT services pending pt progress at time of d/c.  -       Row Name 04/30/24 0943          Therapy Assessment/Plan (PT)    Criteria for Skilled Interventions Met (PT) yes;skilled treatment is necessary  -     Therapy Frequency (PT) 3 times/wk  -     Predicted Duration of Therapy Intervention (PT) Until d/c  -       Row Name 04/30/24 0943          Positioning and Restraints    Pre-Treatment Position in bed  -RM     Post Treatment Position chair  -RM     In Chair call light within reach;encouraged to call for assist;exit alarm on  -RM               User Key  (r) = Recorded By, (t) = Taken By, (c) = Cosigned By       Initials Name Provider Type    RM Marimar Mosqueda, FABRICE Physical Therapist                   Outcome Measures       Row Name 04/30/24 1003 04/30/24 0845       How much help from another person do you currently need...    Turning from your back to your side while in flat bed without using bedrails? 4  -RM 3  -DP    Moving from lying on back to sitting on the side of a flat bed without bedrails? 4  -RM 3  -DP    Moving to and from a bed to a chair (including a wheelchair)? 4  -RM 2  -DP    Standing up from a chair using your arms (e.g., wheelchair, bedside chair)? 4  -RM 2  -DP    Climbing 3-5 steps with a railing? 4  -RM 2  -DP    To walk in hospital room? 4  -RM 2  -DP    AM-PAC 6 Clicks Score (PT) 24  -RM 14  -DP    Highest Level of Mobility Goal 8 --> Walked 250 feet or more  -RM 4 --> Transfer to chair/commode  -DP      Row Name 04/30/24 1003          Functional Assessment    Outcome Measure Options AM-PAC 6 Clicks Basic Mobility (PT)  -               User Key  (r) = Recorded By, (t) = Taken By, (c) = Cosigned By      Initials Name Provider Type    DP Cindi Ba, RN Registered Nurse    RM Marimar Mosqueda, FABRICE Physical Therapist                                 Physical Therapy Education       Title: PT OT SLP Therapies (Done)       Topic: Physical Therapy (Done)       Point: Mobility training (Done)       Learning Progress Summary             Patient Acceptance, E, VU by  at 4/30/2024 1003                                         User Key       Initials Effective Dates Name Provider Type Discipline     03/27/23 -  Marimar Mosqueda PT Physical Therapist PT                  PT Recommendation and Plan  Planned Therapy Interventions (PT): bed mobility training, gait training, patient/family education, stair training, strengthening, transfer training  Plan of Care Reviewed With: patient, spouse  Outcome Evaluation: 62 y/o M with h/o COPD, steatosis of liver, seizure d/o, dysphagia, and alcoholism presented to  the hospital for tonic-clonic seizures admitted with a principal dx of status epilepticus.  Patient reportedly stopped taking all of his medications several days ago following a dx of Parkinson's disease. Other hospital dx include: leukocytosis, hyperammonemia, elevated blood glucose level, acute encephalopathy, and putative sinusitis. At baseline, pit lives at home with wife and is Mod I with functional mobility/ ADLs without AD (declines using rollator) with wife reporting pt is confused at baseline providing 24 hr care other than leaving home for 20 min to take family member to work. This date, pt requires SUP with bed mobility, CGA for functional transfers, and CGA/SBA with gait training for 150 ft with FWW. Anticipated PT d/c recommendation of returning home with 24 hr care from family and HH PT services pending pt progress at time of d/c.     Time Calculation:         PT Charges       Row Name 04/30/24 1004             Time Calculation    Start Time 0845  -RM      Stop Time 0856  -RM      Time Calculation (min) 11 min  -RM      PT Received On 04/30/24  -RM      PT - Next Appointment 05/02/24  -RM      PT Goal Re-Cert Due Date 05/14/24  -RM         Time Calculation- PT    Total Timed Code Minutes- PT 0 minute(s)  -RM                User Key  (r) = Recorded By, (t) = Taken By, (c) = Cosigned By      Initials Name Provider Type    Marimar Sanz, FABRICE Physical Therapist                  Therapy Charges for Today       Code Description Service Date Service Provider Modifiers Qty    66952015645 [facility] PT EVAL MOD COMPLEXITY 4 4/30/2024 Marimar Mosqueda, PT GP 1            PT G-Codes  Outcome Measure Options: AM-PAC 6 Clicks Basic Mobility (PT)  AM-PAC 6 Clicks Score (PT): 24  PT Discharge Summary  Anticipated Discharge Disposition (PT): home with 24/7 care, home with home health    Marimar Mosqueda PT  4/30/2024

## 2024-04-30 NOTE — PLAN OF CARE
Goal Outcome Evaluation:  Plan of Care Reviewed With: patient, spouse           Outcome Evaluation: 62 y/o M with h/o COPD, steatosis of liver, seizure d/o, dysphagia, and alcoholism presented to the hospital for tonic-clonic seizures admitted with a principal dx of status epilepticus.  Patient reportedly stopped taking all of his medications several days ago following a dx of Parkinson's disease. Other hospital dx include: leukocytosis, hyperammonemia, elevated blood glucose level, acute encephalopathy, and putative sinusitis. At baseline, pit lives at home with wife and is Mod I with functional mobility/ ADLs without AD (declines using rollator) with wife reporting pt is confused at baseline providing 24 hr care other than leaving home for 20 min to take family member to work. This date, pt requires SUP with bed mobility, CGA for functional transfers, and CGA/SBA with gait training for 150 ft with FWW. Anticipated PT d/c recommendation of returning home with 24 hr care from family and HH PT services pending pt progress at time of d/c.      Anticipated Discharge Disposition (PT): home with 24/7 care, home with home health

## 2024-04-30 NOTE — THERAPY TREATMENT NOTE
Acute Care - Speech Language Pathology   Swallow Treatment Note  [facility]     Patient Name: Emiliano Bateman  : 1960  MRN: [id number]  Today's Date: 2024               Admit Date: 2024    Visit Dx:     ICD-10-CM ICD-9-CM   1. Status epilepticus  G40.901 345.3   2. Alcohol withdrawal syndrome with complication  F10.939 291.81   3. Seizure disorder  G40.909 345.90   4. Hepatic encephalopathy  K76.82 572.2   5. Chronic alcohol abuse  F10.10 305.00   6. Substance use disorder  F19.90 305.90   7. Acute non-recurrent maxillary sinusitis  J01.00 461.0     Patient Active Problem List   Diagnosis    Alcoholism    Anxiety    Chronic obstructive lung disease    Hypertensive disorder    Steatosis of liver    Status epilepticus    Seizure disorder    Hyperammonemia     SLP Recommendation and Plan     EDUCATION  The patient has been educated in the following areas:   Dysphagia (Swallowing Impairment) Oral Care/Hydration; modified diet instruction.        SLP GOALS       Row Name 24 1400       (LTG) Swallow    (LTG) Swallow Patient will tolerate safest and least restrictive diet without complications from aspiration.  -PF    Time Frame (Swallow Long Term Goal) by discharge  -PF    Progress/Outcomes (Swallow Long Term Goal) goal ongoing  -PF    Comment (Swallow Long Term Goal) see below  -PF       (STG) Swallow 1    (STG) Swallow 1 Patient will participate in full meal assessment to assure safety and adequacy of recommended diet and independent use of safe swallow strategies.  -PF    Time Frame (Swallow Short Term Goal 1) 1 week  -PF    Progress/Outcomes (Swallow Short Term Goal 1) goal ongoing  -PF    Comment (Swallow Short Term Goal 1) Pt was seen for skilled dysphagia therapy to ensure tolerance of rec'd diet. Pt's present diet is puree and thin liquids. He is on room air.  Upon entry, pt was asleep but was easily awaken.  He sat up in bed w/o difficulty to a 90 degree angle hip flexion and consumed lunch.   Pt trialed mixed/mech soft solids (peaches) x 2.  Adequate mastication on mixed/mech soft solids.  Unremarkable oral transit for mixed mech soft, puree, and thin liquid.  No pocketing or oral residue.  No cough, throat clear, vocal changes, or other overt s/s of aspiration.  O2 remained stable above 95 upon swallow of all consistencies consumed.  Per above and per pt's request, advancing diet to mechanical ground and thin liquid diet.  ST will continue to complete meal assessment(s) to ensure tolerance and safety of rec'd diet, provide further recs as indicated.  -PF              User Key  (r) = Recorded By, (t) = Taken By, (c) = Cosigned By      Initials Name Provider Type    PF Fakto, [place], SLP Speech and Language Pathologist             DEBORA Morrison  4/30/2024

## 2024-04-30 NOTE — CASE MANAGEMENT/SOCIAL WORK
Continued Stay Note  NATALIE Muor     Patient Name: Emiliano Bateman  MRN: [id number]  Today's Date: 4/30/2024    Admit Date: 4/22/2024    Plan: Return home with family.   Discharge Plan       Row Name 04/30/24 0842       Plan    Plan Return home with family.    Patient/Family in Agreement with Plan yes    Plan Comments CM met with patient at bedside and provided copy of IMM letter. Psych following, CIWA score 0, medication adjustment.             Megan Naegele, RN     Office Phone: [phone]  Office Cell: [phone]

## 2024-04-30 NOTE — PROGRESS NOTES
"Nutrition Services    Patient Name: Emiliano Bateman  YOB: 1960  MRN: [id number]  Admission date: 4/22/2024    Add Boost Glucose Control BID (Provides 380 kcals, 32 g protein if consumed)     PROGRESS NOTE      Encounter Information: Checking in on patient to monitor Nutrition POC and diet tolerance. SLP saw patient today for skilled dysphagia therapy and advanced patient's diet to mechanical ground from pureed. Patient requested th evaluation and change. Patient is now out of restraints and mental status has improved since admit. Patient's po intake has only been 25-50% of meals but suspected improvement with recent diet advancement and improved mental status.        PO Diet: Diet: Regular/House; Texture: Mechanical Ground (NDD 2); Fluid Consistency: Thin (IDDSI 0)   PO Supplements: none   PO Intake:  25-50% intake        Current nutrition support: -   Nutrition support review: -       Labs (reviewed below): Hyponatremia - management per attending        GI Function:  Last documented BM 4/28       Nutrition Intervention Updates: Will continue to encourage good po intake    Add Boost Glucose Control BID (Provides 380 kcals, 32 g protein if consumed)          Results from last 7 days   Lab Units 04/30/24  0432 04/29/24  0243 04/28/24  0002   SODIUM mmol/L 133* 135* 138   POTASSIUM mmol/L 3.5 4.1 4.1   CHLORIDE mmol/L 93* 95* 97*   CO2 mmol/L 28.0 31.0* 31.0*   BUN mg/dL 12 17 17   CREATININE mg/dL 0.89 0.72* 0.70*   CALCIUM mg/dL 9.8 9.4 9.7   BILIRUBIN mg/dL 0.6 0.5 0.4   ALK PHOS U/L 82 76 79   ALT (SGPT) U/L 49* 32 30   AST (SGOT) U/L 50* 35 39   GLUCOSE mg/dL 125* 148* 168*     Results from last 7 days   Lab Units 04/30/24  0432 04/29/24  0243 04/28/24  0002   MAGNESIUM mg/dL 2.3 2.3 2.4   PHOSPHORUS mg/dL 3.5 3.1 2.8   HEMOGLOBIN g/dL 15.0 14.7 14.7   HEMATOCRIT % 44.4 43.0 43.0     No results found for: \"COVID19\"  Lab Results   Component Value Date    HGBA1C 6.50 (H) 04/23/2024       RD to follow " up per protocol.    Electronically signed by:  Brie Church RD  04/30/24 14:25 EDT

## 2024-04-30 NOTE — PLAN OF CARE
Goal Outcome Evaluation:     Pt was seen for skilled dysphagia therapy to ensure tolerance of rec'd diet. Pt's present diet is puree and thin liquids. He is on room air.  Upon entry, pt was asleep but was easily awaken.  He sat up in bed w/o difficulty to a 90 degree angle hip flexion and consumed lunch.  Pt trialed mixed/mech soft solids (peaches) x 2.  Adequate mastication on mixed/mech soft solids.  Unremarkable oral transit for mixed mech soft, puree, and thin liquid. No pocketing or oral residue.  No cough, throat clear, vocal changes, or other overt s/s of aspiration.  O2 remained stable above 95 upon swallow of all consistencies consumed.  Per above and per pt's request, advancing diet to mechanical ground and thin liquid diet.  ST will continue to complete meal assessment(s) to ensure tolerance and safety of rec'd diet, provide further recs as indicated.  -PF

## 2024-04-30 NOTE — PROGRESS NOTES
"[facility] MEDICINE SERVICE  DAILY PROGRESS NOTE    NAME: Emiliano Bateman  : 1960  MRN: [id number]      LOS: 7 days     PROVIDER OF SERVICE: Jairo Guadarrama MD    Chief Complaint: Status epilepticus    History of Present Illness: Emiliano Bateman is a 63 y.o. male with PMH of Hypertension, COPD, anxiety, steatosis of liver, seizure disorder, and alcoholism presented to the hospital for tonic-clonic seizures, and was admitted with a principal diagnosis of Status epilepticus.  HPI information is limited due to patient postictal following seizure activity; information obtained from ER report and family at bedside.  Patient reportedly stopped taking all of his medications several days ago following a diagnosis of Parkinson's disease.  Patient has a known history of seizure disorder and had a generalized tonic-clonic seizure that lasted several minutes without return to normal mental status near [facility].  Patient was brought to our ER via ambulance where he had additional tonic-clonic seizure.  Patient was given Ativan and Keppra in the emergency department however continued to have \"twitching\".  Patient was brought to the intensive care unit for further evaluation and treatment.     ACP: Patient unable to answer questions at this time and wishes patient remain full code with full intervention; his wife is his decision-maker if he is unable.    Subjective:     Interval History:    -I am seeing this patient for the first time and her chart was reviewed.  Patient was admitted with status epilepticus in ICU.  Today is day 7 of hospital stay.  Patient was sleeping when I went to the room and did not woke up with verbal and tactile stimuli    Review of Systems:   Negative except what is listed above     Objective:     Vital Signs  Temp:  [98 °F (36.7 °C)-98.5 °F (36.9 °C)] 98.5 °F (36.9 °C)  Heart Rate:  [] 93  Resp:  [13-25] 13  BP: (113-134)/(80-94) 127/92  Flow (L/min):  [3] 3 "   Body mass index is 21.41 kg/m².    Physical Exam    General Appearance:    Alert, cooperative, in no acute distress   Head:    Normocephalic, without obvious abnormality, atraumatic   Eyes:            conjunctivae and sclerae normal, no   icterus, no pallor, corneas  clear, PERRLA   Neck:   No adenopathy, supple, trachea midline, no thyromegaly, no   carotid bruit, no JVD   Lungs:     Clear to auscultation,respirations regular, even and                  unlabored    Heart:    Regular rhythm and normal rate, normal S1 and S2, no            murmur, no gallop, no rub, no click   Abdomen:     Normal bowel sounds, no masses, no organomegaly, soft        non-tender, non-distended, no guarding, no rebound                No tenderness   Extremities:   Moves all extremities well, no edema, no cyanosis, no             redness   Lymph nodes:   No palpable adenopathy   Neurologic:   Cranial nerves 2 - 12 grossly intact, sensation intact, DTR       present and equal bilaterally       Scheduled Meds   amLODIPine, 5 mg, Oral, Q24H  enoxaparin, 40 mg, Subcutaneous, Daily  folic acid, 1 mg, Oral, Daily  lactulose, 30 g, Oral, Daily  levETIRAcetam, 500 mg, Intravenous, Q12H  nicotine, 1 patch, Transdermal, Q24H  nystatin, 5 mL, Swish & Spit, 4x Daily  senna-docusate sodium, 2 tablet, Oral, BID  sertraline, 25 mg, Oral, Nightly  sodium chloride, 10 mL, Intravenous, Q12H  thiamine, 100 mg, Oral, Daily       PRN Meds     acetaminophen **OR** acetaminophen    senna-docusate sodium **AND** polyethylene glycol **AND** bisacodyl **AND** bisacodyl    Calcium Replacement - Follow Nurse / BPA Driven Protocol    hydrALAZINE    ipratropium-albuterol    LORazepam    Magnesium Standard Dose Replacement - Follow Nurse / BPA Driven Protocol    nitroglycerin    ondansetron ODT **OR** ondansetron    Phosphorus Replacement - Follow Nurse / BPA Driven Protocol    Potassium Replacement - Follow Nurse / BPA Driven Protocol    sodium chloride    sodium  chloride   Infusions         Diagnostic Data    Results from last 7 days   Lab Units 04/30/24  0432   WBC 10*3/mm3 6.91   HEMOGLOBIN g/dL 15.0   HEMATOCRIT % 44.4   PLATELETS 10*3/mm3 261   GLUCOSE mg/dL 125*   CREATININE mg/dL 0.89   BUN mg/dL 12   SODIUM mmol/L 133*   POTASSIUM mmol/L 3.5   AST (SGOT) U/L 50*   ALT (SGPT) U/L 49*   ALK PHOS U/L 82   BILIRUBIN mg/dL 0.6   ANION GAP mmol/L 12.0       No radiology results for the last day      I have personally reviewed the patient's new results.     Assessment/Plan:     Active and Resolved Problems  Active Hospital Problems    Diagnosis  POA    **Status epilepticus [G40.901]  Yes    Seizure disorder [G40.909]  Yes    Hyperammonemia [E72.20]  Yes    Hypertensive disorder [I10]  Yes    Steatosis of liver [K76.0]  Yes    Chronic obstructive lung disease [J44.9]  Yes    Alcoholism [F10.20]  Yes    Anxiety [F41.9]  Yes      Resolved Hospital Problems   No resolved problems to display.    ??  Parkinson's disease        COPD    Suggestion:    4/30-at this time I will continue current supportive care.  PT OT.  Monitor lab work.  Hopefully discharge patient soon    DVT prophylaxis:  Medical and mechanical DVT prophylaxis orders are present.         Code status is   Code Status and Medical Interventions:   Ordered at: 04/23/24 0045     Code Status (Patient has no pulse and is not breathing):    CPR (Attempt to Resuscitate)     Medical Interventions (Patient has pulse or is breathing):    Full Support       Plan for disposition:5/2    Time: 30 minutes    Signature: Electronically signed by Jairo Guadarrama MD, 04/30/24, 10:21 EDT.  [facility] Hospitalist Team

## 2024-04-30 NOTE — PLAN OF CARE
Goal Outcome Evaluation:           Patient stable at this time.

## 2024-05-01 ENCOUNTER — READMISSION MANAGEMENT (OUTPATIENT)
Dept: CALL CENTER | Facility: HOSPITAL | Age: 64
End: 2024-05-01
Payer: MEDICARE

## 2024-05-01 VITALS
RESPIRATION RATE: 18 BRPM | WEIGHT: 138.2 LBS | HEIGHT: 67 IN | HEART RATE: 85 BPM | TEMPERATURE: 98.8 F | SYSTOLIC BLOOD PRESSURE: 119 MMHG | DIASTOLIC BLOOD PRESSURE: 79 MMHG | OXYGEN SATURATION: 97 % | BODY MASS INDEX: 21.69 KG/M2

## 2024-05-01 LAB
ALBUMIN SERPL-MCNC: 4.3 G/DL (ref 3.5–5.2)
ALBUMIN/GLOB SERPL: 1.2 G/DL
ALP SERPL-CCNC: 87 U/L (ref 39–117)
ALT SERPL W P-5'-P-CCNC: 64 U/L (ref 1–41)
ANION GAP SERPL CALCULATED.3IONS-SCNC: 12 MMOL/L (ref 5–15)
AST SERPL-CCNC: 63 U/L (ref 1–40)
BASOPHILS # BLD AUTO: 0.09 10*3/MM3 (ref 0–0.2)
BASOPHILS NFR BLD AUTO: 1.2 % (ref 0–1.5)
BILIRUB SERPL-MCNC: 0.6 MG/DL (ref 0–1.2)
BUN SERPL-MCNC: 11 MG/DL (ref 8–23)
BUN/CREAT SERPL: 12.2 (ref 7–25)
CA-I SERPL ISE-MCNC: 1.18 MMOL/L (ref 1.2–1.3)
CALCIUM SPEC-SCNC: 9.5 MG/DL (ref 8.6–10.5)
CHLORIDE SERPL-SCNC: 95 MMOL/L (ref 98–107)
CO2 SERPL-SCNC: 27 MMOL/L (ref 22–29)
CREAT SERPL-MCNC: 0.9 MG/DL (ref 0.76–1.27)
DEPRECATED RDW RBC AUTO: 51.6 FL (ref 37–54)
EGFRCR SERPLBLD CKD-EPI 2021: 95.4 ML/MIN/1.73
EOSINOPHIL # BLD AUTO: 0.22 10*3/MM3 (ref 0–0.4)
EOSINOPHIL NFR BLD AUTO: 3 % (ref 0.3–6.2)
ERYTHROCYTE [DISTWIDTH] IN BLOOD BY AUTOMATED COUNT: 13.8 % (ref 12.3–15.4)
GLOBULIN UR ELPH-MCNC: 3.6 GM/DL
GLUCOSE SERPL-MCNC: 119 MG/DL (ref 65–99)
HCT VFR BLD AUTO: 43.3 % (ref 37.5–51)
HGB BLD-MCNC: 14.9 G/DL (ref 13–17.7)
IMM GRANULOCYTES # BLD AUTO: 0.02 10*3/MM3 (ref 0–0.05)
IMM GRANULOCYTES NFR BLD AUTO: 0.3 % (ref 0–0.5)
LYMPHOCYTES # BLD AUTO: 2.3 10*3/MM3 (ref 0.7–3.1)
LYMPHOCYTES NFR BLD AUTO: 30.9 % (ref 19.6–45.3)
MAGNESIUM SERPL-MCNC: 2.6 MG/DL (ref 1.6–2.4)
MCH RBC QN AUTO: 34.5 PG (ref 26.6–33)
MCHC RBC AUTO-ENTMCNC: 34.4 G/DL (ref 31.5–35.7)
MCV RBC AUTO: 100.2 FL (ref 79–97)
MONOCYTES # BLD AUTO: 1.13 10*3/MM3 (ref 0.1–0.9)
MONOCYTES NFR BLD AUTO: 15.2 % (ref 5–12)
NEUTROPHILS NFR BLD AUTO: 3.68 10*3/MM3 (ref 1.7–7)
NEUTROPHILS NFR BLD AUTO: 49.4 % (ref 42.7–76)
NRBC BLD AUTO-RTO: 0 /100 WBC (ref 0–0.2)
PHOSPHATE SERPL-MCNC: 2.9 MG/DL (ref 2.5–4.5)
PLATELET # BLD AUTO: 282 10*3/MM3 (ref 140–450)
PMV BLD AUTO: 9.5 FL (ref 6–12)
POTASSIUM SERPL-SCNC: 3.8 MMOL/L (ref 3.5–5.2)
PROT SERPL-MCNC: 7.9 G/DL (ref 6–8.5)
RBC # BLD AUTO: 4.32 10*6/MM3 (ref 4.14–5.8)
SODIUM SERPL-SCNC: 134 MMOL/L (ref 136–145)
WBC NRBC COR # BLD AUTO: 7.44 10*3/MM3 (ref 3.4–10.8)

## 2024-05-01 PROCEDURE — 83735 ASSAY OF MAGNESIUM: CPT | Performed by: STUDENT IN AN ORGANIZED HEALTH CARE EDUCATION/TRAINING PROGRAM

## 2024-05-01 PROCEDURE — 80053 COMPREHEN METABOLIC PANEL: CPT | Performed by: STUDENT IN AN ORGANIZED HEALTH CARE EDUCATION/TRAINING PROGRAM

## 2024-05-01 PROCEDURE — 85025 COMPLETE CBC W/AUTO DIFF WBC: CPT | Performed by: STUDENT IN AN ORGANIZED HEALTH CARE EDUCATION/TRAINING PROGRAM

## 2024-05-01 PROCEDURE — 82330 ASSAY OF CALCIUM: CPT | Performed by: STUDENT IN AN ORGANIZED HEALTH CARE EDUCATION/TRAINING PROGRAM

## 2024-05-01 PROCEDURE — 84100 ASSAY OF PHOSPHORUS: CPT | Performed by: STUDENT IN AN ORGANIZED HEALTH CARE EDUCATION/TRAINING PROGRAM

## 2024-05-01 PROCEDURE — 25010000002 LORAZEPAM PER 2 MG: Performed by: NURSE PRACTITIONER

## 2024-05-01 RX ORDER — SERTRALINE HYDROCHLORIDE 25 MG/1
25 TABLET, FILM COATED ORAL NIGHTLY
Qty: 90 TABLET | Refills: 1 | Status: SHIPPED | OUTPATIENT
Start: 2024-05-01

## 2024-05-01 RX ORDER — LEVETIRACETAM 500 MG/1
500 TABLET ORAL 2 TIMES DAILY
Qty: 180 TABLET | Refills: 3 | Status: SHIPPED | OUTPATIENT
Start: 2024-05-01

## 2024-05-01 RX ORDER — AMLODIPINE BESYLATE 5 MG/1
5 TABLET ORAL
Qty: 90 TABLET | Refills: 1 | Status: SHIPPED | OUTPATIENT
Start: 2024-05-01

## 2024-05-01 RX ORDER — LORAZEPAM 2 MG/ML
0.25 INJECTION INTRAMUSCULAR ONCE
Status: COMPLETED | OUTPATIENT
Start: 2024-05-01 | End: 2024-05-01

## 2024-05-01 RX ORDER — GAUZE BANDAGE 2" X 2"
100 BANDAGE TOPICAL DAILY
Qty: 90 TABLET | Refills: 1 | Status: SHIPPED | OUTPATIENT
Start: 2024-05-01

## 2024-05-01 RX ADMIN — LORAZEPAM 0.25 MG: 2 INJECTION INTRAMUSCULAR; INTRAVENOUS at 03:23

## 2024-05-01 NOTE — CASE MANAGEMENT/SOCIAL WORK
Case Management Discharge Note      Final Note: Routine home.    Provided Post Acute Provider List?: N/A  Provided Post Acute Provider Quality & Resource List?: N/A    Selected Continued Care - Discharged on 5/1/2024 Admission date: 4/22/2024 - Discharge disposition: Home or Self Care       Transportation Services  Private: Car    Final Discharge Disposition Code: 01 - home or self-care

## 2024-05-01 NOTE — PLAN OF CARE
Goal Outcome Evaluation:    Patient awake most of this shift. Patient stable at this time.

## 2024-05-01 NOTE — CASE MANAGEMENT/SOCIAL WORK
Social Work Assessment  [facility]     Patient Name: Emiliano Bateman  MRN: [id number]  Today's Date: 5/1/2024    Admit Date: 4/22/2024     Discharge Plan       Row Name 05/01/24 1039       Plan    Plan Comments Sw attempted to meet with Pt to discuss ETOH use. Pt was leaving with spouse. RN reported that Pt does not want cessation information for ETOH use. No further needs.        MAYTE Heller, MSW    Phone: [phone]  Fax: [phone]  Email: [email or web address]

## 2024-05-01 NOTE — DISCHARGE SUMMARY
"[facility] Medicine Services  Discharge Summary    Date of Service: 24  Patient Name: Emiliano Bateman  : 1960  MRN: [id number]    Date of Admission: 2024  Date of Discharge:  24  Primary Care Physician: Meryl Thomas MD      Presenting Problem:   Hepatic encephalopathy [K76.82]  Status epilepticus [G40.901]  Seizure disorder [G40.909]  Chronic alcohol abuse [F10.10]  Alcohol withdrawal syndrome with complication [F10.939]  Substance use disorder [F19.90]  Acute non-recurrent maxillary sinusitis [J01.00]    Active and Resolved Hospital Problems:  Active Hospital Problems    Diagnosis POA    **Status epilepticus [G40.901] Yes    Seizure disorder [G40.909] Yes    Hyperammonemia [E72.20] Yes    Hypertensive disorder [I10] Yes    Steatosis of liver [K76.0] Yes    Chronic obstructive lung disease [J44.9] Yes    Alcoholism [F10.20] Yes    Anxiety [F41.9] Yes      Resolved Hospital Problems   No resolved problems to display.         Hospital Course     HPI:Emiliano Bateman is a 63 y.o. male with PMH of Hypertension, COPD, anxiety, steatosis of liver, seizure disorder, and alcoholism presented to the hospital for tonic-clonic seizures, and was admitted with a principal diagnosis of Status epilepticus.  HPI information is limited due to patient postictal following seizure activity; information obtained from ER report and family at bedside.  Patient reportedly stopped taking all of his medications several days ago following a diagnosis of Parkinson's disease.  Patient has a known history of seizure disorder and had a generalized tonic-clonic seizure that lasted several minutes without return to normal mental status near [facility].  Patient was brought to our ER via ambulance where he had additional tonic-clonic seizure.  Patient was given Ativan and Keppra in the emergency department however continued to have \"twitching\".  Patient was brought to the intensive care unit " for further evaluation and treatment.       Hospital Course:  4/30-I am seeing this patient for the first time and her chart was reviewed.  Patient was admitted with status epilepticus in ICU.  Today is day 7 of hospital stay.  Patient was sleeping when I went to the room and did not woke up with verbal and tactile stimuli    5/1-at this time patient is doing much better and wants to be discharged home.  Needs to follow-up with PCP.      Day of Discharge     Vital Signs:  Temp:  [97.8 °F (36.6 °C)-98.8 °F (37.1 °C)] 98.8 °F (37.1 °C)  Heart Rate:  [85-97] 85  Resp:  [17-18] 18  BP: (119-130)/(79-99) 119/79  Flow (L/min):  [3] 3    Physical Exam:  General Appearance:    Alert, cooperative, in no acute distress   Head:    Normocephalic, without obvious abnormality, atraumatic   Eyes:            conjunctivae and sclerae normal, no   icterus, no pallor, corneas  clear, PERRLA   Neck:   No adenopathy, supple, trachea midline, no thyromegaly, no   carotid bruit, no JVD   Lungs:     Clear to auscultation,respirations regular, even and                  unlabored    Heart:    Regular rhythm and normal rate, normal S1 and S2, no            murmur, no gallop, no rub, no click   Abdomen:     Normal bowel sounds, no masses, no organomegaly, soft        non-tender, non-distended, no guarding, no rebound                No tenderness   Extremities:   Moves all extremities well, no edema, no cyanosis, no             redness   Lymph nodes:   No palpable adenopathy   Neurologic:   Cranial nerves 2 - 12 grossly intact, sensation intact, DTR       present and equal bilaterally          Pertinent  and/or Most Recent Results     LAB RESULTS:      Lab 05/01/24  0331 04/30/24  0432 04/29/24  0243 04/28/24  0002 04/26/24  2245   WBC 7.44 6.91 6.31 7.18 7.87   HEMOGLOBIN 14.9 15.0 14.7 14.7 15.0   HEMATOCRIT 43.3 44.4 43.0 43.0 43.8   PLATELETS 282 261 221 242 243   NEUTROS ABS 3.68 3.55 3.11 4.16 4.90   IMMATURE GRANS (ABS) 0.02 0.02 0.02  0.02 0.02   LYMPHS ABS 2.30 1.98 1.92 1.80 1.87   MONOS ABS 1.13* 0.95* 0.84 0.84 0.81   EOS ABS 0.22 0.30 0.35 0.27 0.18   .2* 102.1* 101.4* 100.9* 100.0*         Lab 05/01/24 0331 04/30/24 0432 04/29/24 0243 04/28/24 0002 04/27/24  0120 04/26/24  2245   SODIUM 134* 133* 135* 138 133*  --    POTASSIUM 3.8 3.5 4.1 4.1 4.4  --    CHLORIDE 95* 93* 95* 97* 93*  --    CO2 27.0 28.0 31.0* 31.0* 30.0*  --    ANION GAP 12.0 12.0 9.0 10.0 10.0  --    BUN 11 12 17 17 13  --    CREATININE 0.90 0.89 0.72* 0.70* 0.69*  --    EGFR 95.4 95.7 102.0 102.9 103.3  --    GLUCOSE 119* 125* 148* 168* 122*  --    CALCIUM 9.5 9.8 9.4 9.7 9.2  --    IONIZED CALCIUM 1.18* 1.20 1.20 1.24  --  1.21   MAGNESIUM 2.6* 2.3 2.3 2.4 2.1  --    PHOSPHORUS 2.9 3.5 3.1 2.8  --  2.4*         Lab 05/01/24 0331 04/30/24 0432 04/29/24 0243 04/28/24 0002 04/27/24  0120   TOTAL PROTEIN 7.9 8.1 7.0 7.2 7.0   ALBUMIN 4.3 4.3 3.8 3.8 3.7   GLOBULIN 3.6 3.8 3.2 3.4 3.3   ALT (SGPT) 64* 49* 32 30 22   AST (SGOT) 63* 50* 35 39 30   BILIRUBIN 0.6 0.6 0.5 0.4 0.5   ALK PHOS 87 82 76 79 77                     Brief Urine Lab Results  (Last result in the past 365 days)        Color   Clarity   Blood   Leuk Est   Nitrite   Protein   CREAT   Urine HCG        04/22/24 2154 Yellow   Clear   Negative   Negative   Negative   Trace                 Microbiology Results (last 10 days)       ** No results found for the last 240 hours. **            XR Abdomen KUB    Result Date: 4/28/2024  Impression: Impression: Feeding tube in the distal stomach directed at the pylorus Electronically Signed: Jarrod Hein  4/28/2024 11:17 AM EDT  Workstation ID: [id number]    XR Abdomen KUB    Result Date: 4/26/2024  Impression: Impression: Tip of feeding tube projects over the expected position of the body of the stomach Electronically Signed: Kapil Lopez MD  4/26/2024 10:23 PM EDT  Workstation ID: [id number]    XR Abdomen KUB    Result Date: 4/26/2024  Impression:  Impression: Feeding tube at or just below the GE junction as above. Recommend further advancement Electronically Signed: Kapil Lopez MD  4/26/2024 9:09 PM EDT  Workstation ID: [id number]    XR Chest 1 View    Result Date: 4/26/2024  Impression: Impression: No active cardiopulmonary disease Electronically Signed: Jarrod Angel Luis  4/26/2024 8:30 PM EDT  Workstation ID: [id number]    XR Abdomen KUB    Result Date: 4/25/2024  Impression: Impression: Advancement of the nasogastric tube now partially coiled and oriented retrograde within the distal stomach. Electronically Signed: Kostas Nelson MD  4/25/2024 8:30 PM EDT  Workstation ID: [id number]    XR Abdomen KUB    Result Date: 4/24/2024  Impression: Impression: NG tube extends to the proximal gastric body level. The sidehole is located 3 cm distal to the EG junction. Electronically Signed: Ramona Truong MD  4/24/2024 8:56 AM EDT  Workstation ID: [id number]    XR Abdomen KUB    Result Date: 4/23/2024  Impression: Impression: Nasogastric tube sideport overlies the gastroesophageal junction. Consider advancement up to 5 cm. Electronically Signed: Paulo Roa MD  4/23/2024 8:33 AM EDT  Workstation ID: [id number]    US Liver    Result Date: 4/23/2024  Impression: Impression: Limited right upper quadrant ultrasound of the liver and common bile duct demonstrates no sonographic abnormalities. Electronically Signed: Juan Arzola MD  4/23/2024 7:15 AM EDT  Workstation ID: [id number]    XR Chest 1 View    Result Date: 4/22/2024  Impression: Impression: Mild pulmonary vascular congestion accentuated by relatively low lung volumes Electronically Signed: Kapil Lopez MD  4/22/2024 11:02 PM EDT  Workstation ID: [id number]    CT Head Without Contrast    Result Date: 4/22/2024  Impression: Impression: 1. No acute intracranial abnormality by CT on this limited exam. 2. Moderate left and complete right opacification of maxillary sinuses. Correlate for clinical signs of acute sinusitis.  Electronically Signed: Kostas Nelson MD  4/22/2024 10:31 PM EDT  Workstation ID: [id number]                 Labs Pending at Discharge:      Procedures Performed           Consults:   Consults       Date and Time Order Name Status Description    4/24/2024  9:27 AM Inpatient Hospitalist Consult      4/23/2024 12:56 AM Inpatient Psychiatrist Consult Completed     4/23/2024 12:54 AM Inpatient Neurology Consult General                Discharge Details        Discharge Medications        New Medications        Instructions Start Date   amLODIPine 5 MG tablet  Commonly known as: NORVASC   5 mg, Oral, Every 24 Hours Scheduled      levETIRAcetam 500 MG tablet  Commonly known as: Keppra   500 mg, Oral, 2 Times Daily      sertraline 25 MG tablet  Commonly known as: ZOLOFT   25 mg, Oral, Nightly      thiamine 100 MG tablet  Commonly known as: VITAMIN B1   100 mg, Oral, Daily               Allergies   Allergen Reactions    Penicillins Unknown - Low Severity     Childhood allergy; pt is unsure of reaction         Discharge Disposition:   Home or Self Care    Diet:  Hospital:  Diet Order   Procedures    Diet: Regular/House; Texture: Mechanical Ground (NDD 2); Fluid Consistency: Thin (IDDSI 0)         Discharge Activity:         CODE STATUS:  Code Status and Medical Interventions:   Ordered at: 04/23/24 0045     Code Status (Patient has no pulse and is not breathing):    CPR (Attempt to Resuscitate)     Medical Interventions (Patient has pulse or is breathing):    Full Support         No future appointments.    Additional Instructions for the Follow-ups that You Need to Schedule       Discharge Follow-up with PCP   As directed       Currently Documented PCP:    Meryl Thomas MD    PCP Phone Number:    [phone]     Follow Up Details: PCP in next 1 to 2-week        Discharge Follow-up with Specialty: Neurology next 2 to 3-week   As directed      Specialty: Neurology next 2 to 3-week                Time spent on Discharge  including face to face service:  >30 minutes    Signature: Electronically signed by Jairo Guadarrama MD, 05/01/24, 07:26 EDT.  [facility] Hospitalist Team

## 2024-05-02 NOTE — OUTREACH NOTE
Prep Survey      Flowsheet Row Responses   [facility] patient discharged from? [facility]   Is LACE score < 7 ? No   Eligibility Readm Mgmt   Discharge diagnosis Status epilepticus   Does the patient have one of the following disease processes/diagnoses(primary or secondary)? Other   Does the patient have Home health ordered? No   Is there a DME ordered? No   Prep survey completed? Yes            Meg DELUCA - Registered Nurse

## 2024-05-14 ENCOUNTER — READMISSION MANAGEMENT (OUTPATIENT)
Dept: CALL CENTER | Facility: HOSPITAL | Age: 64
End: 2024-05-14
Payer: MEDICARE

## 2024-05-14 NOTE — OUTREACH NOTE
Medical Week 2 Survey      Flowsheet Row Responses   [facility] facility patient discharged from? [facility]   Does the patient have one of the following disease processes/diagnoses(primary or secondary)? Other   Week 2 attempt successful? No   Unsuccessful attempts Attempt 1            Lashell CALIXTO - Licensed Nurse

## 2024-05-20 ENCOUNTER — READMISSION MANAGEMENT (OUTPATIENT)
Dept: CALL CENTER | Facility: HOSPITAL | Age: 64
End: 2024-05-20
Payer: MEDICARE

## 2024-05-20 NOTE — OUTREACH NOTE
Medical Week 3 Survey      Flowsheet Row Responses   [facility] patient discharged from? [facility]   Does the patient have one of the following disease processes/diagnoses(primary or secondary)? Other   Week 3 attempt successful? Yes   Call start time 1622   Call end time 1624   Discharge diagnosis Status epilepticus   Person spoke with today (if not patient) and relationship Lili Ruiz reviewed with patient/caregiver? Yes   Is the patient taking all medications as directed (includes completed medication regime)? Yes   Does the patient have a primary care provider?  Yes   Comments regarding PCP Martha   Does the patient have an appointment with their PCP within 7 days of discharge? No   What is preventing the patient from scheduling follow up appointments within 7 days of discharge? Haven't had time, Unsure of when or with whom   Nursing Interventions Advised patient to make appointment   Has the patient kept scheduled appointments due by today? No   Psychosocial issues? No   Did the patient receive a copy of their discharge instructions? Yes   Nursing interventions Reviewed instructions with patient   What is the patient's perception of their health status since discharge? Improving   Is the patient/caregiver able to teach back the hierarchy of who to call/visit for symptoms/problems? PCP, Specialist, Home health nurse, Urgent Care, ED, 911 Yes   If the patient is a current smoker, are they able to teach back resources for cessation? Not a smoker   Week 3 Call Completed? Yes   Graduated Yes   Is the patient interested in additional calls from an ambulatory ? No   Would this patient benefit from a Referral to Amb Social Work? No   Wrap up additional comments Spouse was encouraged to call PCP for a f/u appt.  She states she will be sure to schedule.  Pt improving. no needs.   Call end time 1624            DELISA BAZAN - Registered Nurse

## 2024-08-08 ENCOUNTER — HOSPITAL ENCOUNTER (INPATIENT)
Facility: HOSPITAL | Age: 64
LOS: 8 days | Discharge: NURSING, REHAB OR LONG-TERM CARE | DRG: 896 | End: 2024-08-17
Attending: EMERGENCY MEDICINE | Admitting: INTERNAL MEDICINE
Payer: MEDICARE

## 2024-08-08 ENCOUNTER — APPOINTMENT (OUTPATIENT)
Dept: CT IMAGING | Facility: HOSPITAL | Age: 64
DRG: 896 | End: 2024-08-08
Payer: MEDICARE

## 2024-08-08 DIAGNOSIS — G40.901 STATUS EPILEPTICUS: Primary | ICD-10-CM

## 2024-08-08 LAB
ALBUMIN SERPL-MCNC: 4.6 G/DL (ref 3.5–5.2)
ALBUMIN/GLOB SERPL: 1.4 G/DL
ALP SERPL-CCNC: 108 U/L (ref 39–117)
ALT SERPL W P-5'-P-CCNC: 23 U/L (ref 1–41)
ANION GAP SERPL CALCULATED.3IONS-SCNC: 34.2 MMOL/L (ref 5–15)
ANISOCYTOSIS BLD QL: NORMAL
APTT PPP: 28.2 SECONDS (ref 61–76.5)
AST SERPL-CCNC: 45 U/L (ref 1–40)
BASOPHILS # BLD AUTO: 0.09 10*3/MM3 (ref 0–0.2)
BASOPHILS NFR BLD AUTO: 0.7 % (ref 0–1.5)
BILIRUB SERPL-MCNC: 0.8 MG/DL (ref 0–1.2)
BUN SERPL-MCNC: 15 MG/DL (ref 8–23)
BUN/CREAT SERPL: 10.7 (ref 7–25)
CALCIUM SPEC-SCNC: 9.1 MG/DL (ref 8.6–10.5)
CHLORIDE SERPL-SCNC: 94 MMOL/L (ref 98–107)
CK SERPL-CCNC: 226 U/L (ref 20–200)
CO2 SERPL-SCNC: 13.8 MMOL/L (ref 22–29)
CREAT SERPL-MCNC: 1.4 MG/DL (ref 0.76–1.27)
DEPRECATED RDW RBC AUTO: 66.7 FL (ref 37–54)
EGFRCR SERPLBLD CKD-EPI 2021: 56.1 ML/MIN/1.73
EOSINOPHIL # BLD AUTO: 0.3 10*3/MM3 (ref 0–0.4)
EOSINOPHIL NFR BLD AUTO: 2.3 % (ref 0.3–6.2)
ERYTHROCYTE [DISTWIDTH] IN BLOOD BY AUTOMATED COUNT: 18.2 % (ref 12.3–15.4)
ETHANOL UR QL: <0.01 %
GLOBULIN UR ELPH-MCNC: 3.3 GM/DL
GLUCOSE BLDC GLUCOMTR-MCNC: 263 MG/DL (ref 70–105)
GLUCOSE SERPL-MCNC: 246 MG/DL (ref 65–99)
HCT VFR BLD AUTO: 48 % (ref 37.5–51)
HGB BLD-MCNC: 16.3 G/DL (ref 13–17.7)
IMM GRANULOCYTES # BLD AUTO: 0.18 10*3/MM3 (ref 0–0.05)
IMM GRANULOCYTES NFR BLD AUTO: 1.4 % (ref 0–0.5)
INR PPP: 0.94 (ref 0.93–1.1)
LARGE PLATELETS: NORMAL
LYMPHOCYTES # BLD AUTO: 0.97 10*3/MM3 (ref 0.7–3.1)
LYMPHOCYTES NFR BLD AUTO: 7.6 % (ref 19.6–45.3)
MAGNESIUM SERPL-MCNC: 2.8 MG/DL (ref 1.6–2.4)
MCH RBC QN AUTO: 34.2 PG (ref 26.6–33)
MCHC RBC AUTO-ENTMCNC: 34 G/DL (ref 31.5–35.7)
MCV RBC AUTO: 100.8 FL (ref 79–97)
MONOCYTES # BLD AUTO: 0.83 10*3/MM3 (ref 0.1–0.9)
MONOCYTES NFR BLD AUTO: 6.5 % (ref 5–12)
NEUTROPHILS NFR BLD AUTO: 10.44 10*3/MM3 (ref 1.7–7)
NEUTROPHILS NFR BLD AUTO: 81.5 % (ref 42.7–76)
NRBC BLD AUTO-RTO: 0.4 /100 WBC (ref 0–0.2)
PLATELET # BLD AUTO: 151 10*3/MM3 (ref 140–450)
PMV BLD AUTO: 10 FL (ref 6–12)
POTASSIUM SERPL-SCNC: 3.7 MMOL/L (ref 3.5–5.2)
PROT SERPL-MCNC: 7.9 G/DL (ref 6–8.5)
PROTHROMBIN TIME: 10.3 SECONDS (ref 9.6–11.7)
RBC # BLD AUTO: 4.76 10*6/MM3 (ref 4.14–5.8)
SMALL PLATELETS BLD QL SMEAR: ADEQUATE
SODIUM SERPL-SCNC: 142 MMOL/L (ref 136–145)
WBC MORPH BLD: NORMAL
WBC NRBC COR # BLD AUTO: 12.81 10*3/MM3 (ref 3.4–10.8)

## 2024-08-08 PROCEDURE — 25010000002 FOSPHENYTOIN 500 MG PE/10ML SOLUTION 10 ML VIAL: Performed by: EMERGENCY MEDICINE

## 2024-08-08 PROCEDURE — 85025 COMPLETE CBC W/AUTO DIFF WBC: CPT | Performed by: EMERGENCY MEDICINE

## 2024-08-08 PROCEDURE — 85730 THROMBOPLASTIN TIME PARTIAL: CPT | Performed by: EMERGENCY MEDICINE

## 2024-08-08 PROCEDURE — 82140 ASSAY OF AMMONIA: CPT | Performed by: EMERGENCY MEDICINE

## 2024-08-08 PROCEDURE — 25010000002 LEVETRIRACETAM PER 10 MG: Performed by: EMERGENCY MEDICINE

## 2024-08-08 PROCEDURE — 25010000002 THIAMINE PER 100 MG: Performed by: EMERGENCY MEDICINE

## 2024-08-08 PROCEDURE — 72125 CT NECK SPINE W/O DYE: CPT

## 2024-08-08 PROCEDURE — 25810000003 LACTATED RINGERS SOLUTION: Performed by: EMERGENCY MEDICINE

## 2024-08-08 PROCEDURE — 80053 COMPREHEN METABOLIC PANEL: CPT | Performed by: EMERGENCY MEDICINE

## 2024-08-08 PROCEDURE — 87040 BLOOD CULTURE FOR BACTERIA: CPT | Performed by: EMERGENCY MEDICINE

## 2024-08-08 PROCEDURE — 85007 BL SMEAR W/DIFF WBC COUNT: CPT | Performed by: EMERGENCY MEDICINE

## 2024-08-08 PROCEDURE — 83735 ASSAY OF MAGNESIUM: CPT | Performed by: EMERGENCY MEDICINE

## 2024-08-08 PROCEDURE — 25010000002 LORAZEPAM PER 2 MG: Performed by: EMERGENCY MEDICINE

## 2024-08-08 PROCEDURE — 36415 COLL VENOUS BLD VENIPUNCTURE: CPT

## 2024-08-08 PROCEDURE — 82077 ASSAY SPEC XCP UR&BREATH IA: CPT | Performed by: EMERGENCY MEDICINE

## 2024-08-08 PROCEDURE — 70450 CT HEAD/BRAIN W/O DYE: CPT

## 2024-08-08 PROCEDURE — 82948 REAGENT STRIP/BLOOD GLUCOSE: CPT | Performed by: EMERGENCY MEDICINE

## 2024-08-08 PROCEDURE — 99291 CRITICAL CARE FIRST HOUR: CPT

## 2024-08-08 PROCEDURE — 85610 PROTHROMBIN TIME: CPT | Performed by: EMERGENCY MEDICINE

## 2024-08-08 PROCEDURE — 82550 ASSAY OF CK (CPK): CPT | Performed by: EMERGENCY MEDICINE

## 2024-08-08 RX ORDER — SODIUM CHLORIDE 0.9 % (FLUSH) 0.9 %
10 SYRINGE (ML) INJECTION AS NEEDED
Status: DISCONTINUED | OUTPATIENT
Start: 2024-08-08 | End: 2024-08-17 | Stop reason: HOSPADM

## 2024-08-08 RX ORDER — LEVETIRACETAM 500 MG/5ML
1000 INJECTION, SOLUTION, CONCENTRATE INTRAVENOUS ONCE
Status: COMPLETED | OUTPATIENT
Start: 2024-08-08 | End: 2024-08-08

## 2024-08-08 RX ORDER — LORAZEPAM 2 MG/ML
1 INJECTION INTRAMUSCULAR ONCE
Status: COMPLETED | OUTPATIENT
Start: 2024-08-08 | End: 2024-08-08

## 2024-08-08 RX ORDER — THIAMINE HYDROCHLORIDE 100 MG/ML
100 INJECTION, SOLUTION INTRAMUSCULAR; INTRAVENOUS ONCE
Status: COMPLETED | OUTPATIENT
Start: 2024-08-08 | End: 2024-08-08

## 2024-08-08 RX ADMIN — FOSPHENYTOIN SODIUM 1000 MG PE: 50 INJECTION, SOLUTION INTRAMUSCULAR; INTRAVENOUS at 23:59

## 2024-08-08 RX ADMIN — LORAZEPAM 1 MG: 2 INJECTION INTRAMUSCULAR; INTRAVENOUS at 23:02

## 2024-08-08 RX ADMIN — SODIUM CHLORIDE, POTASSIUM CHLORIDE, SODIUM LACTATE AND CALCIUM CHLORIDE 1000 ML: 600; 310; 30; 20 INJECTION, SOLUTION INTRAVENOUS at 21:51

## 2024-08-08 RX ADMIN — LORAZEPAM 1 MG: 2 INJECTION INTRAMUSCULAR; INTRAVENOUS at 21:51

## 2024-08-08 RX ADMIN — THIAMINE HYDROCHLORIDE 100 MG: 100 INJECTION, SOLUTION INTRAMUSCULAR; INTRAVENOUS at 21:51

## 2024-08-08 RX ADMIN — LEVETIRACETAM 1000 MG: 100 INJECTION INTRAVENOUS at 21:51

## 2024-08-08 NOTE — LETTER
EMS Transport Request  For use at [facility], [place], [facility], [place], and [place] only   Patient Name: Emiliano Bateman : 1960   Weight:59.5 kg (131 lb 2.8 oz) Pick-up Location: 2110 BLS/ALS: BLS/ALS: BLS   Insurance: [employer] MEDICARE REPLACEMENT Auth End Date:    Pre-Cert #: D/C Summary complete:    Destination: Other [place]    Contact Precautions: None   Equipment (O2, Fluids, etc.): O2, settings 2L cont   Arrive By Date/Time: 2024 1330 Stretcher/WC: Stretcher   CM Requesting: Mariela Dumont RN Ext: [phone]   Notes/Medical Necessity: confusion, 2L cont 02, weakness (unable to maintain seated position for length of transport).     ______________________________________________________________________    *Only 2 patient bags OR 1 carry-on size bag are permitted.  Wheelchairs and walkers CANNOT transported with the patient. Acknowledge: Yes

## 2024-08-09 ENCOUNTER — APPOINTMENT (OUTPATIENT)
Dept: GENERAL RADIOLOGY | Facility: HOSPITAL | Age: 64
DRG: 896 | End: 2024-08-09
Payer: MEDICARE

## 2024-08-09 PROBLEM — R56.9 SEIZURE DUE TO ALCOHOL WITHDRAWAL: Status: ACTIVE | Noted: 2024-08-09

## 2024-08-09 PROBLEM — F10.939 SEIZURE DUE TO ALCOHOL WITHDRAWAL: Status: ACTIVE | Noted: 2024-08-09

## 2024-08-09 PROBLEM — F10.931 ALCOHOL WITHDRAWAL SEIZURE WITH DELIRIUM: Status: ACTIVE | Noted: 2024-08-09

## 2024-08-09 PROBLEM — R56.9 ALCOHOL WITHDRAWAL SEIZURE WITH DELIRIUM: Status: ACTIVE | Noted: 2024-08-09

## 2024-08-09 LAB
ALBUMIN SERPL-MCNC: 4.2 G/DL (ref 3.5–5.2)
ALBUMIN/GLOB SERPL: 1.6 G/DL
ALP SERPL-CCNC: 80 U/L (ref 39–117)
ALT SERPL W P-5'-P-CCNC: 24 U/L (ref 1–41)
AMMONIA BLD-SCNC: 71 UMOL/L (ref 16–60)
ANION GAP SERPL CALCULATED.3IONS-SCNC: 15.6 MMOL/L (ref 5–15)
ANION GAP SERPL CALCULATED.3IONS-SCNC: 17.7 MMOL/L (ref 5–15)
ARTERIAL PATENCY WRIST A: POSITIVE
AST SERPL-CCNC: 74 U/L (ref 1–40)
ATMOSPHERIC PRESS: ABNORMAL MM[HG]
B PARAPERT DNA SPEC QL NAA+PROBE: NOT DETECTED
B PERT DNA SPEC QL NAA+PROBE: NOT DETECTED
BACTERIA UR QL AUTO: ABNORMAL /HPF
BASE EXCESS BLDA CALC-SCNC: 6.3 MMOL/L (ref 0–3)
BDY SITE: ABNORMAL
BILIRUB SERPL-MCNC: 1.3 MG/DL (ref 0–1.2)
BILIRUB UR QL STRIP: NEGATIVE
BUN SERPL-MCNC: 11 MG/DL (ref 8–23)
BUN SERPL-MCNC: 12 MG/DL (ref 8–23)
BUN/CREAT SERPL: 12.1 (ref 7–25)
BUN/CREAT SERPL: 13 (ref 7–25)
C PNEUM DNA NPH QL NAA+NON-PROBE: NOT DETECTED
CA-I BLDA-SCNC: 1.03 MMOL/L (ref 1.15–1.33)
CALCIUM SPEC-SCNC: 8.4 MG/DL (ref 8.6–10.5)
CALCIUM SPEC-SCNC: 8.7 MG/DL (ref 8.6–10.5)
CHLORIDE SERPL-SCNC: 97 MMOL/L (ref 98–107)
CHLORIDE SERPL-SCNC: 97 MMOL/L (ref 98–107)
CLARITY UR: CLEAR
CO2 SERPL-SCNC: 23.3 MMOL/L (ref 22–29)
CO2 SERPL-SCNC: 25.4 MMOL/L (ref 22–29)
COLOR UR: ABNORMAL
CREAT BLDA-MCNC: 0.88 MG/DL (ref 0.6–1.3)
CREAT SERPL-MCNC: 0.91 MG/DL (ref 0.76–1.27)
CREAT SERPL-MCNC: 0.92 MG/DL (ref 0.76–1.27)
D-LACTATE SERPL-SCNC: 2.2 MMOL/L (ref 0.5–2)
D-LACTATE SERPL-SCNC: 2.9 MMOL/L (ref 0.5–2)
D-LACTATE SERPL-SCNC: 3.3 MMOL/L (ref 0.2–2)
D-LACTATE SERPL-SCNC: 4.5 MMOL/L (ref 0.5–2)
EGFRCR SERPLBLD CKD-EPI 2021: 92.9 ML/MIN/1.73
EGFRCR SERPLBLD CKD-EPI 2021: 94.1 ML/MIN/1.73
EGFRCR SERPLBLD CKD-EPI 2021: 96 ML/MIN/1.73
FLUAV SUBTYP SPEC NAA+PROBE: NOT DETECTED
FLUBV RNA ISLT QL NAA+PROBE: NOT DETECTED
GEN 5 2HR TROPONIN T REFLEX: 87 NG/L
GLOBULIN UR ELPH-MCNC: 2.7 GM/DL
GLUCOSE BLDC GLUCOMTR-MCNC: 103 MG/DL (ref 70–105)
GLUCOSE BLDC GLUCOMTR-MCNC: 115 MG/DL (ref 70–105)
GLUCOSE BLDC GLUCOMTR-MCNC: 131 MG/DL (ref 70–105)
GLUCOSE BLDC GLUCOMTR-MCNC: 155 MG/DL (ref 70–105)
GLUCOSE BLDC GLUCOMTR-MCNC: 156 MG/DL (ref 70–105)
GLUCOSE BLDC GLUCOMTR-MCNC: 173 MG/DL (ref 70–105)
GLUCOSE BLDC GLUCOMTR-MCNC: 185 MG/DL (ref 70–105)
GLUCOSE BLDC GLUCOMTR-MCNC: 204 MG/DL (ref 70–105)
GLUCOSE BLDC GLUCOMTR-MCNC: 77 MG/DL (ref 74–100)
GLUCOSE BLDC GLUCOMTR-MCNC: 77 MG/DL (ref 74–100)
GLUCOSE BLDC GLUCOMTR-MCNC: 82 MG/DL (ref 70–105)
GLUCOSE BLDC GLUCOMTR-MCNC: 93 MG/DL (ref 70–105)
GLUCOSE SERPL-MCNC: 174 MG/DL (ref 65–99)
GLUCOSE SERPL-MCNC: 76 MG/DL (ref 65–99)
GLUCOSE UR STRIP-MCNC: ABNORMAL MG/DL
HADV DNA SPEC NAA+PROBE: NOT DETECTED
HBA1C MFR BLD: 6.09 % (ref 4.8–5.6)
HCO3 BLDA-SCNC: 28.7 MMOL/L (ref 21–28)
HCOV 229E RNA SPEC QL NAA+PROBE: NOT DETECTED
HCOV HKU1 RNA SPEC QL NAA+PROBE: NOT DETECTED
HCOV NL63 RNA SPEC QL NAA+PROBE: NOT DETECTED
HCOV OC43 RNA SPEC QL NAA+PROBE: NOT DETECTED
HCT VFR BLDA CALC: 47 % (ref 38–51)
HEMODILUTION: NO
HGB BLDA-MCNC: 15.9 G/DL (ref 12–17)
HGB UR QL STRIP.AUTO: ABNORMAL
HMPV RNA NPH QL NAA+NON-PROBE: NOT DETECTED
HPIV1 RNA ISLT QL NAA+PROBE: NOT DETECTED
HPIV2 RNA SPEC QL NAA+PROBE: NOT DETECTED
HPIV3 RNA NPH QL NAA+PROBE: NOT DETECTED
HPIV4 P GENE NPH QL NAA+PROBE: NOT DETECTED
HYALINE CASTS UR QL AUTO: ABNORMAL /LPF
INHALED O2 CONCENTRATION: 40 %
KETONES UR QL STRIP: ABNORMAL
LEUKOCYTE ESTERASE UR QL STRIP.AUTO: NEGATIVE
Lab: ABNORMAL
M PNEUMO IGG SER IA-ACNC: NOT DETECTED
MAGNESIUM SERPL-MCNC: 2 MG/DL (ref 1.6–2.4)
MAGNESIUM SERPL-MCNC: 2.2 MG/DL (ref 1.6–2.4)
MODALITY: ABNORMAL
MRSA DNA SPEC QL NAA+PROBE: NORMAL
NITRITE UR QL STRIP: NEGATIVE
NOTIFIED WHO: ABNORMAL
OSMOLALITY SERPL: 299 MOSM/KG (ref 280–301)
PCO2 BLDA: 33.5 MM HG (ref 35–48)
PH BLDA: 7.54 PH UNITS (ref 7.35–7.45)
PH UR STRIP.AUTO: 6 [PH] (ref 5–8)
PHOSPHATE SERPL-MCNC: 1.5 MG/DL (ref 2.5–4.5)
PHOSPHATE SERPL-MCNC: 1.8 MG/DL (ref 2.5–4.5)
PHOSPHATE SERPL-MCNC: 3.7 MG/DL (ref 2.5–4.5)
PO2 BLD: 224 MM[HG] (ref 0–500)
PO2 BLDA: 89.6 MM HG (ref 83–108)
POTASSIUM BLDA-SCNC: 3.4 MMOL/L (ref 3.5–4.5)
POTASSIUM SERPL-SCNC: 3.1 MMOL/L (ref 3.5–5.2)
POTASSIUM SERPL-SCNC: 3.6 MMOL/L (ref 3.5–5.2)
POTASSIUM SERPL-SCNC: 3.7 MMOL/L (ref 3.5–5.2)
PROCALCITONIN SERPL-MCNC: 0.46 NG/ML (ref 0–0.25)
PROT SERPL-MCNC: 6.9 G/DL (ref 6–8.5)
PROT UR QL STRIP: ABNORMAL
RBC # UR STRIP: ABNORMAL /HPF
READ BACK: ABNORMAL
REF LAB TEST METHOD: ABNORMAL
RHINOVIRUS RNA SPEC NAA+PROBE: NOT DETECTED
RSV RNA NPH QL NAA+NON-PROBE: NOT DETECTED
SAO2 % BLDCOA: 98 % (ref 94–98)
SARS-COV-2 RNA NPH QL NAA+NON-PROBE: NOT DETECTED
SODIUM BLD-SCNC: 139 MMOL/L (ref 138–146)
SODIUM SERPL-SCNC: 138 MMOL/L (ref 136–145)
SODIUM SERPL-SCNC: 138 MMOL/L (ref 136–145)
SP GR UR STRIP: 1.01 (ref 1–1.03)
SQUAMOUS #/AREA URNS HPF: ABNORMAL /HPF
TROPONIN T DELTA: 12 NG/L
TROPONIN T SERPL HS-MCNC: 75 NG/L
UROBILINOGEN UR QL STRIP: ABNORMAL
WBC # UR STRIP: ABNORMAL /HPF

## 2024-08-09 PROCEDURE — C1751 CATH, INF, PER/CENT/MIDLINE: HCPCS

## 2024-08-09 PROCEDURE — 0202U NFCT DS 22 TRGT SARS-COV-2: CPT | Performed by: NURSE PRACTITIONER

## 2024-08-09 PROCEDURE — 97166 OT EVAL MOD COMPLEX 45 MIN: CPT

## 2024-08-09 PROCEDURE — 25010000002 PHENOBARBITAL PER 120 MG: Performed by: NURSE PRACTITIONER

## 2024-08-09 PROCEDURE — 97162 PT EVAL MOD COMPLEX 30 MIN: CPT

## 2024-08-09 PROCEDURE — 82948 REAGENT STRIP/BLOOD GLUCOSE: CPT | Performed by: INTERNAL MEDICINE

## 2024-08-09 PROCEDURE — 74018 RADEX ABDOMEN 1 VIEW: CPT

## 2024-08-09 PROCEDURE — 84100 ASSAY OF PHOSPHORUS: CPT | Performed by: NURSE PRACTITIONER

## 2024-08-09 PROCEDURE — 25810000003 LACTATED RINGERS SOLUTION: Performed by: EMERGENCY MEDICINE

## 2024-08-09 PROCEDURE — 83735 ASSAY OF MAGNESIUM: CPT | Performed by: NURSE PRACTITIONER

## 2024-08-09 PROCEDURE — 84484 ASSAY OF TROPONIN QUANT: CPT | Performed by: NURSE PRACTITIONER

## 2024-08-09 PROCEDURE — 25010000002 CEFEPIME PER 500 MG: Performed by: NURSE PRACTITIONER

## 2024-08-09 PROCEDURE — 84100 ASSAY OF PHOSPHORUS: CPT | Performed by: INTERNAL MEDICINE

## 2024-08-09 PROCEDURE — 36600 WITHDRAWAL OF ARTERIAL BLOOD: CPT

## 2024-08-09 PROCEDURE — 63710000001 INSULIN LISPRO (HUMAN) PER 5 UNITS: Performed by: INTERNAL MEDICINE

## 2024-08-09 PROCEDURE — 25010000002 LEVETRIRACETAM PER 10 MG: Performed by: NURSE PRACTITIONER

## 2024-08-09 PROCEDURE — 87641 MR-STAPH DNA AMP PROBE: CPT | Performed by: NURSE PRACTITIONER

## 2024-08-09 PROCEDURE — 25010000002 POTASSIUM CHLORIDE 10 MEQ/100ML SOLUTION: Performed by: NURSE PRACTITIONER

## 2024-08-09 PROCEDURE — 83605 ASSAY OF LACTIC ACID: CPT | Performed by: NURSE PRACTITIONER

## 2024-08-09 PROCEDURE — 83930 ASSAY OF BLOOD OSMOLALITY: CPT | Performed by: NURSE PRACTITIONER

## 2024-08-09 PROCEDURE — 82330 ASSAY OF CALCIUM: CPT

## 2024-08-09 PROCEDURE — 85018 HEMOGLOBIN: CPT

## 2024-08-09 PROCEDURE — 82803 BLOOD GASES ANY COMBINATION: CPT

## 2024-08-09 PROCEDURE — 80053 COMPREHEN METABOLIC PANEL: CPT | Performed by: NURSE PRACTITIONER

## 2024-08-09 PROCEDURE — 25010000002 THIAMINE PER 100 MG: Performed by: NURSE PRACTITIONER

## 2024-08-09 PROCEDURE — 25010000002 VANCOMYCIN HCL 1.25 G RECONSTITUTED SOLUTION 1 EACH VIAL: Performed by: NURSE PRACTITIONER

## 2024-08-09 PROCEDURE — 25010000002 CEFTRIAXONE PER 250 MG: Performed by: INTERNAL MEDICINE

## 2024-08-09 PROCEDURE — 25810000003 SODIUM CHLORIDE 0.9 % SOLUTION 250 ML FLEX CONT: Performed by: NURSE PRACTITIONER

## 2024-08-09 PROCEDURE — 82948 REAGENT STRIP/BLOOD GLUCOSE: CPT

## 2024-08-09 PROCEDURE — 82565 ASSAY OF CREATININE: CPT

## 2024-08-09 PROCEDURE — 25810000003 SODIUM CHLORIDE 0.9 % SOLUTION: Performed by: NURSE PRACTITIONER

## 2024-08-09 PROCEDURE — 83036 HEMOGLOBIN GLYCOSYLATED A1C: CPT | Performed by: NURSE PRACTITIONER

## 2024-08-09 PROCEDURE — 84132 ASSAY OF SERUM POTASSIUM: CPT | Performed by: NURSE PRACTITIONER

## 2024-08-09 PROCEDURE — 02HV33Z INSERTION OF INFUSION DEVICE INTO SUPERIOR VENA CAVA, PERCUTANEOUS APPROACH: ICD-10-PCS | Performed by: NURSE PRACTITIONER

## 2024-08-09 PROCEDURE — 84145 PROCALCITONIN (PCT): CPT | Performed by: NURSE PRACTITIONER

## 2024-08-09 PROCEDURE — 71045 X-RAY EXAM CHEST 1 VIEW: CPT

## 2024-08-09 PROCEDURE — 25010000002 LORAZEPAM PER 2 MG: Performed by: NURSE PRACTITIONER

## 2024-08-09 PROCEDURE — 81001 URINALYSIS AUTO W/SCOPE: CPT | Performed by: EMERGENCY MEDICINE

## 2024-08-09 PROCEDURE — 83605 ASSAY OF LACTIC ACID: CPT

## 2024-08-09 PROCEDURE — 80051 ELECTROLYTE PANEL: CPT

## 2024-08-09 PROCEDURE — 25810000003 SODIUM CHLORIDE 0.9 % SOLUTION: Performed by: INTERNAL MEDICINE

## 2024-08-09 PROCEDURE — 87040 BLOOD CULTURE FOR BACTERIA: CPT | Performed by: EMERGENCY MEDICINE

## 2024-08-09 PROCEDURE — 25010000002 ENOXAPARIN PER 10 MG: Performed by: NURSE PRACTITIONER

## 2024-08-09 RX ORDER — SODIUM CHLORIDE AND POTASSIUM CHLORIDE 150; 900 MG/100ML; MG/100ML
250 INJECTION, SOLUTION INTRAVENOUS CONTINUOUS PRN
Status: DISCONTINUED | OUTPATIENT
Start: 2024-08-09 | End: 2024-08-09

## 2024-08-09 RX ORDER — SODIUM CHLORIDE 0.9 % (FLUSH) 0.9 %
10 SYRINGE (ML) INJECTION AS NEEDED
Status: DISCONTINUED | OUTPATIENT
Start: 2024-08-09 | End: 2024-08-17 | Stop reason: HOSPADM

## 2024-08-09 RX ORDER — SODIUM CHLORIDE 0.9 % (FLUSH) 0.9 %
10 SYRINGE (ML) INJECTION EVERY 12 HOURS SCHEDULED
Status: DISCONTINUED | OUTPATIENT
Start: 2024-08-09 | End: 2024-08-17 | Stop reason: HOSPADM

## 2024-08-09 RX ORDER — LORAZEPAM 2 MG/ML
1 INJECTION INTRAMUSCULAR
Status: DISCONTINUED | OUTPATIENT
Start: 2024-08-09 | End: 2024-08-14

## 2024-08-09 RX ORDER — DEXTROSE MONOHYDRATE, SODIUM CHLORIDE, AND POTASSIUM CHLORIDE 50; 1.49; 4.5 G/1000ML; G/1000ML; G/1000ML
150 INJECTION, SOLUTION INTRAVENOUS CONTINUOUS PRN
Status: DISCONTINUED | OUTPATIENT
Start: 2024-08-09 | End: 2024-08-09

## 2024-08-09 RX ORDER — SODIUM CHLORIDE 450 MG/100ML
250 INJECTION, SOLUTION INTRAVENOUS CONTINUOUS PRN
Status: DISCONTINUED | OUTPATIENT
Start: 2024-08-09 | End: 2024-08-09

## 2024-08-09 RX ORDER — DEXTROSE MONOHYDRATE 25 G/50ML
10-50 INJECTION, SOLUTION INTRAVENOUS
Status: DISCONTINUED | OUTPATIENT
Start: 2024-08-09 | End: 2024-08-09

## 2024-08-09 RX ORDER — LORAZEPAM 2 MG/ML
2 INJECTION INTRAMUSCULAR
Status: DISCONTINUED | OUTPATIENT
Start: 2024-08-09 | End: 2024-08-14

## 2024-08-09 RX ORDER — SODIUM CHLORIDE AND POTASSIUM CHLORIDE 150; 450 MG/100ML; MG/100ML
250 INJECTION, SOLUTION INTRAVENOUS CONTINUOUS PRN
Status: DISCONTINUED | OUTPATIENT
Start: 2024-08-09 | End: 2024-08-09

## 2024-08-09 RX ORDER — ONDANSETRON 2 MG/ML
4 INJECTION INTRAMUSCULAR; INTRAVENOUS EVERY 6 HOURS PRN
Status: DISCONTINUED | OUTPATIENT
Start: 2024-08-09 | End: 2024-08-17 | Stop reason: HOSPADM

## 2024-08-09 RX ORDER — DEXTROSE MONOHYDRATE, SODIUM CHLORIDE, AND POTASSIUM CHLORIDE 50; 2.98; 4.5 G/1000ML; G/1000ML; G/1000ML
150 INJECTION, SOLUTION INTRAVENOUS CONTINUOUS PRN
Status: DISCONTINUED | OUTPATIENT
Start: 2024-08-09 | End: 2024-08-09

## 2024-08-09 RX ORDER — NOREPINEPHRINE BITARTRATE 0.03 MG/ML
.02-.3 INJECTION, SOLUTION INTRAVENOUS
Status: DISCONTINUED | OUTPATIENT
Start: 2024-08-09 | End: 2024-08-13

## 2024-08-09 RX ORDER — LORAZEPAM 1 MG/1
2 TABLET ORAL
Status: DISCONTINUED | OUTPATIENT
Start: 2024-08-09 | End: 2024-08-14

## 2024-08-09 RX ORDER — DEXMEDETOMIDINE HYDROCHLORIDE 4 UG/ML
.2-1.5 INJECTION, SOLUTION INTRAVENOUS
Status: DISCONTINUED | OUTPATIENT
Start: 2024-08-09 | End: 2024-08-14

## 2024-08-09 RX ORDER — LORAZEPAM 2 MG/ML
1 INJECTION INTRAMUSCULAR EVERY 6 HOURS
Status: DISCONTINUED | OUTPATIENT
Start: 2024-08-10 | End: 2024-08-09

## 2024-08-09 RX ORDER — DEXTROSE MONOHYDRATE, SODIUM CHLORIDE, AND POTASSIUM CHLORIDE 50; 1.49; 9 G/1000ML; G/1000ML; G/1000ML
150 INJECTION, SOLUTION INTRAVENOUS CONTINUOUS PRN
Status: DISCONTINUED | OUTPATIENT
Start: 2024-08-09 | End: 2024-08-09

## 2024-08-09 RX ORDER — FENTANYL/ROPIVACAINE/NS/PF 2-625MCG/1
15 PLASTIC BAG, INJECTION (ML) EPIDURAL
Status: COMPLETED | OUTPATIENT
Start: 2024-08-09 | End: 2024-08-09

## 2024-08-09 RX ORDER — NICOTINE POLACRILEX 4 MG
15 LOZENGE BUCCAL
Status: DISCONTINUED | OUTPATIENT
Start: 2024-08-09 | End: 2024-08-09

## 2024-08-09 RX ORDER — SODIUM CHLORIDE 9 MG/ML
125 INJECTION, SOLUTION INTRAVENOUS CONTINUOUS
Status: DISCONTINUED | OUTPATIENT
Start: 2024-08-09 | End: 2024-08-10

## 2024-08-09 RX ORDER — LEVETIRACETAM 500 MG/5ML
500 INJECTION, SOLUTION, CONCENTRATE INTRAVENOUS EVERY 12 HOURS SCHEDULED
Status: DISCONTINUED | OUTPATIENT
Start: 2024-08-09 | End: 2024-08-12

## 2024-08-09 RX ORDER — IBUPROFEN 600 MG/1
1 TABLET ORAL
Status: DISCONTINUED | OUTPATIENT
Start: 2024-08-09 | End: 2024-08-09

## 2024-08-09 RX ORDER — LORAZEPAM 1 MG/1
4 TABLET ORAL
Status: DISCONTINUED | OUTPATIENT
Start: 2024-08-09 | End: 2024-08-14

## 2024-08-09 RX ORDER — LORAZEPAM 2 MG/ML
2 INJECTION INTRAMUSCULAR EVERY 6 HOURS
Status: DISCONTINUED | OUTPATIENT
Start: 2024-08-09 | End: 2024-08-09

## 2024-08-09 RX ORDER — DEXTROSE MONOHYDRATE AND SODIUM CHLORIDE 5; .45 G/100ML; G/100ML
150 INJECTION, SOLUTION INTRAVENOUS CONTINUOUS PRN
Status: DISCONTINUED | OUTPATIENT
Start: 2024-08-09 | End: 2024-08-09

## 2024-08-09 RX ORDER — SERTRALINE HYDROCHLORIDE 25 MG/1
25 TABLET, FILM COATED ORAL NIGHTLY
Status: DISCONTINUED | OUTPATIENT
Start: 2024-08-09 | End: 2024-08-17 | Stop reason: HOSPADM

## 2024-08-09 RX ORDER — DEXTROSE MONOHYDRATE 25 G/50ML
25 INJECTION, SOLUTION INTRAVENOUS
Status: DISCONTINUED | OUTPATIENT
Start: 2024-08-09 | End: 2024-08-17 | Stop reason: HOSPADM

## 2024-08-09 RX ORDER — PHENOBARBITAL 32.4 MG/1
32.4 TABLET ORAL ONCE
Status: DISCONTINUED | OUTPATIENT
Start: 2024-08-11 | End: 2024-08-10

## 2024-08-09 RX ORDER — PHENOBARBITAL SODIUM 65 MG/ML
65 INJECTION, SOLUTION INTRAMUSCULAR; INTRAVENOUS ONCE
Status: COMPLETED | OUTPATIENT
Start: 2024-08-10 | End: 2024-08-10

## 2024-08-09 RX ORDER — PHENOBARBITAL SODIUM 65 MG/ML
65 INJECTION, SOLUTION INTRAMUSCULAR; INTRAVENOUS ONCE
Status: COMPLETED | OUTPATIENT
Start: 2024-08-09 | End: 2024-08-09

## 2024-08-09 RX ORDER — ENOXAPARIN SODIUM 100 MG/ML
40 INJECTION SUBCUTANEOUS DAILY
Status: DISCONTINUED | OUTPATIENT
Start: 2024-08-09 | End: 2024-08-17 | Stop reason: HOSPADM

## 2024-08-09 RX ORDER — DEXTROSE MONOHYDRATE, SODIUM CHLORIDE, AND POTASSIUM CHLORIDE 50; 2.98; 9 G/1000ML; G/1000ML; G/1000ML
150 INJECTION, SOLUTION INTRAVENOUS CONTINUOUS PRN
Status: DISCONTINUED | OUTPATIENT
Start: 2024-08-09 | End: 2024-08-09

## 2024-08-09 RX ORDER — NICOTINE POLACRILEX 4 MG
15 LOZENGE BUCCAL
Status: DISCONTINUED | OUTPATIENT
Start: 2024-08-09 | End: 2024-08-17 | Stop reason: HOSPADM

## 2024-08-09 RX ORDER — METRONIDAZOLE 500 MG/1
500 TABLET ORAL EVERY 8 HOURS SCHEDULED
Status: COMPLETED | OUTPATIENT
Start: 2024-08-09 | End: 2024-08-14

## 2024-08-09 RX ORDER — INSULIN LISPRO 100 [IU]/ML
4-24 INJECTION, SOLUTION INTRAVENOUS; SUBCUTANEOUS EVERY 6 HOURS SCHEDULED
Status: DISCONTINUED | OUTPATIENT
Start: 2024-08-09 | End: 2024-08-15

## 2024-08-09 RX ORDER — SODIUM CHLORIDE, SODIUM LACTATE, POTASSIUM CHLORIDE, CALCIUM CHLORIDE 600; 310; 30; 20 MG/100ML; MG/100ML; MG/100ML; MG/100ML
100 INJECTION, SOLUTION INTRAVENOUS CONTINUOUS
Status: DISCONTINUED | OUTPATIENT
Start: 2024-08-09 | End: 2024-08-09

## 2024-08-09 RX ORDER — SODIUM CHLORIDE 9 MG/ML
250 INJECTION, SOLUTION INTRAVENOUS CONTINUOUS PRN
Status: DISCONTINUED | OUTPATIENT
Start: 2024-08-09 | End: 2024-08-09

## 2024-08-09 RX ORDER — THIAMINE HYDROCHLORIDE 100 MG/ML
200 INJECTION, SOLUTION INTRAMUSCULAR; INTRAVENOUS EVERY 8 HOURS SCHEDULED
Status: COMPLETED | OUTPATIENT
Start: 2024-08-09 | End: 2024-08-13

## 2024-08-09 RX ORDER — NITROGLYCERIN 0.4 MG/1
0.4 TABLET SUBLINGUAL
Status: DISCONTINUED | OUTPATIENT
Start: 2024-08-09 | End: 2024-08-17 | Stop reason: HOSPADM

## 2024-08-09 RX ORDER — METOPROLOL TARTRATE 1 MG/ML
5 INJECTION, SOLUTION INTRAVENOUS EVERY 8 HOURS
Status: DISCONTINUED | OUTPATIENT
Start: 2024-08-09 | End: 2024-08-10

## 2024-08-09 RX ORDER — DEXTROSE MONOHYDRATE AND SODIUM CHLORIDE 5; .9 G/100ML; G/100ML
150 INJECTION, SOLUTION INTRAVENOUS CONTINUOUS PRN
Status: DISCONTINUED | OUTPATIENT
Start: 2024-08-09 | End: 2024-08-09

## 2024-08-09 RX ORDER — POTASSIUM CHLORIDE 7.45 MG/ML
10 INJECTION INTRAVENOUS
Status: DISCONTINUED | OUTPATIENT
Start: 2024-08-09 | End: 2024-08-09

## 2024-08-09 RX ORDER — SODIUM CHLORIDE AND POTASSIUM CHLORIDE 300; 900 MG/100ML; MG/100ML
250 INJECTION, SOLUTION INTRAVENOUS CONTINUOUS PRN
Status: DISCONTINUED | OUTPATIENT
Start: 2024-08-09 | End: 2024-08-09

## 2024-08-09 RX ORDER — LORAZEPAM 1 MG/1
1 TABLET ORAL
Status: DISCONTINUED | OUTPATIENT
Start: 2024-08-09 | End: 2024-08-14

## 2024-08-09 RX ORDER — PHENOBARBITAL 32.4 MG/1
32.4 TABLET ORAL ONCE
Status: DISCONTINUED | OUTPATIENT
Start: 2024-08-10 | End: 2024-08-10

## 2024-08-09 RX ORDER — IBUPROFEN 600 MG/1
1 TABLET ORAL
Status: DISCONTINUED | OUTPATIENT
Start: 2024-08-09 | End: 2024-08-17 | Stop reason: HOSPADM

## 2024-08-09 RX ORDER — SODIUM CHLORIDE 9 MG/ML
40 INJECTION, SOLUTION INTRAVENOUS AS NEEDED
Status: DISCONTINUED | OUTPATIENT
Start: 2024-08-09 | End: 2024-08-09

## 2024-08-09 RX ORDER — ONDANSETRON 4 MG/1
4 TABLET, ORALLY DISINTEGRATING ORAL EVERY 6 HOURS PRN
Status: DISCONTINUED | OUTPATIENT
Start: 2024-08-09 | End: 2024-08-17 | Stop reason: HOSPADM

## 2024-08-09 RX ORDER — SODIUM CHLORIDE 9 MG/ML
40 INJECTION, SOLUTION INTRAVENOUS AS NEEDED
Status: DISCONTINUED | OUTPATIENT
Start: 2024-08-09 | End: 2024-08-17 | Stop reason: HOSPADM

## 2024-08-09 RX ORDER — ACETAMINOPHEN 650 MG/1
650 SUPPOSITORY RECTAL EVERY 4 HOURS PRN
Status: DISCONTINUED | OUTPATIENT
Start: 2024-08-09 | End: 2024-08-17 | Stop reason: HOSPADM

## 2024-08-09 RX ORDER — LORAZEPAM 2 MG/ML
4 INJECTION INTRAMUSCULAR
Status: DISCONTINUED | OUTPATIENT
Start: 2024-08-09 | End: 2024-08-14

## 2024-08-09 RX ADMIN — Medication 10 ML: at 20:51

## 2024-08-09 RX ADMIN — CEFTRIAXONE 1000 MG: 1 INJECTION, POWDER, FOR SOLUTION INTRAMUSCULAR; INTRAVENOUS at 11:18

## 2024-08-09 RX ADMIN — PHENOBARBITAL SODIUM 65 MG: 65 INJECTION, SOLUTION INTRAMUSCULAR; INTRAVENOUS at 18:03

## 2024-08-09 RX ADMIN — SODIUM CHLORIDE, POTASSIUM CHLORIDE, SODIUM LACTATE AND CALCIUM CHLORIDE 1000 ML: 600; 310; 30; 20 INJECTION, SOLUTION INTRAVENOUS at 00:51

## 2024-08-09 RX ADMIN — PHENOBARBITAL SODIUM 128.7 MG: 130 INJECTION, SOLUTION INTRAMUSCULAR; INTRAVENOUS at 01:43

## 2024-08-09 RX ADMIN — FOLIC ACID 1 MG: 5 INJECTION, SOLUTION INTRAMUSCULAR; INTRAVENOUS; SUBCUTANEOUS at 09:29

## 2024-08-09 RX ADMIN — METRONIDAZOLE 500 MG: 500 TABLET ORAL at 21:08

## 2024-08-09 RX ADMIN — LORAZEPAM 2 MG: 2 INJECTION INTRAMUSCULAR; INTRAVENOUS at 07:12

## 2024-08-09 RX ADMIN — POTASSIUM PHOSPHATE, MONOBASIC AND POTASSIUM PHOSPHATE, DIBASIC 15 MMOL: 224; 236 INJECTION, SOLUTION, CONCENTRATE INTRAVENOUS at 11:18

## 2024-08-09 RX ADMIN — Medication 10 ML: at 01:24

## 2024-08-09 RX ADMIN — LORAZEPAM 2 MG: 2 INJECTION INTRAMUSCULAR; INTRAVENOUS at 08:31

## 2024-08-09 RX ADMIN — THIAMINE HYDROCHLORIDE 200 MG: 100 INJECTION, SOLUTION INTRAMUSCULAR; INTRAVENOUS at 13:54

## 2024-08-09 RX ADMIN — PHENOBARBITAL SODIUM 128.7 MG: 130 INJECTION, SOLUTION INTRAMUSCULAR; INTRAVENOUS at 05:37

## 2024-08-09 RX ADMIN — CEFEPIME 2000 MG: 2 INJECTION, POWDER, FOR SOLUTION INTRAVENOUS at 05:46

## 2024-08-09 RX ADMIN — DEXMEDETOMIDINE HYDROCHLORIDE 0.5 MCG/KG/HR: 4 INJECTION, SOLUTION INTRAVENOUS at 09:29

## 2024-08-09 RX ADMIN — ANTI-FUNGAL POWDER MICONAZOLE NITRATE TALC FREE 1 APPLICATION: 1.42 POWDER TOPICAL at 20:52

## 2024-08-09 RX ADMIN — INSULIN HUMAN 1.4 UNITS/HR: 1 INJECTION, SOLUTION INTRAVENOUS at 02:59

## 2024-08-09 RX ADMIN — PHENOBARBITAL SODIUM 128.7 MG: 130 INJECTION, SOLUTION INTRAMUSCULAR; INTRAVENOUS at 08:01

## 2024-08-09 RX ADMIN — ANTI-FUNGAL POWDER MICONAZOLE NITRATE TALC FREE 1 APPLICATION: 1.42 POWDER TOPICAL at 11:47

## 2024-08-09 RX ADMIN — LORAZEPAM 2 MG: 2 INJECTION INTRAMUSCULAR; INTRAVENOUS at 01:24

## 2024-08-09 RX ADMIN — DEXMEDETOMIDINE HYDROCHLORIDE 0.3 MCG/KG/HR: 4 INJECTION, SOLUTION INTRAVENOUS at 17:44

## 2024-08-09 RX ADMIN — SODIUM CHLORIDE 125 ML/HR: 9 INJECTION, SOLUTION INTRAVENOUS at 09:35

## 2024-08-09 RX ADMIN — INSULIN LISPRO 4 UNITS: 100 INJECTION, SOLUTION INTRAVENOUS; SUBCUTANEOUS at 11:47

## 2024-08-09 RX ADMIN — Medication 10 ML: at 08:31

## 2024-08-09 RX ADMIN — POTASSIUM CHLORIDE, DEXTROSE MONOHYDRATE AND SODIUM CHLORIDE 150 ML/HR: 150; 5; 450 INJECTION, SOLUTION INTRAVENOUS at 05:54

## 2024-08-09 RX ADMIN — LEVETIRACETAM 500 MG: 500 INJECTION, SOLUTION INTRAVENOUS at 20:52

## 2024-08-09 RX ADMIN — METRONIDAZOLE 500 MG: 500 TABLET ORAL at 13:54

## 2024-08-09 RX ADMIN — THIAMINE HYDROCHLORIDE 200 MG: 100 INJECTION, SOLUTION INTRAMUSCULAR; INTRAVENOUS at 06:17

## 2024-08-09 RX ADMIN — POTASSIUM CHLORIDE 10 MEQ: 7.46 INJECTION, SOLUTION INTRAVENOUS at 03:53

## 2024-08-09 RX ADMIN — POTASSIUM PHOSPHATE, MONOBASIC AND POTASSIUM PHOSPHATE, DIBASIC 15 MMOL: 224; 236 INJECTION, SOLUTION, CONCENTRATE INTRAVENOUS at 13:54

## 2024-08-09 RX ADMIN — THIAMINE HYDROCHLORIDE 200 MG: 100 INJECTION, SOLUTION INTRAMUSCULAR; INTRAVENOUS at 21:08

## 2024-08-09 RX ADMIN — NOREPINEPHRINE BITARTRATE 0.05 MCG/KG/MIN: 0.03 INJECTION, SOLUTION INTRAVENOUS at 15:19

## 2024-08-09 RX ADMIN — METOPROLOL TARTRATE 5 MG: 1 INJECTION, SOLUTION INTRAVENOUS at 02:27

## 2024-08-09 RX ADMIN — SODIUM CHLORIDE 1000 ML/HR: 9 INJECTION, SOLUTION INTRAVENOUS at 05:22

## 2024-08-09 RX ADMIN — VANCOMYCIN HYDROCHLORIDE 1250 MG: 1.25 INJECTION, POWDER, LYOPHILIZED, FOR SOLUTION INTRAVENOUS at 06:17

## 2024-08-09 RX ADMIN — SERTRALINE HYDROCHLORIDE 25 MG: 25 TABLET ORAL at 20:52

## 2024-08-09 RX ADMIN — NOREPINEPHRINE BITARTRATE 0.05 MCG/KG/MIN: 0.03 INJECTION, SOLUTION INTRAVENOUS at 17:43

## 2024-08-09 RX ADMIN — Medication 10 ML: at 04:24

## 2024-08-09 RX ADMIN — LEVETIRACETAM 500 MG: 500 INJECTION, SOLUTION INTRAVENOUS at 08:31

## 2024-08-09 RX ADMIN — POTASSIUM CHLORIDE 10 MEQ: 7.46 INJECTION, SOLUTION INTRAVENOUS at 04:55

## 2024-08-09 RX ADMIN — ENOXAPARIN SODIUM 40 MG: 100 INJECTION SUBCUTANEOUS at 17:53

## 2024-08-09 NOTE — CASE MANAGEMENT/SOCIAL WORK
Discharge Planning Assessment   [facility]     Patient Name: Emiliano Bateman  MRN: [id number]  Today's Date: 8/9/2024    Admit Date: 8/8/2024    Plan: From home with spouse, pending clinical course and PT/OT eval.   Discharge Needs Assessment       Row Name 08/09/24 1213       Living Environment    People in Home spouse    Name(s) of People in Home Lili - spouse    Current Living Arrangements home    Potentially Unsafe Housing Conditions none    In the past 12 months has the electric, gas, oil, or water company threatened to shut off services in your home? No    Primary Care Provided by self    Provides Primary Care For no one    Family Caregiver if Needed spouse    Family Caregiver Names Lili - spouse    Quality of Family Relationships helpful;involved;supportive    Able to Return to Prior Arrangements yes       Resource/Environmental Concerns    Resource/Environmental Concerns none    Transportation Concerns none       Transportation Needs    In the past 12 months, has lack of transportation kept you from medical appointments or from getting medications? no    In the past 12 months, has lack of transportation kept you from meetings, work, or from getting things needed for daily living? No       Food Insecurity    Within the past 12 months, you worried that your food would run out before you got the money to buy more. Never true    Within the past 12 months, the food you bought just didn't last and you didn't have money to get more. Never true       Transition Planning    Patient/Family Anticipates Transition to home with family    Patient/Family Anticipated Services at Transition none    Transportation Anticipated car, drives self;family or friend will provide       Discharge Needs Assessment    Readmission Within the Last 30 Days no previous admission in last 30 days    Equipment Currently Used at Home cane, straight    Concerns to be Addressed discharge planning    Anticipated Changes Related to Illness  none    Equipment Needed After Discharge none    Outpatient/Agency/Support Group Needs skilled nursing facility    Discharge Facility/Level of Care Needs nursing facility, skilled                   Discharge Plan       Row Name 08/09/24 1216       Plan    Plan From home with spouse, pending clinical course and PT/OT eval.    Plan Comments CM attempted to reach spouse, no answer and no VM set up.  Message left with floor Rn to notify CM if family arrives.  Wife arrived at bedside to speak with CM.  Patient lives at home with spouse who cares for him 24/7. Patient declines all ETOH resources last admit. Patient needs assist with ADLs and has a rollator and cane at home. PCP and pharmacy confirmed. Agreeable to M2B.  Denies financial assistance needs for medication and/or food. Denies any current HH, Caregiver, or rehab services.  CM spoke with wife at length about SNFs vs HH.  Spouse states they are on a waiting list with [employer] for assistance at home. States he is currently on 2.5L home O2 but she does not remember the name of providerr, will attempt to bring info in on her next visit.  DC Barriers:  NPO - NG, IV Abxs, CIWA, 5L NC, cooling blanket (temp 103), seizure precautions, IV Keppra, confused, IVFs, Precedex gtt.               Expected Discharge Date and Time       Expected Discharge Date Expected Discharge Time    Aug 12, 2024            Demographic Summary       Row Name 08/09/24 1213       General Information    Admission Type inpatient    Arrived From emergency department    Required Notices Provided Important Message from Medicare    Referral Source admission list    Reason for Consult discharge planning    Preferred Language English                   Functional Status       Row Name 08/09/24 1213       Functional Status    Usual Activity Tolerance fair    Current Activity Tolerance poor       Functional Status, IADL    Medications completely dependent    Meal Preparation completely dependent     Housekeeping completely dependent    Laundry completely dependent    Shopping completely dependent       Mental Status    General Appearance WDL WDL       Mental Status Summary    Recent Changes in Mental Status/Cognitive Functioning no changes             O. Susan Guthrie RN  SIPS/ICU   O: [phone]  C: [phone]  [email or web address]

## 2024-08-09 NOTE — H&P
Critical Care History and Physical     Emiliano Bateman : 1960 MRN:[id number] LOS:0 ROOM: [facility]     Reason for admission: Alcohol withdrawal seizure with delirium     Assessment / Plan     Seizures, not in status  Probable alcohol withdrawal seizure activity  Shahnazell placed in ED, highest reading of seizure burden at 7%   Continue IV Keppra  IV Ativan as needed  Seizure precaution    Alcohol withdrawal syndrome  Patient previously drank a gallon of vodka daily  Patient's wife states he has drastically reduced his drinking  Last drink was 2 days ago  CIWA protocol    Diabetic ketoacidosis without coma, with history of diabetes mellitus, type 2  Anion gap of 34 on admission.  Check A1c; last 6.50 on   DKA protocol initiated  Insulin drip initiated per DKA protocol.  Adjust iv fluids based on sugars, sodium, and potassium per protocol.  Accuchecks every hour and serial labs as ordered.  Continue protocol until resolved per protocol recommendations.    Essential hypertension  Patient on home amlodipine, unable to take safely at this time -resume when appropriate  Given IV Lopressor  Sitter IV Cardene if needed    Leukocytosis  Patient meet SIRS criteria with elevated WBC and elevated heart rate, however no clear etiology of infection is apparent  Blood cultures pending  UA negative  Procalcitonin and lactic pending  Patient hypertensive requiring IV Lopressor  Do not feel etiology is infectious, will await lactic and procalcitonin -monitor off ABX for now  CXR pending to rule out possible aspiration pneumonia      History of Parkinson's  Patient not on home medication    COPD, not in exacerbation  Continue budesonide and duoneb scheduled and as needed.  Monitor ABG's as ordered. Oxygen supplementation as needed, use BIPAP as needed.  Wean off oxygen as tolerated.    Anxiety/Depression  Continue home Zoloft when able to take PO        Code Status (Patient has no pulse and is not breathing): CPR (Attempt to  "Resuscitate)  Medical Interventions (Patient has pulse or is breathing): Full Support       Nutrition:   NPO Diet NPO Type: Strict NPO     VTE Prophylaxis:  Mechanical VTE prophylaxis orders are present.         History of Present illness     Emiliano Bateman is a 63 y.o. male with PMH of hypertension, COPD, liver disease, Parkinson's, DMT2, seizure disorder who presented to the hospital after suffering a seizure at home, and was admitted with a principal diagnosis of Alcohol withdrawal seizure with delirium.  Information for HPI taken from chart review as patient is unable to cooperate at time of assessment due to acuity of illness.  His wife is at bedside, and states that he had a seizure today which prompted her to call EMS.  Of note patient was recently in our hospital in April for new onset seizures secondary to alcohol withdrawal.  At that time it was reported that patient drink 1 gallon of vodka daily.  Per patient wife, patient has decreased his drinking significantly since that hospitalization.  She states that his last drink was approximately 2 days ago.  Also, patient does have parkinsonian tremors, and was on medication before but stopped taking them due to side effects.  Per ED documentation patient was conversive when he came to the emergency room and denied any complaints of pain or headache.  He had no subjective fevers or recent illnesses.  Per report patient has had stated he felt \"puny\".  Per PTA meds he was not restarted on medication for his Parkinson's on discharge from the hospital.    ED course: Shortly after arriving to the emergency department he had what was described as a generalized tonic-clonic seizure that lasted several minutes.  He was treated with IV Ativan and loaded with 1 g IV Keppra.  Lab showed CK of 226, anion gap 34.2 with glucose 263, CO2 13.8, WBC 12.81 without bandemia, and ammonia 71.  Was ordered 1 L saline bolus.  UA without WBCs or bacteria, however + glucose and + " ketones.  ABG pending at time of note.  CT head and cervical spine were completed and negative for acute abnormality.    ACP: Patient does not have advised her to follow this facility's.  Wife is at bedside and states he is a full code.    Patient was seen and examined on 08/09/24 at 01:18 EDT .    Subjective / Review of systems     Review of Systems   Unable to perform ROS: Acuity of condition        Past Medical/Surgical/Social/Family History & Allergies     Past Medical History:   Diagnosis Date    Alcoholism 03/19/2020    Aneurysm of thoracic aorta 11/08/2019    3.9 cm      Anxiety 03/19/2020    Chronic obstructive lung disease 10/08/2020    xray [facility] 12/9/18      Hypertensive disorder 11/23/2020    Myocardial infarction 10/08/2020    angioplasty      Seizure disorder 04/23/2024    Steatosis of liver 10/09/2020      History reviewed. No pertinent surgical history.   Social History     Socioeconomic History    Marital status:    Tobacco Use    Smoking status: Unknown   Vaping Use    Vaping status: Never Used   Substance and Sexual Activity    Alcohol use: Yes     Comment: 1 gallon of alcohol a day    Drug use: Never    Sexual activity: Yes     Partners: Female      Family History   Problem Relation Age of Onset    No Known Problems Mother     No Known Problems Father       Allergies   Allergen Reactions    Penicillins Unknown - Low Severity     Childhood allergy; pt is unsure of reaction      Social Determinants of Health     Tobacco Use: High Risk (7/31/2019)    [facility]    Patient History     Smoking Tobacco Use: Every Day     Smokeless Tobacco Use: Unknown     Passive Exposure: Not on file   Alcohol Use: Alcohol Misuse (8/9/2024)    AUDIT-C     Frequency of Alcohol Consumption: 4 or more times a week     Average Number of Drinks: 10 or more     Frequency of Binge Drinking: Daily or almost daily   Financial Resource Strain: Low Risk  (4/26/2024)    Overall Financial  Resource Strain (CARDIA)     Difficulty of Paying Living Expenses: Not hard at all   Food Insecurity: No Food Insecurity (4/23/2024)    Hunger Vital Sign     Worried About Running Out of Food in the Last Year: Never true     Ran Out of Food in the Last Year: Never true   Transportation Needs: No Transportation Needs (4/23/2024)    PRAPARE - Transportation     Lack of Transportation (Medical): No     Lack of Transportation (Non-Medical): No   Physical Activity: Inactive (4/23/2024)    Exercise Vital Sign     Days of Exercise per Week: 0 days     Minutes of Exercise per Session: 0 min   Stress: Patient Unable To Answer (4/26/2024)    [employer] - Occupational Stress Questionnaire     Feeling of Stress : Patient unable to answer   Social Connections: Unknown (10/12/2023)    Family and Community Support     Help with Day-to-Day Activities: Not on file     Lonely or Isolated: Not on file   Interpersonal Safety: Not At Risk (8/8/2024)    Abuse Screen     Unsafe at Home or Work/School: no     Feels Threatened by Someone?: no     Does Anyone Keep You from Contacting Others or Doint Things Outside the Home?: no     Physical Sign of Abuse Present: no   Depression: Unknown (4/26/2024)    PHQ-2     PHQ-2 Score: 98-->patient unable to answer   Housing Stability: Not At Risk (8/9/2024)    Housing Stability     Current Living Arrangements: home     Potentially Unsafe Housing Conditions: none   Utilities: Not At Risk (4/23/2024)    [facility] Utilities     Threatened with loss of utilities: No   Health Literacy: Unknown (4/23/2024)    Education     Help with school or training?: Not on file     Preferred Language: English   Employment: Unknown (10/12/2023)    Employment     Do you want help finding or keeping work or a job?: Not on file   Disabilities: Not At Risk (8/9/2024)    Disabilities     Concentrating, Remembering, or Making Decisions Difficulty: no     Doing Errands Independently Difficulty: no         Home Medications     Prior to Admission medications    Medication Sig Start Date End Date Taking? Authorizing Provider   amLODIPine (NORVASC) 5 MG tablet Take 1 tablet by mouth Daily. 5/1/24   Jairo Guadarrama MD   levETIRAcetam (Keppra) 500 MG tablet Take 1 tablet by mouth 2 (Two) Times a Day. 5/1/24   Jairo Guadarrama MD   sertraline (ZOLOFT) 25 MG tablet Take 1 tablet by mouth Every Night. 5/1/24   Jairo Guadarrama MD   Thiamine Mononitrate 100 MG tablet Take 1 tablet by mouth Daily. 5/1/24   Jairo Guadarrama MD        Objective / Physical Exam     Vital signs:  Temp: 98 °F (36.7 °C)  BP: (!) 158/103  Heart Rate: (!) 131  Resp: 19  SpO2: 99 %  Weight: 58.5 kg (128 lb 15.5 oz)    Admission Weight: Weight: 64.4 kg (142 lb)    Physical Exam  Constitutional:       Appearance: He is ill-appearing and toxic-appearing.   HENT:      Head: Normocephalic.   Eyes:      Pupils: Pupils are equal, round, and reactive to light.      Right eye: Pupil is sluggish.      Left eye: Pupil is sluggish.   Cardiovascular:      Rate and Rhythm: Regular rhythm. Tachycardia present.   Pulmonary:      Effort: Tachypnea present.   Abdominal:      General: Abdomen is flat.   Genitourinary:     Penis: Normal.    Skin:     General: Skin is warm and moist.      Coloration: Skin is pale.   Neurological:      Mental Status: He is disoriented.      Motor: Tremor present.      Comments: Minimally responsive at times; does not follow commands   Psychiatric:         Behavior: Behavior is uncooperative.         Cognition and Memory: Cognition is impaired. Memory is impaired.          Labs     Results from last 7 days   Lab Units 08/08/24  2157   WBC 10*3/mm3 12.81*   HEMATOCRIT % 48.0   PLATELETS 10*3/mm3 151      Results from last 7 days   Lab Units 08/08/24  2213   SODIUM mmol/L 142   POTASSIUM mmol/L 3.7   CHLORIDE mmol/L 94*   CO2 mmol/L 13.8*   BUN mg/dL 15   CREATININE mg/dL 1.40*        Imaging     Chest X ray: Pending at time of  note    EKG: My independent evaluation showed Sinus tachycardia, no ST -T changes    Current Medications     Scheduled Meds:  folic acid 1 mg in sodium chloride 0.9 % 50 mL IVPB, 1 mg, Intravenous, Daily  lactated ringers, 1,000 mL, Intravenous, Once  LORazepam, 2 mg, Intravenous, Q6H   Followed by  [START ON 8/10/2024] LORazepam, 1 mg, Intravenous, Q6H  PHENobarbital, 2 mg/kg, Intravenous, Once   Followed by  PHENobarbital, 2 mg/kg, Intravenous, Once   Followed by  PHENobarbital, 2 mg/kg, Intravenous, Once  PHENobarbital, 65 mg, Intravenous, Once   Followed by  [START ON 8/10/2024] PHENobarbital, 65 mg, Intravenous, Once   Followed by  [START ON 8/10/2024] PHENobarbital, 32.4 mg, Oral, Once   Followed by  [START ON 8/11/2024] PHENobarbital, 32.4 mg, Oral, Once   Followed by  [START ON 8/11/2024] PHENobarbital, 32.4 mg, Oral, Once  sodium chloride, 10 mL, Intravenous, Q12H  thiamine (B-1) IV, 200 mg, Intravenous, Q8H   Followed by  [START ON 8/14/2024] thiamine, 100 mg, Oral, Daily         Continuous Infusions:        Patient continues to be critically ill, remains at risk of clinical deterioration or death and needed high complexity decision making. I have spent a total of 45 minutes providing critical care services to this patient including but not limited to: review of labs/ microbiology/imaging/medications, serial monitoring of vital signs, review of other consultant's notes, review of events in the last 24 hrs, monitoring input/output, review of treatment plan with bedside nurse, RT and other treatment team, management of life support and nutrition needs. I also spoke with patient wife about the plan of care and answered all questions.    Time spent in performing separately billable procedures and updating family is not included in the critical care time.       KAREL Ruffin   Critical Care  08/09/24   01:18 EDT

## 2024-08-09 NOTE — THERAPY EVALUATION
Patient Name: Emiliano Bateman  : 1960    MRN: [id number]                              Today's Date: 2024       Admit Date: 2024    Visit Dx:     ICD-10-CM ICD-9-CM   1. Status epilepticus  G40.901 345.3     Patient Active Problem List   Diagnosis    Alcoholism    Anxiety    Chronic obstructive lung disease    Hypertensive disorder    Steatosis of liver    Status epilepticus    Seizure disorder    Hyperammonemia    Alcohol withdrawal seizure with delirium    Seizure due to alcohol withdrawal     Past Medical History:   Diagnosis Date    Alcoholism 2020    Aneurysm of thoracic aorta 2019    3.9 cm      Anxiety 2020    Chronic obstructive lung disease 10/08/2020    xray [facility] 18      Hypertensive disorder 2020    Myocardial infarction 10/08/2020    angioplasty      Seizure disorder 2024    Steatosis of liver 10/09/2020     History reviewed. No pertinent surgical history.   General Information       Row Name 24 1344          Physical Therapy Time and Intention    Document Type evaluation  -CL     Mode of Treatment physical therapy  -CL       Row Name 24 1351 24 1349       General Information    Patient Profile Reviewed -- yes  -CL    Existing Precautions/Restrictions seizures;NPO;fall  -CL --      Row Name 24 1344          Living Environment    People in Home spouse  -CL     Name(s) of People in Home Spouse: Lili  -CL       Row Name 24 1347          Cognition    Orientation Status (Cognition) unable/difficult to assess  -CL               User Key  (r) = Recorded By, (t) = Taken By, (c) = Cosigned By      Initials Name Provider Type    CL Paola Bagley PT Physical Therapist                   Mobility       Row Name 24 1358          Bed Mobility    Bed Mobility bed mobility (all) activities  -CL     All Activities, White (Bed Mobility) dependent (less than 25% patient effort)  -CL       Row Name 24 0812           Transfers    Comment, (Transfers) unsafe at this time  -CL               User Key  (r) = Recorded By, (t) = Taken By, (c) = Cosigned By      Initials Name Provider Type    Paola Lea, PT Physical Therapist                   Obj/Interventions       Row Name 08/09/24 1352          Range of Motion Comprehensive    Comment, General Range of Motion BLEs WFL passively; pt unable to follow commands for active movements  -CL       Row Name 08/09/24 1352          Strength Comprehensive (MMT)    Comment, General Manual Muscle Testing (MMT) Assessment Unable to assess  -CL       Row Name 08/09/24 1352          Balance    Comment, Balance Attempted to place bed in fowlers; pt made no self correcting movements to assist with sitting positioning  -CL               User Key  (r) = Recorded By, (t) = Taken By, (c) = Cosigned By      Initials Name Provider Type    Paola Lea, PT Physical Therapist                   Goals/Plan       Row Name 08/09/24 1355          Bed Mobility Goal 1 (PT)    Activity/Assistive Device (Bed Mobility Goal 1, PT) bed mobility activities, all  -CL     [place] Level/Cues Needed (Bed Mobility Goal 1, PT) moderate assist (50-74% patient effort)  -CL     Time Frame (Bed Mobility Goal 1, PT) long term goal (LTG);2 weeks  -CL       Row Name 08/09/24 4305          Transfer Goal 1 (PT)    Activity/Assistive Device (Transfer Goal 1, PT) sit-to-stand/stand-to-sit;bed-to-chair/chair-to-bed  -CL     [place] Level/Cues Needed (Transfer Goal 1, PT) moderate assist (50-74% patient effort)  -CL     Time Frame (Transfer Goal 1, PT) long term goal (LTG);2 weeks  -CL       Row Name 08/09/24 1355          Therapy Assessment/Plan (PT)    Planned Therapy Interventions (PT) balance training;bed mobility training;neuromuscular re-education;patient/family education;postural re-education;strengthening;transfer training  -CL               User Key  (r) = Recorded By, (t) = Taken By, (c) = Cosigned By       Initials Name Provider Type    Paola Lea, PT Physical Therapist                   Clinical Impression       Row Name 08/09/24 135          Pain    Pretreatment Pain Rating 0/10 - no pain  -CL     Posttreatment Pain Rating 0/10 - no pain  -CL       Row Name 08/09/24 1357          Plan of Care Review    Plan of Care Reviewed With patient  -CL     Outcome Evaluation Emiliano Bateman is a 63 y.o. male with hx of alcohol abuse, Dementia, COPD, HTN and Parkinson's disease. Who presents following a tonic clonic seizure.  He is being treated for Seizures, alcohol withdrawal and DKA.  Pt lives with wife. Prior functional level is not available at this time. Pt remains unresponsive throughout evaluation attempt.  PROM to BLEs is WFL. Pt is dependent for bed mobility.  Pt is currently well below baseline and would benefit from PT intervention for mobilization and strengthening and to prevent loss of function given co-morbid Parkinsons.  He will most likely require SNF for continued PT at discharge.  -CL       Row Name 08/09/24 1350          Therapy Assessment/Plan (PT)    Rehab Potential (PT) fair, will monitor progress closely  -CL     Criteria for Skilled Interventions Met (PT) yes;skilled treatment is necessary  -CL     Therapy Frequency (PT) 3 times/wk  -CL     Predicted Duration of Therapy Intervention (PT) Until discharge  -CL       Row Name 08/09/24 1356          Positioning and Restraints    Pre-Treatment Position in bed  -CL     Post Treatment Position bed  -CL     In Bed notified nsg;supine;call light within reach  -CL               User Key  (r) = Recorded By, (t) = Taken By, (c) = Cosigned By      Initials Name Provider Type    CL Paola Bagley, PT Physical Therapist                   Outcome Measures       Row Name 08/09/24 8905          How much help from another person do you currently need...    Turning from your back to your side while in flat bed without using bedrails? 1  -CL     Moving  from lying on back to sitting on the side of a flat bed without bedrails? 1  -CL     Moving to and from a bed to a chair (including a wheelchair)? 1  -CL     Standing up from a chair using your arms (e.g., wheelchair, bedside chair)? 1  -CL     Climbing 3-5 steps with a railing? 1  -CL     To walk in hospital room? 1  -CL     AM-PAC 6 Clicks Score (PT) 6  -CL     Highest Level of Mobility Goal 2 --> Bed activities/dependent transfer  -CL               User Key  (r) = Recorded By, (t) = Taken By, (c) = Cosigned By      Initials Name Provider Type    CL Paola Bagley, PT Physical Therapist                                 Physical Therapy Education       Title: PT OT SLP Therapies (In Progress)       Topic: Physical Therapy (In Progress)       Point: Mobility training (In Progress)       Learning Progress Summary             Patient Acceptance, E,TB, NL by CL at 8/9/2024 1356                                         User Key       Initials Effective Dates Name Provider Type Discipline    CL 03/02/22 -  Paola Bagley, PT Physical Therapist PT                  PT Recommendation and Plan  Planned Therapy Interventions (PT): balance training, bed mobility training, neuromuscular re-education, patient/family education, postural re-education, strengthening, transfer training  Plan of Care Reviewed With: patient  Outcome Evaluation: Emiliano Bateman is a 63 y.o. male with hx of alcohol abuse, Dementia, COPD, HTN and Parkinson's disease. Who presents following a tonic clonic seizure.  He is being treated for Seizures, alcohol withdrawal and DKA.  Pt lives with wife. Prior functional level is not available at this time. Pt remains unresponsive throughout evaluation attempt.  PROM to BLEs is WFL. Pt is dependent for bed mobility.  Pt is currently well below baseline and would benefit from PT intervention for mobilization and strengthening and to prevent loss of function given co-morbid Parkinsons.  He will most likely  require SNF for continued PT at discharge.     Time Calculation:   PT Evaluation Complexity  History, PT Evaluation Complexity: 3 or more personal factors and/or comorbidities  Examination of Body Systems (PT Eval Complexity): total of 3 or more elements  Clinical Presentation (PT Evaluation Complexity): evolving  Clinical Decision Making (PT Evaluation Complexity): moderate complexity  Overall Complexity (PT Evaluation Complexity): moderate complexity          PT G-Codes  AM-PAC 6 Clicks Score (PT): 6  PT Discharge Summary  Anticipated Discharge Disposition (PT): skilled nursing facility    Paola Bagley, PT  8/9/2024

## 2024-08-09 NOTE — NURSING NOTE
63-year-old male with a history of COPD, liver disease, seizure disorder, hypertension and Parkinson's.  Presents to the hospital with complaints of pain with increasing headache.  Patient does have a history of drinking.  Patient has a sitter to the bedside and is in restraints.  He is not responding to any questions.  Patient seen today for moisture associated dermatitis to the perineal groin and buttock perirectal area.  Patient does have a red papular rash consistent with yeast noted.  Will recommend implementing incontinence protocol recommend adding a 2% miconazole powder and can continue to use zinc oxide-based barrier creams to the perirectal area.  Patient is currently on a low-air-loss critical care surface and this is certainly appropriate.  Recommend to continue with pressure injury prevention strategies

## 2024-08-09 NOTE — PLAN OF CARE
Goal Outcome Evaluation:              Outcome Evaluation: Emiliano Bateman is a 63 y.o. male with PMH of hypertension, COPD, liver disease, Parkinson's, DMT2, seizure disorder who presented to the hospital after suffering a seizure at home, and was admitted with a principal diagnosis of Alcohol withdrawal seizure with delirium.  Pt does have parkinsonian tremors, and was on medication before but stopped taking them due to side effects.  Pt on CIWA protocal.  This date pt is unresponsive to tactile and verbal stimuli.  He was positioned in seated posision, though did not arouse to correct posture at all this date.  Recommend SNF at discharge.      Anticipated Discharge Disposition (OT): skilled nursing facility

## 2024-08-09 NOTE — ED PROVIDER NOTES
Subjective   History of Present Illness  Below is mainly from the family as patient has some Parkinson's confusion    History of present 63-year-old male who has a history of COPD liver disease seizure hypertension Parkinson who was diagnosed with seizures at the end of April in the first part of May when he was admitted to the ICU for seizures.  He was placed on Keppra he is otherwise done well.  The patient cut back on his drinking of alcohol at that time over the last couple days has been feeling a little bit puny and then today he had a generalized clonic tonic seizure he did hit his head.  The patient was brought in for evaluation.  The patient here denies any complaints of pain states he has increasing shaking denies headache.  There is been no reported fever chills or recent illness flus viruses or vaccinations he denies any urinary complaints.  Reportedly taking his medication as prescribed.  Generalized tonic-clonic seizure that lasted several minutes tonight.      Review of Systems   Constitutional:  Negative for chills and fever.   Respiratory:  Negative for chest tightness and shortness of breath.    Gastrointestinal:  Negative for abdominal pain and vomiting.   Musculoskeletal:  Negative for neck pain.   Skin:  Negative for rash.   Neurological:  Positive for seizures.   Psychiatric/Behavioral:  Positive for confusion.        Past Medical History:   Diagnosis Date    Alcoholism 03/19/2020    Aneurysm of thoracic aorta 11/08/2019    3.9 cm      Anxiety 03/19/2020    Chronic obstructive lung disease 10/08/2020    xray [facility] 12/9/18      Hypertensive disorder 11/23/2020    Myocardial infarction 10/08/2020    angioplasty      Seizure disorder 04/23/2024    Steatosis of liver 10/09/2020       Allergies   Allergen Reactions    Penicillins Unknown - Low Severity     Childhood allergy; pt is unsure of reaction       No past surgical history on file.    Family History   Problem Relation Age of Onset    No Known  Problems Mother     No Known Problems Father        Social History     Socioeconomic History    Marital status:    Tobacco Use    Smoking status: Unknown   Vaping Use    Vaping status: Never Used   Substance and Sexual Activity    Alcohol use: Yes    Drug use: Never    Sexual activity: Defer       Prior to Admission medications    Medication Sig Start Date End Date Taking? Authorizing Provider   amLODIPine (NORVASC) 5 MG tablet Take 1 tablet by mouth Daily. 5/1/24   Jairo Guadarrama MD   levETIRAcetam (Keppra) 500 MG tablet Take 1 tablet by mouth 2 (Two) Times a Day. 5/1/24   Jairo Guadarrama MD   sertraline (ZOLOFT) 25 MG tablet Take 1 tablet by mouth Every Night. 5/1/24   Jairo Guadarrama MD   Thiamine Mononitrate 100 MG tablet Take 1 tablet by mouth Daily. 5/1/24   Jairo Guadarrama MD        Objective   Physical Exam  Constitutional is a 63-year-old male awake alert he has a significant tremor diffusely triage vital signs reviewed.  Tachycardia to moderate in 130s but he has a lot of tremor with that.  HEENT extraocular muscles are intact pupils equal round react there is no photophobia or papilledema discussed small subconjunctival hemorrhage on the left but no hyphema.  Mouth was otherwise clear.  Neck supple no adenopathy no JV no bruits no cervical thoracic lumbar spine tenderness no step-off deformity noted.  Trachea midline chest wall without tenderness good breath sounds bilaterally heart is regular tachycardic without murmur rub abdomen soft without tenderness good bowel sounds no pulsatile masses extremities pulses equal upper and lower extremities no edema cords or Homans' sign or evidence of DVT skin warm and dry without rashes or cellulitic changes neurologic awake alert orientated x 1 no face asymmetry speech normal is no drift he has significant tremor throughout his upper extremities.  No focal weakness  Procedures           ED Course      Results for orders placed or performed  during the hospital encounter of 08/08/24   Comprehensive Metabolic Panel    Specimen: Blood   Result Value Ref Range    Glucose 246 (H) 65 - 99 mg/dL    BUN 15 8 - 23 mg/dL    Creatinine 1.40 (H) 0.76 - 1.27 mg/dL    Sodium 142 136 - 145 mmol/L    Potassium 3.7 3.5 - 5.2 mmol/L    Chloride 94 (L) 98 - 107 mmol/L    CO2 13.8 (L) 22.0 - 29.0 mmol/L    Calcium 9.1 8.6 - 10.5 mg/dL    Total Protein 7.9 6.0 - 8.5 g/dL    Albumin 4.6 3.5 - 5.2 g/dL    ALT (SGPT) 23 1 - 41 U/L    AST (SGOT) 45 (H) 1 - 40 U/L    Alkaline Phosphatase 108 39 - 117 U/L    Total Bilirubin 0.8 0.0 - 1.2 mg/dL    Globulin 3.3 gm/dL    A/G Ratio 1.4 g/dL    BUN/Creatinine Ratio 10.7 7.0 - 25.0    Anion Gap 34.2 (H) 5.0 - 15.0 mmol/L    eGFR 56.1 (L) >60.0 mL/min/1.73   Protime-INR    Specimen: Blood   Result Value Ref Range    Protime 10.3 9.6 - 11.7 Seconds    INR 0.94 0.93 - 1.10   aPTT    Specimen: Blood   Result Value Ref Range    PTT 28.2 (L) 61.0 - 76.5 seconds   CK    Specimen: Blood   Result Value Ref Range    Creatine Kinase 226 (H) 20 - 200 U/L   Magnesium    Specimen: Blood   Result Value Ref Range    Magnesium 2.8 (H) 1.6 - 2.4 mg/dL   Ethanol    Specimen: Blood   Result Value Ref Range    Ethanol % <0.010 %   CBC Auto Differential    Specimen: Blood   Result Value Ref Range    WBC 12.81 (H) 3.40 - 10.80 10*3/mm3    RBC 4.76 4.14 - 5.80 10*6/mm3    Hemoglobin 16.3 13.0 - 17.7 g/dL    Hematocrit 48.0 37.5 - 51.0 %    .8 (H) 79.0 - 97.0 fL    MCH 34.2 (H) 26.6 - 33.0 pg    MCHC 34.0 31.5 - 35.7 g/dL    RDW 18.2 (H) 12.3 - 15.4 %    RDW-SD 66.7 (H) 37.0 - 54.0 fl    MPV 10.0 6.0 - 12.0 fL    Platelets 151 140 - 450 10*3/mm3    Neutrophil % 81.5 (H) 42.7 - 76.0 %    Lymphocyte % 7.6 (L) 19.6 - 45.3 %    Monocyte % 6.5 5.0 - 12.0 %    Eosinophil % 2.3 0.3 - 6.2 %    Basophil % 0.7 0.0 - 1.5 %    Immature Grans % 1.4 (H) 0.0 - 0.5 %    Neutrophils, Absolute 10.44 (H) 1.70 - 7.00 10*3/mm3    Lymphocytes, Absolute 0.97 0.70 - 3.10  10*3/mm3    Monocytes, Absolute 0.83 0.10 - 0.90 10*3/mm3    Eosinophils, Absolute 0.30 0.00 - 0.40 10*3/mm3    Basophils, Absolute 0.09 0.00 - 0.20 10*3/mm3    Immature Grans, Absolute 0.18 (H) 0.00 - 0.05 10*3/mm3    nRBC 0.4 (H) 0.0 - 0.2 /100 WBC   Scan Slide    Specimen: Blood   Result Value Ref Range    Anisocytosis Slight/1+ None Seen    WBC Morphology Normal Normal    Platelet Estimate Adequate Normal    Large Platelets Slight/1+ None Seen   POC Glucose STAT    Specimen: Blood   Result Value Ref Range    Glucose 263 (H) 70 - 105 mg/dL     CT Head Without Contrast    Result Date: 8/8/2024  Impression: No evidence of hemorrhage, mass effect or midline shift. No acute intracranial process identified. Limited evaluation due to motion artifact. No acute osseous abnormality of the cervical spine. Electronically Signed: Rupinder Aguirre MD  8/8/2024 11:25 PM EDT  Workstation ID: [id number]    CT Cervical Spine Without Contrast    Result Date: 8/8/2024  Impression: No evidence of hemorrhage, mass effect or midline shift. No acute intracranial process identified. Limited evaluation due to motion artifact. No acute osseous abnormality of the cervical spine. Electronically Signed: Rupinder Aguirre MD  8/8/2024 11:25 PM EDT  Workstation ID: [id number]   Medications   sodium chloride 0.9 % flush 10 mL (has no administration in time range)   lactated ringers bolus 1,000 mL (has no administration in time range)   lactated ringers bolus 1,000 mL (0 mL Intravenous Stopped 8/8/24 2312)   thiamine (B-1) injection 100 mg (100 mg Intravenous Given 8/8/24 2151)   levETIRAcetam (KEPPRA) injection 1,000 mg (1,000 mg Intravenous Given 8/8/24 2151)   LORazepam (ATIVAN) injection 1 mg (1 mg Intravenous Given 8/8/24 2151)   fosphenytoin (Cerebyx) 1,000 mg PE in sodium chloride 0.9 % 100 mL IVPB (1,000 mg PE Intravenous New Bag 8/8/24 2359)   LORazepam (ATIVAN) injection 1 mg (1 mg Intravenous Given 8/8/24 2302)                                               Medical Decision Making  Medical decision making.  Patient had IV established but shortly throughout we had another bout of generalized tonic-clonic seizure that lasted several minutes but patient was given Ativan 1 mg IV and loaded on Keppra gram IV.  Labs obtained independent reviewed by me the patient's comprehensive metabolic profile remarkable glucose of 246 and a CO2 of 13.8.  Coags were negative magnesium 2.8 CK2 28 alcohol was negative blood count white count was 12.8.  CT head without my interpretation is a lot of artifact but I do not see any acute hemorrhage or fractures radiology review the same.  CT cervical spine I do not see any acute fracture or luxation on my independent review.  Radiology review was the same.  Patient here was given a liter lactated Ringer's he had another brief seizure episode but was controlled easily is given additional milligram Ativan IV and loaded on Cerebyx..  Patient remained very much postictal though he was very tremulous and moving about the bed and would not hold still answer questions but there was no focal findings on him.  Patient had the cerebel attaching there was no seizure activity currently.  I do not see any evidence here of intracerebral hemorrhage.  I do not see any evidence of spine fracture I do not see any evidence of suggest acute intra-abdominal etiology I will see any evidence of meningitis encephalitis.  I do not really see any evidence that she has bacteremia or sepsis I do not see any obvious source of that.  Although we will obtain a urine and some blood cultures.  I think he is tachycardic because he is postictal and he is has parkinsonian tremors and's dementia and he is very tremulous and agitated currently.  But will obtain cultures and rule this in or out.  Will hold off on antibiotics pending his urine sample and further evaluation.  Sats are good at 99% lungs are clear and do not suspect any pneumonia.  Patient is somewhat  acidotic from his seizures.  He really cut back on his drinking back in April and May and he has not had really much to do with alcohol since that time according to the family.  But to consider withdrawal as well.  He is given 100 mg of thiamine IV as well.  The patient has been loaded on Keppra Cerebyx and has been given Ativan he will go the ICU.  I talked to the nurse practitioner Case discussed.  Critical care 30 minutes    Problems Addressed:  Status epilepticus: complicated acute illness or injury    Amount and/or Complexity of Data Reviewed  Labs: ordered. Decision-making details documented in ED Course.  Radiology: ordered and independent interpretation performed. Decision-making details documented in ED Course.    Risk  Drug therapy requiring intensive monitoring for toxicity.  Decision regarding hospitalization.        Final diagnoses:   Status epilepticus       ED Disposition  ED Disposition       ED Disposition   Decision to Admit    Condition   --    Comment   Level of Care: Critical Care [6]   Admitting Physician: YG BOBBY [[id number]]   Attending Physician: YG BOBBY [[id number]]                 No follow-up provider specified.       Medication List      No changes were made to your prescriptions during this visit.            Joo Bobby MD  08/09/24 0008

## 2024-08-09 NOTE — PLAN OF CARE
Goal Outcome Evaluation:              Outcome Evaluation: pt disoriented x4, not following comands. pt very agitated and restless this morning, pt started on precedex. pts BP low-  levophed started. NG placed for medications.

## 2024-08-09 NOTE — CONSULTS
Diabetes Education    Patient Name:  Emiliano Bateman  YOB: 1960  MRN: [id number]  Admit Date:  8/8/2024        MD consult for DKA. On 8/9/2024 A1c was 6.09% with no history of diabetes. Patient was admitted post seizure activity. Bedside nurse stated that patient is currently sedated and not appropriate for education. Will attempt follow-up at another time.       Electronically signed by:  Ila De La O RN  08/09/24 15:48 EDT

## 2024-08-09 NOTE — THERAPY EVALUATION
Patient Name: Emiliano Bateman  : 1960    MRN: [id number]                              Today's Date: 2024       Admit Date: 2024    Visit Dx:     ICD-10-CM ICD-9-CM   1. Status epilepticus  G40.901 345.3     Patient Active Problem List   Diagnosis    Alcoholism    Anxiety    Chronic obstructive lung disease    Hypertensive disorder    Steatosis of liver    Status epilepticus    Seizure disorder    Hyperammonemia    Alcohol withdrawal seizure with delirium    Seizure due to alcohol withdrawal     Past Medical History:   Diagnosis Date    Alcoholism 2020    Aneurysm of thoracic aorta 2019    3.9 cm      Anxiety 2020    Chronic obstructive lung disease 10/08/2020    xray [facility] 18      Hypertensive disorder 2020    Myocardial infarction 10/08/2020    angioplasty      Seizure disorder 2024    Steatosis of liver 10/09/2020     History reviewed. No pertinent surgical history.   General Information       Row Name 24 1627          OT Time and Intention    Document Type evaluation  -SR     Mode of Treatment occupational therapy  -SR       Row Name 24 1627          General Information    Existing Precautions/Restrictions seizures;NPO;fall  -SR       Row Name 24 1627          Occupational Profile    Reason for Services/Referral (Occupational Profile) Emiliano Bateman is a 63 y.o. male with PMH of hypertension, COPD, liver disease, Parkinson's, DMT2, seizure disorder who presented to the hospital after suffering a seizure at home, and was admitted with a principal diagnosis of Alcohol withdrawal seizure with delirium.  Pt does have parkinsonian tremors, and was on medication before but stopped taking them due to side effects.  Pt on CIWA protocal.  -SR       Row Name 24 1627          Living Environment    People in Home spouse  -SR       Row Name 24 1627          Cognition    Orientation Status (Cognition) unable/difficult to assess  -SR                User Key  (r) = Recorded By, (t) = Taken By, (c) = Cosigned By      Initials Name Provider Type    SR Allyson Troncoso OT Occupational Therapist                     Mobility/ADL's       Row Name 08/09/24 1628          Bed Mobility    Bed Mobility bed mobility (all) activities  -SR     All Activities, [place] (Bed Mobility) dependent (less than 25% patient effort)  -SR               User Key  (r) = Recorded By, (t) = Taken By, (c) = Cosigned By      Initials Name Provider Type    SR Allyson Troncoso OT Occupational Therapist                   Obj/Interventions       Row Name 08/09/24 1628          Range of Motion Comprehensive    Comment, General Range of Motion PROM WFL, unable to participate in AROM as he is unable to follow commands.  -SR       Row Name 08/09/24 1628          Strength Comprehensive (MMT)    Comment, General Manual Muscle Testing (MMT) Assessment Unable to follow commands for MMT  -SR       Row Name 08/09/24 1628          Balance    Comment, Balance Pt was placed in chair position though when sitting upright pts head fell forward with no attempt to adjust.  -SR               User Key  (r) = Recorded By, (t) = Taken By, (c) = Cosigned By      Initials Name Provider Type    SR Allyson Troncoso OT Occupational Therapist                   Goals/Plan       Row Name 08/09/24 1632          Bed Mobility Goal 1 (OT)    Activity/Assistive Device (Bed Mobility Goal 1, OT) sit to supine/supine to sit  -SR     [place] Level/Cues Needed (Bed Mobility Goal 1, OT) moderate assist (50-74% patient effort)  -SR     Time Frame (Bed Mobility Goal 1, OT) 2 weeks  -SR       Row Name 08/09/24 1632          Grooming Goal 1 (OT)    Activity/Device (Grooming Goal 1, OT) grooming skills, all  -SR     [place] (Grooming Goal 1, OT) contact guard required  -SR     Time Frame (Grooming Goal 1, OT) 2 weeks  -SR       Row Name 08/09/24 1632          Therapy Assessment/Plan (OT)    Planned Therapy  Interventions (OT) activity tolerance training;BADL retraining;IADL retraining;functional balance retraining;neuromuscular control/coordination retraining;ROM/therapeutic exercise;transfer/mobility retraining;strengthening exercise  -SR               User Key  (r) = Recorded By, (t) = Taken By, (c) = Cosigned By      Initials Name Provider Type    SR Allyson Troncoso, OT Occupational Therapist                   Clinical Impression       Row Name 08/09/24 1630          Pain Assessment    Pretreatment Pain Rating 0/10 - no pain  -SR     Posttreatment Pain Rating 0/10 - no pain  -SR       Row Name 08/09/24 1630          Plan of Care Review    Outcome Evaluation Emiliano Bateman is a 63 y.o. male with PMH of hypertension, COPD, liver disease, Parkinson's, DMT2, seizure disorder who presented to the hospital after suffering a seizure at home, and was admitted with a principal diagnosis of Alcohol withdrawal seizure with delirium.  Pt does have parkinsonian tremors, and was on medication before but stopped taking them due to side effects.  Pt on CIWA protocal.  This date pt is unresponsive to tactile and verbal stimuli.  He was positioned in seated posision, though did not arouse to correct posture at all this date.  Recommend SNF at discharge.  -SR       Row Name 08/09/24 1630          Therapy Assessment/Plan (OT)    Rehab Potential (OT) good, to achieve stated therapy goals  -SR     Criteria for Skilled Therapeutic Interventions Met (OT) yes  -SR     Therapy Frequency (OT) 3 times/wk  -SR     Predicted Duration of Therapy Intervention (OT) Until discharge  -SR       Row Name 08/09/24 1630          Therapy Plan Review/Discharge Plan (OT)    Anticipated Discharge Disposition (OT) skilled nursing facility  -SR       Row Name 08/09/24 1630          Positioning and Restraints    Pre-Treatment Position in bed  -SR     Post Treatment Position bed  -SR               User Key  (r) = Recorded By, (t) = Taken By, (c) =  Cosigned By      Initials Name Provider Type    SR Allyson Troncoso OT Occupational Therapist                   Outcome Measures       Row Name 08/09/24 8753          How much help from another person do you currently need...    Turning from your back to your side while in flat bed without using bedrails? 1  -CL     Moving from lying on back to sitting on the side of a flat bed without bedrails? 1  -CL     Moving to and from a bed to a chair (including a wheelchair)? 1  -CL     Standing up from a chair using your arms (e.g., wheelchair, bedside chair)? 1  -CL     Climbing 3-5 steps with a railing? 1  -CL     To walk in hospital room? 1  -CL     AM-PAC 6 Clicks Score (PT) 6  -CL     Highest Level of Mobility Goal 2 --> Bed activities/dependent transfer  -CL               User Key  (r) = Recorded By, (t) = Taken By, (c) = Cosigned By      Initials Name Provider Type    CL Paola Bagley, PT Physical Therapist                    Occupational Therapy Education       Title: PT OT SLP Therapies (In Progress)       Topic: Occupational Therapy (In Progress)       Point: ADL training (In Progress)       Description:   Instruct learner(s) on proper safety adaptation and remediation techniques during self care or transfers.   Instruct in proper use of assistive devices.                  Learning Progress Summary             Patient Acceptance, E,TB, NR by SR at 8/9/2024 1632                         Point: Home exercise program (Not Started)       Description:   Instruct learner(s) on appropriate technique for monitoring, assisting and/or progressing therapeutic exercises/activities.                  Learner Progress:  Not documented in this visit.              Point: Precautions (Not Started)       Description:   Instruct learner(s) on prescribed precautions during self-care and functional transfers.                  Learner Progress:  Not documented in this visit.              Point: Body mechanics (In Progress)        Description:   Instruct learner(s) on proper positioning and spine alignment during self-care, functional mobility activities and/or exercises.                  Learning Progress Summary             Patient Acceptance, E,TB, NR by SR at 8/9/2024 1632                                         User Key       Initials Effective Dates Name Provider Type Discipline     06/16/21 -  Allyson Troncoso OT Occupational Therapist OT                  OT Recommendation and Plan  Planned Therapy Interventions (OT): activity tolerance training, BADL retraining, IADL retraining, functional balance retraining, neuromuscular control/coordination retraining, ROM/therapeutic exercise, transfer/mobility retraining, strengthening exercise  Therapy Frequency (OT): 3 times/wk  Plan of Care Review  Outcome Evaluation: Emiliano Bateman is a 63 y.o. male with PMH of hypertension, COPD, liver disease, Parkinson's, DMT2, seizure disorder who presented to the hospital after suffering a seizure at home, and was admitted with a principal diagnosis of Alcohol withdrawal seizure with delirium.  Pt does have parkinsonian tremors, and was on medication before but stopped taking them due to side effects.  Pt on CIWA protocal.  This date pt is unresponsive to tactile and verbal stimuli.  He was positioned in seated posision, though did not arouse to correct posture at all this date.  Recommend SNF at discharge.     Time Calculation:         Time Calculation- OT       Row Name 08/09/24 1633             Time Calculation- OT    OT Start Time 1120  -SR      OT Stop Time 1129  -SR      OT Time Calculation (min) 9 min  -SR      Total Timed Code Minutes- OT 0 minute(s)  -SR      OT Received On 08/09/24  -SR      OT - Next Appointment 08/12/24  -      OT Goal Re-Cert Due Date 08/23/24  -                User Key  (r) = Recorded By, (t) = Taken By, (c) = Cosigned By      Initials Name Provider Type    SR Allyson Troncoso OT Occupational  Therapist                  Therapy Charges for Today       Code Description Service Date Service Provider Modifiers Qty    62442721142 HC OT EVAL MOD COMPLEXITY 4 8/9/2024 Allyson Troncoso, OT GO 1                 Allyson Troncoso, OT  8/9/2024

## 2024-08-09 NOTE — ED NOTES
This nurse came into patient room to patient having a tonic-clonic seizure that lasted 2 minutes. Suction at bedside and seizure pads were present during event. Provider notified, see new orders.

## 2024-08-09 NOTE — CASE MANAGEMENT/SOCIAL WORK
Discharge Planning Assessment   [facility]     Patient Name: Emiliano Bateman  MRN: [id number]  Today's Date: 8/9/2024    Admit Date: 8/8/2024    Plan: From home with spouse, pending clinical course and PT/OT eval.   Discharge Needs Assessment       Row Name 08/09/24 1213       Living Environment    People in Home spouse    Name(s) of People in Home Lili - spouse    Current Living Arrangements home    Potentially Unsafe Housing Conditions none    In the past 12 months has the electric, gas, oil, or water company threatened to shut off services in your home? No    Primary Care Provided by self    Provides Primary Care For no one    Family Caregiver if Needed spouse    Family Caregiver Names Lili - spouse    Quality of Family Relationships helpful;involved;supportive    Able to Return to Prior Arrangements yes       Resource/Environmental Concerns    Resource/Environmental Concerns none    Transportation Concerns none       Transportation Needs    In the past 12 months, has lack of transportation kept you from medical appointments or from getting medications? no    In the past 12 months, has lack of transportation kept you from meetings, work, or from getting things needed for daily living? No       Food Insecurity    Within the past 12 months, you worried that your food would run out before you got the money to buy more. Never true    Within the past 12 months, the food you bought just didn't last and you didn't have money to get more. Never true       Transition Planning    Patient/Family Anticipates Transition to home with family    Patient/Family Anticipated Services at Transition none    Transportation Anticipated car, drives self;family or friend will provide       Discharge Needs Assessment    Readmission Within the Last 30 Days no previous admission in last 30 days    Equipment Currently Used at Home cane, straight    Concerns to be Addressed discharge planning    Anticipated Changes Related to Illness  none    Equipment Needed After Discharge none    Outpatient/Agency/Support Group Needs skilled nursing facility    Discharge Facility/Level of Care Needs nursing facility, skilled                   Discharge Plan       Row Name 08/09/24 1216       Plan    Plan From home with spouse, pending clinical course and PT/OT eval.    Plan Comments CM attempted to reach spouse, no answer and no VM set up.  Message left with floor Rn to notify CM if family arrives.  Wife arrived at bedside to speak with CM.  Patient lives at home with spouse who cares for him 24/7. Patient declines all ETOH resources last admit. Patient needs assist with ADLs and has a rollator and cane at home. PCP and pharmacy confirmed. Agreeable to M2B.  Denies financial assistance needs for medication and/or food. Denies any current HH, Caregiver, or rehab services.  CM spoke with wife at length about SNFs vs HH.  Spouse states they are on a waiting list with [employer] for assistance at home. States he is currently on 2.5L home O2 but she does not remember the name of providerr, will attempt to bring info in on her next visit.  DC Barriers:  NPO - NG, IV Abxs, CIWA, 5L NC, cooling blanket (temp 103), seizure precautions, IV Keppra, confused, IVFs, Precedex gtt.               Expected Discharge Date and Time       Expected Discharge Date Expected Discharge Time    Aug 12, 2024            Demographic Summary       Row Name 08/09/24 1213       General Information    Admission Type inpatient    Arrived From emergency department    Required Notices Provided Important Message from Medicare    Referral Source admission list    Reason for Consult discharge planning    Preferred Language English                   Functional Status       Row Name 08/09/24 1213       Functional Status    Usual Activity Tolerance fair    Current Activity Tolerance poor       Functional Status, IADL    Medications completely dependent    Meal Preparation completely dependent     Housekeeping completely dependent    Laundry completely dependent    Shopping completely dependent       Mental Status    General Appearance WDL WDL       Mental Status Summary    Recent Changes in Mental Status/Cognitive Functioning no changes             O. Susan Guthrie RN  SIPS/ICU   O: [phone]  C: [phone]  [email or web address]

## 2024-08-09 NOTE — ED NOTES
Patient's family at bedside, reports patient drinks about 1 pint per day.  States he always has a tremor from Parkinson's disease and current tremor is his baseline.  Patient last drink 2 days ago.  Wife reports finding patient on floor having seizure prior to arrival.  Patient recently started on keppra.  Patient alert to self and place at this time.  Patient has dementia alert x 2 at baseline.

## 2024-08-09 NOTE — PLAN OF CARE
Patient with elevated lactic, Pro-Tray, rectal temperature of 102.8, and tachycardia.  CXR pending.  RVP ordered.  Will give cefepime and vancomycin.  Will also give tylenol suppository and place on cooling blanket.  DC vancomycin if MRSA is negative.  Stat repeat BMP ordered after glucose noted on EPOC at 77 and anion gap reading at 10.  Will D/C DKA protocol and transition to subcu insulin if BMP correlates with discontinuation criteria.    Electronically signed by KAREL Ruffin, 08/09/24, 4:41 AM EDT.

## 2024-08-09 NOTE — PROGRESS NOTES
"Pharmacy Antimicrobial Dosing Service    Subjective:  Emiliano Bateman is a 63 y.o.male admitted with alcohol withdrawal seizure with delirium. Pharmacy has been consulted to dose Vancomycin and Cefepime for possible sepsis.          Assessment/Plan    1. Day #1 Vancomycin: Goal -600 mcg*h/mL. Give vancomycin 1250mg IV x1.  Then, vancomycin 750mg IV q12h.  Obtain random 8/10 at 0800.    2. Day #1 Cefepime: 2gm IV q8h for estCrCl > 60 mL/min.    Will continue to monitor drug levels, renal function, culture and sensitivities, and patient clinical status.       Objective:  Relevant clinical data and objective history reviewed:  170.2 cm (67\")   58.5 kg (128 lb 15.5 oz)   Ideal body weight: 66.1 kg (145 lb 11.6 oz)  Body mass index is 20.2 kg/m².        Results from last 7 days   Lab Units 08/09/24  0435 08/09/24  0427 08/09/24  0255   CREATININE mg/dL 0.91 0.88 0.92     Estimated Creatinine Clearance: 67.9 mL/min (by C-G formula based on SCr of 0.91 mg/dL).  No intake/output data recorded.    Results from last 7 days   Lab Units 08/08/24  2157   WBC 10*3/mm3 12.81*     Temperature    08/09/24 0059 08/09/24 0100 08/09/24 0400   Temp: 98 °F (36.7 °C) 98.6 °F (37 °C) (!) 102.8 °F (39.3 °C)     Baseline culture/source/susceptibility:  Microbiology Results (last 10 days)       Procedure Component Value - Date/Time    MRSA Screen, PCR (Inpatient) - Swab, Nares [[id number]]  (Normal) Collected: 08/09/24 0121    Lab Status: Final result Specimen: Swab from Nares Updated: 08/09/24 0448     MRSA PCR No MRSA Detected    Narrative:      The negative predictive value of this diagnostic test is high and should only be used to consider de-escalating anti-MRSA therapy. A positive result may indicate colonization with MRSA and must be correlated clinically.            Oswaldo Sue, PharmD  08/09/24 06:18 EDT    "

## 2024-08-09 NOTE — PLAN OF CARE
Goal Outcome Evaluation:      Pt admitted to ICU on 08/09/24 @0105. Presented with severe tremors, not answering any questions or following directions. Seizure precautions, ciwa, and DKA protocols initiated. Cooling blanket placed due to temp reaching 103.8.

## 2024-08-09 NOTE — ED NOTES
Nursing report ED to floor  Emiliano Bateman  63 y.o.  male    HPI:   Chief Complaint   Patient presents with    Withdrawal       Admitting doctor:   Gil Bobby DO    Admitting diagnosis:   The encounter diagnosis was Status epilepticus.    Code status:   Current Code Status       Date Active Code Status Order ID Comments User Context       Prior            Allergies:   Penicillins    Isolation:  No active isolations     Fall Risk:  Fall Risk Assessment was completed, and patient is at high risk for falls.   Predictive Model Details         17 (Low) Factor Value    Calculated 8/9/2024 00:31 Age 63    Risk of Fall Model Active Peripheral IV Present     Imaging order in this encounter Present     Magnesium 2.8 mg/dL     Number of administrations of Anti-Convulsants 2     Respiratory Rate 18     Number of Distinct Medication Classes administered 4     Chloride 94 mmol/L     Sam Scale not on file     Total Bilirubin 0.8 mg/dL     Creatinine 1.4 mg/dL     Clinically Relevant Sex Not Female     Calcium 9.1 mg/dL     Cardiac Assessment X     Diastolic      Days after Admission 0.139     ALT 23 U/L     Tobacco Use Not Asked     Albumin 4.6 g/dL     Potassium 3.7 mmol/L         Weight:       08/08/24  2312   Weight: 64.4 kg (142 lb)       Intake and Output  No intake or output data in the 24 hours ending 08/09/24 0031    Diet:        Most recent vitals:   Vitals:    08/08/24 2230 08/08/24 2312 08/08/24 2346 08/09/24 0016   BP: (!) 188/107  (!) 199/119 (!) 170/108   Pulse: (!) 149  (!) 140    Resp:       Temp:       SpO2:       Weight:  64.4 kg (142 lb)     Height:           Active LDAs/IV Access:   Lines, Drains & Airways       Active LDAs       Name Placement date Placement time Site Days    Peripheral IV 08/08/24 2151 Left Antecubital 08/08/24 2151  Antecubital  less than 1    Peripheral IV 08/08/24 2359 Right Antecubital 08/08/24 2359  Antecubital  less than 1                    Skin Condition:   Skin  Assessments (last day)       None             Labs (abnormal labs have a star):   Labs Reviewed   COMPREHENSIVE METABOLIC PANEL - Abnormal; Notable for the following components:       Result Value    Glucose 246 (*)     Creatinine 1.40 (*)     Chloride 94 (*)     CO2 13.8 (*)     AST (SGOT) 45 (*)     Anion Gap 34.2 (*)     eGFR 56.1 (*)     All other components within normal limits    Narrative:     GFR Normal >60  Chronic Kidney Disease <60  Kidney Failure <15     APTT - Abnormal; Notable for the following components:    PTT 28.2 (*)     All other components within normal limits   CK - Abnormal; Notable for the following components:    Creatine Kinase 226 (*)     All other components within normal limits   MAGNESIUM - Abnormal; Notable for the following components:    Magnesium 2.8 (*)     All other components within normal limits   CBC WITH AUTO DIFFERENTIAL - Abnormal; Notable for the following components:    WBC 12.81 (*)     .8 (*)     MCH 34.2 (*)     RDW 18.2 (*)     RDW-SD 66.7 (*)     Neutrophil % 81.5 (*)     Lymphocyte % 7.6 (*)     Immature Grans % 1.4 (*)     Neutrophils, Absolute 10.44 (*)     Immature Grans, Absolute 0.18 (*)     nRBC 0.4 (*)     All other components within normal limits   AMMONIA - Abnormal; Notable for the following components:    Ammonia 71 (*)     All other components within normal limits   POCT GLUCOSE FINGERSTICK - Abnormal; Notable for the following components:    Glucose 263 (*)     All other components within normal limits   PROTIME-INR - Normal   BLOOD CULTURE   BLOOD CULTURE   ETHANOL    Narrative:     Plasma Ethanol Clinical Symptoms:    ETOH (%)               Clinical Symptom  .01-.05              No apparent influence  .03-.12              Euphoria, Diminished judgment and attention   .09-.25              Impaired comprehension, Muscle incoordination  .18-.30              Confusion, Staggered gait, Slurred speech  .25-.40              Markedly decreased response  to stimuli, unable to stand or                        walk, vomitting, sleep or stupor  .35-.50              Comatose, Anesthesia, Subnormal body temperature       SCAN SLIDE   URINALYSIS W/ MICROSCOPIC IF INDICATED (NO CULTURE)   CBC AND DIFFERENTIAL    Narrative:     The following orders were created for panel order CBC & Differential.  Procedure                               Abnormality         Status                     ---------                               -----------         ------                     CBC Auto Differential[[id number]]        Abnormal            Final result               Scan Slide[[id number]]                                       Final result                 Please view results for these tests on the individual orders.       LOC:  disoriented x4    Telemetry:  Critical Care    Cardiac Monitoring Ordered: yes    EKG:   Telemetry Scan   Final Result          Medications Given in the ED:   Medications   sodium chloride 0.9 % flush 10 mL (has no administration in time range)   lactated ringers bolus 1,000 mL (has no administration in time range)   lactated ringers bolus 1,000 mL (0 mL Intravenous Stopped 8/8/24 2312)   thiamine (B-1) injection 100 mg (100 mg Intravenous Given 8/8/24 2151)   levETIRAcetam (KEPPRA) injection 1,000 mg (1,000 mg Intravenous Given 8/8/24 2151)   LORazepam (ATIVAN) injection 1 mg (1 mg Intravenous Given 8/8/24 2151)   fosphenytoin (Cerebyx) 1,000 mg PE in sodium chloride 0.9 % 100 mL IVPB (1,000 mg PE Intravenous New Bag 8/8/24 2359)   LORazepam (ATIVAN) injection 1 mg (1 mg Intravenous Given 8/8/24 2302)       Imaging results:  CT Head Without Contrast    Result Date: 8/8/2024  Impression: No evidence of hemorrhage, mass effect or midline shift. No acute intracranial process identified. Limited evaluation due to motion artifact. No acute osseous abnormality of the cervical spine. Electronically Signed: Rupinder Aguirre MD  8/8/2024 11:25 PM EDT  Workstation ID:  [id number]    CT Cervical Spine Without Contrast    Result Date: 8/8/2024  Impression: No evidence of hemorrhage, mass effect or midline shift. No acute intracranial process identified. Limited evaluation due to motion artifact. No acute osseous abnormality of the cervical spine. Electronically Signed: Rupinder Aguirre MD  8/8/2024 11:25 PM EDT  Workstation ID: [id number]     Social issues:   Social History     Socioeconomic History    Marital status:    Tobacco Use    Smoking status: Unknown   Vaping Use    Vaping status: Never Used   Substance and Sexual Activity    Alcohol use: Yes    Drug use: Never    Sexual activity: Defer       NIH Stroke Scale:  Interval: (not recorded)  1a. Level of Consciousness: (not recorded)  1b. LOC Questions: (not recorded)  1c. LOC Commands: (not recorded)  2. Best Gaze: (not recorded)  3. Visual: (not recorded)  4. Facial Palsy: (not recorded)  5a. Motor Arm, Left: (not recorded)  5b. Motor Arm, Right: (not recorded)  6a. Motor Leg, Left: (not recorded)  6b. Motor Leg, Right: (not recorded)  7. Limb Ataxia: (not recorded)  8. Sensory: (not recorded)  9. Best Language: (not recorded)  10. Dysarthria: (not recorded)  11. Extinction and Inattention (formerly Neglect): (not recorded)    Total (NIH Stroke Scale): (not recorded)     Additional notable assessment information:       Nursing report ED to floor:    Icu nurse, Susan Cantu LPN   08/09/24 00:31 EDT Nursing report ED to floor  Emiliano Bateman  63 y.o.  male    HPI:   Chief Complaint   Patient presents with    Withdrawal       Admitting doctor:   Gil Bobby DO    Admitting diagnosis:   The encounter diagnosis was Status epilepticus.    Code status:   Current Code Status       Date Active Code Status Order ID Comments User Context       Prior            Allergies:   Penicillins    Isolation:  No active isolations     Fall Risk:  Fall Risk Assessment was completed, and patient is at high risk for falls.    Predictive Model Details         17 (Low) Factor Value    Calculated 8/9/2024 00:31 Age 63    Risk of Fall Model Active Peripheral IV Present     Imaging order in this encounter Present     Magnesium 2.8 mg/dL     Number of administrations of Anti-Convulsants 2     Respiratory Rate 18     Number of Distinct Medication Classes administered 4     Chloride 94 mmol/L     Sam Scale not on file     Total Bilirubin 0.8 mg/dL     Creatinine 1.4 mg/dL     Clinically Relevant Sex Not Female     Calcium 9.1 mg/dL     Cardiac Assessment X     Diastolic      Days after Admission 0.139     ALT 23 U/L     Tobacco Use Not Asked     Albumin 4.6 g/dL     Potassium 3.7 mmol/L         Weight:       08/08/24  2312   Weight: 64.4 kg (142 lb)       Intake and Output  No intake or output data in the 24 hours ending 08/09/24 0031    Diet:        Most recent vitals:   Vitals:    08/08/24 2230 08/08/24 2312 08/08/24 2346 08/09/24 0016   BP: (!) 188/107  (!) 199/119 (!) 170/108   Pulse: (!) 149  (!) 140    Resp:       Temp:       SpO2:       Weight:  64.4 kg (142 lb)     Height:           Active LDAs/IV Access:   Lines, Drains & Airways       Active LDAs       Name Placement date Placement time Site Days    Peripheral IV 08/08/24 2151 Left Antecubital 08/08/24 2151  Antecubital  less than 1    Peripheral IV 08/08/24 2359 Right Antecubital 08/08/24 2359  Antecubital  less than 1                    Skin Condition:   Skin Assessments (last day)       None             Labs (abnormal labs have a star):   Labs Reviewed   COMPREHENSIVE METABOLIC PANEL - Abnormal; Notable for the following components:       Result Value    Glucose 246 (*)     Creatinine 1.40 (*)     Chloride 94 (*)     CO2 13.8 (*)     AST (SGOT) 45 (*)     Anion Gap 34.2 (*)     eGFR 56.1 (*)     All other components within normal limits    Narrative:     GFR Normal >60  Chronic Kidney Disease <60  Kidney Failure <15     APTT - Abnormal; Notable for the following  components:    PTT 28.2 (*)     All other components within normal limits   CK - Abnormal; Notable for the following components:    Creatine Kinase 226 (*)     All other components within normal limits   MAGNESIUM - Abnormal; Notable for the following components:    Magnesium 2.8 (*)     All other components within normal limits   CBC WITH AUTO DIFFERENTIAL - Abnormal; Notable for the following components:    WBC 12.81 (*)     .8 (*)     MCH 34.2 (*)     RDW 18.2 (*)     RDW-SD 66.7 (*)     Neutrophil % 81.5 (*)     Lymphocyte % 7.6 (*)     Immature Grans % 1.4 (*)     Neutrophils, Absolute 10.44 (*)     Immature Grans, Absolute 0.18 (*)     nRBC 0.4 (*)     All other components within normal limits   AMMONIA - Abnormal; Notable for the following components:    Ammonia 71 (*)     All other components within normal limits   POCT GLUCOSE FINGERSTICK - Abnormal; Notable for the following components:    Glucose 263 (*)     All other components within normal limits   PROTIME-INR - Normal   BLOOD CULTURE   BLOOD CULTURE   ETHANOL    Narrative:     Plasma Ethanol Clinical Symptoms:    ETOH (%)               Clinical Symptom  .01-.05              No apparent influence  .03-.12              Euphoria, Diminished judgment and attention   .09-.25              Impaired comprehension, Muscle incoordination  .18-.30              Confusion, Staggered gait, Slurred speech  .25-.40              Markedly decreased response to stimuli, unable to stand or                        walk, vomitting, sleep or stupor  .35-.50              Comatose, Anesthesia, Subnormal body temperature       SCAN SLIDE   URINALYSIS W/ MICROSCOPIC IF INDICATED (NO CULTURE)   CBC AND DIFFERENTIAL    Narrative:     The following orders were created for panel order CBC & Differential.  Procedure                               Abnormality         Status                     ---------                               -----------         ------                      CBC Auto Differential[[id number]]        Abnormal            Final result               Scan Slide[[id number]]                                       Final result                 Please view results for these tests on the individual orders.       LOC:  disoriented x4.     Telemetry:  Critical Care    Cardiac Monitoring Ordered: yes    EKG:   Telemetry Scan   Final Result          Medications Given in the ED:   Medications   sodium chloride 0.9 % flush 10 mL (has no administration in time range)   lactated ringers bolus 1,000 mL (has no administration in time range)   lactated ringers bolus 1,000 mL (0 mL Intravenous Stopped 8/8/24 2312)   thiamine (B-1) injection 100 mg (100 mg Intravenous Given 8/8/24 2151)   levETIRAcetam (KEPPRA) injection 1,000 mg (1,000 mg Intravenous Given 8/8/24 2151)   LORazepam (ATIVAN) injection 1 mg (1 mg Intravenous Given 8/8/24 2151)   fosphenytoin (Cerebyx) 1,000 mg PE in sodium chloride 0.9 % 100 mL IVPB (1,000 mg PE Intravenous New Bag 8/8/24 2359)   LORazepam (ATIVAN) injection 1 mg (1 mg Intravenous Given 8/8/24 2302)       Imaging results:  CT Head Without Contrast    Result Date: 8/8/2024  Impression: No evidence of hemorrhage, mass effect or midline shift. No acute intracranial process identified. Limited evaluation due to motion artifact. No acute osseous abnormality of the cervical spine. Electronically Signed: Rupinder Aguirre MD  8/8/2024 11:25 PM EDT  Workstation ID: [id number]    CT Cervical Spine Without Contrast    Result Date: 8/8/2024  Impression: No evidence of hemorrhage, mass effect or midline shift. No acute intracranial process identified. Limited evaluation due to motion artifact. No acute osseous abnormality of the cervical spine. Electronically Signed: Rupinder Aguirre MD  8/8/2024 11:25 PM EDT  Workstation ID: [id number]     Social issues:   Social History     Socioeconomic History    Marital status:    Tobacco Use    Smoking status: Unknown   Vaping Use    Vaping  status: Never Used   Substance and Sexual Activity    Alcohol use: Yes    Drug use: Never    Sexual activity: Defer       NIH Stroke Scale:  Interval: (not recorded)  1a. Level of Consciousness: (not recorded)  1b. LOC Questions: (not recorded)  1c. LOC Commands: (not recorded)  2. Best Gaze: (not recorded)  3. Visual: (not recorded)  4. Facial Palsy: (not recorded)  5a. Motor Arm, Left: (not recorded)  5b. Motor Arm, Right: (not recorded)  6a. Motor Leg, Left: (not recorded)  6b. Motor Leg, Right: (not recorded)  7. Limb Ataxia: (not recorded)  8. Sensory: (not recorded)  9. Best Language: (not recorded)  10. Dysarthria: (not recorded)  11. Extinction and Inattention (formerly Neglect): (not recorded)    Total (NIH Stroke Scale): (not recorded)     Additional notable assessment information:       Nursing report ED to floor:    ICU nurse, Susan Cantu LPN   08/09/24 00:31 EDT    left normal/right normal

## 2024-08-09 NOTE — CONSULTS
Picc team consult:    Procedure explained to family members and informed consent obtained via phone and primary RN, patient obtunded.  Time out performed.  Picc line placed RUE cephalic vessel utilizing US guidance and modified Seldinger technique with easily compressible vessel without difficulty.

## 2024-08-09 NOTE — PLAN OF CARE
Goal Outcome Evaluation:  Plan of Care Reviewed With: patient           Outcome Evaluation: Emiliano Bateman is a 63 y.o. male with hx of alcohol abuse, Dementia, COPD, HTN and Parkinson's disease. Who presents following a tonic clonic seizure.  He is being treated for Seizures, alcohol withdrawal and DKA.  Pt lives with wife. Prior functional level is not available at this time. Pt remains unresponsive throughout evaluation attempt.  PROM to BLEs is WFL. Pt is dependent for bed mobility.  Pt is currently well below baseline and would benefit from PT intervention for mobilization and strengthening and to prevent loss of function given co-morbid Parkinsons.  He will most likely require SNF for continued PT at discharge.      Anticipated Discharge Disposition (PT): skilled nursing facility

## 2024-08-10 ENCOUNTER — APPOINTMENT (OUTPATIENT)
Dept: GENERAL RADIOLOGY | Facility: HOSPITAL | Age: 64
DRG: 896 | End: 2024-08-10
Payer: MEDICARE

## 2024-08-10 PROBLEM — E44.0 MODERATE MALNUTRITION: Status: ACTIVE | Noted: 2024-08-10

## 2024-08-10 LAB
ALBUMIN SERPL-MCNC: 3 G/DL (ref 3.5–5.2)
ALBUMIN/GLOB SERPL: 1.5 G/DL
ALP SERPL-CCNC: 53 U/L (ref 39–117)
ALT SERPL W P-5'-P-CCNC: 37 U/L (ref 1–41)
ANION GAP SERPL CALCULATED.3IONS-SCNC: 14.6 MMOL/L (ref 5–15)
ARTERIAL PATENCY WRIST A: POSITIVE
AST SERPL-CCNC: 118 U/L (ref 1–40)
ATMOSPHERIC PRESS: ABNORMAL MM[HG]
BASE EXCESS BLDA CALC-SCNC: -9 MMOL/L (ref 0–3)
BASOPHILS # BLD AUTO: 0.03 10*3/MM3 (ref 0–0.2)
BASOPHILS NFR BLD AUTO: 0.4 % (ref 0–1.5)
BDY SITE: ABNORMAL
BILIRUB SERPL-MCNC: 0.5 MG/DL (ref 0–1.2)
BUN SERPL-MCNC: 10 MG/DL (ref 8–23)
BUN/CREAT SERPL: 15.9 (ref 7–25)
CALCIUM SPEC-SCNC: 6.2 MG/DL (ref 8.6–10.5)
CHLORIDE SERPL-SCNC: 107 MMOL/L (ref 98–107)
CO2 BLDA-SCNC: 18.4 MMOL/L (ref 22–29)
CO2 SERPL-SCNC: 18.4 MMOL/L (ref 22–29)
CREAT SERPL-MCNC: 0.63 MG/DL (ref 0.76–1.27)
DEPRECATED RDW RBC AUTO: 68.9 FL (ref 37–54)
EGFRCR SERPLBLD CKD-EPI 2021: 106.2 ML/MIN/1.73
EOSINOPHIL # BLD AUTO: 0.01 10*3/MM3 (ref 0–0.4)
EOSINOPHIL NFR BLD AUTO: 0.1 % (ref 0.3–6.2)
ERYTHROCYTE [DISTWIDTH] IN BLOOD BY AUTOMATED COUNT: 18.5 % (ref 12.3–15.4)
GLOBULIN UR ELPH-MCNC: 2 GM/DL
GLUCOSE BLDC GLUCOMTR-MCNC: 111 MG/DL (ref 70–105)
GLUCOSE BLDC GLUCOMTR-MCNC: 126 MG/DL (ref 70–105)
GLUCOSE BLDC GLUCOMTR-MCNC: 137 MG/DL (ref 70–105)
GLUCOSE BLDC GLUCOMTR-MCNC: 153 MG/DL (ref 70–105)
GLUCOSE SERPL-MCNC: 106 MG/DL (ref 65–99)
HCO3 BLDA-SCNC: 17.2 MMOL/L (ref 21–28)
HCT VFR BLD AUTO: 36.7 % (ref 37.5–51)
HEMODILUTION: NO
HGB BLD-MCNC: 12.1 G/DL (ref 13–17.7)
IMM GRANULOCYTES # BLD AUTO: 0.04 10*3/MM3 (ref 0–0.05)
IMM GRANULOCYTES NFR BLD AUTO: 0.5 % (ref 0–0.5)
INHALED O2 CONCENTRATION: 60 %
LARGE PLATELETS: NORMAL
LYMPHOCYTES # BLD AUTO: 1.55 10*3/MM3 (ref 0.7–3.1)
LYMPHOCYTES NFR BLD AUTO: 20.1 % (ref 19.6–45.3)
MCH RBC QN AUTO: 34 PG (ref 26.6–33)
MCHC RBC AUTO-ENTMCNC: 33 G/DL (ref 31.5–35.7)
MCV RBC AUTO: 103.1 FL (ref 79–97)
MODALITY: ABNORMAL
MONOCYTES # BLD AUTO: 0.47 10*3/MM3 (ref 0.1–0.9)
MONOCYTES NFR BLD AUTO: 6.1 % (ref 5–12)
NEUTROPHILS NFR BLD AUTO: 5.61 10*3/MM3 (ref 1.7–7)
NEUTROPHILS NFR BLD AUTO: 72.8 % (ref 42.7–76)
NRBC BLD AUTO-RTO: 0.3 /100 WBC (ref 0–0.2)
PCO2 BLDA: 37.8 MM HG (ref 35–48)
PEEP RESPIRATORY: 5 CM[H2O]
PH BLDA: 7.27 PH UNITS (ref 7.35–7.45)
PLATELET # BLD AUTO: 73 10*3/MM3 (ref 140–450)
PMV BLD AUTO: 10.8 FL (ref 6–12)
PO2 BLD: 256 MM[HG] (ref 0–500)
PO2 BLDA: 153.8 MM HG (ref 83–108)
POTASSIUM SERPL-SCNC: 3.5 MMOL/L (ref 3.5–5.2)
PROT SERPL-MCNC: 5 G/DL (ref 6–8.5)
RBC # BLD AUTO: 3.56 10*6/MM3 (ref 4.14–5.8)
RBC MORPH BLD: NORMAL
RESPIRATORY RATE: 16
SAO2 % BLDCOA: 99.1 % (ref 94–98)
SMALL PLATELETS BLD QL SMEAR: NORMAL
SODIUM SERPL-SCNC: 140 MMOL/L (ref 136–145)
TRIGL SERPL-MCNC: 102 MG/DL (ref 0–150)
VENTILATOR MODE: ABNORMAL
VT ON VENT VENT: 450 ML
WBC MORPH BLD: NORMAL
WBC NRBC COR # BLD AUTO: 7.71 10*3/MM3 (ref 3.4–10.8)

## 2024-08-10 PROCEDURE — 25010000002 LEVETRIRACETAM PER 10 MG: Performed by: NURSE PRACTITIONER

## 2024-08-10 PROCEDURE — 0BH17EZ INSERTION OF ENDOTRACHEAL AIRWAY INTO TRACHEA, VIA NATURAL OR ARTIFICIAL OPENING: ICD-10-PCS | Performed by: INTERNAL MEDICINE

## 2024-08-10 PROCEDURE — 5A1955Z RESPIRATORY VENTILATION, GREATER THAN 96 CONSECUTIVE HOURS: ICD-10-PCS | Performed by: INTERNAL MEDICINE

## 2024-08-10 PROCEDURE — 25010000002 ENOXAPARIN PER 10 MG: Performed by: NURSE PRACTITIONER

## 2024-08-10 PROCEDURE — 63710000001 INSULIN LISPRO (HUMAN) PER 5 UNITS: Performed by: INTERNAL MEDICINE

## 2024-08-10 PROCEDURE — 82948 REAGENT STRIP/BLOOD GLUCOSE: CPT

## 2024-08-10 PROCEDURE — 25010000002 FENTANYL CITRATE (PF) 50 MCG/ML SOLUTION

## 2024-08-10 PROCEDURE — 25010000002 PHENOBARBITAL PER 120 MG: Performed by: NURSE PRACTITIONER

## 2024-08-10 PROCEDURE — 80053 COMPREHEN METABOLIC PANEL: CPT | Performed by: NURSE PRACTITIONER

## 2024-08-10 PROCEDURE — 94799 UNLISTED PULMONARY SVC/PX: CPT

## 2024-08-10 PROCEDURE — 36600 WITHDRAWAL OF ARTERIAL BLOOD: CPT

## 2024-08-10 PROCEDURE — 94002 VENT MGMT INPAT INIT DAY: CPT

## 2024-08-10 PROCEDURE — 71045 X-RAY EXAM CHEST 1 VIEW: CPT

## 2024-08-10 PROCEDURE — 82948 REAGENT STRIP/BLOOD GLUCOSE: CPT | Performed by: INTERNAL MEDICINE

## 2024-08-10 PROCEDURE — 25010000002 CEFTRIAXONE PER 250 MG: Performed by: INTERNAL MEDICINE

## 2024-08-10 PROCEDURE — 25810000003 SODIUM CHLORIDE 0.9 % SOLUTION: Performed by: NURSE PRACTITIONER

## 2024-08-10 PROCEDURE — 87205 SMEAR GRAM STAIN: CPT | Performed by: INTERNAL MEDICINE

## 2024-08-10 PROCEDURE — 0 DEXTROSE 5 % SOLUTION 1,000 ML FLEX CONT: Performed by: NURSE PRACTITIONER

## 2024-08-10 PROCEDURE — 25010000002 PROPOFOL 10 MG/ML EMULSION: Performed by: INTERNAL MEDICINE

## 2024-08-10 PROCEDURE — 84478 ASSAY OF TRIGLYCERIDES: CPT | Performed by: INTERNAL MEDICINE

## 2024-08-10 PROCEDURE — 25010000002 THIAMINE PER 100 MG: Performed by: NURSE PRACTITIONER

## 2024-08-10 PROCEDURE — 94761 N-INVAS EAR/PLS OXIMETRY MLT: CPT

## 2024-08-10 PROCEDURE — 85007 BL SMEAR W/DIFF WBC COUNT: CPT | Performed by: NURSE PRACTITIONER

## 2024-08-10 PROCEDURE — 85025 COMPLETE CBC W/AUTO DIFF WBC: CPT | Performed by: NURSE PRACTITIONER

## 2024-08-10 PROCEDURE — 87070 CULTURE OTHR SPECIMN AEROBIC: CPT | Performed by: INTERNAL MEDICINE

## 2024-08-10 PROCEDURE — 82803 BLOOD GASES ANY COMBINATION: CPT

## 2024-08-10 PROCEDURE — 25010000002 MIDAZOLAM PER 1 MG

## 2024-08-10 RX ORDER — FENTANYL CITRATE 50 UG/ML
INJECTION, SOLUTION INTRAMUSCULAR; INTRAVENOUS
Status: COMPLETED
Start: 2024-08-10 | End: 2024-08-10

## 2024-08-10 RX ORDER — PHENOBARBITAL 32.4 MG/1
32.4 TABLET ORAL ONCE
Status: COMPLETED | OUTPATIENT
Start: 2024-08-11 | End: 2024-08-11

## 2024-08-10 RX ORDER — PHENOBARBITAL 32.4 MG/1
32.4 TABLET ORAL ONCE
Status: COMPLETED | OUTPATIENT
Start: 2024-08-10 | End: 2024-08-10

## 2024-08-10 RX ORDER — FENTANYL CITRATE 50 UG/ML
50 INJECTION, SOLUTION INTRAMUSCULAR; INTRAVENOUS ONCE
Status: COMPLETED | OUTPATIENT
Start: 2024-08-10 | End: 2024-08-10

## 2024-08-10 RX ORDER — MIDAZOLAM HYDROCHLORIDE 1 MG/ML
4 INJECTION INTRAMUSCULAR; INTRAVENOUS ONCE
Status: COMPLETED | OUTPATIENT
Start: 2024-08-10 | End: 2024-08-10

## 2024-08-10 RX ORDER — MIDAZOLAM HYDROCHLORIDE 1 MG/ML
INJECTION INTRAMUSCULAR; INTRAVENOUS
Status: COMPLETED
Start: 2024-08-10 | End: 2024-08-10

## 2024-08-10 RX ORDER — PHENOBARBITAL 32.4 MG/1
32.4 TABLET ORAL ONCE
Status: DISCONTINUED | OUTPATIENT
Start: 2024-08-11 | End: 2024-08-11

## 2024-08-10 RX ORDER — LANSOPRAZOLE 30 MG/1
30 TABLET, ORALLY DISINTEGRATING, DELAYED RELEASE ORAL
Status: DISCONTINUED | OUTPATIENT
Start: 2024-08-11 | End: 2024-08-15

## 2024-08-10 RX ADMIN — INSULIN LISPRO 4 UNITS: 100 INJECTION, SOLUTION INTRAVENOUS; SUBCUTANEOUS at 12:07

## 2024-08-10 RX ADMIN — LEVETIRACETAM 500 MG: 500 INJECTION, SOLUTION INTRAVENOUS at 08:35

## 2024-08-10 RX ADMIN — MIDAZOLAM HYDROCHLORIDE 4 MG: 1 INJECTION INTRAMUSCULAR; INTRAVENOUS at 10:40

## 2024-08-10 RX ADMIN — PROPOFOL INJECTABLE EMULSION 20 MCG/KG/MIN: 10 INJECTION, EMULSION INTRAVENOUS at 21:05

## 2024-08-10 RX ADMIN — DEXMEDETOMIDINE HYDROCHLORIDE 0.8 MCG/KG/HR: 4 INJECTION, SOLUTION INTRAVENOUS at 05:17

## 2024-08-10 RX ADMIN — PHENOBARBITAL SODIUM 65 MG: 65 INJECTION, SOLUTION INTRAMUSCULAR; INTRAVENOUS at 06:44

## 2024-08-10 RX ADMIN — PROPOFOL INJECTABLE EMULSION 15 MCG/KG/MIN: 10 INJECTION, EMULSION INTRAVENOUS at 10:40

## 2024-08-10 RX ADMIN — METRONIDAZOLE 500 MG: 500 TABLET ORAL at 15:01

## 2024-08-10 RX ADMIN — ANTI-FUNGAL POWDER MICONAZOLE NITRATE TALC FREE 1 APPLICATION: 1.42 POWDER TOPICAL at 21:02

## 2024-08-10 RX ADMIN — Medication 10 ML: at 08:36

## 2024-08-10 RX ADMIN — METRONIDAZOLE 500 MG: 500 TABLET ORAL at 21:02

## 2024-08-10 RX ADMIN — MIDAZOLAM 4 MG: 1 INJECTION INTRAMUSCULAR; INTRAVENOUS at 10:40

## 2024-08-10 RX ADMIN — PHENOBARBITAL 32.4 MG: 32.4 TABLET ORAL at 21:02

## 2024-08-10 RX ADMIN — NOREPINEPHRINE BITARTRATE 0.16 MCG/KG/MIN: 0.03 INJECTION, SOLUTION INTRAVENOUS at 08:35

## 2024-08-10 RX ADMIN — ANTI-FUNGAL POWDER MICONAZOLE NITRATE TALC FREE 1 APPLICATION: 1.42 POWDER TOPICAL at 08:36

## 2024-08-10 RX ADMIN — DEXMEDETOMIDINE HYDROCHLORIDE 1.2 MCG/KG/HR: 4 INJECTION, SOLUTION INTRAVENOUS at 12:07

## 2024-08-10 RX ADMIN — SODIUM CHLORIDE 125 ML/HR: 9 INJECTION, SOLUTION INTRAVENOUS at 00:58

## 2024-08-10 RX ADMIN — THIAMINE HYDROCHLORIDE 200 MG: 100 INJECTION, SOLUTION INTRAMUSCULAR; INTRAVENOUS at 15:01

## 2024-08-10 RX ADMIN — FENTANYL CITRATE 50 MCG: 50 INJECTION, SOLUTION INTRAMUSCULAR; INTRAVENOUS at 10:39

## 2024-08-10 RX ADMIN — THIAMINE HYDROCHLORIDE 200 MG: 100 INJECTION, SOLUTION INTRAMUSCULAR; INTRAVENOUS at 06:09

## 2024-08-10 RX ADMIN — SERTRALINE HYDROCHLORIDE 25 MG: 25 TABLET ORAL at 21:02

## 2024-08-10 RX ADMIN — THIAMINE HYDROCHLORIDE 200 MG: 100 INJECTION, SOLUTION INTRAMUSCULAR; INTRAVENOUS at 21:02

## 2024-08-10 RX ADMIN — SODIUM BICARBONATE 150 MEQ: 84 INJECTION INTRAVENOUS at 22:11

## 2024-08-10 RX ADMIN — ENOXAPARIN SODIUM 40 MG: 100 INJECTION SUBCUTANEOUS at 15:01

## 2024-08-10 RX ADMIN — SODIUM BICARBONATE 150 MEQ: 84 INJECTION INTRAVENOUS at 15:14

## 2024-08-10 RX ADMIN — SODIUM CHLORIDE 125 ML/HR: 9 INJECTION, SOLUTION INTRAVENOUS at 08:35

## 2024-08-10 RX ADMIN — CEFTRIAXONE 1000 MG: 1 INJECTION, POWDER, FOR SOLUTION INTRAMUSCULAR; INTRAVENOUS at 12:00

## 2024-08-10 RX ADMIN — LEVETIRACETAM 500 MG: 500 INJECTION, SOLUTION INTRAVENOUS at 21:02

## 2024-08-10 RX ADMIN — FOLIC ACID 1 MG: 5 INJECTION, SOLUTION INTRAMUSCULAR; INTRAVENOUS; SUBCUTANEOUS at 08:35

## 2024-08-10 RX ADMIN — DEXMEDETOMIDINE HYDROCHLORIDE 0.5 MCG/KG/HR: 4 INJECTION, SOLUTION INTRAVENOUS at 21:05

## 2024-08-10 RX ADMIN — METRONIDAZOLE 500 MG: 500 TABLET ORAL at 06:09

## 2024-08-10 NOTE — PLAN OF CARE
Goal Outcome Evaluation:              Outcome Evaluation: pt completely disoriented, not following commands. intubated this morning, propofol started. pt continues on levo but weaning down. pt started on tube feeds and tolerating well so far.

## 2024-08-10 NOTE — PROGRESS NOTES
"Critical Care Progress Note     Emiliano Bateman : 1960 MRN:[id number] LOS:1  Rm: 2316/1     Principal Problem: Alcohol withdrawal seizure with delirium     Reason for follow up: All the medical problems listed below    Summary     Emiliano Bateman is a 63 y.o. male with PMH of hypertension, COPD, liver disease, Parkinson's, DMT2, seizure disorder who presented to the hospital after suffering a seizure at home, and was admitted with a principal diagnosis of Alcohol withdrawal seizure with delirium.  Information for HPI taken from chart review as patient is unable to cooperate at time of assessment due to acuity of illness.  His wife is at bedside, and states that he had a seizure today which prompted her to call EMS.  Of note patient was recently in our hospital in April for new onset seizures secondary to alcohol withdrawal.  At that time it was reported that patient drink 1 gallon of vodka daily.  Per patient wife, patient has decreased his drinking significantly since that hospitalization.  She states that his last drink was approximately 2 days ago.  Also, patient does have parkinsonian tremors, and was on medication before but stopped taking them due to side effects.  Per ED documentation patient was conversive when he came to the emergency room and denied any complaints of pain or headache.  He had no subjective fevers or recent illnesses.  Per report patient has had stated he felt \"puny\".  Per PTA meds he was not restarted on medication for his Parkinson's on discharge from the hospital.     ED course: Shortly after arriving to the emergency department he had what was described as a generalized tonic-clonic seizure that lasted several minutes.  He was treated with IV Ativan and loaded with 1 g IV Keppra.  Lab showed CK of 226, anion gap 34.2 with glucose 263, CO2 13.8, WBC 12.81 without bandemia, and ammonia 71.  Was ordered 1 L saline bolus.  UA without WBCs or bacteria, however + glucose and + " ketones.  ABG pending at time of note.  CT head and cervical spine were completed and negative for acute abnormality.    Significant Events     08/10/24 : No further seizure activity noted however the patient is fairly obtunded this morning.  His respiratory rate is in the 30s and having high O2 needs.  Will plan to extubate primarily for airway protection    Assessment / Plan     Seizures, not in status  Probable alcohol withdrawal seizure activity  Ceribell placed in ED, highest reading of seizure burden at 7%   Continue IV Keppra  IV Ativan as needed  Seizure precaution     Alcohol withdrawal syndrome  Patient previously consumed a gallon of vodka daily  Patient's wife states he has drastically reduced his drinking  Last drink was 2 days prior to admission  CIWA protocol     Diabetic ketoacidosis without coma, with history of diabetes mellitus, type 2  Anion gap of 34 on admission.  Hemoglobin A1c 6.09  DKA protocol initiated  Insulin drip initiated per DKA protocol, now transitioned off  Adjust IV fluids as indicated  Accuchecks with sliding scale insulin     Essential hypertension  Patient on home amlodipine, unable to take safely at this time -resume when appropriate  Given IV Lopressor     Leukocytosis  Patient meet SIRS criteria with elevated WBC and elevated heart rate, however no clear etiology of infection is apparent  Blood cultures pending  UA negative  Procalcitonin 0.46 and lactic 4.5 on admission, trended down to 2.2.  No need for further serial monitoring  Patient hypertensive requiring IV Lopressor  Do not feel etiology is infectious, will await lactic and procalcitonin -monitor off ABX for now  CXR pending to rule out possible aspiration pneumonia      History of Parkinson's  Patient not on home medication     COPD  Continue budesonide and duoneb scheduled and as needed.  Monitor ABG's as ordered.   Advance to intubation this morning for airway protection.     Anxiety/Depression  Continue home  Zoloft when able to take PO     Disposition: ICU    Code status:   Code Status (Patient has no pulse and is not breathing): CPR (Attempt to Resuscitate)  Medical Interventions (Patient has pulse or is breathing): Full Support       Nutrition:   NPO Diet NPO Type: Strict NPO   Patient isn't on Tube Feeding     VTE Prophylaxis:  Pharmacologic & mechanical VTE prophylaxis orders are present.         Subjective / Review of systems     Review of Systems   Unable to perform ROS: Mental status change        Objective / Physical Exam     Vital signs:  Temp: 98.2 °F (36.8 °C)  BP: 144/94  Heart Rate: 65  Resp: (!) 29  SpO2: 100 %  Weight: 58.5 kg (128 lb 15.5 oz)    Admission Weight: Weight: 64.4 kg (142 lb)  Current Weight: Weight: 58.5 kg (128 lb 15.5 oz)    Input/Output in last 24 hours:    Intake/Output Summary (Last 24 hours) at 8/10/2024 0907  Last data filed at 8/10/2024 0624  Gross per 24 hour   Intake 568 ml   Output 500 ml   Net 68 ml      Net IO Since Admission: 1,996 mL [08/10/24 0907]     Physical Exam  Vitals and nursing note reviewed.   Constitutional:       Appearance: He is ill-appearing.      Comments: Tachypneic, minimally responsive   HENT:      Head: Normocephalic and atraumatic.      Right Ear: External ear normal.      Left Ear: External ear normal.      Nose: Nose normal.      Mouth/Throat:      Mouth: Mucous membranes are dry.   Eyes:      General: No scleral icterus.     Pupils: Pupils are equal, round, and reactive to light.   Cardiovascular:      Heart sounds: Normal heart sounds, S1 normal and S2 normal. No murmur heard.     Comments: Sinus rhythm  Pulmonary:      Breath sounds: Rhonchi present. No wheezing.      Comments: Tachypnea  Abdominal:      General: There is no distension.      Palpations: Abdomen is soft.      Comments: Active bowel sounds   Skin:     General: Skin is warm and dry.   Neurological:      Comments: Fairly obtunded          Radiology and Labs     Results from last 7 days    Lab Units 08/10/24  0539 08/09/24 0427 08/08/24 2157   WBC 10*3/mm3 7.71  --  12.81*   HEMOGLOBIN g/dL 12.1*  --  16.3   HEMOGLOBIN, POC g/dL  --  15.9  --    HEMATOCRIT % 36.7*  --  48.0   HEMATOCRIT POC %  --  47  --    PLATELETS 10*3/mm3 73*  --  151      Results from last 7 days   Lab Units 08/08/24 2157   PROTIME Seconds 10.3   INR  0.94   APTT seconds 28.2*      Results from last 7 days   Lab Units 08/10/24  0539 08/09/24  2105 08/09/24  1520 08/09/24 0435 08/09/24 0427 08/09/24 0255 08/08/24 2213   SODIUM mmol/L 140  --   --  138  --  138 142   POTASSIUM mmol/L 3.5  --  3.1* 3.7  --  3.6 3.7   CHLORIDE mmol/L 107  --   --  97*  --  97* 94*   CO2 mmol/L 18.4*  --   --  25.4  --  23.3 13.8*   BUN mg/dL 10  --   --  11  --  12 15   CREATININE mg/dL 0.63*  --   --  0.91 0.88 0.92 1.40*   GLUCOSE mg/dL 106*  --   --  76  --  174* 246*   MAGNESIUM mg/dL  --   --   --  2.2  --  2.0 2.8*   PHOSPHORUS mg/dL  --  3.7  --  1.5*  --  1.8*  --       Results from last 7 days   Lab Units 08/10/24  0539 08/09/24  0435 08/08/24  2213   ALK PHOS U/L 53 80 108   AST (SGOT) U/L 118* 74* 45*   ALT (SGPT) U/L 37 24 23     Results from last 7 days   Lab Units 08/09/24 0427   PH, ARTERIAL pH units 7.541*   PCO2, ARTERIAL mm Hg 33.5*   PO2 ART mm Hg 89.6   O2 SATURATION ART % 98.0   FIO2 % 40   HCO3 ART mmol/L 28.7*   BASE EXCESS ART mmol/L 6.3*       XR Abdomen KUB    Result Date: 8/9/2024  Impression: The tip of the nasogastric tube terminates in the fundus of the stomach. The sideport is located at the gastroesophageal junction. Electronically Signed: Patel Nation MD  8/9/2024 12:42 PM EDT  Workstation ID: [id number]    XR Chest 1 View    Result Date: 8/9/2024  Impression: No acute cardiopulmonary process. Electronically Signed: Rupinder Aguirre MD  8/9/2024 4:42 AM EDT  Workstation ID: [id number]    CT Head Without Contrast    Result Date: 8/8/2024  Impression: No evidence of hemorrhage, mass effect or midline shift. No acute  intracranial process identified. Limited evaluation due to motion artifact. No acute osseous abnormality of the cervical spine. Electronically Signed: Rupinder Aguirre MD  8/8/2024 11:25 PM EDT  Workstation ID: [id number]    CT Cervical Spine Without Contrast    Result Date: 8/8/2024  Impression: No evidence of hemorrhage, mass effect or midline shift. No acute intracranial process identified. Limited evaluation due to motion artifact. No acute osseous abnormality of the cervical spine. Electronically Signed: Rupinder Aguirre MD  8/8/2024 11:25 PM EDT  Workstation ID: [id number]     Current medications     Scheduled Meds:   cefTRIAXone, 1,000 mg, Intravenous, Q24H  enoxaparin, 40 mg, Subcutaneous, Daily  folic acid 1 mg in sodium chloride 0.9 % 50 mL IVPB, 1 mg, Intravenous, Daily  insulin lispro, 4-24 Units, Subcutaneous, Q6H  levETIRAcetam, 500 mg, Intravenous, Q12H  metoprolol tartrate, 5 mg, Intravenous, Q8H  metroNIDAZOLE, 500 mg, Nasogastric, Q8H  miconazole, 1 Application, Topical, Q12H  PHENobarbital, 32.4 mg, Oral, Once   Followed by  [START ON 8/11/2024] PHENobarbital, 32.4 mg, Oral, Once   Followed by  [START ON 8/11/2024] PHENobarbital, 32.4 mg, Oral, Once  sertraline, 25 mg, Nasogastric, Nightly  sodium chloride, 10 mL, Intravenous, Q12H  sodium chloride, 10 mL, Intravenous, Q12H  thiamine (B-1) IV, 200 mg, Intravenous, Q8H   Followed by  [START ON 8/14/2024] thiamine, 100 mg, Oral, Daily        Continuous Infusions:   dexmedetomidine, 0.2-1.5 mcg/kg/hr, Last Rate: 1 mcg/kg/hr (08/10/24 0624)  norepinephrine, 0.02-0.3 mcg/kg/min, Last Rate: 0.14 mcg/kg/min (08/10/24 0836)  sodium chloride, 125 mL/hr, Last Rate: 125 mL/hr (08/10/24 0835)          Plan discussed with RN. Reviewed all other data in the last 24 hours, including but not limited to vitals, labs, microbiology, imaging and pertinent notes from other providers.  .      Vain Lawson KAREL   Critical Care  08/10/24   09:07 EDT

## 2024-08-10 NOTE — PROCEDURES
Procedure: Emergent Endotracheal Intubation    Indication: Acute hypoxic respiratory failure, refractory DTs    Sedation: Fentanyl, Versed     Procedure:    Using a size 3 glidescope blade, direct laryngoscopy was performed.    The oropharynx had some redundant soft tissue, no edema appreciated.    No bleeding seen.    The vocal cords were seen and both cords were moving symmetrically.    A size 7.5 ETT was passed easily through the vocal cords in 1 attempt, under direct visualization.    ETT placement was confirmed by chest rise symmetrically; presence of breath sounds bilaterally, positive color change on capnography, condensation in the ETT and pulse oximetry of 100%.    CXR ordered.    Patient tolerated the procedure well.    No acute complication.

## 2024-08-10 NOTE — CONSULTS
Nutrition Services    Patient Name: Emiliano Bateman  YOB: 1960  MRN: [id number]  Admission date: 8/8/2024    Comment:    Moderate chronic disease related malnutrition related to multiple chronic diseases (alcoholism, parkinson's, COPD, fatty liver) as evidenced by greater than 10% wt loss in less than 6 months, PO intake meeting less than 75% of estimated energy intake for greater than 1 month, and moderate muscle wasting per NFPE.     See MSA below.    Initiate Isosource 1.5 @ 30 mL/hr with 10 mL/hr FWF.    CLINICAL NUTRITION ASSESSMENT      Reason for Assessment 8/20: TF consult      H&P      Past Medical History:   Diagnosis Date    Alcoholism 03/19/2020    Aneurysm of thoracic aorta 11/08/2019    3.9 cm      Anxiety 03/19/2020    Chronic obstructive lung disease 10/08/2020    xray [facility] 12/9/18      Hypertensive disorder 11/23/2020    Myocardial infarction 10/08/2020    angioplasty      Seizure disorder 04/23/2024    Steatosis of liver 10/09/2020       History reviewed. No pertinent surgical history.     Current Problems   Seizures, not in status  Possible alcohol withdrawal seizure activity    Alcohol withdrawal syndrome  -pt previously drank a gallon of vodka daily  -pt's wife reports pt has drastically reduced his drinking  -CIWA protocol    Diabetic ketoacidosis without come  DM2    HTN    Parkinson's     COPD, not in exacerbation    Anxiety/depression       Encounter Information        Trending Narrative     8/10: Pt admitted to [facility] after suffering a seizure at home. Admitting dx of alcohol withdrawal seizure with delirium. Pt recently hospitalized at [facility] in April for new onset seizures secondary to alcohol withdrawal. At that time, pt drinking 1 gallon of vodka daily. Pt's wife reports that pt's drinking has significantly decreased since that hospitalization. Pt discussed in ICU rounds on 8/9. Pt reportedly not taking his parkinson's medications because you cannot drink while on parkinson's  "medications. Pt on precedex and levo. NG placed for medication administration and nutrition. Pt did not meet criteria for malnutrition in April 2024. RD visited pt in person 8/9, NFPE completed, consistent with nutrition diagnosis of malnutrition using AND/ASPEN criteria. See MSA below.       Anthropometrics        Current Height, Weight Height: 170.2 cm (67\")  Weight: 58.5 kg (128 lb 15.5 oz) (08/09/24 0059)       Usual Body Weight (UBW) Unknown        Trending Weight Hx     This admission: 8/10: 128# - scale             PTA: 14% x 3.5 months    Wt Readings from Last 30 Encounters:   08/09/24 0059 58.5 kg (128 lb 15.5 oz)   08/09/24 0055 58.8 kg (129 lb 10.1 oz)   08/08/24 2312 64.4 kg (142 lb)   05/01/24 0148 62.7 kg (138 lb 3.2 oz)   04/29/24 1454 62 kg (136 lb 11 oz)   04/28/24 2338 66.5 kg (146 lb 9.7 oz)   04/28/24 0336 65.3 kg (143 lb 15.4 oz)   04/27/24 0012 67.4 kg (148 lb 9.4 oz)   04/26/24 0300 69.4 kg (153 lb)   04/24/24 0515 67.8 kg (149 lb 7.6 oz)   04/22/24 2154 66.2 kg (146 lb)   05/21/22 1446 65.8 kg (145 lb)      BMI kg/m2 Body mass index is 20.2 kg/m².       Labs        Pertinent Labs    Results from last 7 days   Lab Units 08/10/24  0539 08/09/24  1520 08/09/24  0435 08/09/24  0427 08/09/24  0255 08/08/24  2213   SODIUM mmol/L 140  --  138  --  138 142   POTASSIUM mmol/L 3.5 3.1* 3.7  --  3.6 3.7   CHLORIDE mmol/L 107  --  97*  --  97* 94*   CO2 mmol/L 18.4*  --  25.4  --  23.3 13.8*   BUN mg/dL 10  --  11  --  12 15   CREATININE mg/dL 0.63*  --  0.91 0.88 0.92 1.40*   CALCIUM mg/dL 6.2*  --  8.7  --  8.4* 9.1   BILIRUBIN mg/dL 0.5  --  1.3*  --   --  0.8   ALK PHOS U/L 53  --  80  --   --  108   ALT (SGPT) U/L 37  --  24  --   --  23   AST (SGOT) U/L 118*  --  74*  --   --  45*   GLUCOSE mg/dL 106*  --  76  --  174* 246*     Results from last 7 days   Lab Units 08/10/24  0539 08/09/24  2105 08/09/24  0435 08/09/24  0427 08/09/24  0255 08/08/24  2213   0000   MAGNESIUM mg/dL  --   --  2.2  --  " 2.0 2.8*  --    PHOSPHORUS mg/dL  --  3.7 1.5*  --  1.8*  --    < >   HEMOGLOBIN g/dL 12.1*  --   --   --   --   --   --    HEMOGLOBIN, POC   --   --   --    < >  --   --   --    HEMATOCRIT % 36.7*  --   --   --   --   --   --    HEMATOCRIT POC   --   --   --    < >  --   --   --     < > = values in this interval not displayed.     Lab Results   Component Value Date    HGBA1C 6.09 (H) 08/09/2024        Medications    Scheduled Medications fentaNYL citrate (PF), , ,   midazolam, , ,   cefTRIAXone, 1,000 mg, Intravenous, Q24H  enoxaparin, 40 mg, Subcutaneous, Daily  folic acid 1 mg in sodium chloride 0.9 % 50 mL IVPB, 1 mg, Intravenous, Daily  insulin lispro, 4-24 Units, Subcutaneous, Q6H  levETIRAcetam, 500 mg, Intravenous, Q12H  metroNIDAZOLE, 500 mg, Nasogastric, Q8H  miconazole, 1 Application, Topical, Q12H  PHENobarbital, 32.4 mg, Oral, Once   Followed by  [START ON 8/11/2024] PHENobarbital, 32.4 mg, Oral, Once   Followed by  [START ON 8/11/2024] PHENobarbital, 32.4 mg, Oral, Once  sertraline, 25 mg, Nasogastric, Nightly  sodium chloride, 10 mL, Intravenous, Q12H  sodium chloride, 10 mL, Intravenous, Q12H  thiamine (B-1) IV, 200 mg, Intravenous, Q8H   Followed by  [START ON 8/14/2024] thiamine, 100 mg, Oral, Daily        Infusions dexmedetomidine, 0.2-1.5 mcg/kg/hr, Last Rate: 1 mcg/kg/hr (08/10/24 0624)  norepinephrine, 0.02-0.3 mcg/kg/min, Last Rate: 0.14 mcg/kg/min (08/10/24 0836)  propofol, 5-50 mcg/kg/min  sodium chloride, 125 mL/hr, Last Rate: 125 mL/hr (08/10/24 0835)        PRN Medications   fentaNYL citrate (PF)    midazolam    acetaminophen    Calcium Replacement - Follow Nurse / BPA Driven Protocol    dextrose    dextrose    glucagon (human recombinant)    LORazepam **OR** LORazepam **OR** LORazepam **OR** LORazepam **OR** LORazepam **OR** LORazepam **OR** LORazepam **OR** LORazepam    Magnesium Standard Dose Replacement - Follow Nurse / BPA Driven Protocol    nitroglycerin    ondansetron ODT **OR**  ondansetron    Phosphorus Replacement - Follow Nurse / BPA Driven Protocol    [COMPLETED] Insert Peripheral IV **AND** sodium chloride    sodium chloride    sodium chloride    sodium chloride     Physical Findings        Trending Physical   Appearance, NFPE 8/10: NFPE completed, consistent with nutrition diagnosis of malnutrition using AND/ASPEN criteria. See MSA below.      --  Edema  None documented     Bowel Function No documented BM this admission      Tubes NG     Chewing/Swallowing NPO r/t pt not alert enough to eat     Skin No pressure injuries per WOCN assessment        Estimated/Assessed Needs       Energy Requirements    EST Needs, Method, Wt used 5785-5675 kcal/day (30-35 kcal/kg, 58.5 kg)       Protein Requirements    EST Needs, Method, Wt used  g/day (1.2-2.0 g/kg)       Fluid Requirements     Estimated Needs (mL/day) 1 mL/kcal or per MD     Fluid Deficit    Current Na Level (mEq/L)    Desired Na Level (mEq/L)    Estimated Fluid Deficit (L)       Current Nutrition Orders & Evaluation of Intake       Oral Nutrition     Food Allergies NKFA   Current PO Diet NPO Diet NPO Type: Strict NPO   Supplement -   PO Evaluation     Trending % PO Intake 8/10: NPO     Enteral Nutrition    Enteral Route NG   Order, Modulars, Flushes    Tolerance    TF Observation         Parenteral Nutrition     TPN Route    Total # Days on TPN    TPN Order, Lipid Details    MVI & Trace Element Freq    TPN Observation       Nutritional Risk Screening        NRS-2002 Score          Nutrition Diagnosis         Nutrition Dx Problem 1 Moderate chronic disease related malnutrition related to multiple chronic diseases (alcoholism, parkinson's, COPD, fatty liver) as evidenced by greater than 10% wt loss in less than 6 months, PO intake meeting less than 75% of estimated energy intake for greater than 1 month, and moderate muscle wasting per NFPE.       Nutrition Dx Problem 2        Intervention Goal         Intervention Goal(s) TF  initiation and tolerance, wt maintenance/trend toward IBW     Nutrition Intervention        RD Action NFPE completed  Provide goal TF     Nutrition Prescription          Diet Prescription NPO   Supplement Prescription -     Enteral Prescription Initial Goal:  *initial goal conservative d/t risk of RFS     Isosource 1.5 at 30 mL/hr + 10 mL/hr water flush      End Goal:    Isosource 1.5 at 60 mL/hr + water flush per clinical course     Calories  1980 kcals (within range)    Protein  90 g (within range)    Free water  1003 mL   Flushes       The above end goal rate is for 22 hrs/day to assume interruptions for ADLs. Water flushes adjusted based on clinical picture + Rx flushes/IV fluids          TPN Prescription      Monitor/Evaluation        Monitor Per protocol, Pertinent labs, EN delivery/tolerance, Weight, Swallow function     Malnutrition Severity Assessment      Patient meets criteria for : Moderate (non-severe) Malnutrition  Malnutrition Type (Last 8 Hours)       Malnutrition Severity Assessment       Row Name 08/10/24 1042       Malnutrition Severity Assessment    Malnutrition Type Chronic Disease - Related Malnutrition      Row Name 08/10/24 1042       Insufficient Energy Intake     Insufficient Energy Intake Findings Moderate    Insufficient Energy Intake  <75% of est. energy requirement for > or equal to 1 month      Row Name 08/10/24 1042       Unintentional Weight Loss     Unintentional Weight Loss Findings Severe    Unintentional Weight Loss  Weight loss greater than 10% in six months      Row Name 08/10/24 1042       Muscle Loss    Loss of Muscle Mass Findings Moderate    [place] Moderate - slight depression    Clavicle Bone Region Moderate - some protrusion in females, visible in males    Patellar Region Moderate - patella more prominent, less muscle definition around patella    Anterior Thigh Region Moderate - mild depression on inner thigh    Posterior Calf Region Moderate - some roundness,  slight firmness      Row Name 08/10/24 1042       Criteria Met (Must meet criteria for severity in at least 2 of these categories: M Wasting, Fat Loss, Fluid, Secondary Signs, Wt. Status, Intake)    Patient meets criteria for  Moderate (non-severe) Malnutrition                         Electronically signed by:  Sammi Mo RD  08/10/24 10:20 EDT

## 2024-08-11 LAB
ALBUMIN SERPL-MCNC: 3.1 G/DL (ref 3.5–5.2)
ALBUMIN/GLOB SERPL: 1.3 G/DL
ALP SERPL-CCNC: 61 U/L (ref 39–117)
ALT SERPL W P-5'-P-CCNC: 35 U/L (ref 1–41)
ANION GAP SERPL CALCULATED.3IONS-SCNC: 8.8 MMOL/L (ref 5–15)
AST SERPL-CCNC: 75 U/L (ref 1–40)
BASOPHILS # BLD AUTO: 0.03 10*3/MM3 (ref 0–0.2)
BASOPHILS NFR BLD AUTO: 0.4 % (ref 0–1.5)
BILIRUB SERPL-MCNC: 0.7 MG/DL (ref 0–1.2)
BUN SERPL-MCNC: 9 MG/DL (ref 8–23)
BUN/CREAT SERPL: 13.6 (ref 7–25)
CALCIUM SPEC-SCNC: 7.5 MG/DL (ref 8.6–10.5)
CHLORIDE SERPL-SCNC: 97 MMOL/L (ref 98–107)
CO2 SERPL-SCNC: 32.2 MMOL/L (ref 22–29)
CREAT SERPL-MCNC: 0.66 MG/DL (ref 0.76–1.27)
DEPRECATED RDW RBC AUTO: 66.2 FL (ref 37–54)
EGFRCR SERPLBLD CKD-EPI 2021: 104.7 ML/MIN/1.73
EOSINOPHIL # BLD AUTO: 0.15 10*3/MM3 (ref 0–0.4)
EOSINOPHIL NFR BLD AUTO: 1.9 % (ref 0.3–6.2)
ERYTHROCYTE [DISTWIDTH] IN BLOOD BY AUTOMATED COUNT: 18.4 % (ref 12.3–15.4)
GLOBULIN UR ELPH-MCNC: 2.3 GM/DL
GLUCOSE BLDC GLUCOMTR-MCNC: 128 MG/DL (ref 70–105)
GLUCOSE BLDC GLUCOMTR-MCNC: 148 MG/DL (ref 70–105)
GLUCOSE BLDC GLUCOMTR-MCNC: 179 MG/DL (ref 70–105)
GLUCOSE BLDC GLUCOMTR-MCNC: 197 MG/DL (ref 70–105)
GLUCOSE BLDC GLUCOMTR-MCNC: 249 MG/DL (ref 70–105)
GLUCOSE SERPL-MCNC: 151 MG/DL (ref 65–99)
HCT VFR BLD AUTO: 39.2 % (ref 37.5–51)
HGB BLD-MCNC: 13.5 G/DL (ref 13–17.7)
IMM GRANULOCYTES # BLD AUTO: 0.02 10*3/MM3 (ref 0–0.05)
IMM GRANULOCYTES NFR BLD AUTO: 0.3 % (ref 0–0.5)
LYMPHOCYTES # BLD AUTO: 1.74 10*3/MM3 (ref 0.7–3.1)
LYMPHOCYTES NFR BLD AUTO: 22.1 % (ref 19.6–45.3)
MCH RBC QN AUTO: 34.4 PG (ref 26.6–33)
MCHC RBC AUTO-ENTMCNC: 34.4 G/DL (ref 31.5–35.7)
MCV RBC AUTO: 100 FL (ref 79–97)
MONOCYTES # BLD AUTO: 0.41 10*3/MM3 (ref 0.1–0.9)
MONOCYTES NFR BLD AUTO: 5.2 % (ref 5–12)
NEUTROPHILS NFR BLD AUTO: 5.52 10*3/MM3 (ref 1.7–7)
NEUTROPHILS NFR BLD AUTO: 70.1 % (ref 42.7–76)
NRBC BLD AUTO-RTO: 0 /100 WBC (ref 0–0.2)
PLATELET # BLD AUTO: 66 10*3/MM3 (ref 140–450)
PMV BLD AUTO: 11.5 FL (ref 6–12)
POTASSIUM SERPL-SCNC: 3.2 MMOL/L (ref 3.5–5.2)
PROT SERPL-MCNC: 5.4 G/DL (ref 6–8.5)
RBC # BLD AUTO: 3.92 10*6/MM3 (ref 4.14–5.8)
SODIUM SERPL-SCNC: 138 MMOL/L (ref 136–145)
WBC NRBC COR # BLD AUTO: 7.87 10*3/MM3 (ref 3.4–10.8)

## 2024-08-11 PROCEDURE — 0 DEXTROSE 5 % SOLUTION 1,000 ML FLEX CONT: Performed by: NURSE PRACTITIONER

## 2024-08-11 PROCEDURE — 85025 COMPLETE CBC W/AUTO DIFF WBC: CPT | Performed by: NURSE PRACTITIONER

## 2024-08-11 PROCEDURE — 25010000002 ENOXAPARIN PER 10 MG: Performed by: NURSE PRACTITIONER

## 2024-08-11 PROCEDURE — 82948 REAGENT STRIP/BLOOD GLUCOSE: CPT | Performed by: INTERNAL MEDICINE

## 2024-08-11 PROCEDURE — 25010000002 PROPOFOL 10 MG/ML EMULSION: Performed by: INTERNAL MEDICINE

## 2024-08-11 PROCEDURE — 82948 REAGENT STRIP/BLOOD GLUCOSE: CPT

## 2024-08-11 PROCEDURE — 94799 UNLISTED PULMONARY SVC/PX: CPT

## 2024-08-11 PROCEDURE — 25010000002 LORAZEPAM PER 2 MG: Performed by: NURSE PRACTITIONER

## 2024-08-11 PROCEDURE — 80053 COMPREHEN METABOLIC PANEL: CPT | Performed by: NURSE PRACTITIONER

## 2024-08-11 PROCEDURE — 94003 VENT MGMT INPAT SUBQ DAY: CPT

## 2024-08-11 PROCEDURE — 63710000001 INSULIN LISPRO (HUMAN) PER 5 UNITS: Performed by: INTERNAL MEDICINE

## 2024-08-11 PROCEDURE — 25010000002 LEVETRIRACETAM PER 10 MG: Performed by: NURSE PRACTITIONER

## 2024-08-11 PROCEDURE — 25010000002 CEFTRIAXONE PER 250 MG: Performed by: INTERNAL MEDICINE

## 2024-08-11 PROCEDURE — 25010000002 THIAMINE PER 100 MG: Performed by: NURSE PRACTITIONER

## 2024-08-11 PROCEDURE — 94761 N-INVAS EAR/PLS OXIMETRY MLT: CPT

## 2024-08-11 RX ORDER — PHENOBARBITAL 32.4 MG/1
32.4 TABLET ORAL ONCE
Status: COMPLETED | OUTPATIENT
Start: 2024-08-11 | End: 2024-08-11

## 2024-08-11 RX ORDER — POTASSIUM CHLORIDE 1.5 G/1.58G
40 POWDER, FOR SOLUTION ORAL EVERY 4 HOURS
Status: COMPLETED | OUTPATIENT
Start: 2024-08-11 | End: 2024-08-11

## 2024-08-11 RX ORDER — MIDODRINE HYDROCHLORIDE 5 MG/1
10 TABLET ORAL EVERY 8 HOURS
Status: DISCONTINUED | OUTPATIENT
Start: 2024-08-11 | End: 2024-08-15

## 2024-08-11 RX ORDER — FENTANYL CITRATE 50 UG/ML
50 INJECTION, SOLUTION INTRAMUSCULAR; INTRAVENOUS
Status: DISCONTINUED | OUTPATIENT
Start: 2024-08-11 | End: 2024-08-14

## 2024-08-11 RX ADMIN — DEXMEDETOMIDINE HYDROCHLORIDE 1.5 MCG/KG/HR: 4 INJECTION, SOLUTION INTRAVENOUS at 21:54

## 2024-08-11 RX ADMIN — METRONIDAZOLE 500 MG: 500 TABLET ORAL at 05:36

## 2024-08-11 RX ADMIN — Medication 10 ML: at 21:42

## 2024-08-11 RX ADMIN — Medication 10 ML: at 08:24

## 2024-08-11 RX ADMIN — POTASSIUM CHLORIDE 40 MEQ: 1.5 POWDER, FOR SOLUTION ORAL at 10:40

## 2024-08-11 RX ADMIN — MIDODRINE HYDROCHLORIDE 10 MG: 5 TABLET ORAL at 18:32

## 2024-08-11 RX ADMIN — INSULIN LISPRO 4 UNITS: 100 INJECTION, SOLUTION INTRAVENOUS; SUBCUTANEOUS at 00:52

## 2024-08-11 RX ADMIN — INSULIN LISPRO 8 UNITS: 100 INJECTION, SOLUTION INTRAVENOUS; SUBCUTANEOUS at 12:33

## 2024-08-11 RX ADMIN — DEXMEDETOMIDINE HYDROCHLORIDE 1.5 MCG/KG/HR: 4 INJECTION, SOLUTION INTRAVENOUS at 16:31

## 2024-08-11 RX ADMIN — POTASSIUM CHLORIDE 40 MEQ: 1.5 POWDER, FOR SOLUTION ORAL at 13:49

## 2024-08-11 RX ADMIN — THIAMINE HYDROCHLORIDE 200 MG: 100 INJECTION, SOLUTION INTRAMUSCULAR; INTRAVENOUS at 13:49

## 2024-08-11 RX ADMIN — FOLIC ACID 1 MG: 5 INJECTION, SOLUTION INTRAMUSCULAR; INTRAVENOUS; SUBCUTANEOUS at 08:24

## 2024-08-11 RX ADMIN — LEVETIRACETAM 500 MG: 500 INJECTION, SOLUTION INTRAVENOUS at 08:24

## 2024-08-11 RX ADMIN — PHENOBARBITAL 32.4 MG: 32.4 TABLET ORAL at 12:33

## 2024-08-11 RX ADMIN — PROPOFOL INJECTABLE EMULSION 30 MCG/KG/MIN: 10 INJECTION, EMULSION INTRAVENOUS at 08:15

## 2024-08-11 RX ADMIN — LEVETIRACETAM 500 MG: 500 INJECTION, SOLUTION INTRAVENOUS at 21:42

## 2024-08-11 RX ADMIN — SERTRALINE HYDROCHLORIDE 25 MG: 25 TABLET ORAL at 21:43

## 2024-08-11 RX ADMIN — DEXMEDETOMIDINE HYDROCHLORIDE 1.5 MCG/KG/HR: 4 INJECTION, SOLUTION INTRAVENOUS at 10:25

## 2024-08-11 RX ADMIN — LORAZEPAM 2 MG: 2 INJECTION INTRAMUSCULAR; INTRAVENOUS at 19:54

## 2024-08-11 RX ADMIN — MIDODRINE HYDROCHLORIDE 10 MG: 5 TABLET ORAL at 10:40

## 2024-08-11 RX ADMIN — METRONIDAZOLE 500 MG: 500 TABLET ORAL at 21:43

## 2024-08-11 RX ADMIN — METRONIDAZOLE 500 MG: 500 TABLET ORAL at 13:49

## 2024-08-11 RX ADMIN — SODIUM BICARBONATE 150 MEQ: 84 INJECTION INTRAVENOUS at 14:23

## 2024-08-11 RX ADMIN — THIAMINE HYDROCHLORIDE 200 MG: 100 INJECTION, SOLUTION INTRAMUSCULAR; INTRAVENOUS at 21:43

## 2024-08-11 RX ADMIN — ANTI-FUNGAL POWDER MICONAZOLE NITRATE TALC FREE 1 APPLICATION: 1.42 POWDER TOPICAL at 08:24

## 2024-08-11 RX ADMIN — THIAMINE HYDROCHLORIDE 200 MG: 100 INJECTION, SOLUTION INTRAMUSCULAR; INTRAVENOUS at 05:36

## 2024-08-11 RX ADMIN — SODIUM BICARBONATE 150 MEQ: 84 INJECTION INTRAVENOUS at 05:25

## 2024-08-11 RX ADMIN — PHENOBARBITAL 32.4 MG: 32.4 TABLET ORAL at 21:43

## 2024-08-11 RX ADMIN — LANSOPRAZOLE 30 MG: 30 TABLET, ORALLY DISINTEGRATING, DELAYED RELEASE ORAL at 05:36

## 2024-08-11 RX ADMIN — CEFTRIAXONE 1000 MG: 1 INJECTION, POWDER, FOR SOLUTION INTRAMUSCULAR; INTRAVENOUS at 10:40

## 2024-08-11 RX ADMIN — ENOXAPARIN SODIUM 40 MG: 100 INJECTION SUBCUTANEOUS at 16:31

## 2024-08-11 RX ADMIN — ANTI-FUNGAL POWDER MICONAZOLE NITRATE TALC FREE 1 APPLICATION: 1.42 POWDER TOPICAL at 21:54

## 2024-08-11 NOTE — PLAN OF CARE
Goal Outcome Evaluation:           Pt following commands. Pt continues on vent. Pt on precedex. Propofol/levo/bicarb all stopped throughout day.

## 2024-08-11 NOTE — PROGRESS NOTES
Nutrition Services    Patient Name: Emiliano Bateman  YOB: 1960  MRN: [id number]  Admission date: 8/8/2024    PROGRESS NOTE      Encounter Information: 8/11: Checking in on TF initiation and tolerance. Pt intubated 8/10, pt remains intubated at this time. Precedex infusing. Propofol stopped. Levo stopped.        PO Diet: NPO Diet NPO Type: Tube Feeding   PO Supplements: -   PO Intake:  -       Current nutrition support: Isosource 1.5 @ 30 mL/hr with 10 mL/hr FWF   Nutrition support review:        Labs (reviewed below): Hyperglycemia - will trial DM specific formula        GI Function:  No documented BM since admission (x 3 days)       Nutrition Intervention Updates: Change TF order to Diabetisource AC @ 45 mL/hr, advance rate by 10 mL q4 until goal reached.    GOAL: Diabetisource AC @ 65 mL/hr with 10 mL/hr FWF.       Results from last 7 days   Lab Units 08/11/24  0535 08/10/24  0539 08/09/24  1520 08/09/24  0435   SODIUM mmol/L 138 140  --  138   POTASSIUM mmol/L 3.2* 3.5 3.1* 3.7   CHLORIDE mmol/L 97* 107  --  97*   CO2 mmol/L 32.2* 18.4*  --  25.4   BUN mg/dL 9 10  --  11   CREATININE mg/dL 0.66* 0.63*  --  0.91   CALCIUM mg/dL 7.5* 6.2*  --  8.7   BILIRUBIN mg/dL 0.7 0.5  --  1.3*   ALK PHOS U/L 61 53  --  80   ALT (SGPT) U/L 35 37  --  24   AST (SGOT) U/L 75* 118*  --  74*   GLUCOSE mg/dL 151* 106*  --  76     Results from last 7 days   Lab Units 08/11/24  0535 08/10/24  0539 08/09/24  2105 08/09/24  0435 08/09/24  0427 08/09/24  0255 08/08/24  2213   0000   MAGNESIUM mg/dL  --   --   --  2.2  --  2.0 2.8*  --    PHOSPHORUS mg/dL  --   --  3.7 1.5*  --  1.8*  --    < >   HEMOGLOBIN g/dL 13.5 12.1*  --   --   --   --   --   --    HEMOGLOBIN, POC   --   --   --   --    < >  --   --   --    HEMATOCRIT % 39.2 36.7*  --   --   --   --   --   --    HEMATOCRIT POC   --   --   --   --    < >  --   --   --    TRIGLYCERIDES mg/dL  --  102  --   --   --   --   --   --     < > = values in this interval not  displayed.     COVID19   Date Value Ref Range Status   08/09/2024 Not Detected Not Detected - Ref. Range Final     Lab Results   Component Value Date    HGBA1C 6.09 (H) 08/09/2024     Estimated/Assessed Needs       Energy Requirements    EST Needs, Method, Wt used 1569 kcal/day ([employer] 2003b)  8696-7239 kcal/day (30-35 kcal/kg, 58.5 kg)        Protein Requirements    EST Needs, Method, Wt used  g/day (1.2-2.0 g/kg)        Fluid Requirements     Estimated Needs (mL/day) 1 mL/kcal or per MD     Enteral Prescription Initial Goal:  *initial goal conservative to assess for tolerance     Diabetisource AC at 45 mL/hr + 10 mL/hr water flush, advance by 10 mL q4 until goal reached.     End Goal:    Diabetisource AC at 65 mL/hr + 10 mL/hr water flush     Calories  1716 kcals (109% [employer] 2003b)    Protein  86 g (within range)    Free water  1172 mL   Flushes  220 mL     The above end goal rate is for 22 hrs/day to assume interruptions for ADLs. Water flushes adjusted based on clinical picture + Rx flushes/IV fluids     Specialized formula chosen r/t hyperglycemia on standard formula.         TPN Prescription        RD to follow up per protocol.    Electronically signed by:  Sammi Mo RD  08/11/24 14:09 EDT

## 2024-08-11 NOTE — PROGRESS NOTES
"Critical Care Progress Note     Emiliano Bateman : 1960 MRN:[id number] LOS:2  Rm: 2316/1     Principal Problem: Alcohol withdrawal seizure with delirium     Reason for follow up: All the medical problems listed below    Summary     Emiliano Bateman is a 63 y.o. male with PMH of hypertension, COPD, liver disease, Parkinson's, DMT2, seizure disorder who presented to the hospital after suffering a seizure at home, and was admitted with a principal diagnosis of Alcohol withdrawal seizure with delirium.  Information for HPI taken from chart review as patient is unable to cooperate at time of assessment due to acuity of illness.  His wife is at bedside, and states that he had a seizure today which prompted her to call EMS.  Of note patient was recently in our hospital in April for new onset seizures secondary to alcohol withdrawal.  At that time it was reported that patient drink 1 gallon of vodka daily.  Per patient wife, patient has decreased his drinking significantly since that hospitalization.  She states that his last drink was approximately 2 days ago.  Also, patient does have parkinsonian tremors, and was on medication before but stopped taking them due to side effects.  Per ED documentation patient was conversive when he came to the emergency room and denied any complaints of pain or headache.  He had no subjective fevers or recent illnesses.  Per report patient has had stated he felt \"puny\".  Per PTA meds he was not restarted on medication for his Parkinson's on discharge from the hospital.     ED course: Shortly after arriving to the emergency department he had what was described as a generalized tonic-clonic seizure that lasted several minutes.  He was treated with IV Ativan and loaded with 1 g IV Keppra.  Lab showed CK of 226, anion gap 34.2 with glucose 263, CO2 13.8, WBC 12.81 without bandemia, and ammonia 71.  Was ordered 1 L saline bolus.  UA without WBCs or bacteria, however + glucose and + " ketones.  ABG pending at time of note.  CT head and cervical spine were completed and negative for acute abnormality.    Significant Events     08/11/24 : Intubated and sedated.  Ventilator support requirements minimal with FiO2 40% and PEEP +5.  Currently on Precedex and propofol, attempting to wean the propofol.  Add fentanyl pushes as needed.  Requiring Levophed for vasopressor support.    Assessment / Plan     Acute respiratory failure without hypoxia or without hypercapnia  Intubated for airway protection  Worsening encephalopathy secondary to acute DTs required intubation 8/10 for airway protection  RVP negative  Respiratory culture pending and NTD  Ventilator settings noted and adjusted as needed.   Close monitoring of ABG.   Minimize sedation/analgesia to keep RASS of 0 to -1.    Seizures, not in status  Probable alcohol withdrawal seizure activity  Ceribell placed in ED, highest reading of seizure burden at 7%   Continue IV Keppra  On phenobarbital taper per protocol  IV Ativan as needed  Seizure precaution  Now intubated and sedated     Alcohol withdrawal syndrome  Patient previously consumed a gallon of vodka daily  Patient's wife states he has drastically reduced his drinking  Last drink was 2 days prior to admission  CIWA protocol.  Now intubated and sedated     Diabetic ketoacidosis without coma, with history of diabetes mellitus, type 2  Anion gap of 34 on admission.  Hemoglobin A1c 6.09  DKA protocol initiated  Insulin drip initiated per DKA protocol, now transitioned off  Adjust IV fluids as indicated  Accuchecks with sliding scale insulin     Essential hypertension  Patient on home amlodipine, unable to take safely at this time -resume when appropriate  Given IV Lopressor     Leukocytosis  Patient met SIRS criteria with elevated WBC and elevated heart rate, however no clear etiology of infection is apparent  Blood cultures pending and NTD  UA negative  Procalcitonin 0.46 and lactic 4.5 on  admission, trended down to 2.2.  No need for further serial monitoring  Patient hypertensive requiring IV Lopressor  Due to respiratory failure added empiric ceftriaxone 8/9 for 5 days  CXR no acute cardiopulmonary disease      History of Parkinson's  Patient not on home medication     COPD  Continue budesonide and duoneb scheduled and as needed.  Monitor ABG's as ordered.   Intubated for airway protection 8/11     Anxiety/Depression  Continue home Zoloft     Disposition: ICU    Code status:   Code Status (Patient has no pulse and is not breathing): CPR (Attempt to Resuscitate)  Medical Interventions (Patient has pulse or is breathing): Full Support       Nutrition:   NPO Diet NPO Type: Tube Feeding   Tube Feeding: Formula: Isosource 1.5 (Jevity 1.5); Feeding Type: Continuous; Start at: 30 mL/hr; Then Advance By: Do Not Advance; Water Flush: 10 mL; Every: 1 hour; Water Bolus: None     VTE Prophylaxis:  Pharmacologic & mechanical VTE prophylaxis orders are present.         Subjective / Review of systems     Review of Systems   Unable to perform ROS: Intubated        Objective / Physical Exam     Vital signs:  Temp: 98.9 °F (37.2 °C)  BP: 137/81  Heart Rate: 57  Resp: 16  SpO2: 93 %  Weight: 58.5 kg (128 lb 15.5 oz)    Admission Weight: Weight: 64.4 kg (142 lb)  Current Weight: Weight: 58.5 kg (128 lb 15.5 oz)    Input/Output in last 24 hours:    Intake/Output Summary (Last 24 hours) at 8/11/2024 1106  Last data filed at 8/11/2024 1054  Gross per 24 hour   Intake 8651 ml   Output 1650 ml   Net 7001 ml      Net IO Since Admission: 8,997 mL [08/11/24 1106]     Physical Exam  Vitals and nursing note reviewed.   Constitutional:       Appearance: He is ill-appearing.      Comments: Intubated and sedated   HENT:      Head: Normocephalic and atraumatic.      Right Ear: External ear normal.      Left Ear: External ear normal.      Nose: Nose normal.      Comments: Left NG tube     Mouth/Throat:      Mouth: Mucous membranes  are dry.      Comments: Oral ET tube secured  Eyes:      General: No scleral icterus.     Pupils: Pupils are equal, round, and reactive to light.   Neck:      Comments: No JVD  Cardiovascular:      Heart sounds: Normal heart sounds, S1 normal and S2 normal. No murmur heard.     Comments: Sinus bradycardia to sinus rhythm  Pulmonary:      Breath sounds: No wheezing or rhonchi.      Comments: Mechanically ventilated, coarse but clear  Abdominal:      General: There is no distension.      Palpations: Abdomen is soft.      Comments: Active bowel sounds   Musculoskeletal:      Cervical back: Neck supple. No rigidity.   Skin:     General: Skin is warm and dry.   Neurological:      Comments: Intubated and sedated          Radiology and Labs     Results from last 7 days   Lab Units 08/11/24  0535 08/10/24  0539 08/09/24  0427 08/08/24  2157   WBC 10*3/mm3 7.87 7.71  --  12.81*   HEMOGLOBIN g/dL 13.5 12.1*  --  16.3   HEMOGLOBIN, POC g/dL  --   --  15.9  --    HEMATOCRIT % 39.2 36.7*  --  48.0   HEMATOCRIT POC %  --   --  47  --    PLATELETS 10*3/mm3 66* 73*  --  151      Results from last 7 days   Lab Units 08/08/24  2157   PROTIME Seconds 10.3   INR  0.94   APTT seconds 28.2*      Results from last 7 days   Lab Units 08/11/24  0535 08/10/24  0539 08/09/24  2105 08/09/24  1520 08/09/24  0435 08/09/24  0427 08/09/24  0255 08/08/24  2213   SODIUM mmol/L 138 140  --   --  138  --  138 142   POTASSIUM mmol/L 3.2* 3.5  --  3.1* 3.7  --  3.6 3.7   CHLORIDE mmol/L 97* 107  --   --  97*  --  97* 94*   CO2 mmol/L 32.2* 18.4*  --   --  25.4  --  23.3 13.8*   BUN mg/dL 9 10  --   --  11  --  12 15   CREATININE mg/dL 0.66* 0.63*  --   --  0.91 0.88 0.92 1.40*   GLUCOSE mg/dL 151* 106*  --   --  76  --  174* 246*   MAGNESIUM mg/dL  --   --   --   --  2.2  --  2.0 2.8*   PHOSPHORUS mg/dL  --   --  3.7  --  1.5*  --  1.8*  --       Results from last 7 days   Lab Units 08/11/24  0535 08/10/24  0539 08/09/24  0435 08/08/24  5008   ALK PHOS  U/L 61 53 80 108   AST (SGOT) U/L 75* 118* 74* 45*   ALT (SGPT) U/L 35 37 24 23     Results from last 7 days   Lab Units 08/10/24  1237 08/09/24  0427   PH, ARTERIAL pH units 7.267* 7.541*   PCO2, ARTERIAL mm Hg 37.8 33.5*   PO2 ART mm Hg 153.8* 89.6   O2 SATURATION ART % 99.1* 98.0   FIO2 % 60 40   HCO3 ART mmol/L 17.2* 28.7*   BASE EXCESS ART mmol/L -9.0* 6.3*       XR Chest 1 View    Result Date: 8/10/2024  Impression: Lines and tubes as characterized above. No definite acute cardiopulmonary abnormality. Electronically Signed: Robert Weston DO  8/10/2024 11:29 AM EDT  Workstation ID: [id number]    XR Abdomen KUB    Result Date: 8/9/2024  Impression: The tip of the nasogastric tube terminates in the fundus of the stomach. The sideport is located at the gastroesophageal junction. Electronically Signed: Patel Nation MD  8/9/2024 12:42 PM EDT  Workstation ID: [id number]     Current medications     Scheduled Meds:   cefTRIAXone, 1,000 mg, Intravenous, Q24H  enoxaparin, 40 mg, Subcutaneous, Daily  folic acid 1 mg in sodium chloride 0.9 % 50 mL IVPB, 1 mg, Intravenous, Daily  insulin lispro, 4-24 Units, Subcutaneous, Q6H  lansoprazole, 30 mg, Nasogastric, Q AM  levETIRAcetam, 500 mg, Intravenous, Q12H  metroNIDAZOLE, 500 mg, Nasogastric, Q8H  miconazole, 1 Application, Topical, Q12H  midodrine, 10 mg, Nasogastric, Q8H  PHENobarbital, 32.4 mg, Oral, Once   Followed by  PHENobarbital, 32.4 mg, Oral, Once  potassium chloride, 40 mEq, Nasogastric, Q4H  sertraline, 25 mg, Nasogastric, Nightly  sodium chloride, 10 mL, Intravenous, Q12H  sodium chloride, 10 mL, Intravenous, Q12H  thiamine (B-1) IV, 200 mg, Intravenous, Q8H   Followed by  [START ON 8/14/2024] thiamine, 100 mg, Oral, Daily        Continuous Infusions:   dexmedetomidine, 0.2-1.5 mcg/kg/hr, Last Rate: 1 mcg/kg/hr (08/11/24 1051)  norepinephrine, 0.02-0.3 mcg/kg/min, Last Rate: 0.04 mcg/kg/min (08/11/24 1053)  propofol, 5-50 mcg/kg/min, Last Rate: 15 mcg/kg/min  (08/11/24 1052)  sodium bicarbonate 8.4 % 150 mEq in dextrose (D5W) 5 % 1,000 mL infusion (greater than 100 mEq), 150 mEq, Last Rate: 125 mL/hr at 08/11/24 1053          Plan discussed with RN. Reviewed all other data in the last 24 hours, including but not limited to vitals, labs, microbiology, imaging and pertinent notes from other providers.  .      KAREL Merlos   Critical Care  08/11/24   11:06 EDT

## 2024-08-12 LAB
ALBUMIN SERPL-MCNC: 2.8 G/DL (ref 3.5–5.2)
ALBUMIN/GLOB SERPL: 1.3 G/DL
ALP SERPL-CCNC: 64 U/L (ref 39–117)
ALT SERPL W P-5'-P-CCNC: 25 U/L (ref 1–41)
ANION GAP SERPL CALCULATED.3IONS-SCNC: 7.9 MMOL/L (ref 5–15)
AST SERPL-CCNC: 44 U/L (ref 1–40)
BACTERIA SPEC RESP CULT: NORMAL
BASOPHILS # BLD AUTO: 0.03 10*3/MM3 (ref 0–0.2)
BASOPHILS NFR BLD AUTO: 0.6 % (ref 0–1.5)
BILIRUB SERPL-MCNC: 0.4 MG/DL (ref 0–1.2)
BUN SERPL-MCNC: 11 MG/DL (ref 8–23)
BUN/CREAT SERPL: 19 (ref 7–25)
CALCIUM SPEC-SCNC: 7.2 MG/DL (ref 8.6–10.5)
CHLORIDE SERPL-SCNC: 100 MMOL/L (ref 98–107)
CO2 SERPL-SCNC: 30.1 MMOL/L (ref 22–29)
CREAT SERPL-MCNC: 0.58 MG/DL (ref 0.76–1.27)
DEPRECATED RDW RBC AUTO: 68.8 FL (ref 37–54)
EGFRCR SERPLBLD CKD-EPI 2021: 108.9 ML/MIN/1.73
EOSINOPHIL # BLD AUTO: 0.14 10*3/MM3 (ref 0–0.4)
EOSINOPHIL NFR BLD AUTO: 2.6 % (ref 0.3–6.2)
ERYTHROCYTE [DISTWIDTH] IN BLOOD BY AUTOMATED COUNT: 18.9 % (ref 12.3–15.4)
GLOBULIN UR ELPH-MCNC: 2.1 GM/DL
GLUCOSE BLDC GLUCOMTR-MCNC: 171 MG/DL (ref 70–105)
GLUCOSE BLDC GLUCOMTR-MCNC: 172 MG/DL (ref 70–105)
GLUCOSE BLDC GLUCOMTR-MCNC: 187 MG/DL (ref 70–105)
GLUCOSE BLDC GLUCOMTR-MCNC: 193 MG/DL (ref 70–105)
GLUCOSE BLDC GLUCOMTR-MCNC: 198 MG/DL (ref 70–105)
GLUCOSE SERPL-MCNC: 166 MG/DL (ref 65–99)
GRAM STN SPEC: NORMAL
HCT VFR BLD AUTO: 39.2 % (ref 37.5–51)
HGB BLD-MCNC: 13.2 G/DL (ref 13–17.7)
IMM GRANULOCYTES # BLD AUTO: 0.02 10*3/MM3 (ref 0–0.05)
IMM GRANULOCYTES NFR BLD AUTO: 0.4 % (ref 0–0.5)
LYMPHOCYTES # BLD AUTO: 1.81 10*3/MM3 (ref 0.7–3.1)
LYMPHOCYTES NFR BLD AUTO: 33.2 % (ref 19.6–45.3)
MCH RBC QN AUTO: 34.2 PG (ref 26.6–33)
MCHC RBC AUTO-ENTMCNC: 33.7 G/DL (ref 31.5–35.7)
MCV RBC AUTO: 101.6 FL (ref 79–97)
MONOCYTES # BLD AUTO: 0.48 10*3/MM3 (ref 0.1–0.9)
MONOCYTES NFR BLD AUTO: 8.8 % (ref 5–12)
NEUTROPHILS NFR BLD AUTO: 2.97 10*3/MM3 (ref 1.7–7)
NEUTROPHILS NFR BLD AUTO: 54.4 % (ref 42.7–76)
NRBC BLD AUTO-RTO: 0.4 /100 WBC (ref 0–0.2)
PLATELET # BLD AUTO: 66 10*3/MM3 (ref 140–450)
PMV BLD AUTO: 10.9 FL (ref 6–12)
POTASSIUM SERPL-SCNC: 3.3 MMOL/L (ref 3.5–5.2)
POTASSIUM SERPL-SCNC: 4.3 MMOL/L (ref 3.5–5.2)
PROT SERPL-MCNC: 4.9 G/DL (ref 6–8.5)
RBC # BLD AUTO: 3.86 10*6/MM3 (ref 4.14–5.8)
SODIUM SERPL-SCNC: 138 MMOL/L (ref 136–145)
WBC NRBC COR # BLD AUTO: 5.45 10*3/MM3 (ref 3.4–10.8)

## 2024-08-12 PROCEDURE — 97535 SELF CARE MNGMENT TRAINING: CPT

## 2024-08-12 PROCEDURE — 25010000002 LEVETRIRACETAM PER 10 MG: Performed by: NURSE PRACTITIONER

## 2024-08-12 PROCEDURE — 80053 COMPREHEN METABOLIC PANEL: CPT | Performed by: NURSE PRACTITIONER

## 2024-08-12 PROCEDURE — 94003 VENT MGMT INPAT SUBQ DAY: CPT

## 2024-08-12 PROCEDURE — 82948 REAGENT STRIP/BLOOD GLUCOSE: CPT

## 2024-08-12 PROCEDURE — 94761 N-INVAS EAR/PLS OXIMETRY MLT: CPT

## 2024-08-12 PROCEDURE — 84132 ASSAY OF SERUM POTASSIUM: CPT | Performed by: INTERNAL MEDICINE

## 2024-08-12 PROCEDURE — 94799 UNLISTED PULMONARY SVC/PX: CPT

## 2024-08-12 PROCEDURE — 25010000002 CEFTRIAXONE PER 250 MG: Performed by: INTERNAL MEDICINE

## 2024-08-12 PROCEDURE — 63710000001 INSULIN LISPRO (HUMAN) PER 5 UNITS: Performed by: INTERNAL MEDICINE

## 2024-08-12 PROCEDURE — 25010000002 THIAMINE PER 100 MG: Performed by: NURSE PRACTITIONER

## 2024-08-12 PROCEDURE — 97530 THERAPEUTIC ACTIVITIES: CPT

## 2024-08-12 PROCEDURE — 85025 COMPLETE CBC W/AUTO DIFF WBC: CPT | Performed by: NURSE PRACTITIONER

## 2024-08-12 PROCEDURE — 25010000002 LORAZEPAM PER 2 MG: Performed by: NURSE PRACTITIONER

## 2024-08-12 PROCEDURE — 25010000002 ENOXAPARIN PER 10 MG: Performed by: NURSE PRACTITIONER

## 2024-08-12 RX ORDER — LEVETIRACETAM 100 MG/ML
500 SOLUTION ORAL EVERY 12 HOURS SCHEDULED
Status: DISCONTINUED | OUTPATIENT
Start: 2024-08-12 | End: 2024-08-15

## 2024-08-12 RX ORDER — POTASSIUM CHLORIDE 1.5 G/1.58G
40 POWDER, FOR SOLUTION ORAL EVERY 4 HOURS
Status: COMPLETED | OUTPATIENT
Start: 2024-08-12 | End: 2024-08-12

## 2024-08-12 RX ORDER — ACETAMINOPHEN 160 MG/5ML
650 SOLUTION ORAL EVERY 6 HOURS PRN
Status: DISCONTINUED | OUTPATIENT
Start: 2024-08-12 | End: 2024-08-12

## 2024-08-12 RX ORDER — ACETAMINOPHEN 160 MG/5ML
650 SOLUTION ORAL EVERY 6 HOURS PRN
Status: DISCONTINUED | OUTPATIENT
Start: 2024-08-12 | End: 2024-08-17 | Stop reason: HOSPADM

## 2024-08-12 RX ADMIN — LEVETIRACETAM 500 MG: 500 INJECTION, SOLUTION INTRAVENOUS at 08:45

## 2024-08-12 RX ADMIN — CEFTRIAXONE 1000 MG: 1 INJECTION, POWDER, FOR SOLUTION INTRAMUSCULAR; INTRAVENOUS at 10:05

## 2024-08-12 RX ADMIN — INSULIN LISPRO 4 UNITS: 100 INJECTION, SOLUTION INTRAVENOUS; SUBCUTANEOUS at 05:48

## 2024-08-12 RX ADMIN — ANTI-FUNGAL POWDER MICONAZOLE NITRATE TALC FREE 1 APPLICATION: 1.42 POWDER TOPICAL at 20:24

## 2024-08-12 RX ADMIN — Medication 10 ML: at 08:46

## 2024-08-12 RX ADMIN — ENOXAPARIN SODIUM 40 MG: 100 INJECTION SUBCUTANEOUS at 15:16

## 2024-08-12 RX ADMIN — LORAZEPAM 2 MG: 2 INJECTION INTRAMUSCULAR; INTRAVENOUS at 00:26

## 2024-08-12 RX ADMIN — INSULIN LISPRO 4 UNITS: 100 INJECTION, SOLUTION INTRAVENOUS; SUBCUTANEOUS at 23:54

## 2024-08-12 RX ADMIN — Medication 10 ML: at 20:37

## 2024-08-12 RX ADMIN — INSULIN LISPRO 4 UNITS: 100 INJECTION, SOLUTION INTRAVENOUS; SUBCUTANEOUS at 18:13

## 2024-08-12 RX ADMIN — POTASSIUM CHLORIDE 40 MEQ: 1.5 POWDER, FOR SOLUTION ORAL at 08:49

## 2024-08-12 RX ADMIN — LORAZEPAM 2 MG: 2 INJECTION INTRAMUSCULAR; INTRAVENOUS at 05:34

## 2024-08-12 RX ADMIN — MIDODRINE HYDROCHLORIDE 10 MG: 5 TABLET ORAL at 10:05

## 2024-08-12 RX ADMIN — FOLIC ACID 1 MG: 5 INJECTION, SOLUTION INTRAMUSCULAR; INTRAVENOUS; SUBCUTANEOUS at 08:45

## 2024-08-12 RX ADMIN — METRONIDAZOLE 500 MG: 500 TABLET ORAL at 14:10

## 2024-08-12 RX ADMIN — THIAMINE HYDROCHLORIDE 200 MG: 100 INJECTION, SOLUTION INTRAMUSCULAR; INTRAVENOUS at 14:10

## 2024-08-12 RX ADMIN — DEXMEDETOMIDINE HYDROCHLORIDE 1.25 MCG/KG/HR: 4 INJECTION, SOLUTION INTRAVENOUS at 20:37

## 2024-08-12 RX ADMIN — POTASSIUM CHLORIDE 40 MEQ: 1.5 POWDER, FOR SOLUTION ORAL at 11:25

## 2024-08-12 RX ADMIN — THIAMINE HYDROCHLORIDE 200 MG: 100 INJECTION, SOLUTION INTRAMUSCULAR; INTRAVENOUS at 05:48

## 2024-08-12 RX ADMIN — DEXMEDETOMIDINE HYDROCHLORIDE 1.5 MCG/KG/HR: 4 INJECTION, SOLUTION INTRAVENOUS at 02:28

## 2024-08-12 RX ADMIN — LORAZEPAM 2 MG: 2 INJECTION INTRAMUSCULAR; INTRAVENOUS at 12:30

## 2024-08-12 RX ADMIN — INSULIN LISPRO 4 UNITS: 100 INJECTION, SOLUTION INTRAVENOUS; SUBCUTANEOUS at 11:24

## 2024-08-12 RX ADMIN — DEXMEDETOMIDINE HYDROCHLORIDE 1.25 MCG/KG/HR: 4 INJECTION, SOLUTION INTRAVENOUS at 07:32

## 2024-08-12 RX ADMIN — LANSOPRAZOLE 30 MG: 30 TABLET, ORALLY DISINTEGRATING, DELAYED RELEASE ORAL at 05:48

## 2024-08-12 RX ADMIN — MIDODRINE HYDROCHLORIDE 10 MG: 5 TABLET ORAL at 18:14

## 2024-08-12 RX ADMIN — THIAMINE HYDROCHLORIDE 200 MG: 100 INJECTION, SOLUTION INTRAMUSCULAR; INTRAVENOUS at 21:17

## 2024-08-12 RX ADMIN — SERTRALINE HYDROCHLORIDE 25 MG: 25 TABLET ORAL at 20:24

## 2024-08-12 RX ADMIN — METRONIDAZOLE 500 MG: 500 TABLET ORAL at 21:17

## 2024-08-12 RX ADMIN — DEXMEDETOMIDINE HYDROCHLORIDE 1.25 MCG/KG/HR: 4 INJECTION, SOLUTION INTRAVENOUS at 15:18

## 2024-08-12 RX ADMIN — ANTI-FUNGAL POWDER MICONAZOLE NITRATE TALC FREE 1 APPLICATION: 1.42 POWDER TOPICAL at 08:45

## 2024-08-12 RX ADMIN — INSULIN LISPRO 4 UNITS: 100 INJECTION, SOLUTION INTRAVENOUS; SUBCUTANEOUS at 00:09

## 2024-08-12 RX ADMIN — METRONIDAZOLE 500 MG: 500 TABLET ORAL at 05:48

## 2024-08-12 RX ADMIN — LEVETIRACETAM 500 MG: 100 SOLUTION ORAL at 20:24

## 2024-08-12 RX ADMIN — ACETAMINOPHEN 650 MG: 650 SOLUTION ORAL at 14:20

## 2024-08-12 NOTE — PLAN OF CARE
Goal Outcome Evaluation:      VSS. Dex gtt continued. Patient started to become agitated during this shift, so NV restraints were started. Pt had 1050 dark yellow UOP this shift. Pt continues to obey commands.

## 2024-08-12 NOTE — PLAN OF CARE
"Goal Outcome Evaluation:      Assessment: Emiliano Bateman presents with functional mobility impairments which indicate the need for skilled intervention. PT attempts at stimulation and mobility this date with PROM and dependent repositioning in bed.  Patient remains unresponsive to stimulus.  Will continue to follow for PT to progress as able.  SNF remains the most appropriate d/c dispo. Tolerating session today without incident. Will continue to follow and progress as tolerated.     Plan/Recommendations:   If medically appropriate, Moderate Intensity Therapy recommended post-acute care. This is recommended as therapy feels the patient would require 3-4 days per week and wouldn't tolerate \"3 hour daily\" rehab intensity. SNF would be the preferred choice. If the patient does not agree to SNF, arrange HH or OP depending on home bound status. If patient is medically complex, consider LTACH. Pt requires no DME at discharge.                                         "

## 2024-08-12 NOTE — DISCHARGE PLACEMENT REQUEST
"Yeny Mora (63 y.o. Male)       Date of Birth   1960    Social Security Number       Address   [address] [place] [address] [place] [facility]    Home Phone   [phone]    MRN   [id number]       Rastafarian   None    Marital Status                               Admission Date   8/8/24    Admission Type   Emergency    Admitting Provider   Gil Bobby DO    Attending Provider   Gil Bobby DO    Department, Room/Bed   [facility] INTENSIVE CARE UNIT, 2316/1       Discharge Date       Discharge Disposition       Discharge Destination                                 Attending Provider: Gil Bobby DO    Allergies: Penicillins    Isolation: None   Infection: None   Code Status: CPR    Ht: 170.2 cm (67\")   Wt: 67 kg (147 lb 11.3 oz)    Admission Cmt: None   Principal Problem: Alcohol withdrawal seizure with delirium [F10.931,R56.9]                   Active Insurance as of 8/8/2024       Primary Coverage       Payor Plan Insurance Group Employer/Plan Group    HUMANA MEDICARE REPLACEMENT HU[employer]NA MED ADV SNP HMO [id number]       Payor Plan Address Payor Plan Phone Number Payor Plan Fax Number Effective Dates    [address] [address] [phone]  1/1/2024 - None Entered    [facility] [address]         Subscriber Name Subscriber Birth Date Member ID       YENY MORA 1960 [id number]               Secondary Coverage       Payor Plan Insurance Group Employer/Plan Group    [place]        Payor Plan Address Payor Plan Phone Number Payor Plan Fax Number Effective Dates    [address] [address]   8/1/2024 - None Entered    [place] [address]         Subscriber Name Subscriber Birth Date Member ID       YENY MORA 1960 [id number]                     Emergency Contacts        (Rel.) Home Phone Work Phone Mobile Phone    MORGANSHARIF (Spouse) -- -- [phone]                "

## 2024-08-12 NOTE — PROGRESS NOTES
"Critical Care Progress Note     Emiliano Bateman : 1960 MRN:[id number] LOS:3  Rm: 2316/1     Principal Problem: Alcohol withdrawal seizure with delirium     Reason for follow up: All the medical problems listed below    Summary     Emiliano Bateman is a 63 y.o. male with PMH of hypertension, COPD, liver disease, Parkinson's, DMT2, seizure disorder who presented to the hospital after suffering a seizure at home, and was admitted with a principal diagnosis of Alcohol withdrawal seizure with delirium.  Information for HPI taken from chart review as patient is unable to cooperate at time of assessment due to acuity of illness.  His wife is at bedside, and states that he had a seizure today which prompted her to call EMS.  Of note patient was recently in our hospital in April for new onset seizures secondary to alcohol withdrawal.  At that time it was reported that patient drink 1 gallon of vodka daily.  Per patient wife, patient has decreased his drinking significantly since that hospitalization.  She states that his last drink was approximately 2 days ago.  Also, patient does have parkinsonian tremors, and was on medication before but stopped taking them due to side effects.  Per ED documentation patient was conversive when he came to the emergency room and denied any complaints of pain or headache.  He had no subjective fevers or recent illnesses.  Per report patient has had stated he felt \"puny\".  Per PTA meds he was not restarted on medication for his Parkinson's on discharge from the hospital.     ED course: Shortly after arriving to the emergency department he had what was described as a generalized tonic-clonic seizure that lasted several minutes.  He was treated with IV Ativan and loaded with 1 g IV Keppra.  Lab showed CK of 226, anion gap 34.2 with glucose 263, CO2 13.8, WBC 12.81 without bandemia, and ammonia 71.  Was ordered 1 L saline bolus.  UA without WBCs or bacteria, however + glucose and + " ketones.  ABG pending at time of note.  CT head and cervical spine were completed and negative for acute abnormality.    Significant Events     08/12/24 : Intubated and sedated with Precedex.  Ventilator support requirements minimal with FiO2 40% and PEEP +5.    Requiring Levophed for vasopressor support.  He has not had any further seizure activity    Assessment / Plan     Acute respiratory failure without hypoxia or without hypercapnia  Intubated for airway protection  Worsening encephalopathy secondary to acute DTs required intubation 8/10 for airway protection  RVP negative  Respiratory culture pending and NTD  Ventilator settings noted and adjusted as needed.   Close monitoring of ABG.   Minimize sedation/analgesia to keep RASS of 0 to -1.    Seizures, not in status  Probable alcohol withdrawal seizure activity  Ceribell placed in ED, highest reading of seizure burden at 7%   Continue IV Keppra  On phenobarbital taper per protocol  IV Ativan as needed  Seizure precaution  Now intubated and sedated     Alcohol withdrawal syndrome  Patient previously consumed a gallon of vodka daily  Patient's wife states he has drastically reduced his drinking  Last drink was 2 days prior to admission  CIWA protocol.  Now intubated and sedated     Diabetic ketoacidosis without coma, with history of diabetes mellitus, type 2  Anion gap of 34 on admission.  Hemoglobin A1c 6.09  DKA protocol initiated  Insulin drip initiated per DKA protocol, now transitioned off  Adjust IV fluids as indicated  Accuchecks with sliding scale insulin     Essential hypertension  Home dose amlodipine on hold due to hypotension and pressor support     Leukocytosis  Patient met SIRS criteria with elevated WBC and elevated heart rate, however no clear etiology of infection is apparent  Blood cultures pending and NTD  UA negative  Procalcitonin 0.46 and lactic 4.5 on admission, trended down to 2.2.  No need for further serial monitoring  Now hypotensive  requiring pressor support  Due to respiratory failure added empiric ceftriaxone 8/9 for 5 days  CXR no acute cardiopulmonary disease      History of Parkinson's  Patient not on home medication     COPD  Hold inhalers, not in acute exacerbation  Monitor ABG's as ordered.   Intubated for airway protection 8/11     Anxiety/Depression  Continue home Zoloft     Disposition: ICU    Code status:   Code Status (Patient has no pulse and is not breathing): CPR (Attempt to Resuscitate)  Medical Interventions (Patient has pulse or is breathing): Full Support       Nutrition:   NPO Diet NPO Type: Tube Feeding   Tube Feeding: Formula: Diabetisource AC (Glucerna 1.2); Feeding Type: Continuous; Start at: 45 mL/hr; Then Advance By: Other; Other: 10; Every: 4 hours; To Goal Rate of: 65 mL/hr; Water Flush: 10 mL; Every: 1 hour; Water Bolus: None     VTE Prophylaxis:  Pharmacologic & mechanical VTE prophylaxis orders are present.         Subjective / Review of systems     Review of Systems   Unable to perform ROS: Intubated        Objective / Physical Exam     Vital signs:  Temp: 99.9 °F (37.7 °C)  BP: (!) 89/59  Heart Rate: 59  Resp: 18  SpO2: 98 %  Weight: 67 kg (147 lb 11.3 oz)    Admission Weight: Weight: 64.4 kg (142 lb)  Current Weight: Weight: 67 kg (147 lb 11.3 oz)    Input/Output in last 24 hours:    Intake/Output Summary (Last 24 hours) at 8/12/2024 0804  Last data filed at 8/12/2024 0700  Gross per 24 hour   Intake 3592 ml   Output 1550 ml   Net 2042 ml      Net IO Since Admission: 9,937 mL [08/12/24 0804]     Physical Exam  Vitals and nursing note reviewed.   Constitutional:       Appearance: He is ill-appearing.      Comments: Intubated and sedated   HENT:      Head: Normocephalic and atraumatic.      Right Ear: External ear normal.      Left Ear: External ear normal.      Nose: Nose normal.      Comments: Left NG tube     Mouth/Throat:      Comments: Oral ET tube secured  Eyes:      General: No scleral icterus.      Pupils: Pupils are equal, round, and reactive to light.   Neck:      Comments: No JVD  Cardiovascular:      Heart sounds: Normal heart sounds, S1 normal and S2 normal. No murmur heard.     Comments: Sinus bradycardia to sinus rhythm  Pulmonary:      Breath sounds: No wheezing or rhonchi.      Comments: Mechanically ventilated, coarse but clear  Abdominal:      General: There is no distension.      Palpations: Abdomen is soft.      Comments: Active bowel sounds   Musculoskeletal:      Cervical back: Neck supple. No rigidity.   Skin:     General: Skin is warm and dry.   Neurological:      Comments: Intubated and sedated          Radiology and Labs     Results from last 7 days   Lab Units 08/12/24  0441 08/11/24  0535 08/10/24  0539 08/09/24  0427 08/08/24  2157   WBC 10*3/mm3 5.45 7.87 7.71  --  12.81*   HEMOGLOBIN g/dL 13.2 13.5 12.1*  --  16.3   HEMOGLOBIN, POC g/dL  --   --   --  15.9  --    HEMATOCRIT % 39.2 39.2 36.7*  --  48.0   HEMATOCRIT POC %  --   --   --  47  --    PLATELETS 10*3/mm3 66* 66* 73*  --  151      Results from last 7 days   Lab Units 08/08/24  2157   PROTIME Seconds 10.3   INR  0.94   APTT seconds 28.2*      Results from last 7 days   Lab Units 08/12/24  0441 08/11/24  0535 08/10/24  0539 08/09/24  2105 08/09/24  1520 08/09/24  0435 08/09/24  0427 08/09/24  0255 08/08/24  2213   SODIUM mmol/L 138 138 140  --   --  138  --  138 142   POTASSIUM mmol/L 3.3* 3.2* 3.5  --  3.1* 3.7  --  3.6 3.7   CHLORIDE mmol/L 100 97* 107  --   --  97*  --  97* 94*   CO2 mmol/L 30.1* 32.2* 18.4*  --   --  25.4  --  23.3 13.8*   BUN mg/dL 11 9 10  --   --  11  --  12 15   CREATININE mg/dL 0.58* 0.66* 0.63*  --   --  0.91 0.88 0.92 1.40*   GLUCOSE mg/dL 166* 151* 106*  --   --  76  --  174* 246*   MAGNESIUM mg/dL  --   --   --   --   --  2.2  --  2.0 2.8*   PHOSPHORUS mg/dL  --   --   --  3.7  --  1.5*  --  1.8*  --       Results from last 7 days   Lab Units 08/12/24  0441 08/11/24  0535 08/10/24  0539 08/09/24  0435  08/08/24  2213   ALK PHOS U/L 64 61 53 80 108   AST (SGOT) U/L 44* 75* 118* 74* 45*   ALT (SGPT) U/L 25 35 37 24 23     Results from last 7 days   Lab Units 08/10/24  1237 08/09/24  0427   PH, ARTERIAL pH units 7.267* 7.541*   PCO2, ARTERIAL mm Hg 37.8 33.5*   PO2 ART mm Hg 153.8* 89.6   O2 SATURATION ART % 99.1* 98.0   FIO2 % 60 40   HCO3 ART mmol/L 17.2* 28.7*   BASE EXCESS ART mmol/L -9.0* 6.3*       XR Chest 1 View    Result Date: 8/10/2024  Impression: Lines and tubes as characterized above. No definite acute cardiopulmonary abnormality. Electronically Signed: Robert Weston DO  8/10/2024 11:29 AM EDT  Workstation ID: [id number]     Current medications     Scheduled Meds:   cefTRIAXone, 1,000 mg, Intravenous, Q24H  enoxaparin, 40 mg, Subcutaneous, Daily  folic acid 1 mg in sodium chloride 0.9 % 50 mL IVPB, 1 mg, Intravenous, Daily  insulin lispro, 4-24 Units, Subcutaneous, Q6H  lansoprazole, 30 mg, Nasogastric, Q AM  levETIRAcetam, 500 mg, Intravenous, Q12H  metroNIDAZOLE, 500 mg, Nasogastric, Q8H  miconazole, 1 Application, Topical, Q12H  midodrine, 10 mg, Nasogastric, Q8H  sertraline, 25 mg, Nasogastric, Nightly  sodium chloride, 10 mL, Intravenous, Q12H  sodium chloride, 10 mL, Intravenous, Q12H  thiamine (B-1) IV, 200 mg, Intravenous, Q8H   Followed by  [START ON 8/14/2024] thiamine, 100 mg, Oral, Daily        Continuous Infusions:   dexmedetomidine, 0.2-1.5 mcg/kg/hr, Last Rate: 1.25 mcg/kg/hr (08/12/24 0732)  norepinephrine, 0.02-0.3 mcg/kg/min, Last Rate: Stopped (08/11/24 1237)  propofol, 5-50 mcg/kg/min, Last Rate: Stopped (08/11/24 1342)          Plan discussed with RN. Reviewed all other data in the last 24 hours, including but not limited to vitals, labs, microbiology, imaging and pertinent notes from other providers.  .      Vani Lawson, APRN   Critical Care  08/12/24   08:04 EDT

## 2024-08-12 NOTE — PLAN OF CARE
Goal Outcome Evaluation:     RN titrated Dex gtt off and attempted an SAT this morning which pt passed so RT moved on to attempt an SBT which pt failed by respirations immediately going up in the 40's. Dex gtt was restarted and MD [place] was made aware. Plan is to attempt SAT and SBT again tomorrow with hopes of extubation.

## 2024-08-12 NOTE — THERAPY TREATMENT NOTE
"Subjective: Pt agreeable to therapeutic plan of care.  Patient resting in bed with family present.  Patient remains on vent but with planned weaning trials today.  Cleared for treatment by nursing.    Objective:     Precautions - NG tube, NPO, Seizures, intubated  Patient with increased agitation this AM and restrains re-applied, they are released for UE ROM and reapplied end of session.  RN notified.      Bed mobility - Assist x 2 and Dependent; patient requires dependent assistance for positioning in bed; completed repositioning and HOB elevation to erect sitting posture for stimulus and attempt at increased alertness. Completed sternal rub and cloth to face as well as verbal stimulation.  Patient does not wake ore respond at this time.    Not safe to sit EOB.    Completed LE and UE PROM for prevention of secondary complications of immobility and in attempt to stimulate alertness.  BLE manual GS stretch 2x 30s sustained stretch noted B clonus with quick stretch.  Passive Heel slides and hip ab/add x5-10 reps  Educated family on position, therapy approach, and verbal interactions with patient.        Vitals: WNL     Pain: no pain behaviors, unable to report    Education: Provided education on the importance of mobility in the acute care setting    Assessment: Emiliano Bateman presents with functional mobility impairments which indicate the need for skilled intervention. PT attempts at stimulation and mobility this date with PROM and dependent repositioning in bed.  Patient remains unresponsive to stimulus.  Will continue to follow for PT to progress as able.  SNF remains the most appropriate d/c dispo. Tolerating session today without incident. Will continue to follow and progress as tolerated.     Plan/Recommendations:   If medically appropriate, Moderate Intensity Therapy recommended post-acute care. This is recommended as therapy feels the patient would require 3-4 days per week and wouldn't tolerate \"3 hour " "daily\" rehab intensity. SNF would be the preferred choice. If the patient does not agree to SNF, arrange HH or OP depending on home bound status. If patient is medically complex, consider LTACH. Pt requires no DME at discharge.     Pt desires Skilled Rehab placement at discharge. Pt cooperative; agreeable to therapeutic recommendations and plan of care.         Basic Mobility 6-click:  Rollin = Total, A lot = 2, A little = 3; 4 = None  Supine>Sit:   1 = Total, A lot = 2, A little = 3; 4 = None   Sit>Stand with arms:  1 = Total, A lot = 2, A little = 3; 4 = None  Bed>Chair:   1 = Total, A lot = 2, A little = 3; 4 = None  Ambulate in room:  1 = Total, A lot = 2, A little = 3; 4 = None  3-5 Steps with railin = Total, A lot = 2, A little = 3; 4 = None  Score: 6    Modified Tucker: 5 = Severe disability (Requires constant nursing care and attention, bedridden, incontinent)    Post-Tx Position: Supine with HOB Elevated, Alarms activated, and Call light and personal items within reach  PPE: gloves    "

## 2024-08-12 NOTE — PROGRESS NOTES
Nutrition Services    Patient Name: Emiliano Bateman  YOB: 1960  MRN: [id number]  Admission date: 8/8/2024    PROGRESS NOTE      Encounter Information: Checking in on Nutrition POC and TF tolerance. Pt discussed in AM interdisciplinary rounds. TF changed to Diabetisource AC yesterday. TF currently infusing at goal rate per EMR. Remains intubated. On precedex. MAP 69. Lactate 2.2. SBT to begin today. Propofol off since last night. RD will continue to monitor per protocol.        PO Diet: NPO Diet NPO Type: Tube Feeding   PO Supplements: -   PO Intake:  -       Current nutrition support: Diabetisource AC @ 65 mL/hr with 10 mL/hr FWF   Nutrition support review: Infusing as documented above.        Labs (reviewed below): Hypokalemia - replaced today          GI Function:  No documented BM since admission (x 3 days)       Nutrition Intervention Updates: Continue current TF at goal rate.        Results from last 7 days   Lab Units 08/12/24  0441 08/11/24  0535 08/10/24  0539   SODIUM mmol/L 138 138 140   POTASSIUM mmol/L 3.3* 3.2* 3.5   CHLORIDE mmol/L 100 97* 107   CO2 mmol/L 30.1* 32.2* 18.4*   BUN mg/dL 11 9 10   CREATININE mg/dL 0.58* 0.66* 0.63*   CALCIUM mg/dL 7.2* 7.5* 6.2*   BILIRUBIN mg/dL 0.4 0.7 0.5   ALK PHOS U/L 64 61 53   ALT (SGPT) U/L 25 35 37   AST (SGOT) U/L 44* 75* 118*   GLUCOSE mg/dL 166* 151* 106*     Results from last 7 days   Lab Units 08/12/24  0441 08/11/24  0535 08/10/24  0539 08/09/24  2105 08/09/24  0435 08/09/24  0427 08/09/24  0255 08/08/24  2213   0000   MAGNESIUM mg/dL  --   --   --   --  2.2  --  2.0 2.8*  --    PHOSPHORUS mg/dL  --   --   --  3.7 1.5*  --  1.8*  --    < >   HEMOGLOBIN g/dL 13.2   < > 12.1*  --   --   --   --   --   --    HEMOGLOBIN, POC   --   --   --   --   --    < >  --   --   --    HEMATOCRIT % 39.2   < > 36.7*  --   --   --   --   --   --    HEMATOCRIT POC   --   --   --   --   --    < >  --   --   --    TRIGLYCERIDES mg/dL  --   --  102  --   --   --    --   --   --     < > = values in this interval not displayed.     COVID19   Date Value Ref Range Status   08/09/2024 Not Detected Not Detected - Ref. Range Final     Lab Results   Component Value Date    HGBA1C 6.09 (H) 08/09/2024         RD to follow up per protocol.    Electronically signed by:  Brie Church RD  08/12/24 08:17 EDT

## 2024-08-12 NOTE — THERAPY TREATMENT NOTE
"Subjective: Pt agreeable to therapeutic plan of care.  Cognition: arousal/alertness: Other pt not responding to stimuli.      Objective:     Precautions - pt intubated.      Grooming: Dependent  ADL Position: supine  ADL Comments:  Pt unable to arouse, even with wiping forehead.      Therapeutic Exercise - 5 Reps Bilaterally PROM lying supine    Vitals: WNL    Pain: NA  Interventions for pain: N/A  Education: Provided education on the importance of mobility in the acute care setting      Assessment: Emiliano Bateman presents with ADL impairments affecting function including balance, endurance / activity tolerance, pain, postural / trunk control, and strength. Assessment: Emiliano Bateman presents with ADL impairments affecting function including balance, endurance / activity tolerance, pain, postural / trunk control, and strength  OT facilitated enriched sensory environment by opening blinds and talking to family about playing familiar music.  Demonstrated functioning below baseline abilities indicate the need for continued skilled intervention while inpatient. Tolerating session today without incident. Will continue to follow and progress as tolerated.     Plan/Recommendations:   Moderate Intensity Therapy recommended post-acute care. This is recommended as therapy feels the patient would require 3-4 days per week and wouldn't tolerate \"3 hour daily\" rehab intensity. SNF would be the preferred choice. If the patient does not agree to SNF, arrange HH or OP depending on home bound status. If patient is medically complex, consider LTACH.. Pt requires no DME at discharge. and talking to family about playing familiar music.  Demonstrated functioning below baseline abilities indicate the need for continued skilled intervention while inpatient. Tolerating session today without incident. Will continue to follow and progress as tolerated.     Plan/Recommendations:   Moderate Intensity Therapy recommended post-acute care. This " "is recommended as therapy feels the patient would require 3-4 days per week and wouldn't tolerate \"3 hour daily\" rehab intensity. SNF would be the preferred choice. If the patient does not agree to SNF, arrange HH or OP depending on home bound status. If patient is medically complex, consider LTACH.. Pt requires no DME at discharge.     Pt desires Skilled Rehab placement at discharge. Pt cooperative; agreeable to therapeutic recommendations and plan of care.     Modified Guys: N/A = No pre-op stroke/TIA    Post-Tx Position: Supine with HOB Elevated, Alarms activated, and Call light and personal items within reach  PPE: gloves    "

## 2024-08-12 NOTE — CASE MANAGEMENT/SOCIAL WORK
Continued Stay Note   Bhaskar     Patient Name: Emiliano Bateman  MRN: [id number]  Today's Date: 8/12/2024    Admit Date: 8/8/2024    Plan: Plan to dc to [facility], pending acceptance.  Precert required.  PASRR required, pending.   Discharge Plan       Row Name 08/12/24 1344       Plan    Plan Plan to dc to [facility], pending acceptance.  Precert required.  PASRR required, pending.    Patient/Family in Agreement with Plan yes    Plan Comments CM met with spouse at bedside, agreeable to [facility], referral in basket, liaison notified, pending acceptance.  [facility] is second choice if needed. DC Barriers: NPO - NG, IV Abxs, vent 40/5, seizure precautions, IV Keppra, PICC, Precedex gtt, DM educator following.             Expected Discharge Date and Time       Expected Discharge Date Expected Discharge Time    Aug 14, 2024               JOHN Guthrie RN  SIPS/ICU   O: [phone]  C: [phone]  [email or web address]

## 2024-08-12 NOTE — CASE MANAGEMENT/SOCIAL WORK
Continued Stay Note   Bhaskar     Patient Name: Emiliano Bateman  MRN: [id number]  Today's Date: 8/12/2024    Admit Date: 8/8/2024    Plan: From home with spouse, pending clinical course and PT/OT eval.   Discharge Plan       Row Name 08/12/24 1017       Plan    Plan From home with spouse, pending clinical course and PT/OT eval.    Plan Comments DC Barriers: NPO - NG, IV Abxs, vent 40/5, seizure precautions, IV Keppra, PICC, Precedex gtt, DM educator following.               Expected Discharge Date and Time       Expected Discharge Date Expected Discharge Time    Aug 14, 2024               JOHN Guthrie RN  SIPS/ICU   O: [phone]  C: [phone]  [email or web address]

## 2024-08-13 ENCOUNTER — APPOINTMENT (OUTPATIENT)
Dept: GENERAL RADIOLOGY | Facility: HOSPITAL | Age: 64
DRG: 896 | End: 2024-08-13
Payer: MEDICARE

## 2024-08-13 LAB
ALBUMIN SERPL-MCNC: 2.8 G/DL (ref 3.5–5.2)
ALBUMIN/GLOB SERPL: 1.1 G/DL
ALP SERPL-CCNC: 74 U/L (ref 39–117)
ALT SERPL W P-5'-P-CCNC: 24 U/L (ref 1–41)
ANION GAP SERPL CALCULATED.3IONS-SCNC: 5.2 MMOL/L (ref 5–15)
ANISOCYTOSIS BLD QL: NORMAL
AST SERPL-CCNC: 37 U/L (ref 1–40)
BASOPHILS # BLD AUTO: 0.03 10*3/MM3 (ref 0–0.2)
BASOPHILS NFR BLD AUTO: 0.4 % (ref 0–1.5)
BILIRUB SERPL-MCNC: 0.4 MG/DL (ref 0–1.2)
BUN SERPL-MCNC: 15 MG/DL (ref 8–23)
BUN/CREAT SERPL: 24.6 (ref 7–25)
CALCIUM SPEC-SCNC: 7.6 MG/DL (ref 8.6–10.5)
CHLORIDE SERPL-SCNC: 101 MMOL/L (ref 98–107)
CO2 SERPL-SCNC: 27.8 MMOL/L (ref 22–29)
CREAT SERPL-MCNC: 0.61 MG/DL (ref 0.76–1.27)
DEPRECATED RDW RBC AUTO: 69 FL (ref 37–54)
EGFRCR SERPLBLD CKD-EPI 2021: 107.3 ML/MIN/1.73
EOSINOPHIL # BLD AUTO: 0.22 10*3/MM3 (ref 0–0.4)
EOSINOPHIL NFR BLD AUTO: 2.9 % (ref 0.3–6.2)
ERYTHROCYTE [DISTWIDTH] IN BLOOD BY AUTOMATED COUNT: 18.7 % (ref 12.3–15.4)
GIANT PLATELETS: NORMAL
GLOBULIN UR ELPH-MCNC: 2.5 GM/DL
GLUCOSE BLDC GLUCOMTR-MCNC: 167 MG/DL (ref 70–105)
GLUCOSE BLDC GLUCOMTR-MCNC: 192 MG/DL (ref 70–105)
GLUCOSE BLDC GLUCOMTR-MCNC: 192 MG/DL (ref 70–105)
GLUCOSE BLDC GLUCOMTR-MCNC: 197 MG/DL (ref 70–105)
GLUCOSE SERPL-MCNC: 145 MG/DL (ref 65–99)
HCT VFR BLD AUTO: 39.2 % (ref 37.5–51)
HGB BLD-MCNC: 13.3 G/DL (ref 13–17.7)
IMM GRANULOCYTES # BLD AUTO: 0.04 10*3/MM3 (ref 0–0.05)
IMM GRANULOCYTES NFR BLD AUTO: 0.5 % (ref 0–0.5)
LARGE PLATELETS: NORMAL
LYMPHOCYTES # BLD AUTO: 1.49 10*3/MM3 (ref 0.7–3.1)
LYMPHOCYTES NFR BLD AUTO: 19.5 % (ref 19.6–45.3)
MACROCYTES BLD QL SMEAR: NORMAL
MCH RBC QN AUTO: 34.4 PG (ref 26.6–33)
MCHC RBC AUTO-ENTMCNC: 33.9 G/DL (ref 31.5–35.7)
MCV RBC AUTO: 101.3 FL (ref 79–97)
MONOCYTES # BLD AUTO: 0.96 10*3/MM3 (ref 0.1–0.9)
MONOCYTES NFR BLD AUTO: 12.6 % (ref 5–12)
NEUTROPHILS NFR BLD AUTO: 4.9 10*3/MM3 (ref 1.7–7)
NEUTROPHILS NFR BLD AUTO: 64.1 % (ref 42.7–76)
NRBC BLD AUTO-RTO: 0 /100 WBC (ref 0–0.2)
PLATELET # BLD AUTO: 101 10*3/MM3 (ref 140–450)
PMV BLD AUTO: 10.1 FL (ref 6–12)
POIKILOCYTOSIS BLD QL SMEAR: NORMAL
POTASSIUM SERPL-SCNC: 4 MMOL/L (ref 3.5–5.2)
PROT SERPL-MCNC: 5.3 G/DL (ref 6–8.5)
RBC # BLD AUTO: 3.87 10*6/MM3 (ref 4.14–5.8)
SMALL PLATELETS BLD QL SMEAR: NORMAL
SODIUM SERPL-SCNC: 134 MMOL/L (ref 136–145)
WBC MORPH BLD: NORMAL
WBC NRBC COR # BLD AUTO: 7.64 10*3/MM3 (ref 3.4–10.8)

## 2024-08-13 PROCEDURE — 85025 COMPLETE CBC W/AUTO DIFF WBC: CPT | Performed by: NURSE PRACTITIONER

## 2024-08-13 PROCEDURE — 82948 REAGENT STRIP/BLOOD GLUCOSE: CPT

## 2024-08-13 PROCEDURE — 25010000002 LORAZEPAM PER 2 MG: Performed by: NURSE PRACTITIONER

## 2024-08-13 PROCEDURE — 80053 COMPREHEN METABOLIC PANEL: CPT | Performed by: NURSE PRACTITIONER

## 2024-08-13 PROCEDURE — 94003 VENT MGMT INPAT SUBQ DAY: CPT

## 2024-08-13 PROCEDURE — 94799 UNLISTED PULMONARY SVC/PX: CPT

## 2024-08-13 PROCEDURE — 25010000002 THIAMINE PER 100 MG: Performed by: NURSE PRACTITIONER

## 2024-08-13 PROCEDURE — 63710000001 INSULIN LISPRO (HUMAN) PER 5 UNITS: Performed by: INTERNAL MEDICINE

## 2024-08-13 PROCEDURE — 25010000002 CEFTRIAXONE PER 250 MG: Performed by: INTERNAL MEDICINE

## 2024-08-13 PROCEDURE — 25010000002 ENOXAPARIN PER 10 MG: Performed by: NURSE PRACTITIONER

## 2024-08-13 PROCEDURE — 93010 ELECTROCARDIOGRAM REPORT: CPT | Performed by: INTERNAL MEDICINE

## 2024-08-13 PROCEDURE — 94761 N-INVAS EAR/PLS OXIMETRY MLT: CPT

## 2024-08-13 PROCEDURE — 71045 X-RAY EXAM CHEST 1 VIEW: CPT

## 2024-08-13 PROCEDURE — 25010000002 FUROSEMIDE PER 20 MG: Performed by: INTERNAL MEDICINE

## 2024-08-13 PROCEDURE — 85007 BL SMEAR W/DIFF WBC COUNT: CPT | Performed by: NURSE PRACTITIONER

## 2024-08-13 PROCEDURE — 93005 ELECTROCARDIOGRAM TRACING: CPT | Performed by: INTERNAL MEDICINE

## 2024-08-13 RX ORDER — FUROSEMIDE 10 MG/ML
40 INJECTION INTRAMUSCULAR; INTRAVENOUS EVERY 12 HOURS
Status: DISCONTINUED | OUTPATIENT
Start: 2024-08-13 | End: 2024-08-14

## 2024-08-13 RX ORDER — FOLIC ACID 1 MG/1
1 TABLET ORAL DAILY
Status: DISCONTINUED | OUTPATIENT
Start: 2024-08-14 | End: 2024-08-17 | Stop reason: HOSPADM

## 2024-08-13 RX ORDER — QUETIAPINE FUMARATE 25 MG/1
25 TABLET, FILM COATED ORAL EVERY 12 HOURS SCHEDULED
Status: DISCONTINUED | OUTPATIENT
Start: 2024-08-13 | End: 2024-08-14

## 2024-08-13 RX ADMIN — Medication 10 ML: at 07:46

## 2024-08-13 RX ADMIN — CEFTRIAXONE 1000 MG: 1 INJECTION, POWDER, FOR SOLUTION INTRAMUSCULAR; INTRAVENOUS at 09:34

## 2024-08-13 RX ADMIN — MIDODRINE HYDROCHLORIDE 10 MG: 5 TABLET ORAL at 17:43

## 2024-08-13 RX ADMIN — THIAMINE HYDROCHLORIDE 200 MG: 100 INJECTION, SOLUTION INTRAMUSCULAR; INTRAVENOUS at 05:15

## 2024-08-13 RX ADMIN — ANTI-FUNGAL POWDER MICONAZOLE NITRATE TALC FREE 1 APPLICATION: 1.42 POWDER TOPICAL at 07:45

## 2024-08-13 RX ADMIN — LORAZEPAM 2 MG: 2 INJECTION INTRAMUSCULAR; INTRAVENOUS at 05:37

## 2024-08-13 RX ADMIN — DEXMEDETOMIDINE HYDROCHLORIDE 0.6 MCG/KG/HR: 4 INJECTION, SOLUTION INTRAVENOUS at 11:26

## 2024-08-13 RX ADMIN — Medication 10 ML: at 21:04

## 2024-08-13 RX ADMIN — LEVETIRACETAM 500 MG: 100 SOLUTION ORAL at 07:42

## 2024-08-13 RX ADMIN — INSULIN LISPRO 4 UNITS: 100 INJECTION, SOLUTION INTRAVENOUS; SUBCUTANEOUS at 17:31

## 2024-08-13 RX ADMIN — FUROSEMIDE 40 MG: 10 INJECTION, SOLUTION INTRAMUSCULAR; INTRAVENOUS at 14:53

## 2024-08-13 RX ADMIN — ANTI-FUNGAL POWDER MICONAZOLE NITRATE TALC FREE 1 APPLICATION: 1.42 POWDER TOPICAL at 21:05

## 2024-08-13 RX ADMIN — METRONIDAZOLE 500 MG: 500 TABLET ORAL at 05:15

## 2024-08-13 RX ADMIN — METRONIDAZOLE 500 MG: 500 TABLET ORAL at 14:24

## 2024-08-13 RX ADMIN — LORAZEPAM 2 MG: 2 INJECTION INTRAMUSCULAR; INTRAVENOUS at 02:47

## 2024-08-13 RX ADMIN — QUETIAPINE FUMARATE 25 MG: 25 TABLET ORAL at 21:05

## 2024-08-13 RX ADMIN — LORAZEPAM 4 MG: 2 INJECTION INTRAMUSCULAR; INTRAVENOUS at 17:30

## 2024-08-13 RX ADMIN — DEXMEDETOMIDINE HYDROCHLORIDE 1.5 MCG/KG/HR: 4 INJECTION, SOLUTION INTRAVENOUS at 23:17

## 2024-08-13 RX ADMIN — ACETAMINOPHEN 650 MG: 650 SOLUTION ORAL at 07:42

## 2024-08-13 RX ADMIN — LANSOPRAZOLE 30 MG: 30 TABLET, ORALLY DISINTEGRATING, DELAYED RELEASE ORAL at 05:15

## 2024-08-13 RX ADMIN — DEXMEDETOMIDINE HYDROCHLORIDE 1.5 MCG/KG/HR: 4 INJECTION, SOLUTION INTRAVENOUS at 17:47

## 2024-08-13 RX ADMIN — DEXMEDETOMIDINE HYDROCHLORIDE 1.5 MCG/KG/HR: 4 INJECTION, SOLUTION INTRAVENOUS at 03:28

## 2024-08-13 RX ADMIN — THIAMINE HYDROCHLORIDE 200 MG: 100 INJECTION, SOLUTION INTRAMUSCULAR; INTRAVENOUS at 14:24

## 2024-08-13 RX ADMIN — FOLIC ACID 1 MG: 5 INJECTION, SOLUTION INTRAMUSCULAR; INTRAVENOUS; SUBCUTANEOUS at 07:42

## 2024-08-13 RX ADMIN — LORAZEPAM 2 MG: 2 INJECTION INTRAMUSCULAR; INTRAVENOUS at 15:50

## 2024-08-13 RX ADMIN — Medication 10 ML: at 21:05

## 2024-08-13 RX ADMIN — QUETIAPINE FUMARATE 25 MG: 25 TABLET ORAL at 09:34

## 2024-08-13 RX ADMIN — INSULIN LISPRO 4 UNITS: 100 INJECTION, SOLUTION INTRAVENOUS; SUBCUTANEOUS at 11:31

## 2024-08-13 RX ADMIN — MIDODRINE HYDROCHLORIDE 10 MG: 5 TABLET ORAL at 02:29

## 2024-08-13 RX ADMIN — METRONIDAZOLE 500 MG: 500 TABLET ORAL at 21:05

## 2024-08-13 RX ADMIN — THIAMINE HYDROCHLORIDE 200 MG: 100 INJECTION, SOLUTION INTRAMUSCULAR; INTRAVENOUS at 21:05

## 2024-08-13 RX ADMIN — ENOXAPARIN SODIUM 40 MG: 100 INJECTION SUBCUTANEOUS at 14:24

## 2024-08-13 RX ADMIN — MIDODRINE HYDROCHLORIDE 10 MG: 5 TABLET ORAL at 07:42

## 2024-08-13 RX ADMIN — LEVETIRACETAM 500 MG: 100 SOLUTION ORAL at 21:05

## 2024-08-13 RX ADMIN — SERTRALINE HYDROCHLORIDE 25 MG: 25 TABLET ORAL at 21:05

## 2024-08-13 RX ADMIN — INSULIN LISPRO 4 UNITS: 100 INJECTION, SOLUTION INTRAVENOUS; SUBCUTANEOUS at 05:15

## 2024-08-13 NOTE — CONSULTS
Diabetes Education    Patient Name:  Emiliano Bateman  YOB: 1960  MRN: [id number]  Admit Date:  8/8/2024    Follow up note: Pt condition not appropriate for education. BS this am 192. Educator contacted wife to complete the assessment. Wife states to her knowledge, pt does not have hx of diabetes and has not taken diabetes meds in the past. A1c this adm 6.09%. Reviewed this result with wife. Discussed this result is falling within prediabetes range. Discussed bs on adm and during hospitalization have been falling in diabetes range. Wife states she has diabetes and checks her own bs at home. Pt will be going to rehab at discharge per CM note. Discussed importance of routine bs checking when pt returns home from rehab. Discussed bs meter/supplies can be ordered for pt. Wife states she has an Accuchek meter at home but would want to receive instruction on how to use the Accuchek Guide meter in case she has different Accuchek meter. Wife states she has attended diabetes class in the past and feels comfortable with how to manage bs. Discussed pt receiving sliding scale insulin in hospital. Wife requesting educator meet with wife tomorrow to instruct in bs monitoring. Will attempt follow up with wife on different day.       Electronically signed by:  Beth Senior RN  08/13/24 10:53 EDT

## 2024-08-13 NOTE — PROGRESS NOTES
"Critical Care Progress Note     Emiliano Bateman : 1960 MRN:[id number] LOS:4  Rm: 2316/1     Principal Problem: Alcohol withdrawal seizure with delirium     Reason for follow up: All the medical problems listed below    Summary     Emiliano Bateman is a 63 y.o. male with PMH of hypertension, COPD, liver disease, Parkinson's, DMT2, seizure disorder who presented to the hospital after suffering a seizure at home, and was admitted with a principal diagnosis of Alcohol withdrawal seizure with delirium.  Information for HPI taken from chart review as patient is unable to cooperate at time of assessment due to acuity of illness.  His wife is at bedside, and states that he had a seizure today which prompted her to call EMS.  Of note patient was recently in our hospital in April for new onset seizures secondary to alcohol withdrawal.  At that time it was reported that patient drink 1 gallon of vodka daily.  Per patient wife, patient has decreased his drinking significantly since that hospitalization.  She states that his last drink was approximately 2 days ago.  Also, patient does have parkinsonian tremors, and was on medication before but stopped taking them due to side effects.  Per ED documentation patient was conversive when he came to the emergency room and denied any complaints of pain or headache.  He had no subjective fevers or recent illnesses.  Per report patient has had stated he felt \"puny\".  Per PTA meds he was not restarted on medication for his Parkinson's on discharge from the hospital.     ED course: Shortly after arriving to the emergency department he had what was described as a generalized tonic-clonic seizure that lasted several minutes.  He was treated with IV Ativan and loaded with 1 g IV Keppra.  Lab showed CK of 226, anion gap 34.2 with glucose 263, CO2 13.8, WBC 12.81 without bandemia, and ammonia 71.  Was ordered 1 L saline bolus.  UA without WBCs or bacteria, however + glucose and + " ketones.  ABG pending at time of note.  CT head and cervical spine were completed and negative for acute abnormality.    Significant Events     08/13/24 : Patient afebrile, all vital signs within normal limits except his respiratory rate which is tachypneic.  Off pressors.  He has had 2.1 L of urine output over the last 24 hours and is net +10.2 L for the admission.  Chemistry panel noted that the creatinine is within normal limits, albumin is low at 2.8, glucose in the mid 100s.  CBC shows the white blood cell count is normal, remainder is unremarkable.  Repeat portable chest x-ray is pending.  Patient did not tolerate spontaneous breathing trial today.  Chest x-ray shows some mild pulmonary edema.  Will add Lasix 40 mg IV push every 12 hours.    Assessment / Plan     Acute respiratory failure without hypoxia or without hypercapnia  Intubated for airway protection  Worsening encephalopathy secondary to acute DTs required intubation 8/10 for airway protection  RVP negative  Respiratory culture pending and NTD  Ventilator settings noted and adjusted as needed.   Close monitoring of ABG.   Minimize sedation/analgesia to keep RASS of 0 to -1.    Seizures, not in status  Probable alcohol withdrawal seizure activity  Ceribell placed in ED, highest reading of seizure burden at 7%   Continue IV Keppra  On phenobarbital taper per protocol  IV Ativan as needed  Seizure precaution  Now intubated and sedated     Alcohol withdrawal syndrome  Patient previously consumed a gallon of vodka daily  Patient's wife states he has drastically reduced his drinking  Last drink was 2 days prior to admission  CIWA protocol.  Now intubated and sedated     Diabetic ketoacidosis without coma, with history of diabetes mellitus, type 2  Anion gap of 34 on admission.  Hemoglobin A1c 6.09  DKA protocol initiated  Insulin drip initiated per DKA protocol, now transitioned off  Adjust IV fluids as indicated  Accuchecks with sliding scale insulin      Essential hypertension  Home dose amlodipine on hold due to hypotension and pressor support     Leukocytosis  Patient met SIRS criteria with elevated WBC and elevated heart rate, however no clear etiology of infection is apparent  Blood cultures pending and NTD  UA negative  Procalcitonin 0.46 and lactic 4.5 on admission, trended down to 2.2.  No need for further serial monitoring  Now hypotensive requiring pressor support  Due to respiratory failure added empiric ceftriaxone 8/9 for 5 days  CXR no acute cardiopulmonary disease      History of Parkinson's  Patient not on home medication     COPD  Hold inhalers, not in acute exacerbation  Monitor ABG's as ordered.   Intubated for airway protection 8/11     Anxiety/Depression  Continue home Zoloft     Disposition: ICU        Critical Care Time:  34 mins.      Code status:   Code Status (Patient has no pulse and is not breathing): CPR (Attempt to Resuscitate)  Medical Interventions (Patient has pulse or is breathing): Full Support       Nutrition:   NPO Diet NPO Type: Tube Feeding   Tube Feeding: Formula: Diabetisource AC (Glucerna 1.2); Feeding Type: Continuous; Start at: 45 mL/hr; Then Advance By: Other; Other: 10; Every: 4 hours; To Goal Rate of: 65 mL/hr; Water Flush: 10 mL; Every: 1 hour; Water Bolus: None     VTE Prophylaxis:  Pharmacologic & mechanical VTE prophylaxis orders are present.         Subjective / Review of systems     Unable to be obtained secondary to patient's current condition.    Objective / Physical Exam     Vital signs:  Temp: 98.9 °F (37.2 °C)  BP: 123/87  Heart Rate: 94  Resp: (!) 38  SpO2: 99 %  Weight: 65.8 kg (145 lb 1 oz)    Admission Weight: Weight: 64.4 kg (142 lb)  Current Weight: Weight: 65.8 kg (145 lb 1 oz)    Input/Output in last 24 hours:    Intake/Output Summary (Last 24 hours) at 8/13/2024 1439  Last data filed at 8/13/2024 1400  Gross per 24 hour   Intake 2983.79 ml   Output 2450 ml   Net 533.79 ml      Net IO Since  Admission: 10,005.79 mL [08/13/24 1439]     Physical Exam  Vitals and nursing note reviewed.   Constitutional:       Appearance: He is ill-appearing.      Comments: Intubated and sedated.  Easily agitated when sedation is lightened and becomes very tachypneic.   HENT:      Head: Normocephalic and atraumatic.      Right Ear: External ear normal.      Left Ear: External ear normal.      Nose: Nose normal.      Comments: Left NG tube     Mouth/Throat:      Comments: Oral ET tube secured  Eyes:      General: No scleral icterus.     Pupils: Pupils are equal, round, and reactive to light.   Neck:      Comments: No JVD  Cardiovascular:      Heart sounds: Normal heart sounds, S1 normal and S2 normal. No murmur heard.     Comments: Sinus bradycardia to sinus rhythm  Pulmonary:      Breath sounds: No wheezing or rhonchi.      Comments: Mechanically ventilated, coarse but clear  Abdominal:      General: There is no distension.      Palpations: Abdomen is soft.      Comments: Active bowel sounds   Musculoskeletal:      Cervical back: Neck supple. No rigidity.   Skin:     General: Skin is warm and dry.   Neurological:      Comments: Intubated and sedated          Radiology and Labs     Results from last 7 days   Lab Units 08/13/24  0341 08/12/24  0441 08/11/24  0535 08/10/24  0539 08/09/24  0427 08/08/24  2157   WBC 10*3/mm3 7.64 5.45 7.87 7.71  --  12.81*   HEMOGLOBIN g/dL 13.3 13.2 13.5 12.1*  --  16.3   HEMOGLOBIN, POC g/dL  --   --   --   --  15.9  --    HEMATOCRIT % 39.2 39.2 39.2 36.7*  --  48.0   HEMATOCRIT POC %  --   --   --   --  47  --    PLATELETS 10*3/mm3 101* 66* 66* 73*  --  151      Results from last 7 days   Lab Units 08/08/24  2157   PROTIME Seconds 10.3   INR  0.94   APTT seconds 28.2*      Results from last 7 days   Lab Units 08/13/24  0341 08/12/24  1809 08/12/24  0441 08/11/24  0535 08/10/24  0539 08/09/24  2105 08/09/24  1520 08/09/24  0435 08/09/24  0427 08/09/24  0255 08/08/24  2213   SODIUM mmol/L 134*   --  138 138 140  --   --  138  --  138 142   POTASSIUM mmol/L 4.0 4.3 3.3* 3.2* 3.5  --    < > 3.7  --  3.6 3.7   CHLORIDE mmol/L 101  --  100 97* 107  --   --  97*  --  97* 94*   CO2 mmol/L 27.8  --  30.1* 32.2* 18.4*  --   --  25.4  --  23.3 13.8*   BUN mg/dL 15  --  11 9 10  --   --  11  --  12 15   CREATININE mg/dL 0.61*  --  0.58* 0.66* 0.63*  --   --  0.91   < > 0.92 1.40*   GLUCOSE mg/dL 145*  --  166* 151* 106*  --   --  76  --  174* 246*   MAGNESIUM mg/dL  --   --   --   --   --   --   --  2.2  --  2.0 2.8*   PHOSPHORUS mg/dL  --   --   --   --   --  3.7  --  1.5*  --  1.8*  --     < > = values in this interval not displayed.      Results from last 7 days   Lab Units 08/13/24  0341 08/12/24  0441 08/11/24  0535 08/10/24  0539 08/09/24  0435   ALK PHOS U/L 74 64 61 53 80   AST (SGOT) U/L 37 44* 75* 118* 74*   ALT (SGPT) U/L 24 25 35 37 24     Results from last 7 days   Lab Units 08/10/24  1237 08/09/24  0427   PH, ARTERIAL pH units 7.267* 7.541*   PCO2, ARTERIAL mm Hg 37.8 33.5*   PO2 ART mm Hg 153.8* 89.6   O2 SATURATION ART % 99.1* 98.0   FIO2 % 60 40   HCO3 ART mmol/L 17.2* 28.7*   BASE EXCESS ART mmol/L -9.0* 6.3*       XR Chest 1 View    Result Date: 8/13/2024  Impression: 1.Increased left basilar opacity, which may be due to pneumonia and/or atelectasis. 2.Stable positioning of support tubes and lines, as above. Electronically Signed: Medina Campo  8/13/2024 2:26 PM EDT  Workstation ID: [id number]     Current medications     Scheduled Meds:   enoxaparin, 40 mg, Subcutaneous, Daily  folic acid 1 mg in sodium chloride 0.9 % 50 mL IVPB, 1 mg, Intravenous, Daily  furosemide, 40 mg, Intravenous, Q12H  insulin lispro, 4-24 Units, Subcutaneous, Q6H  lansoprazole, 30 mg, Nasogastric, Q AM  levETIRAcetam, 500 mg, Nasogastric, Q12H  metroNIDAZOLE, 500 mg, Nasogastric, Q8H  miconazole, 1 Application, Topical, Q12H  midodrine, 10 mg, Nasogastric, Q8H  QUEtiapine, 25 mg, Nasogastric, Q12H  sertraline, 25 mg,  Nasogastric, Nightly  sodium chloride, 10 mL, Intravenous, Q12H  sodium chloride, 10 mL, Intravenous, Q12H  thiamine (B-1) IV, 200 mg, Intravenous, Q8H   Followed by  [START ON 8/14/2024] thiamine, 100 mg, Oral, Daily        Continuous Infusions:   dexmedetomidine, 0.2-1.5 mcg/kg/hr, Last Rate: 0.6 mcg/kg/hr (08/13/24 1126)          Plan discussed with RN. Reviewed all other data in the last 24 hours, including but not limited to vitals, labs, microbiology, imaging and pertinent notes from other providers.  .      Gil Bobby,    Critical Care  08/13/24   14:39 EDT

## 2024-08-13 NOTE — PLAN OF CARE
Goal Outcome Evaluation:     Pt failed SBT again today. Pt breathed spontaneously with pressure support of 16 for 30 minutes before needing to be switched back to a rate. Seroquel was added to MAR for anxiety. RN obtained EKG to check QT interval. CXR obtained showed increased left basilar opacity due to possible pneumonia or atelectasis.

## 2024-08-13 NOTE — CASE MANAGEMENT/SOCIAL WORK
Continued Stay Note  [facility]     Patient Name: Emiliano Bateman  MRN: [id number]  Today's Date: 8/13/2024    Admit Date: 8/8/2024    Plan: Plan to dc to [facility], if bed available at dc. Precert required. PASRR approved.   Discharge Plan       Row Name 08/13/24 1412       Plan    Plan Plan to dc to [facility], if bed available at dc. Precert required. PASRR approved.    Plan Comments DC Barriers: NPO - NG, IV Abxs, vent 40/5, seizure precautions, IV Keppra, PICC, Precedex gtt, DM educator following.               Expected Discharge Date and Time       Expected Discharge Date Expected Discharge Time    Aug 16, 2024               JOHN Guthrie RN  SIPS/ICU   O: [phone]  C: [phone]  [email or web address]

## 2024-08-13 NOTE — PLAN OF CARE
Goal Outcome Evaluation:      During this shift patient had a large BM. VSS. Patients CIWA score was high two different times during the night. Ativan was given twicw. Dex gtt running. Bilateral restraints continued for ETT management.

## 2024-08-13 NOTE — SIGNIFICANT NOTE
08/13/24 1052   OTHER   Discipline physical therapist   Rehab Time/Intention   Session Not Performed other (see comments)  (Patient intubated and sedated; planned for breathing trial; RN requests to hold therapy until after trial)   Therapy Assessment/Plan (PT)   Criteria for Skilled Interventions Met (PT) yes;meets criteria;skilled treatment is necessary   Recommendation   PT - Next Appointment 08/14/24

## 2024-08-13 NOTE — PROGRESS NOTES
Nutrition Services    Patient Name: Emiliano Bateman  YOB: 1960  MRN: [id number]  Admission date: 8/8/2024    PROGRESS NOTE      Encounter Information: Check on for TF. Pt discussed in AM rounds. Remains intubated. On precedex.  Diabetes educator saw 8/13 and spoke to patients wife.  Noted with plans for EKG and MD added Seroquel.         PO Diet: NPO Diet NPO Type: Tube Feeding   PO Supplements: None ordered   PO Intake:  NPO       Current nutrition support: Diabetisource AC @ 65 mL/hr with 10 mL/hr FWF   Nutrition support review: Infusing as documented above.        Labs (reviewed below): Hypokalemia - resolved       GI Function:  Last documented BM 8/13 (today)       Nutrition Intervention Updates: Continue current TF at goal rate.        Results from last 7 days   Lab Units 08/13/24  0341 08/12/24  1809 08/12/24  0441 08/11/24  0535   SODIUM mmol/L 134*  --  138 138   POTASSIUM mmol/L 4.0 4.3 3.3* 3.2*   CHLORIDE mmol/L 101  --  100 97*   CO2 mmol/L 27.8  --  30.1* 32.2*   BUN mg/dL 15  --  11 9   CREATININE mg/dL 0.61*  --  0.58* 0.66*   CALCIUM mg/dL 7.6*  --  7.2* 7.5*   BILIRUBIN mg/dL 0.4  --  0.4 0.7   ALK PHOS U/L 74  --  64 61   ALT (SGPT) U/L 24  --  25 35   AST (SGOT) U/L 37  --  44* 75*   GLUCOSE mg/dL 145*  --  166* 151*     Results from last 7 days   Lab Units 08/13/24  0341 08/11/24  0535 08/10/24  0539 08/09/24  2105 08/09/24  0435 08/09/24  0427 08/09/24  0255 08/08/24  2213   0000   MAGNESIUM mg/dL  --   --   --   --  2.2  --  2.0 2.8*  --    PHOSPHORUS mg/dL  --   --   --  3.7 1.5*  --  1.8*  --    < >   HEMOGLOBIN g/dL 13.3   < > 12.1*  --   --   --   --   --   --    HEMOGLOBIN, POC   --   --   --   --   --    < >  --   --   --    HEMATOCRIT % 39.2   < > 36.7*  --   --   --   --   --   --    HEMATOCRIT POC   --   --   --   --   --    < >  --   --   --    TRIGLYCERIDES mg/dL  --   --  102  --   --   --   --   --   --     < > = values in this interval not displayed.     COVID19    Date Value Ref Range Status   08/09/2024 Not Detected Not Detected - Ref. Range Final     Lab Results   Component Value Date    HGBA1C 6.09 (H) 08/09/2024         RD to follow up per protocol.    Electronically signed by:  Olive Maher RD  08/13/24 07:53 EDT

## 2024-08-14 ENCOUNTER — APPOINTMENT (OUTPATIENT)
Dept: GENERAL RADIOLOGY | Facility: HOSPITAL | Age: 64
DRG: 896 | End: 2024-08-14
Payer: MEDICARE

## 2024-08-14 PROBLEM — E11.9 TYPE 2 DIABETES MELLITUS: Status: ACTIVE | Noted: 2024-08-14

## 2024-08-14 PROBLEM — J96.00 ACUTE RESPIRATORY FAILURE: Status: ACTIVE | Noted: 2024-08-14

## 2024-08-14 LAB
ALBUMIN SERPL-MCNC: 3.1 G/DL (ref 3.5–5.2)
ALBUMIN/GLOB SERPL: 1.1 G/DL
ALP SERPL-CCNC: 79 U/L (ref 39–117)
ALT SERPL W P-5'-P-CCNC: 20 U/L (ref 1–41)
ANION GAP SERPL CALCULATED.3IONS-SCNC: 5.8 MMOL/L (ref 5–15)
ARTERIAL PATENCY WRIST A: POSITIVE
AST SERPL-CCNC: 40 U/L (ref 1–40)
ATMOSPHERIC PRESS: ABNORMAL MM[HG]
BACTERIA SPEC AEROBE CULT: NORMAL
BACTERIA SPEC AEROBE CULT: NORMAL
BASE EXCESS BLDA CALC-SCNC: 4.9 MMOL/L (ref 0–3)
BASOPHILS # BLD AUTO: 0.07 10*3/MM3 (ref 0–0.2)
BASOPHILS NFR BLD AUTO: 0.9 % (ref 0–1.5)
BDY SITE: ABNORMAL
BILIRUB SERPL-MCNC: 0.3 MG/DL (ref 0–1.2)
BUN SERPL-MCNC: 18 MG/DL (ref 8–23)
BUN/CREAT SERPL: 23.7 (ref 7–25)
CALCIUM SPEC-SCNC: 8.1 MG/DL (ref 8.6–10.5)
CHLORIDE SERPL-SCNC: 98 MMOL/L (ref 98–107)
CO2 BLDA-SCNC: 31.4 MMOL/L (ref 22–29)
CO2 SERPL-SCNC: 32.2 MMOL/L (ref 22–29)
CREAT SERPL-MCNC: 0.76 MG/DL (ref 0.76–1.27)
DEPRECATED RDW RBC AUTO: 69.9 FL (ref 37–54)
EGFRCR SERPLBLD CKD-EPI 2021: 100.4 ML/MIN/1.73
EOSINOPHIL # BLD AUTO: 0.33 10*3/MM3 (ref 0–0.4)
EOSINOPHIL NFR BLD AUTO: 4.1 % (ref 0.3–6.2)
ERYTHROCYTE [DISTWIDTH] IN BLOOD BY AUTOMATED COUNT: 19.2 % (ref 12.3–15.4)
GLOBULIN UR ELPH-MCNC: 2.9 GM/DL
GLUCOSE BLDC GLUCOMTR-MCNC: 115 MG/DL (ref 70–105)
GLUCOSE BLDC GLUCOMTR-MCNC: 142 MG/DL (ref 74–100)
GLUCOSE BLDC GLUCOMTR-MCNC: 192 MG/DL (ref 70–105)
GLUCOSE BLDC GLUCOMTR-MCNC: 223 MG/DL (ref 70–105)
GLUCOSE SERPL-MCNC: 204 MG/DL (ref 65–99)
HCO3 BLDA-SCNC: 30 MMOL/L (ref 21–28)
HCT VFR BLD AUTO: 42 % (ref 37.5–51)
HEMODILUTION: NO
HGB BLD-MCNC: 14.3 G/DL (ref 13–17.7)
IMM GRANULOCYTES # BLD AUTO: 0.09 10*3/MM3 (ref 0–0.05)
IMM GRANULOCYTES NFR BLD AUTO: 1.1 % (ref 0–0.5)
INHALED O2 CONCENTRATION: 40 %
LYMPHOCYTES # BLD AUTO: 1.94 10*3/MM3 (ref 0.7–3.1)
LYMPHOCYTES NFR BLD AUTO: 24.3 % (ref 19.6–45.3)
MAGNESIUM SERPL-MCNC: 1.8 MG/DL (ref 1.6–2.4)
MCH RBC QN AUTO: 34.4 PG (ref 26.6–33)
MCHC RBC AUTO-ENTMCNC: 34 G/DL (ref 31.5–35.7)
MCV RBC AUTO: 101 FL (ref 79–97)
MODALITY: ABNORMAL
MONOCYTES # BLD AUTO: 1.29 10*3/MM3 (ref 0.1–0.9)
MONOCYTES NFR BLD AUTO: 16.2 % (ref 5–12)
NEUTROPHILS NFR BLD AUTO: 4.25 10*3/MM3 (ref 1.7–7)
NEUTROPHILS NFR BLD AUTO: 53.4 % (ref 42.7–76)
NRBC BLD AUTO-RTO: 0 /100 WBC (ref 0–0.2)
PCO2 BLDA: 44.9 MM HG (ref 35–48)
PEEP RESPIRATORY: 5 CM[H2O]
PH BLDA: 7.43 PH UNITS (ref 7.35–7.45)
PHOSPHATE SERPL-MCNC: 0.8 MG/DL (ref 2.5–4.5)
PHOSPHATE SERPL-MCNC: 3 MG/DL (ref 2.5–4.5)
PLATELET # BLD AUTO: 166 10*3/MM3 (ref 140–450)
PMV BLD AUTO: 9.8 FL (ref 6–12)
PO2 BLD: 216 MM[HG] (ref 0–500)
PO2 BLDA: 86.4 MM HG (ref 83–108)
POTASSIUM SERPL-SCNC: 3.7 MMOL/L (ref 3.5–5.2)
PROT SERPL-MCNC: 6 G/DL (ref 6–8.5)
PSV: 7 CMH2O
QT INTERVAL: 419 MS
QTC INTERVAL: 442 MS
RBC # BLD AUTO: 4.16 10*6/MM3 (ref 4.14–5.8)
RESPIRATORY RATE: 31
SAO2 % BLDCOA: 96.8 % (ref 94–98)
SODIUM SERPL-SCNC: 136 MMOL/L (ref 136–145)
VENTILATOR MODE: ABNORMAL
WBC NRBC COR # BLD AUTO: 7.97 10*3/MM3 (ref 3.4–10.8)

## 2024-08-14 PROCEDURE — 74018 RADEX ABDOMEN 1 VIEW: CPT

## 2024-08-14 PROCEDURE — 25010000002 HYDROMORPHONE 1 MG/ML SOLUTION: Performed by: NURSE PRACTITIONER

## 2024-08-14 PROCEDURE — 82803 BLOOD GASES ANY COMBINATION: CPT

## 2024-08-14 PROCEDURE — 63710000001 INSULIN LISPRO (HUMAN) PER 5 UNITS: Performed by: INTERNAL MEDICINE

## 2024-08-14 PROCEDURE — 94799 UNLISTED PULMONARY SVC/PX: CPT

## 2024-08-14 PROCEDURE — 36600 WITHDRAWAL OF ARTERIAL BLOOD: CPT | Performed by: NURSE PRACTITIONER

## 2024-08-14 PROCEDURE — 85025 COMPLETE CBC W/AUTO DIFF WBC: CPT | Performed by: NURSE PRACTITIONER

## 2024-08-14 PROCEDURE — 36600 WITHDRAWAL OF ARTERIAL BLOOD: CPT

## 2024-08-14 PROCEDURE — 25010000002 LORAZEPAM PER 2 MG: Performed by: NURSE PRACTITIONER

## 2024-08-14 PROCEDURE — 80053 COMPREHEN METABOLIC PANEL: CPT | Performed by: NURSE PRACTITIONER

## 2024-08-14 PROCEDURE — 94761 N-INVAS EAR/PLS OXIMETRY MLT: CPT

## 2024-08-14 PROCEDURE — 82948 REAGENT STRIP/BLOOD GLUCOSE: CPT

## 2024-08-14 PROCEDURE — 84100 ASSAY OF PHOSPHORUS: CPT

## 2024-08-14 PROCEDURE — 25010000002 FUROSEMIDE PER 20 MG: Performed by: INTERNAL MEDICINE

## 2024-08-14 PROCEDURE — 84100 ASSAY OF PHOSPHORUS: CPT | Performed by: INTERNAL MEDICINE

## 2024-08-14 PROCEDURE — 94003 VENT MGMT INPAT SUBQ DAY: CPT

## 2024-08-14 PROCEDURE — 83735 ASSAY OF MAGNESIUM: CPT

## 2024-08-14 PROCEDURE — 25010000002 ENOXAPARIN PER 10 MG: Performed by: NURSE PRACTITIONER

## 2024-08-14 PROCEDURE — 82803 BLOOD GASES ANY COMBINATION: CPT | Performed by: NURSE PRACTITIONER

## 2024-08-14 RX ORDER — LORAZEPAM 2 MG/ML
1 INJECTION INTRAMUSCULAR ONCE
Status: COMPLETED | OUTPATIENT
Start: 2024-08-14 | End: 2024-08-14

## 2024-08-14 RX ORDER — QUETIAPINE FUMARATE 25 MG/1
50 TABLET, FILM COATED ORAL EVERY 12 HOURS SCHEDULED
Status: DISCONTINUED | OUTPATIENT
Start: 2024-08-14 | End: 2024-08-17

## 2024-08-14 RX ORDER — FENTANYL/ROPIVACAINE/NS/PF 2-625MCG/1
15 PLASTIC BAG, INJECTION (ML) EPIDURAL
Status: COMPLETED | OUTPATIENT
Start: 2024-08-14 | End: 2024-08-14

## 2024-08-14 RX ORDER — QUETIAPINE FUMARATE 25 MG/1
25 TABLET, FILM COATED ORAL ONCE
Status: COMPLETED | OUTPATIENT
Start: 2024-08-14 | End: 2024-08-14

## 2024-08-14 RX ORDER — LORAZEPAM 2 MG/ML
0.5 INJECTION INTRAMUSCULAR EVERY 4 HOURS PRN
Status: DISCONTINUED | OUTPATIENT
Start: 2024-08-14 | End: 2024-08-15

## 2024-08-14 RX ORDER — FUROSEMIDE 10 MG/ML
40 INJECTION INTRAMUSCULAR; INTRAVENOUS EVERY 6 HOURS SCHEDULED
Status: DISCONTINUED | OUTPATIENT
Start: 2024-08-14 | End: 2024-08-15

## 2024-08-14 RX ADMIN — QUETIAPINE FUMARATE 50 MG: 25 TABLET ORAL at 20:06

## 2024-08-14 RX ADMIN — ACETAMINOPHEN 650 MG: 650 SOLUTION ORAL at 08:27

## 2024-08-14 RX ADMIN — DEXMEDETOMIDINE HYDROCHLORIDE 1.5 MCG/KG/HR: 4 INJECTION, SOLUTION INTRAVENOUS at 08:27

## 2024-08-14 RX ADMIN — QUETIAPINE FUMARATE 25 MG: 25 TABLET ORAL at 08:28

## 2024-08-14 RX ADMIN — QUETIAPINE FUMARATE 25 MG: 25 TABLET ORAL at 11:06

## 2024-08-14 RX ADMIN — LORAZEPAM 1 MG: 2 INJECTION INTRAMUSCULAR; INTRAVENOUS at 03:51

## 2024-08-14 RX ADMIN — Medication 10 ML: at 08:28

## 2024-08-14 RX ADMIN — ANTI-FUNGAL POWDER MICONAZOLE NITRATE TALC FREE 1 APPLICATION: 1.42 POWDER TOPICAL at 08:28

## 2024-08-14 RX ADMIN — INSULIN LISPRO 8 UNITS: 100 INJECTION, SOLUTION INTRAVENOUS; SUBCUTANEOUS at 05:36

## 2024-08-14 RX ADMIN — MIDODRINE HYDROCHLORIDE 10 MG: 5 TABLET ORAL at 08:27

## 2024-08-14 RX ADMIN — LORAZEPAM 4 MG: 2 INJECTION INTRAMUSCULAR; INTRAVENOUS at 00:11

## 2024-08-14 RX ADMIN — Medication 100 MG: at 08:27

## 2024-08-14 RX ADMIN — INSULIN LISPRO 4 UNITS: 100 INJECTION, SOLUTION INTRAVENOUS; SUBCUTANEOUS at 12:03

## 2024-08-14 RX ADMIN — POTASSIUM PHOSPHATE, MONOBASIC POTASSIUM PHOSPHATE, DIBASIC 15 MMOL: 224; 236 INJECTION, SOLUTION, CONCENTRATE INTRAVENOUS at 11:07

## 2024-08-14 RX ADMIN — DEXMEDETOMIDINE HYDROCHLORIDE 1.5 MCG/KG/HR: 4 INJECTION, SOLUTION INTRAVENOUS at 14:41

## 2024-08-14 RX ADMIN — MIDODRINE HYDROCHLORIDE 10 MG: 5 TABLET ORAL at 17:33

## 2024-08-14 RX ADMIN — SERTRALINE HYDROCHLORIDE 25 MG: 25 TABLET ORAL at 20:07

## 2024-08-14 RX ADMIN — FUROSEMIDE 40 MG: 10 INJECTION, SOLUTION INTRAMUSCULAR; INTRAVENOUS at 02:34

## 2024-08-14 RX ADMIN — Medication 10 ML: at 20:07

## 2024-08-14 RX ADMIN — LEVETIRACETAM 500 MG: 100 SOLUTION ORAL at 08:27

## 2024-08-14 RX ADMIN — HYDROMORPHONE HYDROCHLORIDE 1 MG: 1 INJECTION, SOLUTION INTRAMUSCULAR; INTRAVENOUS; SUBCUTANEOUS at 22:47

## 2024-08-14 RX ADMIN — LEVETIRACETAM 500 MG: 100 SOLUTION ORAL at 20:06

## 2024-08-14 RX ADMIN — POTASSIUM PHOSPHATE, MONOBASIC POTASSIUM PHOSPHATE, DIBASIC 15 MMOL: 224; 236 INJECTION, SOLUTION, CONCENTRATE INTRAVENOUS at 13:30

## 2024-08-14 RX ADMIN — ANTI-FUNGAL POWDER MICONAZOLE NITRATE TALC FREE 1 APPLICATION: 1.42 POWDER TOPICAL at 20:07

## 2024-08-14 RX ADMIN — POTASSIUM PHOSPHATE, MONOBASIC POTASSIUM PHOSPHATE, DIBASIC 15 MMOL: 224; 236 INJECTION, SOLUTION, CONCENTRATE INTRAVENOUS at 12:03

## 2024-08-14 RX ADMIN — INSULIN LISPRO 4 UNITS: 100 INJECTION, SOLUTION INTRAVENOUS; SUBCUTANEOUS at 00:02

## 2024-08-14 RX ADMIN — METRONIDAZOLE 500 MG: 500 TABLET ORAL at 05:37

## 2024-08-14 RX ADMIN — DEXMEDETOMIDINE HYDROCHLORIDE 1.5 MCG/KG/HR: 4 INJECTION, SOLUTION INTRAVENOUS at 04:49

## 2024-08-14 RX ADMIN — ENOXAPARIN SODIUM 40 MG: 100 INJECTION SUBCUTANEOUS at 15:47

## 2024-08-14 RX ADMIN — LORAZEPAM 0.5 MG: 2 INJECTION INTRAMUSCULAR; INTRAVENOUS at 20:39

## 2024-08-14 RX ADMIN — MIDODRINE HYDROCHLORIDE 10 MG: 5 TABLET ORAL at 02:33

## 2024-08-14 RX ADMIN — FUROSEMIDE 40 MG: 10 INJECTION, SOLUTION INTRAMUSCULAR; INTRAVENOUS at 17:33

## 2024-08-14 RX ADMIN — FUROSEMIDE 40 MG: 10 INJECTION, SOLUTION INTRAMUSCULAR; INTRAVENOUS at 23:31

## 2024-08-14 RX ADMIN — LANSOPRAZOLE 30 MG: 30 TABLET, ORALLY DISINTEGRATING, DELAYED RELEASE ORAL at 05:37

## 2024-08-14 RX ADMIN — FOLIC ACID 1 MG: 1 TABLET ORAL at 08:27

## 2024-08-14 NOTE — SIGNIFICANT NOTE
08/14/24 1345   OTHER   Discipline physical therapist   Rehab Time/Intention   Session Not Performed other (see comments)  (hold per nursing)   Therapy Assessment/Plan (PT)   Criteria for Skilled Interventions Met (PT) yes;meets criteria;skilled treatment is necessary   Recommendation   PT - Next Appointment 08/15/24

## 2024-08-14 NOTE — CONSULTS
Diabetes Education    Patient Name:  Emiliano Bateman  YOB: 1960  MRN: [id number]  Admit Date:  8/8/2024        Follow-up with patient's wife to teach blood glucose monitoring. Demonstrated to wife how to use the Accu-chek glucometer and lancing device. Wife did return demonstration of loading the lancing device, cocking the lancet, and ejecting the lancet. Log book given with discussion on checking blood sugar daily varying the times before meals and at bedtime.  Prescriptions started in discharge orders for lancets, test strips, and Accu-chek Guide Me glucometer. Wife has no further questions or concerns related to diabetes at this time.    Electronically signed by:  Ila De La O RN  08/14/24 12:31 EDT

## 2024-08-14 NOTE — PROGRESS NOTES
"Critical Care Progress Note     Emiliano Bateman : 1960 MRN:[id number] LOS:5  Rm: 2316/1     Principal Problem: Alcohol withdrawal seizure with delirium     Reason for follow up: All the medical problems listed below    Summary     Emiliano Bateman is a 63 y.o. male with PMH of hypertension, COPD, liver disease, Parkinson's, DMT2, seizure disorder who presented to the hospital after suffering a seizure at home, and was admitted with a principal diagnosis of Alcohol withdrawal seizure with delirium.  Information for HPI taken from chart review as patient is unable to cooperate at time of assessment due to acuity of illness.  His wife is at bedside, and states that he had a seizure today which prompted her to call EMS.  Of note patient was recently in our hospital in April for new onset seizures secondary to alcohol withdrawal.  At that time it was reported that patient drink 1 gallon of vodka daily.  Per patient wife, patient has decreased his drinking significantly since that hospitalization.  She states that his last drink was approximately 2 days ago.  Also, patient does have parkinsonian tremors, and was on medication before but stopped taking them due to side effects.  Per ED documentation patient was conversive when he came to the emergency room and denied any complaints of pain or headache.  He had no subjective fevers or recent illnesses.  Per report patient has had stated he felt \"puny\".  Per PTA meds he was not restarted on medication for his Parkinson's on discharge from the hospital.     ED course: Shortly after arriving to the emergency department he had what was described as a generalized tonic-clonic seizure that lasted several minutes.  He was treated with IV Ativan and loaded with 1 g IV Keppra.  Lab showed CK of 226, anion gap 34.2 with glucose 263, CO2 13.8, WBC 12.81 without bandemia, and ammonia 71.  Was ordered 1 L saline bolus.  UA without WBCs or bacteria, however + glucose and + " ketones.  ABG pending at time of note.  CT head and cervical spine were completed and negative for acute abnormality.    Significant Events     08/14/24 : Patient remains afebrile, all vital signs are within normal limits.  He has had 4.1 L of urine output over the last 24 hours and is net +10.3 L for the admission.  Increase Lasix to 40 mg IV push every 6 hours.  Chemistry panel shows that the glucose is running in the upper 100s, phosphorus low at 0.8 and being replaced.  CBC unremarkable.  Patient did not do well on spontaneous breathing trial earlier this morning, but is doing better this afternoon.  As long as he can go at least an hour, we will plan on extubating.  Assessment / Plan     Acute respiratory failure without hypoxia or without hypercapnia  Intubated for airway protection  Worsening encephalopathy secondary to acute DTs required intubation 8/10 for airway protection  RVP negative  Respiratory culture pending and NTD  Ventilator settings noted and adjusted as needed.   Close monitoring of ABG.   Minimize sedation/analgesia to keep RASS of 0 to -1.    Seizures, not in status  Probable alcohol withdrawal seizure activity  Ceribell placed in ED, highest reading of seizure burden at 7%   Continue IV Keppra  On phenobarbital taper per protocol  IV Ativan as needed  Seizure precaution  Now intubated and sedated     Alcohol withdrawal syndrome  Patient previously consumed a gallon of vodka daily  Patient's wife states he has drastically reduced his drinking  Last drink was 2 days prior to admission  CIWA protocol.  Now intubated and sedated     Diabetic ketoacidosis without coma, with history of diabetes mellitus, type 2  Anion gap of 34 on admission.  Hemoglobin A1c 6.09  DKA protocol initiated  Insulin drip initiated per DKA protocol, now transitioned off  Adjust IV fluids as indicated  Accuchecks with sliding scale insulin     Essential hypertension  Home dose amlodipine on hold due to hypotension and  pressor support     Leukocytosis  Patient met SIRS criteria with elevated WBC and elevated heart rate, however no clear etiology of infection is apparent  Blood cultures pending and NTD  UA negative  Procalcitonin 0.46 and lactic 4.5 on admission, trended down to 2.2.  No need for further serial monitoring  Now hypotensive requiring pressor support  Due to respiratory failure added empiric ceftriaxone 8/9 for 5 days  CXR no acute cardiopulmonary disease      History of Parkinson's  Patient not on home medication     COPD  Hold inhalers, not in acute exacerbation  Monitor ABG's as ordered.   Intubated for airway protection 8/11     Anxiety/Depression  Continue home Zoloft     Disposition: ICU        Critical Care Time:  34 mins.      Code status:   Code Status (Patient has no pulse and is not breathing): CPR (Attempt to Resuscitate)  Medical Interventions (Patient has pulse or is breathing): Full Support       Nutrition:   NPO Diet NPO Type: Tube Feeding   Tube Feeding: Formula: Diabetisource AC (Glucerna 1.2); Feeding Type: Continuous; Start at: 20 mL/hr; Then Advance By: Do Not Advance; Water Flush: 10 mL; Every: 1 hour; Water Bolus: None     VTE Prophylaxis:  Pharmacologic & mechanical VTE prophylaxis orders are present.         Subjective / Review of systems     Unable to be obtained secondary to patient's current condition.    Objective / Physical Exam     Vital signs:  Temp: 98.7 °F (37.1 °C)  BP: 90/60  Heart Rate: 88  Resp: 23  SpO2: 94 %  Weight: 65.8 kg (145 lb 1 oz)    Admission Weight: Weight: 64.4 kg (142 lb)  Current Weight: Weight: 65.8 kg (145 lb 1 oz)    Input/Output in last 24 hours:    Intake/Output Summary (Last 24 hours) at 8/14/2024 1443  Last data filed at 8/14/2024 1400  Gross per 24 hour   Intake 2988.41 ml   Output 3200 ml   Net -211.59 ml      Net IO Since Admission: 10,320.2 mL [08/14/24 1443]       GEN: Middle-age man who appears much older than stated age.  Intubated, minimally sedated,  on vent.  NAD.  NEURO:  Brainstem reflexes intact.  No obvious focal deficit.  Moves all 4 ext.  HEENT:  N/AT.  PERRL.  MMM.  Oropharynx non-erythematous.  No drainage from the eyes/ears/nose.  No conjunctival petechiae.  No oral thrush.  ETT in place.  NECK:  Supple, NT, trachea midline.  No meningismus.    CHEST/LUNGS:  Breath sounds are clear and equal bilaterally.  No w/r/r.  Chest excursion equal bilaterally.    CARDIOVASCULAR:  RRR w/o murmur noted.  PMI not displaced.  GI:  Abdomen soft, NT, ND, +BS.    :  Normal external genitalia.  No see cath in place.  EXTREMITIES:  No deformity or amputation.  No cyanosis, edema, or asymmetry.  Pulses 2+ and equal in BLE's.    SKIN:  Warm, dry, and pink.  No rash, breakdown, or track marks noted.  LYMPHATICS/HEME:  No overt LAD or abnormal bruising.  No lymphedema.  MSK:  No joint abnormalities noted.    PSYCH: Unable to assess secondary to patient's current condition..           Radiology and Labs     Results from last 7 days   Lab Units 08/14/24  0543 08/13/24  0341 08/12/24  0441 08/11/24  0535 08/10/24  0539   WBC 10*3/mm3 7.97 7.64 5.45 7.87 7.71   HEMOGLOBIN g/dL 14.3 13.3 13.2 13.5 12.1*   HEMATOCRIT % 42.0 39.2 39.2 39.2 36.7*   PLATELETS 10*3/mm3 166 101* 66* 66* 73*      Results from last 7 days   Lab Units 08/08/24  2157   PROTIME Seconds 10.3   INR  0.94   APTT seconds 28.2*      Results from last 7 days   Lab Units 08/14/24  0543 08/13/24  0341 08/12/24  1809 08/12/24  0441 08/11/24  0535 08/10/24  0539 08/09/24  2105 08/09/24  1520 08/09/24  0435 08/09/24  0427 08/09/24  0255 08/08/24  2213   SODIUM mmol/L 136 134*  --  138 138 140  --   --  138  --  138 142   POTASSIUM mmol/L 3.7 4.0 4.3 3.3* 3.2* 3.5  --    < > 3.7  --  3.6 3.7   CHLORIDE mmol/L 98 101  --  100 97* 107  --   --  97*  --  97* 94*   CO2 mmol/L 32.2* 27.8  --  30.1* 32.2* 18.4*  --   --  25.4  --  23.3 13.8*   BUN mg/dL 18 15  --  11 9 10  --   --  11  --  12 15   CREATININE mg/dL 0.76  0.61*  --  0.58* 0.66* 0.63*  --   --  0.91   < > 0.92 1.40*   GLUCOSE mg/dL 204* 145*  --  166* 151* 106*  --   --  76  --  174* 246*   MAGNESIUM mg/dL 1.8  --   --   --   --   --   --   --  2.2  --  2.0 2.8*   PHOSPHORUS mg/dL 0.8*  --   --   --   --   --  3.7  --  1.5*  --  1.8*  --     < > = values in this interval not displayed.      Results from last 7 days   Lab Units 08/14/24  0543 08/13/24  0341 08/12/24  0441 08/11/24  0535 08/10/24  0539   ALK PHOS U/L 79 74 64 61 53   AST (SGOT) U/L 40 37 44* 75* 118*   ALT (SGPT) U/L 20 24 25 35 37     Results from last 7 days   Lab Units 08/10/24  1237 08/09/24  0427   PH, ARTERIAL pH units 7.267* 7.541*   PCO2, ARTERIAL mm Hg 37.8 33.5*   PO2 ART mm Hg 153.8* 89.6   O2 SATURATION ART % 99.1* 98.0   FIO2 % 60 40   HCO3 ART mmol/L 17.2* 28.7*   BASE EXCESS ART mmol/L -9.0* 6.3*       XR Chest 1 View    Result Date: 8/13/2024  Impression: 1.Increased left basilar opacity, which may be due to pneumonia and/or atelectasis. 2.Stable positioning of support tubes and lines, as above. Electronically Signed: Medina Campo  8/13/2024 2:26 PM EDT  Workstation ID: [id number]     Current medications     Scheduled Meds:   enoxaparin, 40 mg, Subcutaneous, Daily  folic acid, 1 mg, Nasogastric, Daily  furosemide, 40 mg, Intravenous, Q12H  insulin lispro, 4-24 Units, Subcutaneous, Q6H  lansoprazole, 30 mg, Nasogastric, Q AM  levETIRAcetam, 500 mg, Nasogastric, Q12H  miconazole, 1 Application, Topical, Q12H  midodrine, 10 mg, Nasogastric, Q8H  QUEtiapine, 50 mg, Nasogastric, Q12H  sertraline, 25 mg, Nasogastric, Nightly  sodium chloride, 10 mL, Intravenous, Q12H  sodium chloride, 10 mL, Intravenous, Q12H  thiamine, 100 mg, Oral, Daily        Continuous Infusions:   dexmedetomidine, 0.2-1.5 mcg/kg/hr, Last Rate: 1.5 mcg/kg/hr (08/14/24 1441)          Plan discussed with RN. Reviewed all other data in the last 24 hours, including but not limited to vitals, labs, microbiology, imaging and  pertinent notes from other providers.  .      Gil Bobby DO   Critical Care  08/14/24   14:43 EDT

## 2024-08-14 NOTE — SIGNIFICANT NOTE
Weaning attempted at this time however after about 1 hour 25 minutes patients rr went to 48 and he was in distress.  Returned to VC+/AC and rr now 24.

## 2024-08-14 NOTE — SIGNIFICANT NOTE
08/14/24 0953   OTHER   Discipline occupational therapist   Rehab Time/Intention   Session Not Performed other (see comments)  (SBT this AM. NSG hold until PM.)

## 2024-08-14 NOTE — PLAN OF CARE
Goal Outcome Evaluation:   Pt rested well throughout the night, pt has increased agitation with urination and repositioning. X1 prn ativan given for increased agitation/restlessness; see MAR. Precedex at 1.5. VSS this shift. No s/s of pain/discomfort at this time. Bilateral soft wrist restraints maintained for ETT management.

## 2024-08-14 NOTE — PROGRESS NOTES
Nutrition Services    Patient Name: Emiliano Bateman  YOB: 1960  MRN: [id number]  Admission date: 8/8/2024    PROGRESS NOTE      Encounter Information: Check on for TF. Pt discussed in AM rounds. Remains intubated. On precedex.  RD ordered mag/phos for review today.  MD would like to increase seroquel dose today.         PO Diet: NPO Diet NPO Type: Tube Feeding   PO Supplements: None ordered   PO Intake:  NPO       Current nutrition support: Diabetisource AC @ 65 mL/hr with 10 mL/hr FWF   Nutrition support review: Infusing as documented above.        Labs (reviewed below): Hypophosphatemia - to replace today        GI Function:  Last documented BM 8/13 (yesterday)       Nutrition Intervention Updates: Decrease TF back to 20 mL/hour until Phos improved.         Results from last 7 days   Lab Units 08/14/24  0543 08/13/24  0341 08/12/24  1809 08/12/24  0441   SODIUM mmol/L 136 134*  --  138   POTASSIUM mmol/L 3.7 4.0 4.3 3.3*   CHLORIDE mmol/L 98 101  --  100   CO2 mmol/L 32.2* 27.8  --  30.1*   BUN mg/dL 18 15  --  11   CREATININE mg/dL 0.76 0.61*  --  0.58*   CALCIUM mg/dL 8.1* 7.6*  --  7.2*   BILIRUBIN mg/dL 0.3 0.4  --  0.4   ALK PHOS U/L 79 74  --  64   ALT (SGPT) U/L 20 24  --  25   AST (SGOT) U/L 40 37  --  44*   GLUCOSE mg/dL 204* 145*  --  166*     Results from last 7 days   Lab Units 08/14/24  0543 08/11/24  0535 08/10/24  0539 08/09/24  2105 08/09/24  0435 08/09/24  0427 08/09/24  0255   MAGNESIUM mg/dL 1.8  --   --   --  2.2  --  2.0   PHOSPHORUS mg/dL 0.8*  --   --    < > 1.5*  --  1.8*   HEMOGLOBIN g/dL 14.3   < > 12.1*  --   --   --   --    HEMOGLOBIN, POC   --   --   --   --   --    < >  --    HEMATOCRIT % 42.0   < > 36.7*  --   --   --   --    HEMATOCRIT POC   --   --   --   --   --    < >  --    TRIGLYCERIDES mg/dL  --   --  102  --   --   --   --     < > = values in this interval not displayed.     COVID19   Date Value Ref Range Status   08/09/2024 Not Detected Not Detected - Ref.  Range Final     Lab Results   Component Value Date    HGBA1C 6.09 (H) 08/09/2024         RD to follow up per protocol.    Electronically signed by:  Olive Maher RD  08/14/24 08:20 EDT

## 2024-08-14 NOTE — SIGNIFICANT NOTE
08/14/24 1302   OTHER   Discipline occupational therapist   Rehab Time/Intention   Session Not Performed other (see comments)  (Medically unstable. Will continue to follow.)   Recommendation   OT - Next Appointment 08/15/24

## 2024-08-14 NOTE — CASE MANAGEMENT/SOCIAL WORK
Continued Stay Note  [facility]     Patient Name: Emiliano Bateman  MRN: [id number]  Today's Date: 8/14/2024    Admit Date: 8/8/2024    Plan: Plan to dc to [facility], if bed available at dc. Precert required. PASRR approved.   Discharge Plan       Row Name 08/14/24 0908       Plan    Plan Plan to dc to [facility], if bed available at dc. Precert required. PASRR approved.    Plan Comments DC Barriers: NPO - NG, IV Abxs, vent 40/5, seizure precautions, IV Keppra, PICC, Precedex gtt, DM educator following.               Expected Discharge Date and Time       Expected Discharge Date Expected Discharge Time    Aug 20, 2024               JOHN Guthrie RN  SIPS/ICU   O: [phone]  C: [phone]  [email or web address]

## 2024-08-14 NOTE — PLAN OF CARE
Goal Outcome Evaluation:      Seroquel dosage increased. CIWA protocol discontinued. Pt extubated at 1632 to 4L NC. Restraints discontinued post extubation. NGT replaced by RN after pt removed it. KUB verified correct placement.

## 2024-08-15 LAB
ALBUMIN SERPL-MCNC: 3.7 G/DL (ref 3.5–5.2)
ALBUMIN/GLOB SERPL: 1.3 G/DL
ALP SERPL-CCNC: 82 U/L (ref 39–117)
ALT SERPL W P-5'-P-CCNC: 18 U/L (ref 1–41)
ANION GAP SERPL CALCULATED.3IONS-SCNC: 10.2 MMOL/L (ref 5–15)
ARTERIAL PATENCY WRIST A: POSITIVE
AST SERPL-CCNC: 31 U/L (ref 1–40)
ATMOSPHERIC PRESS: ABNORMAL MM[HG]
BASE EXCESS BLDA CALC-SCNC: 7.1 MMOL/L (ref 0–3)
BASOPHILS # BLD AUTO: 0.06 10*3/MM3 (ref 0–0.2)
BASOPHILS NFR BLD AUTO: 0.6 % (ref 0–1.5)
BDY SITE: ABNORMAL
BILIRUB SERPL-MCNC: 0.3 MG/DL (ref 0–1.2)
BUN SERPL-MCNC: 17 MG/DL (ref 8–23)
BUN/CREAT SERPL: 20.7 (ref 7–25)
CALCIUM SPEC-SCNC: 8.1 MG/DL (ref 8.6–10.5)
CHLORIDE SERPL-SCNC: 99 MMOL/L (ref 98–107)
CO2 BLDA-SCNC: 35.9 MMOL/L (ref 22–29)
CO2 SERPL-SCNC: 32.8 MMOL/L (ref 22–29)
CREAT SERPL-MCNC: 0.82 MG/DL (ref 0.76–1.27)
DEPRECATED RDW RBC AUTO: 73.4 FL (ref 37–54)
EGFRCR SERPLBLD CKD-EPI 2021: 98.1 ML/MIN/1.73
EOSINOPHIL # BLD AUTO: 0.11 10*3/MM3 (ref 0–0.4)
EOSINOPHIL NFR BLD AUTO: 1.1 % (ref 0.3–6.2)
ERYTHROCYTE [DISTWIDTH] IN BLOOD BY AUTOMATED COUNT: 19.9 % (ref 12.3–15.4)
GLOBULIN UR ELPH-MCNC: 2.8 GM/DL
GLUCOSE BLDC GLUCOMTR-MCNC: 133 MG/DL (ref 70–105)
GLUCOSE BLDC GLUCOMTR-MCNC: 164 MG/DL (ref 70–105)
GLUCOSE BLDC GLUCOMTR-MCNC: 170 MG/DL (ref 70–105)
GLUCOSE BLDC GLUCOMTR-MCNC: 173 MG/DL (ref 70–105)
GLUCOSE SERPL-MCNC: 145 MG/DL (ref 65–99)
HCO3 BLDA-SCNC: 34.1 MMOL/L (ref 21–28)
HCT VFR BLD AUTO: 41.4 % (ref 37.5–51)
HEMODILUTION: NO
HGB BLD-MCNC: 13.5 G/DL (ref 13–17.7)
IMM GRANULOCYTES # BLD AUTO: 0.13 10*3/MM3 (ref 0–0.05)
IMM GRANULOCYTES NFR BLD AUTO: 1.3 % (ref 0–0.5)
LYMPHOCYTES # BLD AUTO: 1.85 10*3/MM3 (ref 0.7–3.1)
LYMPHOCYTES NFR BLD AUTO: 18.9 % (ref 19.6–45.3)
MCH RBC QN AUTO: 33.5 PG (ref 26.6–33)
MCHC RBC AUTO-ENTMCNC: 32.6 G/DL (ref 31.5–35.7)
MCV RBC AUTO: 102.7 FL (ref 79–97)
MODALITY: ABNORMAL
MONOCYTES # BLD AUTO: 2.22 10*3/MM3 (ref 0.1–0.9)
MONOCYTES NFR BLD AUTO: 22.7 % (ref 5–12)
NEUTROPHILS NFR BLD AUTO: 5.43 10*3/MM3 (ref 1.7–7)
NEUTROPHILS NFR BLD AUTO: 55.4 % (ref 42.7–76)
NRBC BLD AUTO-RTO: 0 /100 WBC (ref 0–0.2)
PCO2 BLDA: 56.3 MM HG (ref 35–48)
PH BLDA: 7.39 PH UNITS (ref 7.35–7.45)
PHOSPHATE SERPL-MCNC: 3.4 MG/DL (ref 2.5–4.5)
PLATELET # BLD AUTO: 280 10*3/MM3 (ref 140–450)
PMV BLD AUTO: 9.4 FL (ref 6–12)
PO2 BLDA: 71.1 MM HG (ref 83–108)
POTASSIUM SERPL-SCNC: 3.7 MMOL/L (ref 3.5–5.2)
PROT SERPL-MCNC: 6.5 G/DL (ref 6–8.5)
RBC # BLD AUTO: 4.03 10*6/MM3 (ref 4.14–5.8)
RBC MORPH BLD: NORMAL
SAO2 % BLDCOA: 93.4 % (ref 94–98)
SMALL PLATELETS BLD QL SMEAR: ADEQUATE
SODIUM SERPL-SCNC: 142 MMOL/L (ref 136–145)
WBC MORPH BLD: NORMAL
WBC NRBC COR # BLD AUTO: 9.8 10*3/MM3 (ref 3.4–10.8)

## 2024-08-15 PROCEDURE — 25010000002 ENOXAPARIN PER 10 MG: Performed by: NURSE PRACTITIONER

## 2024-08-15 PROCEDURE — 25010000002 ONDANSETRON PER 1 MG: Performed by: NURSE PRACTITIONER

## 2024-08-15 PROCEDURE — 63710000001 INSULIN LISPRO (HUMAN) PER 5 UNITS: Performed by: INTERNAL MEDICINE

## 2024-08-15 PROCEDURE — 25010000002 HYDROMORPHONE 1 MG/ML SOLUTION: Performed by: HOSPITALIST

## 2024-08-15 PROCEDURE — 82948 REAGENT STRIP/BLOOD GLUCOSE: CPT

## 2024-08-15 PROCEDURE — 80053 COMPREHEN METABOLIC PANEL: CPT | Performed by: NURSE PRACTITIONER

## 2024-08-15 PROCEDURE — 97530 THERAPEUTIC ACTIVITIES: CPT

## 2024-08-15 PROCEDURE — 63710000001 INSULIN LISPRO (HUMAN) PER 5 UNITS: Performed by: NURSE PRACTITIONER

## 2024-08-15 PROCEDURE — 99221 1ST HOSP IP/OBS SF/LOW 40: CPT | Performed by: PSYCHIATRY & NEUROLOGY

## 2024-08-15 PROCEDURE — 84100 ASSAY OF PHOSPHORUS: CPT

## 2024-08-15 PROCEDURE — 97110 THERAPEUTIC EXERCISES: CPT

## 2024-08-15 PROCEDURE — 25010000002 LEVETRIRACETAM PER 10 MG: Performed by: HOSPITALIST

## 2024-08-15 PROCEDURE — 97112 NEUROMUSCULAR REEDUCATION: CPT

## 2024-08-15 PROCEDURE — 25010000002 LORAZEPAM PER 2 MG: Performed by: NURSE PRACTITIONER

## 2024-08-15 PROCEDURE — 85007 BL SMEAR W/DIFF WBC COUNT: CPT | Performed by: NURSE PRACTITIONER

## 2024-08-15 PROCEDURE — 92610 EVALUATE SWALLOWING FUNCTION: CPT

## 2024-08-15 PROCEDURE — 85025 COMPLETE CBC W/AUTO DIFF WBC: CPT | Performed by: NURSE PRACTITIONER

## 2024-08-15 PROCEDURE — 25010000002 FUROSEMIDE PER 20 MG: Performed by: INTERNAL MEDICINE

## 2024-08-15 RX ORDER — MIDODRINE HYDROCHLORIDE 5 MG/1
5 TABLET ORAL EVERY 8 HOURS
Status: DISCONTINUED | OUTPATIENT
Start: 2024-08-15 | End: 2024-08-16

## 2024-08-15 RX ORDER — LORAZEPAM 2 MG/ML
0.5 INJECTION INTRAMUSCULAR EVERY 4 HOURS PRN
Status: COMPLETED | OUTPATIENT
Start: 2024-08-15 | End: 2024-08-17

## 2024-08-15 RX ORDER — LEVETIRACETAM 500 MG/5ML
500 INJECTION, SOLUTION, CONCENTRATE INTRAVENOUS EVERY 12 HOURS SCHEDULED
Status: DISCONTINUED | OUTPATIENT
Start: 2024-08-15 | End: 2024-08-16

## 2024-08-15 RX ORDER — PANTOPRAZOLE SODIUM 40 MG/10ML
40 INJECTION, POWDER, LYOPHILIZED, FOR SOLUTION INTRAVENOUS
Status: DISCONTINUED | OUTPATIENT
Start: 2024-08-16 | End: 2024-08-17 | Stop reason: HOSPADM

## 2024-08-15 RX ORDER — OLANZAPINE 10 MG/2ML
5 INJECTION, POWDER, LYOPHILIZED, FOR SOLUTION INTRAMUSCULAR EVERY 8 HOURS PRN
Status: DISCONTINUED | OUTPATIENT
Start: 2024-08-15 | End: 2024-08-17 | Stop reason: HOSPADM

## 2024-08-15 RX ORDER — INSULIN LISPRO 100 [IU]/ML
4-24 INJECTION, SOLUTION INTRAVENOUS; SUBCUTANEOUS
Status: DISCONTINUED | OUTPATIENT
Start: 2024-08-15 | End: 2024-08-17 | Stop reason: HOSPADM

## 2024-08-15 RX ADMIN — LORAZEPAM 0.5 MG: 2 INJECTION INTRAMUSCULAR; INTRAVENOUS at 23:36

## 2024-08-15 RX ADMIN — FUROSEMIDE 40 MG: 10 INJECTION, SOLUTION INTRAMUSCULAR; INTRAVENOUS at 11:44

## 2024-08-15 RX ADMIN — LANSOPRAZOLE 30 MG: 30 TABLET, ORALLY DISINTEGRATING, DELAYED RELEASE ORAL at 05:44

## 2024-08-15 RX ADMIN — MIDODRINE HYDROCHLORIDE 10 MG: 5 TABLET ORAL at 02:01

## 2024-08-15 RX ADMIN — Medication 10 ML: at 08:13

## 2024-08-15 RX ADMIN — ANTI-FUNGAL POWDER MICONAZOLE NITRATE TALC FREE 1 APPLICATION: 1.42 POWDER TOPICAL at 08:13

## 2024-08-15 RX ADMIN — ONDANSETRON 4 MG: 2 INJECTION INTRAMUSCULAR; INTRAVENOUS at 05:52

## 2024-08-15 RX ADMIN — INSULIN LISPRO 4 UNITS: 100 INJECTION, SOLUTION INTRAVENOUS; SUBCUTANEOUS at 05:44

## 2024-08-15 RX ADMIN — LEVETIRACETAM 500 MG: 100 SOLUTION ORAL at 08:13

## 2024-08-15 RX ADMIN — MIDODRINE HYDROCHLORIDE 5 MG: 5 TABLET ORAL at 12:01

## 2024-08-15 RX ADMIN — Medication 10 ML: at 20:42

## 2024-08-15 RX ADMIN — Medication 100 MG: at 08:13

## 2024-08-15 RX ADMIN — FUROSEMIDE 40 MG: 10 INJECTION, SOLUTION INTRAMUSCULAR; INTRAVENOUS at 05:44

## 2024-08-15 RX ADMIN — LEVETIRACETAM 500 MG: 100 INJECTION INTRAVENOUS at 20:39

## 2024-08-15 RX ADMIN — ENOXAPARIN SODIUM 40 MG: 100 INJECTION SUBCUTANEOUS at 17:18

## 2024-08-15 RX ADMIN — FOLIC ACID 1 MG: 1 TABLET ORAL at 08:13

## 2024-08-15 RX ADMIN — QUETIAPINE FUMARATE 50 MG: 25 TABLET ORAL at 20:38

## 2024-08-15 RX ADMIN — HYDROMORPHONE HYDROCHLORIDE 0.5 MG: 1 INJECTION, SOLUTION INTRAMUSCULAR; INTRAVENOUS; SUBCUTANEOUS at 16:43

## 2024-08-15 RX ADMIN — INSULIN LISPRO 4 UNITS: 100 INJECTION, SOLUTION INTRAVENOUS; SUBCUTANEOUS at 22:10

## 2024-08-15 RX ADMIN — ANTI-FUNGAL POWDER MICONAZOLE NITRATE TALC FREE 1 APPLICATION: 1.42 POWDER TOPICAL at 20:38

## 2024-08-15 RX ADMIN — QUETIAPINE FUMARATE 50 MG: 25 TABLET ORAL at 08:13

## 2024-08-15 RX ADMIN — SERTRALINE HYDROCHLORIDE 25 MG: 25 TABLET ORAL at 20:38

## 2024-08-15 NOTE — CASE MANAGEMENT/SOCIAL WORK
Continued Stay Note  [facility]     Patient Name: Emiliano Bateman  MRN: [id number]  Today's Date: 8/15/2024    Admit Date: 8/8/2024    Plan: Plan to dc to [facility], if bed available at dc. Precert required. PASRR approved.   Discharge Plan       Row Name 08/15/24 1402       Plan    Plan Plan to dc to [facility], if bed available at dc. Precert required. PASRR approved.    Plan Comments DC Barriers: NPO - NG, IV Abxs, 4L NC, seizure precautions, restraints, IV Keppra, PICC, Neuro/DM educator following.                 Expected Discharge Date and Time       Expected Discharge Date Expected Discharge Time    Aug 20, 2024               JOHN Guthrie RN  SIPS/ICU   O: [phone]  C: [phone]  [email or web address]

## 2024-08-15 NOTE — THERAPY TREATMENT NOTE
"Subjective: Pt agreeable to therapeutic plan of care.  Cognition: oriented to Person    Objective:     Precautions - wrist restraints (removed for therapy session with RN approval); NPO, NG tube, falls risk    Bed Mobility: Mod-A   Functional Transfers: Min-A, Assist x 2, and with rolling walker     Balance: supported and standing Mod-A  Functional Ambulation: Min-A, Mod-A, Assist x 2, and with rolling walker; took steps to HOB    Lower Body Dressing: Dependent  ADL Position: edge of bed sitting  ADL Comments: socks    Therapeutic Exercise - 10 Reps B UE AROM lying supine    Vitals: WNL  BP:   Supine 125/76  Sitting 138/78  Supine 109/78    Pain: 0 VAS     Education: Provided education on the importance of mobility in the acute care setting, ADL training, and Transfer Training      Assessment: Emiliano Bateman presents with ADL impairments affecting function including balance, cognition, endurance / activity tolerance, and strength. Demonstrated functioning below baseline abilities indicate the need for continued skilled intervention while inpatient. Wrist restraints removed, pt completed BUE AROM 1 x 10.  Pt t/f from supine to sitting on EOB with MOD A.  Pt required DEP to don socks.  Pt able to sit EOB x 10 min with MOD A progressing to SBA.  Pt completed sit to stand t/f using RW with MIN A x 2.  Pt able to completed lateral steps towards HOB with MIN-MOD A x 2 using RW.  Tolerating session today without incident. Will continue to follow and progress as tolerated.     Plan/Recommendations:   Moderate Intensity Therapy recommended post-acute care. This is recommended as therapy feels the patient would require 3-4 days per week and wouldn't tolerate \"3 hour daily\" rehab intensity. SNF would be the preferred choice. If the patient does not agree to SNF, arrange HH or OP depending on home bound status. If patient is medically complex, consider LTACH.. Pt requires no DME at discharge.     Pt desires Skilled Rehab " placement at discharge. Pt cooperative; agreeable to therapeutic recommendations and plan of care.     Modified [place]: 4 = Moderately severe disability (Unable to attend to own bodily needs without assistance, and unable to walk unassisted)     Post-Tx Position: Supine with HOB Elevated, Alarms activated, and Call light and personal items within reach; soft wrist restraints replaced  PPE: gloves

## 2024-08-15 NOTE — PLAN OF CARE
Goal Outcome Evaluation:  Emiliano Bateman presents with ADL impairments affecting function including balance, cognition, endurance / activity tolerance, and strength. Demonstrated functioning below baseline abilities indicate the need for continued skilled intervention while inpatient. Wrist restraints removed, pt completed BUE AROM 1 x 10.  Pt t/f from supine to sitting on EOB with MOD A.  Pt required DEP to don socks.  Pt able to sit EOB x 10 min with MOD A progressing to SBA.  Pt completed sit to stand t/f using RW with MIN A x 2.  Pt able to completed lateral steps towards HOB with MIN-MOD A x 2 using RW.  Tolerating session today without incident. Will continue to follow and progress as tolerated.

## 2024-08-15 NOTE — PLAN OF CARE
Goal Outcome Evaluation:   Assessment: Emiliano Bateman presents with functional mobility impairments which indicate the need for skilled intervention. Marked improvement in alertness and participation now that he is extubated.  He is cooperative and appropriate throughout, confused and disoriented but following commands with increased time.  He requires min-modAx2 for functional transfer and lateral gait. Sitting balance improves within session. SNF remains the most appropriate d/c disposition, anticipate he will progress well with increased opportunities for movement.  Tolerating session today without incident. Will continue to follow and progress as tolerated.

## 2024-08-15 NOTE — PLAN OF CARE
Goal Outcome Evaluation:   Pt barely got any rest this shift. Pt very agitated, restless, and pulling lines/tubes. Pt trying to get out of bed and having hallucinations throughout the night. Around 2300, unable to keep patient from pulling lines/tubes and not able to redirect; pt placed in bilateral soft restraints for safety purposes. Family notified and aware, will pass on to dayshift RN to attempt to de-escalate once family is at bedside. Pt frequently coughing up thick sputum, unable to orally suction. Attempted NT suction unsuccessfully, pt more agitated and unable to hold head still. Bilateral soft wrist restraints maintained. VSS. No c/o pain/discomfort at this time.

## 2024-08-15 NOTE — THERAPY TREATMENT NOTE
Subjective: Pt agreeable to therapeutic plan of care.  Patient resting in bed, is extubated.  He is alert and interactive.  He is oriented to person only, but after oriented is able to answer orientation question appropriately for another provider.  Cleared for treatment by nursing.    Objective:     Precautions - restraints- removed for treatment but replaced per nursing.    NG tube, NPO, falls    Bed mobility - Mod-A, increased time and cues for technique  Transfers - Min-A and Assist x 2 to CTS with RW, maintains static standing at bedside x3 minutes with modA posterior lean  Ambulation - lateral gait R x3 feet to HOB with RW and min-modAx2, assist for WS and stability as well as RW management.    Static and dynamic sitting EOB without back support with UE support and modA for balance xapprox 8 minutes, sitting balance after standing improves to close supervision x1-2 minutes      Vitals:   Supine 125/76mmHG 100bpm  Sitting 138/78mmHG 127bpm  Supine 109/78mmHG 114bpm    Pain: 0 VAS      Education: Provided education on the importance of mobility in the acute care setting, Transfer Training, and Gait Training    Assessment: Emiliano Bateman presents with functional mobility impairments which indicate the need for skilled intervention. Marked improvement in alertness and participation now that he is extubated.  He is cooperative and appropriate throughout, confused and disoriented but following commands with increased time.  He requires min-modAx2 for functional transfer and lateral gait. Sitting balance improves within session. SNF remains the most appropriate d/c disposition, anticipate he will progress well with increased opportunities for movement.  Tolerating session today without incident. Will continue to follow and progress as tolerated.     Plan/Recommendations:   If medically appropriate, Moderate Intensity Therapy recommended post-acute care. This is recommended as therapy feels the patient would require  "3-4 days per week and wouldn't tolerate \"3 hour daily\" rehab intensity. SNF would be the preferred choice. If the patient does not agree to SNF, arrange HH or OP depending on home bound status. If patient is medically complex, consider LTACH. Pt requires no DME at discharge.     Pt desires Skilled Rehab placement at discharge. Pt cooperative; agreeable to therapeutic recommendations and plan of care.         Basic Mobility 6-click:  Rollin = Total, A lot = 2, A little = 3; 4 = None  Supine>Sit:   1 = Total, A lot = 2, A little = 3; 4 = None   Sit>Stand with arms:  1 = Total, A lot = 2, A little = 3; 4 = None  Bed>Chair:   1 = Total, A lot = 2, A little = 3; 4 = None  Ambulate in room:  1 = Total, A lot = 2, A little = 3; 4 = None  3-5 Steps with railin = Total, A lot = 2, A little = 3; 4 = None  Score: 6    Modified [place]: 4 = Moderately severe disability (Unable to attend to own bodily needs without assistance, and unable to walk unassisted)     Post-Tx Position: Supine with HOB Elevated, Alarms activated, and Call light and personal items within reach restraints in place  PPE: gloves    "

## 2024-08-15 NOTE — PROGRESS NOTES
Nutrition Services    Patient Name: Emiliano Bateman  YOB: 1960  MRN: [id number]  Admission date: 8/8/2024    PROGRESS NOTE      Encounter Information: Check on for TF. Extubated 8/14.  NG tube replaced but patient has since removed.  RN to reach out to MD about diet advancement.  SLP to see.         PO Diet: NPO Diet NPO Type: Tube Feeding   PO Supplements: None ordered   PO Intake:  NPO       Current nutrition support: Diabetisource AC @ 20 mL/hr with 10 mL/hr FWF   Nutrition support review: Infusing as documented above prior to NG removal        Labs (reviewed below): Hypophosphatemia - resolved        GI Function:  Last documented BM 8/13 (yesterday)       Nutrition Intervention Updates: Diet per MD/SLP    D/C TF order        Results from last 7 days   Lab Units 08/15/24  0531 08/14/24  0543 08/13/24  0341   SODIUM mmol/L 142 136 134*   POTASSIUM mmol/L 3.7 3.7 4.0   CHLORIDE mmol/L 99 98 101   CO2 mmol/L 32.8* 32.2* 27.8   BUN mg/dL 17 18 15   CREATININE mg/dL 0.82 0.76 0.61*   CALCIUM mg/dL 8.1* 8.1* 7.6*   BILIRUBIN mg/dL 0.3 0.3 0.4   ALK PHOS U/L 82 79 74   ALT (SGPT) U/L 18 20 24   AST (SGOT) U/L 31 40 37   GLUCOSE mg/dL 145* 204* 145*     Results from last 7 days   Lab Units 08/15/24  0531 08/14/24 2015 08/14/24  0543 08/11/24  0535 08/10/24  0539 08/09/24  2105 08/09/24  0435 08/09/24  0427 08/09/24  0255   MAGNESIUM mg/dL  --   --  1.8  --   --   --  2.2  --  2.0   PHOSPHORUS mg/dL 3.4   < > 0.8*  --   --    < > 1.5*  --  1.8*   HEMOGLOBIN g/dL 13.5  --  14.3   < > 12.1*  --   --   --   --    HEMOGLOBIN, POC   --   --   --   --   --   --   --    < >  --    HEMATOCRIT % 41.4  --  42.0   < > 36.7*  --   --   --   --    HEMATOCRIT POC   --   --   --   --   --   --   --    < >  --    TRIGLYCERIDES mg/dL  --   --   --   --  102  --   --   --   --     < > = values in this interval not displayed.     COVID19   Date Value Ref Range Status   08/09/2024 Not Detected Not Detected - Ref. Range Final      Lab Results   Component Value Date    HGBA1C 6.09 (H) 08/09/2024         RD to follow up per protocol.    Electronically signed by:  Olive Maher RD  08/15/24 08:02 EDT

## 2024-08-15 NOTE — PROGRESS NOTES
[facility] MEDICINE SERVICE  DAILY PROGRESS NOTE    NAME: Emiliano Bateman  : 1960  MRN: [id number]      LOS: 6 days     PROVIDER OF SERVICE: Frank Gill MD    Chief Complaint: <principal problem not specified>    Subjective:     Interval History:  History taken from: patient chart RN  Seen while working with PT  Not agitated following commands   Pulled out Dobhoff     Review of Systems:   Review of Systems  All negative except as above  Objective:     Vital Signs  Temp:  [98.1 °F (36.7 °C)-99.2 °F (37.3 °C)] 99.2 °F (37.3 °C)  Heart Rate:  [] 102  Resp:  [18-32] 18  BP: ()/(50-93) 130/87  Flow (L/min):  [4] 4  FiO2 (%):  [40 %] 40 %   Body mass index is 22.72 kg/m².    Physical Exam  Physical Exam  NAD slightly confused RRR S1-S2 audible next abdomen ferritin  Abdomen soft nontender nondistended  Scheduled Meds   enoxaparin, 40 mg, Subcutaneous, Daily  folic acid, 1 mg, Nasogastric, Daily  furosemide, 40 mg, Intravenous, Q6H  insulin lispro, 4-24 Units, Subcutaneous, Q6H  lansoprazole, 30 mg, Nasogastric, Q AM  levETIRAcetam, 500 mg, Nasogastric, Q12H  miconazole, 1 Application, Topical, Q12H  midodrine, 5 mg, Nasogastric, Q8H  QUEtiapine, 50 mg, Nasogastric, Q12H  sertraline, 25 mg, Nasogastric, Nightly  sodium chloride, 10 mL, Intravenous, Q12H  sodium chloride, 10 mL, Intravenous, Q12H  thiamine, 100 mg, Oral, Daily       PRN Meds     acetaminophen    acetaminophen    Calcium Replacement - Follow Nurse / BPA Driven Protocol    dextrose    dextrose    glucagon (human recombinant)    HYDROmorphone    LORazepam    Magnesium Standard Dose Replacement - Follow Nurse / BPA Driven Protocol    nitroglycerin    ondansetron ODT **OR** ondansetron    Phosphorus Replacement - Follow Nurse / BPA Driven Protocol    Potassium Replacement - Follow Nurse / BPA Driven Protocol    [COMPLETED] Insert Peripheral IV **AND** sodium chloride    sodium chloride    sodium chloride    sodium chloride    Infusions         Diagnostic Data    Results from last 7 days   Lab Units 08/15/24  0531   WBC 10*3/mm3 9.80   HEMOGLOBIN g/dL 13.5   HEMATOCRIT % 41.4   PLATELETS 10*3/mm3 280   GLUCOSE mg/dL 145*   CREATININE mg/dL 0.82   BUN mg/dL 17   SODIUM mmol/L 142   POTASSIUM mmol/L 3.7   AST (SGOT) U/L 31   ALT (SGPT) U/L 18   ALK PHOS U/L 82   BILIRUBIN mg/dL 0.3   ANION GAP mmol/L 10.2       XR Abdomen KUB    Result Date: 8/14/2024  Impression: Esophagogastric tube tip overlies the proximal stomach. Electronically Signed: Xiang Tomas MD  8/14/2024 6:40 PM EDT  Workstation ID: [id number]    XR Chest 1 View    Result Date: 8/13/2024  Impression: 1.Increased left basilar opacity, which may be due to pneumonia and/or atelectasis. 2.Stable positioning of support tubes and lines, as above. Electronically Signed: Medina Campo  8/13/2024 2:26 PM EDT  Workstation ID: [id number]       I reviewed the patient's new clinical results.  I reviewed the patient's new imaging results and agree with the interpretation.    Assessment/Plan:     Active and Resolved Problems  Active Hospital Problems    Diagnosis  POA    Type 2 diabetes mellitus [E11.9]  Yes    Acute respiratory failure [J96.00]  Yes    Moderate malnutrition [E44.0]  Yes    Seizure due to alcohol withdrawal [F10.939, R56.9]  Yes    Hypertensive disorder [I10]  Yes    Chronic obstructive lung disease [J44.9]  Yes    Alcoholism [F10.20]  Yes    Anxiety [F41.9]  Yes      Resolved Hospital Problems   No resolved problems to display.       63-year-old male with history of hypertension, COPD, DM2, EtOH abuse seizure disorder admitted to [facility] with breakthrough seizure      #History of seizure disorder with breakthrough seizures  Concern for alcohol withdrawal seizures  Patient continues to drink EtOH  Continue Keppra changed to IV for now.  Once taking p.o. consistently may switch to p.o.  Neurology consulted  Breakthrough seizure precautions    #EtOH withdrawal syndrome c/b  DT   Last reported drink 2 days prior to admission required intubation in ICU for airway protection now extubated    Status post phenobarb tapering dose  Now off CIWA monitoring  Delirium precautions as below  Continue thiamine folic acid MVI  Wean off supplemental oxygen  Lasix as needed    #Acute encephalopathy.  Metabolic with delirium  #Dysphagia  Avoid benzodiazepines  Delirium precautions  Finished course of antibiotics  Continue Seroquel 50 every 12  Zyprexa as needed for agitation.  QTc 442  Pulled Dobbhoff passed bedside swallow eval will get speech therapist on board goal is to initiate diet          # Hypertension  Antihypertensives on hold  Currently on midodrine 5 every 8 monitor blood pressure    #Leukocytosis  Finished a very course of antibiotics  Culture data nonrevealing     #DM2 complicated by DKA  DKA has since resolved  Continue ISS lispro  Will hold off on Lantus as patient currently not eating      #Parkinson's?  Not on any medications at home    #COPD  Not exacerbation  Continue current as needed nebs    #Anxiety/depression  Continue Zoloft    VTE Prophylaxis:  Pharmacologic & mechanical VTE prophylaxis orders are present.         Code status is   Code Status and Medical Interventions: CPR (Attempt to Resuscitate); Full Support   Ordered at: 08/09/24 0047     Code Status (Patient has no pulse and is not breathing):    CPR (Attempt to Resuscitate)     Medical Interventions (Patient has pulse or is breathing):    Full Support       Plan for disposition: 2 to 3 days    Time: 30 minutes    Signature: Electronically signed by Frank Gill MD, 08/15/24, 12:38 EDT.  [facility], [place] Hospitalist Team

## 2024-08-15 NOTE — CONSULTS
Primary Care Provider: Meryl Thomas MD     Consult requested by: Admitting team    Reason for Consultation: Neurological evaluation /advise on antiepileptics    History taken from: patient chart RN    Chief complaint: He was admitted on eighth of this month for seizures and delirium and possible withdrawal       SUBJECTIVE:    History of present illness: Background per H&P: Emiliano Bateman is a 63 y.o. year old male who was evaluated in room ICU 2316 at [facility]    Source of information is the patient but more than that the previous records and evaluation done by the team members    He was evaluated by neurology, Dr. Cade, on April 23 and 24 this year, 2024    He again presented with seizure-like activity, likely withdrawal related as his last drink apparently was 2 days before this presentation but I am trying to find out what has happened since April as does he have seizures otherwise?    I called his wife but she did not reply    He is on Keppra now and we will continue    He is improving, and today when I saw him he is awake and alert and essentially oriented x 2 but very responsive    Mild generalized weakness but otherwise nonfocal    His ABGs yesterday were not the best but otherwise he is improving    CT head on admission was okay    Vital signs lately okay and labs improving    As per hospitalist team,  63-year-old male with a past medical history of hypertension, COPD, liver disease, Parkinson's, dementia, diabetes mellitus type 2, alcoholism, seizure disorder who was admitted through the emergency department on 8/8/2024 after seizure at home.  He also had a seizure subsequently in the emergency department.  Review of records shows he was admitted in April for new onset seizure secondary to alcohol withdrawal and placed on Keppra.   August 8/24 admission day in ED he was initially placed on IV Cerebyx also treated for diabetic ketoacidosis on insulin drip, eventually triggered sepsis  blood cultures were negative and he was initially treated with IV ceftriaxone and IV vancomycin.  Currently on IV Flagyl. He required intubation on 8/10/2024 secondary to refractory seizures.  He also had an NG tube placed.  Patient was extubated today at 1632 is now stable on 4 L oxygen via nasal cannula.  We have been consulted for downgrade from critical care status for medical management.  Last ABG today at 1535 showed a pH of 7.433 pCO2 44.9 pO2 86.4, glucose 142 WBC 7.97.  He is awake and appears in no distress.  He follows commands but is not conversive.  No family at bedside.     As per admitting H&P,  Seizures, not in status  Probable alcohol withdrawal seizure activity  Ceribell placed in ED, highest reading of seizure burden at 7%   Continue IV Keppra  IV Ativan as needed  Seizure precaution     Alcohol withdrawal syndrome  Patient previously drank a gallon of vodka daily  Patient's wife states he has drastically reduced his drinking  Last drink was 2 days ago  CIWA protocol     Diabetic ketoacidosis without coma, with history of diabetes mellitus, type 2  Anion gap of 34 on admission.  Check A1c; last 6.50 on 4/23  DKA protocol initiated  Insulin drip initiated per DKA protocol.  Adjust iv fluids based on sugars, sodium, and potassium per protocol.  Accuchecks every hour and serial labs as ordered.  Continue protocol until resolved per protocol recommendations.     Essential hypertension  Patient on home amlodipine, unable to take safely at this time -resume when appropriate  Given IV Lopressor  Sitter IV Cardene if needed     Leukocytosis  Patient meet SIRS criteria with elevated WBC and elevated heart rate, however no clear etiology of infection is apparent  Blood cultures pending  UA negative  Procalcitonin and lactic pending  Patient hypertensive requiring IV Lopressor  Do not feel etiology is infectious, will await lactic and procalcitonin -monitor off ABX for now  CXR pending to rule out possible  "aspiration pneumonia        History of Parkinson's  Patient not on home medication     COPD, not in exacerbation  Continue budesonide and duoneb scheduled and as needed.  Monitor ABG's as ordered. Oxygen supplementation as needed, use BIPAP as needed.  Wean off oxygen as tolerated.     Anxiety/Depression  Continue home Zoloft when able to take PO         Code Status (Patient has no pulse and is not breathing): CPR (Attempt to Resuscitate)  Medical Interventions (Patient has pulse or is breathing): Full Support        Nutrition:   NPO Diet NPO Type: Strict NPO      VTE Prophylaxis:  Mechanical VTE prophylaxis orders are present.           History of Present illness      Emiliano Bateman is a 63 y.o. male with PMH of hypertension, COPD, liver disease, Parkinson's, DMT2, seizure disorder who presented to the hospital after suffering a seizure at home, and was admitted with a principal diagnosis of Alcohol withdrawal seizure with delirium.  Information for HPI taken from chart review as patient is unable to cooperate at time of assessment due to acuity of illness.  His wife is at bedside, and states that he had a seizure today which prompted her to call EMS.  Of note patient was recently in our hospital in April for new onset seizures secondary to alcohol withdrawal.  At that time it was reported that patient drink 1 gallon of vodka daily.  Per patient wife, patient has decreased his drinking significantly since that hospitalization.  She states that his last drink was approximately 2 days ago.  Also, patient does have parkinsonian tremors, and was on medication before but stopped taking them due to side effects.  Per ED documentation patient was conversive when he came to the emergency room and denied any complaints of pain or headache.  He had no subjective fevers or recent illnesses.  Per report patient has had stated he felt \"puny\".  Per PTA meds he was not restarted on medication for his Parkinson's on discharge " from the hospital.     ED course: Shortly after arriving to the emergency department he had what was described as a generalized tonic-clonic seizure that lasted several minutes.  He was treated with IV Ativan and loaded with 1 g IV Keppra.  Lab showed CK of 226, anion gap 34.2 with glucose 263, CO2 13.8, WBC 12.81 without bandemia, and ammonia 71.  Was ordered 1 L saline bolus.  UA without WBCs or bacteria, however + glucose and + ketones.  ABG pending at time of note.  CT head and cervical spine were completed and negative for acute abnormality.     ACP: Patient does not have advised her to follow this facility's.  Wife is at bedside and states he is a full code.      - Portions of the above HPI were copied from previous encounters and edited as appropriate. PMH as detailed below.     Review of Systems   Questionable considering his present condition mostly the confusional state    PATIENT HISTORY:  Past Medical History:   Diagnosis Date    Alcoholism 03/19/2020    Aneurysm of thoracic aorta 11/08/2019    3.9 cm      Anxiety 03/19/2020    Chronic obstructive lung disease 10/08/2020    xray [facility] 12/9/18      Hypertensive disorder 11/23/2020    Myocardial infarction 10/08/2020    angioplasty      Seizure disorder 04/23/2024    Steatosis of liver 10/09/2020   , History reviewed. No pertinent surgical history.,   Family History   Problem Relation Age of Onset    No Known Problems Mother     No Known Problems Father    ,   Social History     Tobacco Use    Smoking status: Unknown   Vaping Use    Vaping status: Never Used   Substance Use Topics    Alcohol use: Yes     Comment: 1 gallon of alcohol a day    Drug use: Never   ,   Prior to Admission medications    Medication Sig Start Date End Date Taking? Authorizing Provider   amLODIPine (NORVASC) 5 MG tablet Take 1 tablet by mouth Daily. 5/1/24   Jairo Guadarrama MD   levETIRAcetam (Keppra) 500 MG tablet Take 1 tablet by mouth 2 (Two) Times a Day. 5/1/24   Thierry  Jairo Thornton MD   sertraline (ZOLOFT) 25 MG tablet Take 1 tablet by mouth Every Night. 5/1/24   Jairo Guadarrama MD   Thiamine Mononitrate 100 MG tablet Take 1 tablet by mouth Daily. 5/1/24   Jairo Guadarrama MD    Allergies:  Penicillins    Current Facility-Administered Medications   Medication Dose Route Frequency Provider Last Rate Last Admin    acetaminophen (TYLENOL) 160 MG/5ML oral solution 650 mg  650 mg Nasogastric Q6H PRN Gil Bobby DO   650 mg at 08/14/24 0827    acetaminophen (TYLENOL) suppository 650 mg  650 mg Rectal Q4H PRN Jannette Parker APRN        Calcium Replacement - Follow Nurse / BPA Driven Protocol   Does not apply PRN Jannette Parker APRN        dextrose (D50W) (25 g/50 mL) IV injection 25 g  25 g Intravenous Q15 Min PRN Gisele Boston MD        dextrose (GLUTOSE) oral gel 15 g  15 g Oral Q15 Min PRN Gisele Boston MD        Enoxaparin Sodium (LOVENOX) syringe 40 mg  40 mg Subcutaneous Daily Natan Eaton APRN   40 mg at 08/14/24 1547    folic acid (FOLVITE) tablet 1 mg  1 mg Nasogastric Daily Gil Bobby DO   1 mg at 08/15/24 0813    furosemide (LASIX) injection 40 mg  40 mg Intravenous Q6H Gil Bobby DO   40 mg at 08/15/24 1144    glucagon (GLUCAGEN) injection 1 mg  1 mg Intramuscular Q15 Min PRN Gisele Boston MD        HYDROmorphone (DILAUDID) injection 1 mg  1 mg Intravenous Q4H PRN Jannette Parker APRN   1 mg at 08/14/24 2247    insulin lispro (HUMALOG/ADMELOG) injection 4-24 Units  4-24 Units Subcutaneous Q6H Gisele Boston MD   4 Units at 08/15/24 0544    lansoprazole (PREVACID SOLUTAB) disintegrating tablet Tablet Delayed Release Dispersible 30 mg  30 mg Nasogastric Q AM Gisele Boston MD   30 mg at 08/15/24 0544    levETIRAcetam (KEPPRA) 100 MG/ML oral solution 500 mg  500 mg Nasogastric Q12H Gisele Boston MD   500 mg at 08/15/24 0813    LORazepam (ATIVAN) injection 0.5 mg  0.5 mg Intravenous Q4H PRN Keith  KAREL Montalvo   0.5 mg at 08/14/24 2039    Magnesium Standard Dose Replacement - Follow Nurse / BPA Driven Protocol   Does not apply PRN Jannette Parker APRN        miconazole (MICOTIN) 2 % powder 1 Application  1 Application Topical Q12H Vani Pena APRN   1 Application at 08/15/24 0813    midodrine (PROAMATINE) tablet 5 mg  5 mg Nasogastric Q8H Frank Gill MD   5 mg at 08/15/24 1201    nitroglycerin (NITROSTAT) SL tablet 0.4 mg  0.4 mg Sublingual Q5 Min PRN Jannette Parker APRN        ondansetron ODT (ZOFRAN-ODT) disintegrating tablet 4 mg  4 mg Oral Q6H PRN Jannette Parker APRN        Or    ondansetron (ZOFRAN) injection 4 mg  4 mg Intravenous Q6H PRN Jannette Parker APRN   4 mg at 08/15/24 0552    Phosphorus Replacement - Follow Nurse / BPA Driven Protocol   Does not apply PRN Jannette Parker APRN        Potassium Replacement - Follow Nurse / BPA Driven Protocol   Does not apply PRN Vani Lawson APRN        QUEtiapine (SEROquel) tablet 50 mg  50 mg Nasogastric Q12H Gil Bobby DO   50 mg at 08/15/24 0813    sertraline (ZOLOFT) tablet 25 mg  25 mg Nasogastric Nightly Natan Eaton APRN   25 mg at 08/14/24 2007    sodium chloride 0.9 % flush 10 mL  10 mL Intravenous PRN Joo Bobby MD        sodium chloride 0.9 % flush 10 mL  10 mL Intravenous Q12H Jannette Parker APRN   10 mL at 08/15/24 0813    sodium chloride 0.9 % flush 10 mL  10 mL Intravenous PRN Jannette Parker APRN        sodium chloride 0.9 % flush 10 mL  10 mL Intravenous Q12H Jannette Parker APRN   10 mL at 08/15/24 0813    sodium chloride 0.9 % flush 10 mL  10 mL Intravenous PRN Jannette Parker APRN   10 mL at 08/09/24 0424    sodium chloride 0.9 % infusion 40 mL  40 mL Intravenous PRN Jannette Parker APRN        thiamine (VITAMIN B-1) tablet 100 mg  100 mg Oral Daily Jannette Parker APRN   100 mg at 08/15/24 0877         ________________________________________________________        OBJECTIVE:  Upon today's exam, the gentleman is resting comfortably in bed in no acute distress but still in restraints     Neurologic Exam    The patient is awake and alert and oriented x self and he thinks he is at home  Not really oriented to time    Heme can name and he can follow commands and repeat     Cranial nerve examination demonstrate:  Full fields of vision to confrontation  Pupils are round, reactive to light and accommodation and size of about 3 mm  No ptosis or nystagmus  Funduscopic examination was not successful  Eye movements are conjugate     Sensation on the face and scalp are normal  Muscles of mastication are normal and symmetric  Muscles of  facial expression are normal and symmetric  Hearing is intact bilaterally  Head turning and shoulder shrugs were unremarkable  Tongue was midline  I could not visualize  oropharynx or uvula     Motor examination:  Normal bulk, tone and strength was 5-/5  No fasciculations     Sensory examination:  Intact for soft touch, pain   Romberg was not evaluated     Reflexes:  0/4     Coordination:  He is in restraints, he does have some significant tremors     Gait:  Deferred     Toe signs:  Mute    ________________________________________________________   RESULTS REVIEW:    VITAL SIGNS:   Temp:  [98.1 °F (36.7 °C)-99.2 °F (37.3 °C)] 99.2 °F (37.3 °C)  Heart Rate:  [] 102  Resp:  [18-32] 18  BP: ()/(50-93) 130/87  FiO2 (%):  [40 %] 40 %     LABS:      Lab 08/15/24  0531 08/14/24  0543 08/13/24  0341 08/12/24  0441 08/11/24  0535 08/10/24  0539 08/09/24  0946 08/09/24  0611 08/09/24  0427 08/09/24  0255 08/08/24  2157   WBC 9.80 7.97 7.64 5.45 7.87   < >  --   --   --   --  12.81*   HEMOGLOBIN 13.5 14.3 13.3 13.2 13.5   < >  --   --   --   --  16.3   HEMOGLOBIN, POC  --   --   --   --   --   --   --   --  15.9  --   --    HEMATOCRIT 41.4 42.0 39.2 39.2 39.2   < >  --   --   --   --   48.0   HEMATOCRIT POC  --   --   --   --   --   --   --   --  47  --   --    PLATELETS 280 166 101* 66* 66*   < >  --   --   --   --  151   NEUTROS ABS 5.43 4.25 4.90 2.97 5.52   < >  --   --   --   --  10.44*   IMMATURE GRANS (ABS) 0.13* 0.09* 0.04 0.02 0.02   < >  --   --   --   --  0.18*   LYMPHS ABS 1.85 1.94 1.49 1.81 1.74   < >  --   --   --   --  0.97   MONOS ABS 2.22* 1.29* 0.96* 0.48 0.41   < >  --   --   --   --  0.83   EOS ABS 0.11 0.33 0.22 0.14 0.15   < >  --   --   --   --  0.30   .7* 101.0* 101.3* 101.6* 100.0*   < >  --   --   --   --  100.8*   PROCALCITONIN  --   --   --   --   --   --   --   --   --  0.46*  --    LACTATE  --   --   --   --   --   --  2.2* 2.9* 3.3* 4.5*  --    PROTIME  --   --   --   --   --   --   --   --   --   --  10.3   APTT  --   --   --   --   --   --   --   --   --   --  28.2*    < > = values in this interval not displayed.         Lab 08/15/24  0531 08/14/24 2015 08/14/24  0543 08/13/24  0341 08/12/24  1809 08/12/24  0441 08/11/24  0535 08/10/24  0539 08/09/24  2105 08/09/24  1520 08/09/24  0435 08/09/24  0427 08/09/24  0255 08/08/24  2213 08/08/24  2213   SODIUM 142  --  136 134*  --  138 138   < >  --   --  138  --  138  --  142   POTASSIUM 3.7  --  3.7 4.0 4.3 3.3* 3.2*   < >  --    < > 3.7  --  3.6  --  3.7   CHLORIDE 99  --  98 101  --  100 97*   < >  --   --  97*  --  97*  --  94*   CO2 32.8*  --  32.2* 27.8  --  30.1* 32.2*   < >  --   --  25.4  --  23.3  --  13.8*   ANION GAP 10.2  --  5.8 5.2  --  7.9 8.8   < >  --   --  15.6*  --  17.7*  --  34.2*   BUN 17  --  18 15  --  11 9   < >  --   --  11  --  12  --  15   CREATININE 0.82  --  0.76 0.61*  --  0.58* 0.66*   < >  --   --  0.91   < > 0.92  --  1.40*   EGFR 98.1  --  100.4 107.3  --  108.9 104.7   < >  --   --  94.1   < > 92.9  --  56.1*   GLUCOSE 145*  --  204* 145*  --  166* 151*   < >  --   --  76  --  174*  --  246*   CALCIUM 8.1*  --  8.1* 7.6*  --  7.2* 7.5*   < >  --   --  8.7  --  8.4*  --  9.1    MAGNESIUM  --   --  1.8  --   --   --   --   --   --   --  2.2  --  2.0  --  2.8*   PHOSPHORUS 3.4 3.0 0.8*  --   --   --   --   --  3.7  --  1.5*  --  1.8*   < >  --    HEMOGLOBIN A1C  --   --   --   --   --   --   --   --   --   --   --   --  6.09*  --   --     < > = values in this interval not displayed.         Lab 08/15/24  0531 08/14/24  0543 08/13/24  0341 08/12/24  0441 08/11/24  0535   TOTAL PROTEIN 6.5 6.0 5.3* 4.9* 5.4*   ALBUMIN 3.7 3.1* 2.8* 2.8* 3.1*   GLOBULIN 2.8 2.9 2.5 2.1 2.3   ALT (SGPT) 18 20 24 25 35   AST (SGOT) 31 40 37 44* 75*   BILIRUBIN 0.3 0.3 0.4 0.4 0.7   ALK PHOS 82 79 74 64 61         Lab 08/09/24  0435 08/09/24  0255 08/08/24  2157   HSTROP T 87* 75*  --    PROTIME  --   --  10.3   INR  --   --  0.94         Lab 08/10/24  0539   TRIGLYCERIDES 102             Lab 08/14/24  2320 08/14/24  1535 08/10/24  1237 08/09/24  0427   PH, ARTERIAL 7.390 7.433 7.267* 7.541*   PCO2, ARTERIAL 56.3* 44.9 37.8 33.5*   PO2 ART 71.1* 86.4 153.8* 89.6   O2 SATURATION ART 93.4* 96.8 99.1* 98.0   FIO2  --  40 60 40   HCO3 ART 34.1* 30.0* 17.2* 28.7*   BASE EXCESS ART 7.1* 4.9* -9.0* 6.3*     UA          4/22/2024    21:54 8/9/2024    01:19   Urinalysis   Squamous Epithelial Cells, UA  0-2    Specific Gravity, UA <=1.005  1.015    Ketones, UA Negative  15 mg/dL (1+)    Blood, UA Negative  Large (3+)    Leukocytes, UA Negative  Negative    Nitrite, UA Negative  Negative    RBC, UA  Too Numerous to Count    WBC, UA  0-2    Bacteria, UA  None Seen        Lab Results   Component Value Date    TSH 0.476 04/23/2024    LDL 84 04/23/2024    HGBA1C 6.09 (H) 08/09/2024    [id number] 447 04/23/2024       IMAGING STUDIES:  XR Abdomen KUB    Result Date: 8/14/2024  Impression: Esophagogastric tube tip overlies the proximal stomach. Electronically Signed: Xiang Tomas MD  8/14/2024 6:40 PM EDT  Workstation ID: [id number]    XR Chest 1 View    Result Date: 8/13/2024  Impression: 1.Increased left basilar opacity, which  may be due to pneumonia and/or atelectasis. 2.Stable positioning of support tubes and lines, as above. Electronically Signed: Medina Jangley  8/13/2024 2:26 PM EDT  Workstation ID: [id number]     I reviewed the patient's new clinical results.    ________________________________________________________     PROBLEM LIST:    Alcoholism    Anxiety    Chronic obstructive lung disease    Hypertensive disorder    Seizure due to alcohol withdrawal    Moderate malnutrition    Type 2 diabetes mellitus    Acute respiratory failure            ASSESSMENT/PLAN:  Alcoholism, possible Dts  Seizures which could be secondary to above, may be just withdrawal related but otherwise some of the patients could have seizures    I will continue Keppra and try to get some more information from his wife    It does not look like he is seizing anymore    Continue supportive care    Seizure precautions state laws may apply      I discussed the patient's findings and my recommendations with patient and nursing staff    Homer Lewis MD  08/15/24  12:48 EDT

## 2024-08-15 NOTE — CONSULTS
[facility] Medicine Services  Consult Note    Patient Name: Emiliano Bateman  : 1960  MRN: [id number]  Primary Care Physician:  Meryl Thomas MD  Referring Physician: Meryl Thomas MD  Date of admission: 2024  Date and Time of Care: 24 at 2350    Inpatient Hospitalist Consult  Consult performed by: Sagrario Duff APRN  Consult ordered by: Jannette Parker APRN            Reason for Consult/ Chief Complaint: Downgrade from critical care, medical management     Consult Requested By: Jannette MEJIAS    Subjective:     History of Present Illness: 63-year-old male with a past medical history of hypertension, COPD, liver disease, Parkinson's, dementia, diabetes mellitus type 2, alcoholism, seizure disorder who was admitted through the emergency department on 2024 after seizure at home.  He also had a seizure subsequently in the emergency department.  Review of records shows he was admitted in April for new onset seizure secondary to alcohol withdrawal and placed on Keppra.    admission day in ED he was initially placed on IV Cerebyx also treated for diabetic ketoacidosis on insulin drip, eventually triggered sepsis blood cultures were negative and he was initially treated with IV ceftriaxone and IV vancomycin.  Currently on IV Flagyl. He required intubation on 8/10/2024 secondary to refractory seizures.  He also had an NG tube placed.  Patient was extubated today at 1632 is now stable on 4 L oxygen via nasal cannula.  We have been consulted for downgrade from critical care status for medical management.  Last ABG today at 1535 showed a pH of 7.433 pCO2 44.9 pO2 86.4, glucose 142 WBC 7.97.  He is awake and appears in no distress.  He follows commands but is not conversive.  No family at bedside.    Review of Systems:   Review of Systems   Unable to perform ROS: Other (patient not alert but not answering, follows commands)       Personal History:     Past  Medical History:   Diagnosis Date    Alcoholism 03/19/2020    Aneurysm of thoracic aorta 11/08/2019    3.9 cm      Anxiety 03/19/2020    Chronic obstructive lung disease 10/08/2020    xray [facility] 12/9/18      Hypertensive disorder 11/23/2020    Myocardial infarction 10/08/2020    angioplasty      Seizure disorder 04/23/2024    Steatosis of liver 10/09/2020       History reviewed. No pertinent surgical history.    Family History: family history includes No Known Problems in his father and mother. Otherwise pertinent FHx was reviewed and not pertinent to current issue.    Social History:  reports current alcohol use. He reports that he does not use drugs.    Home Medications:   Thiamine Mononitrate, amLODIPine, levETIRAcetam, and sertraline    Allergies:  Allergies   Allergen Reactions    Penicillins Unknown - Low Severity     Childhood allergy; pt is unsure of reaction         Objective:     Vital Signs  Temp:  [98.3 °F (36.8 °C)-99.2 °F (37.3 °C)] 99.2 °F (37.3 °C)  Heart Rate:  [] 130  Resp:  [15-43] 22  BP: ()/(50-90) 124/90  Flow (L/min):  [4] 4  FiO2 (%):  [40 %] 40 %   Body mass index is 22.72 kg/m².    Physical Exam  Physical Exam  Vitals reviewed.   Constitutional:       Appearance: Normal appearance.   HENT:      Head: Normocephalic and atraumatic.      Right Ear: External ear normal.      Left Ear: External ear normal.      Nose: Nose normal.      Mouth/Throat:      Mouth: Mucous membranes are moist.   Eyes:      Extraocular Movements: Extraocular movements intact.   Cardiovascular:      Rate and Rhythm: Regular rhythm. Tachycardia present.      Pulses: Normal pulses.      Heart sounds: Normal heart sounds.   Pulmonary:      Effort: Pulmonary effort is normal.      Breath sounds: Normal breath sounds.   Abdominal:      Palpations: Abdomen is soft.   Genitourinary:     Comments: deferred  Musculoskeletal:         General: Normal range of motion.      Cervical back: Normal range of motion and neck  supple.   Skin:     General: Skin is warm and dry.   Neurological:      Mental Status: He is alert.      Comments: Awake appears alert cooperative follows commands not answering questions.   Psychiatric:      Comments: Cooperative, follows commands awake alert not answering questions unable to assess thought content and judgment behavior currently stable         Scheduled Meds   enoxaparin, 40 mg, Subcutaneous, Daily  folic acid, 1 mg, Nasogastric, Daily  furosemide, 40 mg, Intravenous, Q6H  insulin lispro, 4-24 Units, Subcutaneous, Q6H  lansoprazole, 30 mg, Nasogastric, Q AM  levETIRAcetam, 500 mg, Nasogastric, Q12H  miconazole, 1 Application, Topical, Q12H  midodrine, 10 mg, Nasogastric, Q8H  QUEtiapine, 50 mg, Nasogastric, Q12H  sertraline, 25 mg, Nasogastric, Nightly  sodium chloride, 10 mL, Intravenous, Q12H  sodium chloride, 10 mL, Intravenous, Q12H  thiamine, 100 mg, Oral, Daily       PRN Meds     acetaminophen    acetaminophen    Calcium Replacement - Follow Nurse / BPA Driven Protocol    dextrose    dextrose    glucagon (human recombinant)    HYDROmorphone    LORazepam    Magnesium Standard Dose Replacement - Follow Nurse / BPA Driven Protocol    nitroglycerin    ondansetron ODT **OR** ondansetron    Phosphorus Replacement - Follow Nurse / BPA Driven Protocol    Potassium Replacement - Follow Nurse / BPA Driven Protocol    [COMPLETED] Insert Peripheral IV **AND** sodium chloride    sodium chloride    sodium chloride    sodium chloride   Infusions         Diagnostic Data    Results from last 7 days   Lab Units 08/14/24  0543   WBC 10*3/mm3 7.97   HEMOGLOBIN g/dL 14.3   HEMATOCRIT % 42.0   PLATELETS 10*3/mm3 166   GLUCOSE mg/dL 204*   CREATININE mg/dL 0.76   BUN mg/dL 18   SODIUM mmol/L 136   POTASSIUM mmol/L 3.7   AST (SGOT) U/L 40   ALT (SGPT) U/L 20   ALK PHOS U/L 79   BILIRUBIN mg/dL 0.3   ANION GAP mmol/L 5.8       XR Abdomen KUB    Result Date: 8/14/2024  Impression: Esophagogastric tube tip overlies  the proximal stomach. Electronically Signed: Xiang Tomas MD  8/14/2024 6:40 PM EDT  Workstation ID: [id number]    XR Chest 1 View    Result Date: 8/13/2024  Impression: 1.Increased left basilar opacity, which may be due to pneumonia and/or atelectasis. 2.Stable positioning of support tubes and lines, as above. Electronically Signed: Medinaleila Campo  8/13/2024 2:26 PM EDT  Workstation ID: [id number]       I reviewed the patient's new clinical results.    Assessment/Plan:     Active and Resolved Problems  Active Hospital Problems    Diagnosis  POA    Seizure due to alcohol withdrawal [F10.939, R56.9]  Yes     Priority: High    Type 2 diabetes mellitus [E11.9]  Yes     Priority: Medium    Acute respiratory failure [J96.00]  Yes     Priority: Medium    Moderate malnutrition [E44.0]  Yes    Hypertensive disorder [I10]  Yes    Chronic obstructive lung disease [J44.9]  Yes    Alcoholism [F10.20]  Yes    Anxiety [F41.9]  Yes      Resolved Hospital Problems   No resolved problems to display.     Seizures likely secondary to alcohol withdrawal, seizure precautions in place, on IV Keppra no longer on phenobarbital, IV Ativan as needed    Alcohol withdrawal syndrome, CIWA, precautions discontinued has been 7 days seizure precautions still in place Ativan as needed    Diabetes mellitus type 2, no longer in DKA, continue SSI as needed with Accu-Cheks 6 hours while on tube feeds    Acute respiratory failure, extubated yesterday 1632, currently stable on 4 L oxygen via nasal cannula    Moderate malnutrition, currently on NG tube feeds    Essential hypertension, on Lasix 40 mg every 6 hours    COPD, not in exacerbation, on 4 L oxygen via nasal cannula    Anxiety depression, on home Zoloft    Alcoholism, on folic acid,thiamine, case management consulted    Leukocytosis, resolved, completed empiric IV ceftriaxone and IV vancomycin, blood cultures negative, continue IV Flagyl to finish      VTE Prophylaxis:  Pharmacologic & mechanical VTE  prophylaxis orders are present.         Code status is   Code Status and Medical Interventions: CPR (Attempt to Resuscitate); Full Support   Ordered at: 08/09/24 0047     Code Status (Patient has no pulse and is not breathing):    CPR (Attempt to Resuscitate)     Medical Interventions (Patient has pulse or is breathing):    Full Support       Plan for disposition: pending clinical course     Time: 30 minutes        Signature: Electronically signed by KAREL Thao, 08/15/24, 00:20 EDT.  [facility] Hospitalist Team

## 2024-08-15 NOTE — THERAPY EVALUATION
Acute Care - Speech Language Pathology   Swallow Initial Evaluation  [facility]     Patient Name: Emiliano Bateman  : 1960  MRN: [id number]  Today's Date: 8/15/2024               Admit Date: 2024    Visit Dx:     ICD-10-CM ICD-9-CM   1. Status epilepticus  G40.901 345.3     Patient Active Problem List   Diagnosis    Alcoholism    Anxiety    Chronic obstructive lung disease    Hypertensive disorder    Steatosis of liver    Status epilepticus    Seizure disorder    Hyperammonemia    Alcohol withdrawal seizure with delirium    Seizure due to alcohol withdrawal    Moderate malnutrition    Type 2 diabetes mellitus    Acute respiratory failure     Past Medical History:   Diagnosis Date    Alcoholism 2020    Aneurysm of thoracic aorta 2019    3.9 cm      Anxiety 2020    Chronic obstructive lung disease 10/08/2020    xray [facility] 18      Hypertensive disorder 2020    Myocardial infarction 10/08/2020    angioplasty      Seizure disorder 2024    Steatosis of liver 10/09/2020     History reviewed. No pertinent surgical history.    SLP Recommendation and Plan  SLP Swallowing Diagnosis: suspect acute-on-chronic, oral dysphagia, suspected pharyngeal dysphagia (08/15/24 1300)  SLP Diet Recommendation: puree, thin liquids (08/15/24 1300)  Recommended Precautions and Strategies: upright posture during/after eating, small bites of food and sips of liquid (08/15/24 1300)  SLP Rec. for Method of Medication Administration: meds crushed, meds whole, with puree (08/15/24 1300)     Monitor for Signs of Aspiration: yes, notify SLP if any concerns (08/15/24 1300)     Swallow Criteria for Skilled Therapeutic Interventions Met: demonstrates skilled criteria (08/15/24 1300)     Rehab Potential/Prognosis, Swallowing: good, to achieve stated therapy goals (08/15/24 1300)  Therapy Frequency (Swallow): 3 days per week, 4 days per week (08/15/24 1300)  Predicted Duration Therapy Intervention (Days): until  discharge (08/15/24 1300)  Oral Care Recommendations: Oral Care BID/PRN (08/15/24 1300)    Emiliano Bateman is a 63 y.o male with PMH of Hypertension, COPD, anxiety, steatosis of liver, seizure disorder, and ETOH abuse seizure disorder with recent diagnosis of Parkinson's. Pt self-extubated on 08/14 and removed feeding tube. Pt remains confused, but able to follow directions. Pt familiar to ST department - pt was seen in April with diet recommendation of puree/thins. Diet recommendation was advanced to [facility] Soft/Thins per pt request. Pt reports he did not follow recommendations once at home.     Pt seen on this date for clinical bedside swallow evaluation. Upon entry, pt in bed and required pt care from nurse before evaluation could be completed. Upon re-entry, pt sat up 90 degrees in bed. Oral mech grossly WNL - however pt ROM was impacted by shakiness. Pt is edentulous and reported he does not have dentures. Pt stated he eats anything and everything he wants at home. PO observed: ice chip x2, water by spoon x1, water by straw x4, applesauce x2, peaches x1. Pt with good labial seal on straw and spoon. Pt demonstrated reduced rotary chew and slight uncoordinated oral manipulation - possibly due to shakiness noted before PO. Pt had good oral clearance - no residues or pocketing. Pt with no cough, throat clear, or change in vocal quality following water or puree trials. Following peach, pt with large cough x3. Due to pt history and evaluation results, it is recommended pt be placed on puree/thins with meds whole/crushed in puree as tolerated. ST will continue to follow to ensure tolerance and make further recommendations as indicated.     SWALLOW EVALUATION (Last 72 Hours)       SLP Adult Swallow Evaluation       Row Name 08/15/24 1300       Rehab Evaluation    Document Type evaluation  -AA    Patient Observations alert;cooperative  -AA       General Information    Patient Profile Reviewed yes  -AA    Pertinent  History Of Current Problem Emiliano Bateman is a 63 y.o. male with PMH of Hypertension, COPD, anxiety, steatosis of liver, seizure disorder, and  EtOH abuse seizure disorder admitted to [facility] with breakthrough seizure  HPI information is limited due to patient postictal following seizure activity; information obtained from ER report and family at bedside.  Patient reportedly stopped taking all of his medications several days ago following a diagnosis of Parkinson's disease.  Patient has a known history of seizure disorder and had a generalized tonic-clonic seizure that lasted several minutes without return to normal mental status near [facility]. Pt previously seen by ST at [facility] with diet recommendation of [facility] Soft/Thins.  -AA    Precautions/Limitations, Vision WFL;for purposes of eval  -AA    Precautions/Limitations, Hearing WFL;for purposes of eval  -AA    Prior Level of Function-Communication WFL  -AA    Prior Level of Function-Swallowing mechanical ground textures;thin liquids  -AA    Plans/Goals Discussed with patient  -AA       Oral Motor Structure and Function    Dentition Assessment edentulous, does not have dentures  -AA    Secretion Management WNL/WFL  -AA    Mucosal Quality moist, healthy  -AA    Volitional Swallow WFL  -AA    Volitional Cough WFL  -AA       Oral Musculature and Cranial Nerve Assessment    Oral Motor General Assessment WFL;other (see comments)  ROM slightly impaired d/t shakiness.  -AA       General Eating/Swallowing Observations    Respiratory Support Currently in Use nasal cannula  -AA    O2 Liters 4L  -AA    Eating/Swallowing Skills fed by SLP;self-fed;other (see comments)  Pt attempted self feeding, very shaky and required assistance.  -AA    Positioning During Eating upright 90 degree;upright in bed  -AA    Utensils Used cup;straw  -AA    Consistencies Trialed ice chips;thin liquids;pureed;soft to chew textures  -AA       Clinical Swallow Eval    Oral Prep Phase  impaired  -AA    Oral Transit WFL  -AA    Oral Residue WFL  -AA    Pharyngeal Phase suspected pharyngeal impairment  -AA    Esophageal Phase unremarkable  -AA       SLP Evaluation Clinical Impression    SLP Swallowing Diagnosis suspect acute-on-chronic;oral dysphagia;suspected pharyngeal dysphagia  -AA    Functional Impact risk of aspiration/pneumonia;risk of malnutrition;risk of dehydration  -AA    Rehab Potential/Prognosis, Swallowing good, to achieve stated therapy goals  -AA    Swallow Criteria for Skilled Therapeutic Interventions Met demonstrates skilled criteria  -AA       Recommendations    Therapy Frequency (Swallow) 3 days per week;4 days per week  -AA    Predicted Duration Therapy Intervention (Days) until discharge  -AA    SLP Diet Recommendation puree;thin liquids  -AA    Recommended Precautions and Strategies upright posture during/after eating;small bites of food and sips of liquid  -AA    Oral Care Recommendations Oral Care BID/PRN  -AA    SLP Rec. for Method of Medication Administration meds crushed;meds whole;with puree  -AA    Monitor for Signs of Aspiration yes;notify SLP if any concerns  -AA       Swallow Goals (SLP)    Swallow LTGs Patient will demonstrate functional swallow for  -AA    Swallow STGs diet tolerance goal selection (SLP)  -AA    Diet Tolerance Goal Selection (SLP) Patient will tolerate therapeutic trials of  -AA       (LTG) Patient will demonstrate functional swallow for    Diet Texture (Demonstrate functional swallow) mechanical ground textures  -AA    [place] (Demonstrate functional swallow) with minimal cues (75-90% accuracy)  -AA    Time Frame (Demonstrate functional swallow) by discharge  -AA    Progress/Outcomes (Demonstrate functional swallow) new goal  -AA       (STG) Patient will tolerate therapeutic trials of    Consistencies Trialed (Tolerate therapeutic trials) mechanical ground textures;soft to chew (chopped) textures  -AA    Desired Outcome (Tolerate therapeutic  trials) without signs/symptoms of aspiration  -AA    [place] (Tolerate therapeutic trials) with minimal cues (75-90% accuracy)  -AA    Time Frame (Tolerate therapeutic trials) by discharge  -AA    Progress/Outcomes (Tolerate therapeutic trials) new goal  -AA              User Key  (r) = Recorded By, (t) = Taken By, (c) = Cosigned By      Initials Name Effective Dates    Isidra Sher SLP 06/18/24 -                     EDUCATION  The patient has been educated in the following areas:   Dysphagia (Swallowing Impairment).        SLP GOALS       Row Name 08/15/24 1300       (LTG) Patient will demonstrate functional swallow for    Diet Texture (Demonstrate functional swallow) mechanical ground textures  -AA    [place] (Demonstrate functional swallow) with minimal cues (75-90% accuracy)  -AA    Time Frame (Demonstrate functional swallow) by discharge  -AA    Progress/Outcomes (Demonstrate functional swallow) new goal  -AA       (STG) Patient will tolerate therapeutic trials of    Consistencies Trialed (Tolerate therapeutic trials) mechanical ground textures;soft to chew (chopped) textures  -AA    Desired Outcome (Tolerate therapeutic trials) without signs/symptoms of aspiration  -AA    [place] (Tolerate therapeutic trials) with minimal cues (75-90% accuracy)  -AA    Time Frame (Tolerate therapeutic trials) by discharge  -AA    Progress/Outcomes (Tolerate therapeutic trials) new goal  -AA              User Key  (r) = Recorded By, (t) = Taken By, (c) = Cosigned By      Initials Name Provider Type    Isidra Sher SLP Speech and Language Pathologist             DEBORA Bowles  8/15/2024

## 2024-08-16 LAB
ALBUMIN SERPL-MCNC: 3.7 G/DL (ref 3.5–5.2)
ALBUMIN/GLOB SERPL: 1.2 G/DL
ALP SERPL-CCNC: 79 U/L (ref 39–117)
ALT SERPL W P-5'-P-CCNC: 18 U/L (ref 1–41)
ANION GAP SERPL CALCULATED.3IONS-SCNC: 13 MMOL/L (ref 5–15)
AST SERPL-CCNC: 33 U/L (ref 1–40)
BASOPHILS # BLD AUTO: 0.07 10*3/MM3 (ref 0–0.2)
BASOPHILS NFR BLD AUTO: 0.7 % (ref 0–1.5)
BILIRUB SERPL-MCNC: 0.5 MG/DL (ref 0–1.2)
BUN SERPL-MCNC: 17 MG/DL (ref 8–23)
BUN/CREAT SERPL: 22.4 (ref 7–25)
CALCIUM SPEC-SCNC: 8.6 MG/DL (ref 8.6–10.5)
CHLORIDE SERPL-SCNC: 92 MMOL/L (ref 98–107)
CO2 SERPL-SCNC: 31 MMOL/L (ref 22–29)
CREAT SERPL-MCNC: 0.76 MG/DL (ref 0.76–1.27)
DEPRECATED RDW RBC AUTO: 71.6 FL (ref 37–54)
EGFRCR SERPLBLD CKD-EPI 2021: 100.4 ML/MIN/1.73
EOSINOPHIL # BLD AUTO: 0.21 10*3/MM3 (ref 0–0.4)
EOSINOPHIL NFR BLD AUTO: 2 % (ref 0.3–6.2)
ERYTHROCYTE [DISTWIDTH] IN BLOOD BY AUTOMATED COUNT: 19.5 % (ref 12.3–15.4)
GLOBULIN UR ELPH-MCNC: 3 GM/DL
GLUCOSE BLDC GLUCOMTR-MCNC: 123 MG/DL (ref 70–105)
GLUCOSE BLDC GLUCOMTR-MCNC: 133 MG/DL (ref 70–105)
GLUCOSE BLDC GLUCOMTR-MCNC: 134 MG/DL (ref 70–105)
GLUCOSE BLDC GLUCOMTR-MCNC: 151 MG/DL (ref 70–105)
GLUCOSE SERPL-MCNC: 112 MG/DL (ref 65–99)
HCT VFR BLD AUTO: 39.4 % (ref 37.5–51)
HGB BLD-MCNC: 13.2 G/DL (ref 13–17.7)
IMM GRANULOCYTES # BLD AUTO: 0.08 10*3/MM3 (ref 0–0.05)
IMM GRANULOCYTES NFR BLD AUTO: 0.8 % (ref 0–0.5)
LYMPHOCYTES # BLD AUTO: 2.17 10*3/MM3 (ref 0.7–3.1)
LYMPHOCYTES NFR BLD AUTO: 20.5 % (ref 19.6–45.3)
MCH RBC QN AUTO: 34.2 PG (ref 26.6–33)
MCHC RBC AUTO-ENTMCNC: 33.5 G/DL (ref 31.5–35.7)
MCV RBC AUTO: 102.1 FL (ref 79–97)
MONOCYTES # BLD AUTO: 2.28 10*3/MM3 (ref 0.1–0.9)
MONOCYTES NFR BLD AUTO: 21.6 % (ref 5–12)
NEUTROPHILS NFR BLD AUTO: 5.77 10*3/MM3 (ref 1.7–7)
NEUTROPHILS NFR BLD AUTO: 54.4 % (ref 42.7–76)
NRBC BLD AUTO-RTO: 0 /100 WBC (ref 0–0.2)
PLATELET # BLD AUTO: 404 10*3/MM3 (ref 140–450)
PMV BLD AUTO: 9.4 FL (ref 6–12)
POTASSIUM SERPL-SCNC: 3.3 MMOL/L (ref 3.5–5.2)
POTASSIUM SERPL-SCNC: 4.5 MMOL/L (ref 3.5–5.2)
PROT SERPL-MCNC: 6.7 G/DL (ref 6–8.5)
RBC # BLD AUTO: 3.86 10*6/MM3 (ref 4.14–5.8)
SODIUM SERPL-SCNC: 136 MMOL/L (ref 136–145)
WBC NRBC COR # BLD AUTO: 10.58 10*3/MM3 (ref 3.4–10.8)

## 2024-08-16 PROCEDURE — 25010000002 LEVETRIRACETAM PER 10 MG: Performed by: HOSPITALIST

## 2024-08-16 PROCEDURE — 97110 THERAPEUTIC EXERCISES: CPT

## 2024-08-16 PROCEDURE — 82948 REAGENT STRIP/BLOOD GLUCOSE: CPT

## 2024-08-16 PROCEDURE — 97116 GAIT TRAINING THERAPY: CPT

## 2024-08-16 PROCEDURE — 80053 COMPREHEN METABOLIC PANEL: CPT | Performed by: NURSE PRACTITIONER

## 2024-08-16 PROCEDURE — 92526 ORAL FUNCTION THERAPY: CPT

## 2024-08-16 PROCEDURE — 97530 THERAPEUTIC ACTIVITIES: CPT

## 2024-08-16 PROCEDURE — 84132 ASSAY OF SERUM POTASSIUM: CPT | Performed by: HOSPITALIST

## 2024-08-16 PROCEDURE — 85025 COMPLETE CBC W/AUTO DIFF WBC: CPT | Performed by: NURSE PRACTITIONER

## 2024-08-16 RX ORDER — LEVETIRACETAM 500 MG/1
500 TABLET ORAL EVERY 12 HOURS SCHEDULED
Status: DISCONTINUED | OUTPATIENT
Start: 2024-08-16 | End: 2024-08-17 | Stop reason: HOSPADM

## 2024-08-16 RX ORDER — POTASSIUM CHLORIDE 20 MEQ/1
40 TABLET, EXTENDED RELEASE ORAL EVERY 4 HOURS
Status: COMPLETED | OUTPATIENT
Start: 2024-08-16 | End: 2024-08-16

## 2024-08-16 RX ADMIN — ANTI-FUNGAL POWDER MICONAZOLE NITRATE TALC FREE 1 APPLICATION: 1.42 POWDER TOPICAL at 21:40

## 2024-08-16 RX ADMIN — SERTRALINE HYDROCHLORIDE 25 MG: 25 TABLET ORAL at 21:39

## 2024-08-16 RX ADMIN — Medication 100 MG: at 08:26

## 2024-08-16 RX ADMIN — Medication 10 ML: at 21:40

## 2024-08-16 RX ADMIN — QUETIAPINE FUMARATE 50 MG: 25 TABLET ORAL at 08:26

## 2024-08-16 RX ADMIN — POTASSIUM CHLORIDE 40 MEQ: 1500 TABLET, EXTENDED RELEASE ORAL at 03:51

## 2024-08-16 RX ADMIN — PANTOPRAZOLE SODIUM 40 MG: 40 INJECTION, POWDER, FOR SOLUTION INTRAVENOUS at 05:52

## 2024-08-16 RX ADMIN — FOLIC ACID 1 MG: 1 TABLET ORAL at 08:26

## 2024-08-16 RX ADMIN — MIDODRINE HYDROCHLORIDE 5 MG: 5 TABLET ORAL at 02:33

## 2024-08-16 RX ADMIN — LEVETIRACETAM 500 MG: 100 INJECTION INTRAVENOUS at 08:26

## 2024-08-16 RX ADMIN — LEVETIRACETAM 500 MG: 500 TABLET, FILM COATED ORAL at 21:39

## 2024-08-16 RX ADMIN — Medication 10 ML: at 08:27

## 2024-08-16 RX ADMIN — QUETIAPINE FUMARATE 50 MG: 25 TABLET ORAL at 21:39

## 2024-08-16 RX ADMIN — POTASSIUM CHLORIDE 40 MEQ: 1500 TABLET, EXTENDED RELEASE ORAL at 08:27

## 2024-08-16 RX ADMIN — ANTI-FUNGAL POWDER MICONAZOLE NITRATE TALC FREE 1 APPLICATION: 1.42 POWDER TOPICAL at 08:27

## 2024-08-16 RX ADMIN — Medication 10 ML: at 21:39

## 2024-08-16 NOTE — THERAPY TREATMENT NOTE
"Subjective: Pt agreeable to therapeutic plan of care.    Objective:     Bed mobility - Supine to sitting Min-A    Transfers - STS Min-A and with rolling walker    Ambulation - 5 feet Mod-A and with rolling walker. Posterior lean while taking steps.     Therapeutic Exercise - 20 Reps B LE AROM supported sitting / chair    Vitals: WNL    Pain: 0 VAS   Location:   Intervention for pain: N/A    Education: Provided education on the importance of mobility in the acute care setting, Verbal/Tactile Cues, Transfer Training, and Gait Training    Assessment: Emiliano Bateman presents with functional mobility impairments which indicate the need for skilled intervention. Tolerating session today without incident. Pt demonstrates impaired safety awareness and impulsivity throughout session, requiring cueing and education on safety. Pt left sitting in recliner chair with alarm engaged and electronic sitter present. Recommending SNF at discharge. Will continue to follow and progress as tolerated.     Plan/Recommendations:   If medically appropriate, Moderate Intensity Therapy recommended post-acute care. This is recommended as therapy feels the patient would require 3-4 days per week and wouldn't tolerate \"3 hour daily\" rehab intensity. SNF would be the preferred choice. If the patient does not agree to SNF, arrange HH or OP depending on home bound status. If patient is medically complex, consider LTACH. Pt requires no DME at discharge.     Pt desires Skilled Rehab placement at discharge. Pt cooperative; agreeable to therapeutic recommendations and plan of care.         Basic Mobility 6-click:  Rollin = Total, A lot = 2, A little = 3; 4 = None  Supine>Sit:   1 = Total, A lot = 2, A little = 3; 4 = None   Sit>Stand with arms:  1 = Total, A lot = 2, A little = 3; 4 = None  Bed>Chair:   1 = Total, A lot = 2, A little = 3; 4 = None  Ambulate in room:  1 = Total, A lot = 2, A little = 3; 4 = None  3-5 Steps with railin = " Total, A lot = 2, A little = 3; 4 = None  Score: 14    Modified Holmen: N/A = No pre-op stroke/TIA    Post-Tx Position: Up in Chair, Alarms activated, and Call light and personal items within reach  PPE: gloves

## 2024-08-16 NOTE — PLAN OF CARE
Problem: Fall Injury Risk  Goal: Absence of Fall and Fall-Related Injury  Intervention: Identify and Manage Contributors  Recent Flowsheet Documentation  Taken 8/16/2024 0001 by Jemima Oliver RN  Medication Review/Management: medications reviewed  Taken 8/15/2024 2001 by Jemima Oliver RN  Medication Review/Management:   medications reviewed   pharmacy consulted  Intervention: Promote Injury-Free Environment  Recent Flowsheet Documentation  Taken 8/16/2024 0401 by Jemima Oliver RN  Safety Promotion/Fall Prevention:   assistive device/personal items within reach   clutter free environment maintained   fall prevention program maintained   nonskid shoes/slippers when out of bed   room organization consistent   safety round/check completed  Taken 8/16/2024 0200 by Jemima Oliver RN  Safety Promotion/Fall Prevention: safety round/check completed  Taken 8/16/2024 0001 by Jemima Oliver RN  Safety Promotion/Fall Prevention:   assistive device/personal items within reach   clutter free environment maintained   fall prevention program maintained   nonskid shoes/slippers when out of bed   room organization consistent   safety round/check completed  Taken 8/15/2024 2200 by Jemima Oliver RN  Safety Promotion/Fall Prevention: safety round/check completed  Taken 8/15/2024 2001 by Jemima Oliver RN  Safety Promotion/Fall Prevention:   assistive device/personal items within reach   clutter free environment maintained   fall prevention program maintained   nonskid shoes/slippers when out of bed   room organization consistent   safety round/check completed     Problem: Adult Inpatient Plan of Care  Goal: Absence of Hospital-Acquired Illness or Injury  Intervention: Identify and Manage Fall Risk  Recent Flowsheet Documentation  Taken 8/16/2024 0401 by Jemima Oliver RN  Safety Promotion/Fall Prevention:   assistive device/personal items within reach   clutter free environment maintained   fall prevention  program maintained   nonskid shoes/slippers when out of bed   room organization consistent   safety round/check completed  Taken 8/16/2024 0200 by Jemima Oliver RN  Safety Promotion/Fall Prevention: safety round/check completed  Taken 8/16/2024 0001 by Jemima Oliver RN  Safety Promotion/Fall Prevention:   assistive device/personal items within reach   clutter free environment maintained   fall prevention program maintained   nonskid shoes/slippers when out of bed   room organization consistent   safety round/check completed  Taken 8/15/2024 2200 by Jemima Oliver RN  Safety Promotion/Fall Prevention: safety round/check completed  Taken 8/15/2024 2001 by Jemima Oliver RN  Safety Promotion/Fall Prevention:   assistive device/personal items within reach   clutter free environment maintained   fall prevention program maintained   nonskid shoes/slippers when out of bed   room organization consistent   safety round/check completed  Intervention: Prevent Skin Injury  Recent Flowsheet Documentation  Taken 8/15/2024 2001 by Jemima Oliver RN  Skin Protection:   adhesive use limited   incontinence pads utilized   tubing/devices free from skin contact  Intervention: Prevent and Manage VTE (Venous Thromboembolism) Risk  Recent Flowsheet Documentation  Taken 8/16/2024 0401 by Jemima Oliver RN  VTE Prevention/Management:   bilateral   sequential compression devices off   patient refused intervention  Taken 8/16/2024 0001 by Jemima Oliver RN  VTE Prevention/Management:   bilateral   sequential compression devices on  Taken 8/15/2024 2001 by Jemima Oliver RN  Activity Management: activity minimized  VTE Prevention/Management:   bilateral   sequential compression devices on  Intervention: Prevent Infection  Recent Flowsheet Documentation  Taken 8/16/2024 0401 by Jemima Oliver RN  Infection Prevention:   environmental surveillance performed   hand hygiene promoted   personal protective equipment  utilized   single patient room provided  Taken 8/16/2024 0001 by Jemima Oliver RN  Infection Prevention:   environmental surveillance performed   hand hygiene promoted   personal protective equipment utilized   single patient room provided  Taken 8/15/2024 2001 by Jemima Oliver RN  Infection Prevention:   environmental surveillance performed   hand hygiene promoted   personal protective equipment utilized   single patient room provided  Goal: Optimal Comfort and Wellbeing  Intervention: Provide Person-Centered Care  Recent Flowsheet Documentation  Taken 8/15/2024 2001 by Jemima Oliver RN  Trust Relationship/Rapport:   care explained   choices provided   questions answered   questions encouraged   reassurance provided     Problem: Skin Injury Risk Increased  Goal: Skin Health and Integrity  Intervention: Optimize Skin Protection  Recent Flowsheet Documentation  Taken 8/15/2024 2001 by Jemima Oliver RN  Pressure Reduction Techniques:   weight shift assistance provided   pressure points protected   positioned off wounds   frequent weight shift encouraged  Head of Bed (HOB) Positioning: HOB at 20-30 degrees  Pressure Reduction Devices:   pressure-redistributing mattress utilized   positioning supports utilized  Skin Protection:   adhesive use limited   incontinence pads utilized   tubing/devices free from skin contact     Problem: COPD (Chronic Obstructive Pulmonary Disease) Comorbidity  Goal: Maintenance of COPD Symptom Control  Intervention: Maintain COPD-Symptom Control  Recent Flowsheet Documentation  Taken 8/16/2024 0001 by Jemima Oliver RN  Medication Review/Management: medications reviewed  Taken 8/15/2024 2001 by Jemima Oliver RN  Supportive Measures:   active listening utilized   verbalization of feelings encouraged  Medication Review/Management:   medications reviewed   pharmacy consulted  Goal: Maintenance of COPD Symptom Control  Intervention: Maintain COPD-Symptom Control  Recent  Flowsheet Documentation  Taken 8/16/2024 0001 by Jemima Oliver RN  Medication Review/Management: medications reviewed  Taken 8/15/2024 2001 by Jemima Oliver RN  Supportive Measures:   active listening utilized   verbalization of feelings encouraged  Medication Review/Management:   medications reviewed   pharmacy consulted     Problem: Hypertension Comorbidity  Goal: Blood Pressure in Desired Range  Intervention: Maintain Blood Pressure Management  Recent Flowsheet Documentation  Taken 8/16/2024 0001 by Jemima Oliver RN  Medication Review/Management: medications reviewed  Taken 8/15/2024 2001 by Jemima Oliver RN  Medication Review/Management:   medications reviewed   pharmacy consulted  Goal: Blood Pressure in Desired Range  Intervention: Maintain Blood Pressure Management  Recent Flowsheet Documentation  Taken 8/16/2024 0001 by Jemima Oliver RN  Medication Review/Management: medications reviewed  Taken 8/15/2024 2001 by Jemima Oliver RN  Medication Review/Management:   medications reviewed   pharmacy consulted     Problem: Asthma Comorbidity  Goal: Maintenance of Asthma Control  Intervention: Maintain Asthma Symptom Control  Recent Flowsheet Documentation  Taken 8/16/2024 0001 by Jemima Oliver RN  Medication Review/Management: medications reviewed  Taken 8/15/2024 2001 by Jemima Oliver RN  Medication Review/Management:   medications reviewed   pharmacy consulted     Problem: Behavioral Health Comorbidity  Goal: Maintenance of Behavioral Health Symptom Control  Intervention: Maintain Behavioral Health Symptom Control  Recent Flowsheet Documentation  Taken 8/16/2024 0001 by Jemima Oliver RN  Medication Review/Management: medications reviewed  Taken 8/15/2024 2001 by Jemima Oliver RN  Medication Review/Management:   medications reviewed   pharmacy consulted     Problem: Diabetes Comorbidity  Goal: Blood Glucose Level Within Targeted Range  Intervention: Monitor and Manage  Glycemia  Recent Flowsheet Documentation  Taken 8/15/2024 2001 by Jemima Oliver RN  Glycemic Management: blood glucose monitored     Problem: Heart Failure Comorbidity  Goal: Maintenance of Heart Failure Symptom Control  Intervention: Maintain Heart Failure-Management  Recent Flowsheet Documentation  Taken 8/16/2024 0001 by Jemima Oliver RN  Medication Review/Management: medications reviewed  Taken 8/15/2024 2001 by Jemima Oliver RN  Medication Review/Management:   medications reviewed   pharmacy consulted     Problem: Osteoarthritis Comorbidity  Goal: Maintenance of Osteoarthritis Symptom Control  Intervention: Maintain Osteoarthritis Symptom Control  Recent Flowsheet Documentation  Taken 8/16/2024 0001 by Jemima Oliver RN  Medication Review/Management: medications reviewed  Taken 8/15/2024 2001 by Jemima Oliver RN  Activity Management: activity minimized  Medication Review/Management:   medications reviewed   pharmacy consulted     Problem: Pain Chronic (Persistent) (Comorbidity Management)  Goal: Acceptable Pain Control and Functional Ability  Intervention: Manage Persistent Pain  Recent Flowsheet Documentation  Taken 8/16/2024 0001 by Jemima Oliver RN  Medication Review/Management: medications reviewed  Taken 8/15/2024 2001 by Jemima Oliver RN  Bowel Elimination Promotion: adequate fluid intake promoted  Sleep/Rest Enhancement:   awakenings minimized   consistent schedule promoted  Medication Review/Management:   medications reviewed   pharmacy consulted  Intervention: Optimize Psychosocial Wellbeing  Recent Flowsheet Documentation  Taken 8/15/2024 2001 by Jemima Oliver RN  Supportive Measures:   active listening utilized   verbalization of feelings encouraged  Diversional Activities: television     Problem: Seizure Disorder Comorbidity  Goal: Maintenance of Seizure Control  Intervention: Maintain Seizure-Symptom Control  Recent Flowsheet Documentation  Taken 8/16/2024 0401 by  Wegman, Jemima B, RN  Seizure Precautions:   activity supervised   clutter-free environment maintained   side rails padded  Taken 8/16/2024 0001 by Jemima Oliver RN  Seizure Precautions:   activity supervised   side rails padded  Taken 8/15/2024 2001 by Jemima Oliver RN  Seizure Precautions:   clutter-free environment maintained   side rails padded     Problem: Restraint, Nonviolent  Goal: Absence of Harm or Injury  Intervention: Implement Least Restrictive Safety Strategies  Recent Flowsheet Documentation  Taken 8/16/2024 0401 by Jemima Oliver RN  Medical Device Protection:   torso covered   tubing secured  Taken 8/16/2024 0001 by Jemima Oliver RN  Medical Device Protection:   torso covered   tubing secured  Taken 8/15/2024 2001 by Jemima Oliver RN  Medical Device Protection:   IV pole/bag removed from visual field   torso covered   tubing secured  Diversional Activities: television  Intervention: Protect Dignity, Rights, and Personal Wellbeing  Recent Flowsheet Documentation  Taken 8/15/2024 2001 by Jemima Oliver RN  Trust Relationship/Rapport:   care explained   choices provided   questions answered   questions encouraged   reassurance provided  Goal: Absence of Harm or Injury  Intervention: Implement Least Restrictive Safety Strategies  Recent Flowsheet Documentation  Taken 8/16/2024 0401 by Jemima Oliver RN  Medical Device Protection:   torso covered   tubing secured  Taken 8/16/2024 0001 by Jemima Oliver RN  Medical Device Protection:   torso covered   tubing secured  Taken 8/15/2024 2001 by Jemima Oliver RN  Medical Device Protection:   IV pole/bag removed from visual field   torso covered   tubing secured  Diversional Activities: television  Intervention: Protect Dignity, Rights, and Personal Wellbeing  Recent Flowsheet Documentation  Taken 8/15/2024 2001 by Jemima Oliver RN  Trust Relationship/Rapport:   care explained   choices provided   questions answered    questions encouraged   reassurance provided     Problem: Aspiration (Enteral Nutrition)  Goal: Absence of Aspiration Signs and Symptoms  Intervention: Minimize Aspiration Risk  Recent Flowsheet Documentation  Taken 8/15/2024 2001 by Jemima Oliver RN  Head of Bed (HOB) Positioning: HOB at 20-30 degrees     Problem: Asthma Comorbidity  Goal: Maintenance of Asthma Control  Intervention: Maintain Asthma Symptom Control  Recent Flowsheet Documentation  Taken 8/16/2024 0001 by Jemima Oliver RN  Medication Review/Management: medications reviewed  Taken 8/15/2024 2001 by Jemima Oliver RN  Medication Review/Management:   medications reviewed   pharmacy consulted     Problem: Behavioral Health Comorbidity  Goal: Maintenance of Behavioral Health Symptom Control  Intervention: Maintain Behavioral Health Symptom Control  Recent Flowsheet Documentation  Taken 8/16/2024 0001 by Jemima Oliver RN  Medication Review/Management: medications reviewed  Taken 8/15/2024 2001 by Jemima Oliver RN  Medication Review/Management:   medications reviewed   pharmacy consulted     Problem: COPD (Chronic Obstructive Pulmonary Disease) Comorbidity  Goal: Maintenance of COPD Symptom Control  Intervention: Maintain COPD-Symptom Control  Recent Flowsheet Documentation  Taken 8/16/2024 0001 by Jemima Oliver RN  Medication Review/Management: medications reviewed  Taken 8/15/2024 2001 by Jemima Oliver RN  Supportive Measures:   active listening utilized   verbalization of feelings encouraged  Medication Review/Management:   medications reviewed   pharmacy consulted     Problem: Diabetes Comorbidity  Goal: Blood Glucose Level Within Targeted Range  Intervention: Monitor and Manage Glycemia  Recent Flowsheet Documentation  Taken 8/15/2024 2001 by Jemima Oliver RN  Glycemic Management: blood glucose monitored     Problem: Heart Failure Comorbidity  Goal: Maintenance of Heart Failure Symptom Control  Intervention: Maintain  Heart Failure-Management  Recent Flowsheet Documentation  Taken 8/16/2024 0001 by Jemima Oliver RN  Medication Review/Management: medications reviewed  Taken 8/15/2024 2001 by Jemima Oliver RN  Medication Review/Management:   medications reviewed   pharmacy consulted     Problem: Hypertension Comorbidity  Goal: Blood Pressure in Desired Range  Intervention: Maintain Blood Pressure Management  Recent Flowsheet Documentation  Taken 8/16/2024 0001 by Jemima Oliver RN  Medication Review/Management: medications reviewed  Taken 8/15/2024 2001 by Jemima Oliver RN  Medication Review/Management:   medications reviewed   pharmacy consulted     Problem: Osteoarthritis Comorbidity  Goal: Maintenance of Osteoarthritis Symptom Control  Intervention: Maintain Osteoarthritis Symptom Control  Recent Flowsheet Documentation  Taken 8/16/2024 0001 by Jemima Oliver RN  Medication Review/Management: medications reviewed  Taken 8/15/2024 2001 by Jemima Oliver RN  Activity Management: activity minimized  Medication Review/Management:   medications reviewed   pharmacy consulted     Problem: Pain Chronic (Persistent) (Comorbidity Management)  Goal: Acceptable Pain Control and Functional Ability  Intervention: Manage Persistent Pain  Recent Flowsheet Documentation  Taken 8/16/2024 0001 by Jemima Oliver RN  Medication Review/Management: medications reviewed  Taken 8/15/2024 2001 by Jemima Oliver RN  Bowel Elimination Promotion: adequate fluid intake promoted  Sleep/Rest Enhancement:   awakenings minimized   consistent schedule promoted  Medication Review/Management:   medications reviewed   pharmacy consulted  Intervention: Optimize Psychosocial Wellbeing  Recent Flowsheet Documentation  Taken 8/15/2024 2001 by Jemima Oliver RN  Supportive Measures:   active listening utilized   verbalization of feelings encouraged  Diversional Activities: television     Problem: Seizure Disorder Comorbidity  Goal:  Maintenance of Seizure Control  Intervention: Maintain Seizure-Symptom Control  Recent Flowsheet Documentation  Taken 8/16/2024 0401 by Jemima Oliver RN  Seizure Precautions:   activity supervised   clutter-free environment maintained   side rails padded  Taken 8/16/2024 0001 by Jemima Oliver RN  Seizure Precautions:   activity supervised   side rails padded  Taken 8/15/2024 2001 by Jemima Oliver RN  Seizure Precautions:   clutter-free environment maintained   side rails padded     Problem: Skin Injury Risk Increased  Goal: Skin Health and Integrity  Intervention: Optimize Skin Protection  Recent Flowsheet Documentation  Taken 8/15/2024 2001 by Jemima Oliver RN  Pressure Reduction Techniques:   weight shift assistance provided   pressure points protected   positioned off wounds   frequent weight shift encouraged  Head of Bed (HOB) Positioning: HOB at 20-30 degrees  Pressure Reduction Devices:   pressure-redistributing mattress utilized   positioning supports utilized  Skin Protection:   adhesive use limited   incontinence pads utilized   tubing/devices free from skin contact     Problem: Fall Injury Risk  Goal: Absence of Fall and Fall-Related Injury  Intervention: Identify and Manage Contributors  Recent Flowsheet Documentation  Taken 8/16/2024 0001 by Jemima Oliver RN  Medication Review/Management: medications reviewed  Taken 8/15/2024 2001 by Jemima Oliver RN  Medication Review/Management:   medications reviewed   pharmacy consulted  Intervention: Promote Injury-Free Environment  Recent Flowsheet Documentation  Taken 8/16/2024 0401 by Jemima Oliver RN  Safety Promotion/Fall Prevention:   assistive device/personal items within reach   clutter free environment maintained   fall prevention program maintained   nonskid shoes/slippers when out of bed   room organization consistent   safety round/check completed  Taken 8/16/2024 0200 by Jemima Oliver RN  Safety Promotion/Fall Prevention:  safety round/check completed  Taken 8/16/2024 0001 by Jemima Oliver RN  Safety Promotion/Fall Prevention:   assistive device/personal items within reach   clutter free environment maintained   fall prevention program maintained   nonskid shoes/slippers when out of bed   room organization consistent   safety round/check completed  Taken 8/15/2024 2200 by Jemima Oliver RN  Safety Promotion/Fall Prevention: safety round/check completed  Taken 8/15/2024 2001 by Jemima Oliver RN  Safety Promotion/Fall Prevention:   assistive device/personal items within reach   clutter free environment maintained   fall prevention program maintained   nonskid shoes/slippers when out of bed   room organization consistent   safety round/check completed     Problem: Restraint, Nonviolent  Goal: Absence of Harm or Injury  Intervention: Implement Least Restrictive Safety Strategies  Recent Flowsheet Documentation  Taken 8/16/2024 0401 by Jemima Oliver RN  Medical Device Protection:   torso covered   tubing secured  Taken 8/16/2024 0001 by Jemima Oliver RN  Medical Device Protection:   torso covered   tubing secured  Taken 8/15/2024 2001 by Jemima Oliver RN  Medical Device Protection:   IV pole/bag removed from visual field   torso covered   tubing secured  Diversional Activities: television  Intervention: Protect Dignity, Rights, and Personal Wellbeing  Recent Flowsheet Documentation  Taken 8/15/2024 2001 by Jemima Oliver RN  Trust Relationship/Rapport:   care explained   choices provided   questions answered   questions encouraged   reassurance provided  Goal: Absence of Harm or Injury  Intervention: Implement Least Restrictive Safety Strategies  Recent Flowsheet Documentation  Taken 8/16/2024 0401 by Jemima Oliver RN  Medical Device Protection:   torso covered   tubing secured  Taken 8/16/2024 0001 by Jemima Oliver RN  Medical Device Protection:   torso covered   tubing secured  Taken 8/15/2024 2001 by  Jemima Oliver, RN  Medical Device Protection:   IV pole/bag removed from visual field   torso covered   tubing secured  Diversional Activities: television  Intervention: Protect Dignity, Rights, and Personal Wellbeing  Recent Flowsheet Documentation  Taken 8/15/2024 2001 by Jemima Oliver, RN  Trust Relationship/Rapport:   care explained   choices provided   questions answered   questions encouraged   reassurance provided   Goal Outcome Evaluation:

## 2024-08-16 NOTE — PLAN OF CARE
Goal Outcome Evaluation:   VSS. Leletter removed this shift per MD order. Pt easily redirected. PICC line removed, no issues. Pre-cert approved for facility. Possible discharge tomorrow. Call light within reach, bed alarm on.

## 2024-08-16 NOTE — DISCHARGE PLACEMENT REQUEST
"Yeny Bateman (63 y.o. Male)       Date of Birth   1960    Social Security Number       Address   [address] [place] [address] [place] [address]    Home Phone   [phone]    MRN   [id number]       Confucianism   None    Marital Status                               Admission Date   8/8/24    Admission Type   Emergency    Admitting Provider   Gil Bobby DO    Attending Provider   Frank Gill MD    Department, Room/Bed   [facility], 2110/1       Discharge Date       Discharge Disposition       Discharge Destination                                 Attending Provider: Frank Gill MD    Allergies: Penicillins    Isolation: None   Infection: None   Code Status: CPR    Ht: 170.2 cm (67\")   Wt: 59.5 kg (131 lb 2.8 oz)    Admission Cmt: None   Principal Problem: None                  Active Insurance as of 8/8/2024       Primary Coverage       Payor Plan Insurance Group Employer/Plan Group    HUMANA MEDICARE REPLACEMENT HUMANA MED ADV SNP HMO [id number]       Payor Plan Address Payor Plan Phone Number Payor Plan Fax Number Effective Dates    [address] [address] [phone]  1/1/2024 - None Entered    [facility] [id number]         Subscriber Name Subscriber Birth Date Member ID       YENY BATEMAN 1960 [id number]               Secondary Coverage       Payor Plan Insurance Group Employer/Plan Group    [place]        Payor Plan Address Payor Plan Phone Number Payor Plan Fax Number Effective Dates    [address] [address]   8/1/2024 - None Entered    [place] [address]         Subscriber Name Subscriber Birth Date Member ID       YENY BATEMAN 1960 [id number]                     Emergency Contacts        (Rel.) Home Phone Work Phone Mobile Phone    SHARIF BATEMAN (Spouse) -- -- [phone]                 History & Physical        Jannette Parker APRN at 08/09/24 0100       Attestation signed by " Gisele Boston MD at 24 0900    I have reviewed this documentation and agree.                    Critical Care History and Physical     Emiliano Bateman : 1960 MRN:[id number] LOS:0 ROOM: [facility]/     Reason for admission: Alcohol withdrawal seizure with delirium     Assessment / Plan     Seizures, not in status  Probable alcohol withdrawal seizure activity  Ceribell placed in ED, highest reading of seizure burden at 7%   Continue IV Keppra  IV Ativan as needed  Seizure precaution    Alcohol withdrawal syndrome  Patient previously drank a gallon of vodka daily  Patient's wife states he has drastically reduced his drinking  Last drink was 2 days ago  CIWA protocol    Diabetic ketoacidosis without coma, with history of diabetes mellitus, type 2  Anion gap of 34 on admission.  Check A1c; last 6.50 on   DKA protocol initiated  Insulin drip initiated per DKA protocol.  Adjust iv fluids based on sugars, sodium, and potassium per protocol.  Accuchecks every hour and serial labs as ordered.  Continue protocol until resolved per protocol recommendations.    Essential hypertension  Patient on home amlodipine, unable to take safely at this time -resume when appropriate  Given IV Lopressor  Sitter IV Cardene if needed    Leukocytosis  Patient meet SIRS criteria with elevated WBC and elevated heart rate, however no clear etiology of infection is apparent  Blood cultures pending  UA negative  Procalcitonin and lactic pending  Patient hypertensive requiring IV Lopressor  Do not feel etiology is infectious, will await lactic and procalcitonin -monitor off ABX for now  CXR pending to rule out possible aspiration pneumonia      History of Parkinson's  Patient not on home medication    COPD, not in exacerbation  Continue budesonide and duoneb scheduled and as needed.  Monitor ABG's as ordered. Oxygen supplementation as needed, use BIPAP as needed.  Wean off oxygen as tolerated.    Anxiety/Depression  Continue home  "Zoloft when able to take PO        Code Status (Patient has no pulse and is not breathing): CPR (Attempt to Resuscitate)  Medical Interventions (Patient has pulse or is breathing): Full Support       Nutrition:   NPO Diet NPO Type: Strict NPO     VTE Prophylaxis:  Mechanical VTE prophylaxis orders are present.         History of Present illness     Emiliano Bateman is a 63 y.o. male with PMH of hypertension, COPD, liver disease, Parkinson's, DMT2, seizure disorder who presented to the hospital after suffering a seizure at home, and was admitted with a principal diagnosis of Alcohol withdrawal seizure with delirium.  Information for HPI taken from chart review as patient is unable to cooperate at time of assessment due to acuity of illness.  His wife is at bedside, and states that he had a seizure today which prompted her to call EMS.  Of note patient was recently in our hospital in April for new onset seizures secondary to alcohol withdrawal.  At that time it was reported that patient drink 1 gallon of vodka daily.  Per patient wife, patient has decreased his drinking significantly since that hospitalization.  She states that his last drink was approximately 2 days ago.  Also, patient does have parkinsonian tremors, and was on medication before but stopped taking them due to side effects.  Per ED documentation patient was conversive when he came to the emergency room and denied any complaints of pain or headache.  He had no subjective fevers or recent illnesses.  Per report patient has had stated he felt \"puny\".  Per PTA meds he was not restarted on medication for his Parkinson's on discharge from the hospital.    ED course: Shortly after arriving to the emergency department he had what was described as a generalized tonic-clonic seizure that lasted several minutes.  He was treated with IV Ativan and loaded with 1 g IV Keppra.  Lab showed CK of 226, anion gap 34.2 with glucose 263, CO2 13.8, WBC 12.81 without " bandemia, and ammonia 71.  Was ordered 1 L saline bolus.  UA without WBCs or bacteria, however + glucose and + ketones.  ABG pending at time of note.  CT head and cervical spine were completed and negative for acute abnormality.    ACP: Patient does not have advised her to follow this facility's.  Wife is at bedside and states he is a full code.    Patient was seen and examined on 08/09/24 at 01:18 EDT .    Subjective / Review of systems     Review of Systems   Unable to perform ROS: Acuity of condition        Past Medical/Surgical/Social/Family History & Allergies     Past Medical History:   Diagnosis Date    Alcoholism 03/19/2020    Aneurysm of thoracic aorta 11/08/2019    3.9 cm      Anxiety 03/19/2020    Chronic obstructive lung disease 10/08/2020    xray [facility] 12/9/18      Hypertensive disorder 11/23/2020    Myocardial infarction 10/08/2020    angioplasty      Seizure disorder 04/23/2024    Steatosis of liver 10/09/2020      History reviewed. No pertinent surgical history.   Social History     Socioeconomic History    Marital status:    Tobacco Use    Smoking status: Unknown   Vaping Use    Vaping status: Never Used   Substance and Sexual Activity    Alcohol use: Yes     Comment: 1 gallon of alcohol a day    Drug use: Never    Sexual activity: Yes     Partners: Female      Family History   Problem Relation Age of Onset    No Known Problems Mother     No Known Problems Father       Allergies   Allergen Reactions    Penicillins Unknown - Low Severity     Childhood allergy; pt is unsure of reaction      Social Determinants of Health     Tobacco Use: High Risk (7/31/2019)    Received from [facility]    Patient History     Smoking Tobacco Use: Every Day     Smokeless Tobacco Use: Unknown     Passive Exposure: Not on file   Alcohol Use: Alcohol Misuse (8/9/2024)    AUDIT-C     Frequency of Alcohol Consumption: 4 or more times a week     Average Number of Drinks: 10 or more     Frequency of  Binge Drinking: Daily or almost daily   Financial Resource Strain: Low Risk  (4/26/2024)    Overall Financial Resource Strain (CARDIA)     Difficulty of Paying Living Expenses: Not hard at all   Food Insecurity: No Food Insecurity (4/23/2024)    Hunger Vital Sign     Worried About Running Out of Food in the Last Year: Never true     Ran Out of Food in the Last Year: Never true   Transportation Needs: No Transportation Needs (4/23/2024)    PRAPARE - Transportation     Lack of Transportation (Medical): No     Lack of Transportation (Non-Medical): No   Physical Activity: Inactive (4/23/2024)    Exercise Vital Sign     Days of Exercise per Week: 0 days     Minutes of Exercise per Session: 0 min   Stress: Patient Unable To Answer (4/26/2024)    [employer] [place] [employer] - Occupational Stress Questionnaire     Feeling of Stress : Patient unable to answer   Social Connections: Unknown (10/12/2023)    Family and Community Support     Help with Day-to-Day Activities: Not on file     Lonely or Isolated: Not on file   Interpersonal Safety: Not At Risk (8/8/2024)    Abuse Screen     Unsafe at Home or Work/School: no     Feels Threatened by Someone?: no     Does Anyone Keep You from Contacting Others or Doint Things Outside the Home?: no     Physical Sign of Abuse Present: no   Depression: Unknown (4/26/2024)    PHQ-2     PHQ-2 Score: 98-->patient unable to answer   Housing Stability: Not At Risk (8/9/2024)    Housing Stability     Current Living Arrangements: home     Potentially Unsafe Housing Conditions: none   Utilities: Not At Risk (4/23/2024)    [facility] Utilities     Threatened with loss of utilities: No   Health Literacy: Unknown (4/23/2024)    Education     Help with school or training?: Not on file     Preferred Language: English   Employment: Unknown (10/12/2023)    Employment     Do you want help finding or keeping work or a job?: Not on file   Disabilities: Not At Risk (8/9/2024)    Disabilities      Concentrating, Remembering, or Making Decisions Difficulty: no     Doing Errands Independently Difficulty: no        Home Medications     Prior to Admission medications    Medication Sig Start Date End Date Taking? Authorizing Provider   amLODIPine (NORVASC) 5 MG tablet Take 1 tablet by mouth Daily. 5/1/24   Jairo Guadarrama MD   levETIRAcetam (Keppra) 500 MG tablet Take 1 tablet by mouth 2 (Two) Times a Day. 5/1/24   Jairo Guadarrama MD   sertraline (ZOLOFT) 25 MG tablet Take 1 tablet by mouth Every Night. 5/1/24   Jairo Guadarrama MD   Thiamine Mononitrate 100 MG tablet Take 1 tablet by mouth Daily. 5/1/24   Jairo Guadarrama MD        Objective / Physical Exam     Vital signs:  Temp: 98 °F (36.7 °C)  BP: (!) 158/103  Heart Rate: (!) 131  Resp: 19  SpO2: 99 %  Weight: 58.5 kg (128 lb 15.5 oz)    Admission Weight: Weight: 64.4 kg (142 lb)    Physical Exam  Constitutional:       Appearance: He is ill-appearing and toxic-appearing.   HENT:      Head: Normocephalic.   Eyes:      Pupils: Pupils are equal, round, and reactive to light.      Right eye: Pupil is sluggish.      Left eye: Pupil is sluggish.   Cardiovascular:      Rate and Rhythm: Regular rhythm. Tachycardia present.   Pulmonary:      Effort: Tachypnea present.   Abdominal:      General: Abdomen is flat.   Genitourinary:     Penis: Normal.    Skin:     General: Skin is warm and moist.      Coloration: Skin is pale.   Neurological:      Mental Status: He is disoriented.      Motor: Tremor present.      Comments: Minimally responsive at times; does not follow commands   Psychiatric:         Behavior: Behavior is uncooperative.         Cognition and Memory: Cognition is impaired. Memory is impaired.          Labs     Results from last 7 days   Lab Units 08/08/24  2157   WBC 10*3/mm3 12.81*   HEMATOCRIT % 48.0   PLATELETS 10*3/mm3 151      Results from last 7 days   Lab Units 08/08/24  2213   SODIUM mmol/L 142   POTASSIUM mmol/L 3.7   CHLORIDE mmol/L  94*   CO2 mmol/L 13.8*   BUN mg/dL 15   CREATININE mg/dL 1.40*        Imaging     Chest X ray: Pending at time of note    EKG: My independent evaluation showed Sinus tachycardia, no ST -T changes    Current Medications     Scheduled Meds:  folic acid 1 mg in sodium chloride 0.9 % 50 mL IVPB, 1 mg, Intravenous, Daily  lactated ringers, 1,000 mL, Intravenous, Once  LORazepam, 2 mg, Intravenous, Q6H   Followed by  [START ON 8/10/2024] LORazepam, 1 mg, Intravenous, Q6H  PHENobarbital, 2 mg/kg, Intravenous, Once   Followed by  PHENobarbital, 2 mg/kg, Intravenous, Once   Followed by  PHENobarbital, 2 mg/kg, Intravenous, Once  PHENobarbital, 65 mg, Intravenous, Once   Followed by  [START ON 8/10/2024] PHENobarbital, 65 mg, Intravenous, Once   Followed by  [START ON 8/10/2024] PHENobarbital, 32.4 mg, Oral, Once   Followed by  [START ON 8/11/2024] PHENobarbital, 32.4 mg, Oral, Once   Followed by  [START ON 8/11/2024] PHENobarbital, 32.4 mg, Oral, Once  sodium chloride, 10 mL, Intravenous, Q12H  thiamine (B-1) IV, 200 mg, Intravenous, Q8H   Followed by  [START ON 8/14/2024] thiamine, 100 mg, Oral, Daily         Continuous Infusions:        Patient continues to be critically ill, remains at risk of clinical deterioration or death and needed high complexity decision making. I have spent a total of 45 minutes providing critical care services to this patient including but not limited to: review of labs/ microbiology/imaging/medications, serial monitoring of vital signs, review of other consultant's notes, review of events in the last 24 hrs, monitoring input/output, review of treatment plan with bedside nurse, RT and other treatment team, management of life support and nutrition needs. I also spoke with patient wife about the plan of care and answered all questions.    Time spent in performing separately billable procedures and updating family is not included in the critical care time.       KAREL Ruffin   Critical  Care  24   01:18 EDT      Electronically signed by Gisele Boston MD at 24 0900          Operative/Procedure Notes (all)        Gisele Boston MD at 08/10/24 1115  Version 1 of 1       Procedures    1. INTUBATION [PRO89 (Custom)]                 Procedure: Emergent Endotracheal Intubation    Indication: Acute hypoxic respiratory failure, refractory DTs    Sedation: Fentanyl, Versed     Procedure:    Using a size 3 glidescope blade, direct laryngoscopy was performed.    The oropharynx had some redundant soft tissue, no edema appreciated.    No bleeding seen.    The vocal cords were seen and both cords were moving symmetrically.    A size 7.5 ETT was passed easily through the vocal cords in 1 attempt, under direct visualization.    ETT placement was confirmed by chest rise symmetrically; presence of breath sounds bilaterally, positive color change on capnography, condensation in the ETT and pulse oximetry of 100%.    CXR ordered.    Patient tolerated the procedure well.    No acute complication.     Electronically signed by Gisele Boston MD at 08/10/24 1148          Physician Progress Notes (last 48 hours)        Frank Gill MD at 24 1036              [facility] MEDICINE SERVICE  DAILY PROGRESS NOTE    NAME: Emiliano Bateman  : 1960  MRN: [id number]      LOS: 7 days     PROVIDER OF SERVICE: Frank Gill MD    Chief Complaint: <principal problem not specified>    Subjective:     Interval History:  History taken from: patient chart family RN  Calm cooperative off restraints now    Review of Systems:   Review of Systems  All negative except as above  Objective:     Vital Signs  Temp:  [98.4 °F (36.9 °C)-99.6 °F (37.6 °C)] 98.9 °F (37.2 °C)  Heart Rate:  [] 100  Resp:  [18-25] 18  BP: (104-148)/(75-96) 147/93  Flow (L/min):  [2] 2   Body mass index is 20.54 kg/m².    Physical Exam  Physical Exam  NAD slightly confused RRR S1-S2 audible next abdomen ferritin  Abdomen  soft nontender nondistended  Scheduled Meds   enoxaparin, 40 mg, Subcutaneous, Daily  folic acid, 1 mg, Nasogastric, Daily  insulin lispro, 4-24 Units, Subcutaneous, 4x Daily With Meals & Nightly  levETIRAcetam, 500 mg, Intravenous, Q12H  miconazole, 1 Application, Topical, Q12H  pantoprazole, 40 mg, Intravenous, Q AM  QUEtiapine, 50 mg, Nasogastric, Q12H  sertraline, 25 mg, Nasogastric, Nightly  sodium chloride, 10 mL, Intravenous, Q12H  sodium chloride, 10 mL, Intravenous, Q12H  thiamine, 100 mg, Oral, Daily       PRN Meds     acetaminophen    acetaminophen    Calcium Replacement - Follow Nurse / BPA Driven Protocol    dextrose    dextrose    glucagon (human recombinant)    HYDROmorphone    LORazepam    Magnesium Standard Dose Replacement - Follow Nurse / BPA Driven Protocol    nitroglycerin    OLANZapine (zyPREXA) 5 mg in sterile water (preservative free) 1 mL injection    ondansetron ODT **OR** ondansetron    Phosphorus Replacement - Follow Nurse / BPA Driven Protocol    Potassium Replacement - Follow Nurse / BPA Driven Protocol    [COMPLETED] Insert Peripheral IV **AND** sodium chloride    sodium chloride    sodium chloride    sodium chloride   Infusions         Diagnostic Data    Results from last 7 days   Lab Units 08/16/24  0228   WBC 10*3/mm3 10.58   HEMOGLOBIN g/dL 13.2   HEMATOCRIT % 39.4   PLATELETS 10*3/mm3 404   GLUCOSE mg/dL 112*   CREATININE mg/dL 0.76   BUN mg/dL 17   SODIUM mmol/L 136   POTASSIUM mmol/L 3.3*   AST (SGOT) U/L 33   ALT (SGPT) U/L 18   ALK PHOS U/L 79   BILIRUBIN mg/dL 0.5   ANION GAP mmol/L 13.0       XR Abdomen KUB    Result Date: 8/14/2024  Impression: Esophagogastric tube tip overlies the proximal stomach. Electronically Signed: Xiang Tomas MD  8/14/2024 6:40 PM EDT  Workstation ID: [id number]       I reviewed the patient's new clinical results.  I reviewed the patient's new imaging results and agree with the interpretation.    Assessment/Plan:     Active and Resolved  Problems  Active Hospital Problems    Diagnosis  POA    Type 2 diabetes mellitus [E11.9]  Yes    Acute respiratory failure [J96.00]  Yes    Moderate malnutrition [E44.0]  Yes    Seizure due to alcohol withdrawal [F10.939, R56.9]  Yes    Hypertensive disorder [I10]  Yes    Chronic obstructive lung disease [J44.9]  Yes    Alcoholism [F10.20]  Yes    Anxiety [F41.9]  Yes      Resolved Hospital Problems   No resolved problems to display.       63-year-old male with history of hypertension, COPD, DM2, EtOH abuse seizure disorder admitted to [facility] with breakthrough seizure        #History of seizure disorder with breakthrough seizures  Concern for alcohol withdrawal seizures  Patient continues to drink EtOH  Seen by neurology continue with current dose of Keppra  Breakthrough seizure precautions     #EtOH withdrawal syndrome c/b DT   Last reported drink 2 days prior to admission required intubation in ICU for airway protection now extubated     Status post phenobarb tapering dose  Now off CIWA monitoring  Delirium precautions as below  Continue thiamine folic acid MVI  Now off supplemental oxygen monitor oxygen saturations  Lasix as needed     #Acute encephalopathy.  Metabolic with delirium  #Dysphagia  Avoid benzodiazepines  Delirium precautions  Finished course of antibiotics  Continue Seroquel 50 every 12  Zyprexa as needed for agitation.  QTc 442  SLP following started on modified diet monitor for tolerance             # Hypertension  Antihypertensives on hold  DC midodrine  Monitor blood pressure     #Leukocytosis  Finished a very course of antibiotics  Culture data nonrevealing      #DM2 complicated by DKA  DKA has since resolved  Continue ISS lispro  Will hold off on Lantus as patient currently not eating        #Parkinson's?  Not on any medications at home     #COPD  Not exacerbation  Continue current as needed nebs     #Anxiety/depression  Continue Zoloft    VTE Prophylaxis:  Pharmacologic & mechanical  VTE prophylaxis orders are present.         Code status is   Code Status and Medical Interventions: CPR (Attempt to Resuscitate); Full Support   Ordered at: 24 0047     Code Status (Patient has no pulse and is not breathing):    CPR (Attempt to Resuscitate)     Medical Interventions (Patient has pulse or is breathing):    Full Support       Plan for disposition: 24 hours    Time: 30 minutes    Signature: Electronically signed by Frank Gill MD, 24, 10:36 EDT.  [facility] Hospitalist Team      Electronically signed by Frank Gill MD at 24 1038       Frank Gill MD at 08/15/24 1238              [facility] MEDICINE SERVICE  DAILY PROGRESS NOTE    NAME: Emiliano Bateman  : 1960  MRN: [id number]      LOS: 6 days     PROVIDER OF SERVICE: Frank Gill MD    Chief Complaint: <principal problem not specified>    Subjective:     Interval History:  History taken from: patient chart RN  Seen while working with PT  Not agitated following commands   Pulled out Dobhoff     Review of Systems:   Review of Systems  All negative except as above  Objective:     Vital Signs  Temp:  [98.1 °F (36.7 °C)-99.2 °F (37.3 °C)] 99.2 °F (37.3 °C)  Heart Rate:  [] 102  Resp:  [18-32] 18  BP: ()/(50-93) 130/87  Flow (L/min):  [4] 4  FiO2 (%):  [40 %] 40 %   Body mass index is 22.72 kg/m².    Physical Exam  Physical Exam  NAD slightly confused RRR S1-S2 audible next abdomen ferritin  Abdomen soft nontender nondistended  Scheduled Meds   enoxaparin, 40 mg, Subcutaneous, Daily  folic acid, 1 mg, Nasogastric, Daily  furosemide, 40 mg, Intravenous, Q6H  insulin lispro, 4-24 Units, Subcutaneous, Q6H  lansoprazole, 30 mg, Nasogastric, Q AM  levETIRAcetam, 500 mg, Nasogastric, Q12H  miconazole, 1 Application, Topical, Q12H  midodrine, 5 mg, Nasogastric, Q8H  QUEtiapine, 50 mg, Nasogastric, Q12H  sertraline, 25 mg, Nasogastric, Nightly  sodium chloride, 10 mL, Intravenous,  Q12H  sodium chloride, 10 mL, Intravenous, Q12H  thiamine, 100 mg, Oral, Daily       PRN Meds     acetaminophen    acetaminophen    Calcium Replacement - Follow Nurse / BPA Driven Protocol    dextrose    dextrose    glucagon (human recombinant)    HYDROmorphone    LORazepam    Magnesium Standard Dose Replacement - Follow Nurse / BPA Driven Protocol    nitroglycerin    ondansetron ODT **OR** ondansetron    Phosphorus Replacement - Follow Nurse / BPA Driven Protocol    Potassium Replacement - Follow Nurse / BPA Driven Protocol    [COMPLETED] Insert Peripheral IV **AND** sodium chloride    sodium chloride    sodium chloride    sodium chloride   Infusions         Diagnostic Data    Results from last 7 days   Lab Units 08/15/24  0531   WBC 10*3/mm3 9.80   HEMOGLOBIN g/dL 13.5   HEMATOCRIT % 41.4   PLATELETS 10*3/mm3 280   GLUCOSE mg/dL 145*   CREATININE mg/dL 0.82   BUN mg/dL 17   SODIUM mmol/L 142   POTASSIUM mmol/L 3.7   AST (SGOT) U/L 31   ALT (SGPT) U/L 18   ALK PHOS U/L 82   BILIRUBIN mg/dL 0.3   ANION GAP mmol/L 10.2       XR Abdomen KUB    Result Date: 8/14/2024  Impression: Esophagogastric tube tip overlies the proximal stomach. Electronically Signed: Xiang Tomas MD  8/14/2024 6:40 PM EDT  Workstation ID: [id number]    XR Chest 1 View    Result Date: 8/13/2024  Impression: 1.Increased left basilar opacity, which may be due to pneumonia and/or atelectasis. 2.Stable positioning of support tubes and lines, as above. Electronically Signed: Medina Campo  8/13/2024 2:26 PM EDT  Workstation ID: [id number]       I reviewed the patient's new clinical results.  I reviewed the patient's new imaging results and agree with the interpretation.    Assessment/Plan:     Active and Resolved Problems  Active Hospital Problems    Diagnosis  POA    Type 2 diabetes mellitus [E11.9]  Yes    Acute respiratory failure [J96.00]  Yes    Moderate malnutrition [E44.0]  Yes    Seizure due to alcohol withdrawal [F10.939, R56.9]  Yes     Hypertensive disorder [I10]  Yes    Chronic obstructive lung disease [J44.9]  Yes    Alcoholism [F10.20]  Yes    Anxiety [F41.9]  Yes      Resolved Hospital Problems   No resolved problems to display.       63-year-old male with history of hypertension, COPD, DM2, EtOH abuse seizure disorder admitted to [facility] with breakthrough seizure      #History of seizure disorder with breakthrough seizures  Concern for alcohol withdrawal seizures  Patient continues to drink EtOH  Continue Keppra changed to IV for now.  Once taking p.o. consistently may switch to p.o.  Neurology consulted  Breakthrough seizure precautions    #EtOH withdrawal syndrome c/b DT   Last reported drink 2 days prior to admission required intubation in ICU for airway protection now extubated    Status post phenobarb tapering dose  Now off CIWA monitoring  Delirium precautions as below  Continue thiamine folic acid MVI  Wean off supplemental oxygen  Lasix as needed    #Acute encephalopathy.  Metabolic with delirium  #Dysphagia  Avoid benzodiazepines  Delirium precautions  Finished course of antibiotics  Continue Seroquel 50 every 12  Zyprexa as needed for agitation.  QTc 442  Pulled Dobbhoff passed bedside swallow eval will get speech therapist on board goal is to initiate diet          # Hypertension  Antihypertensives on hold  Currently on midodrine 5 every 8 monitor blood pressure    #Leukocytosis  Finished a very course of antibiotics  Culture data nonrevealing     #DM2 complicated by DKA  DKA has since resolved  Continue ISS lispro  Will hold off on Lantus as patient currently not eating      #Parkinson's?  Not on any medications at home    #COPD  Not exacerbation  Continue current as needed nebs    #Anxiety/depression  Continue Zoloft    VTE Prophylaxis:  Pharmacologic & mechanical VTE prophylaxis orders are present.         Code status is   Code Status and Medical Interventions: CPR (Attempt to Resuscitate); Full Support   Ordered at:  "24 0047     Code Status (Patient has no pulse and is not breathing):    CPR (Attempt to Resuscitate)     Medical Interventions (Patient has pulse or is breathing):    Full Support       Plan for disposition: 2 to 3 days    Time: 30 minutes    Signature: Electronically signed by Frank Gill MD, 08/15/24, 12:38 EDT.  [facility] Hospitalist Team      Electronically signed by Frank Gill MD at 08/15/24 1300       Gil Bobby DO at 24 1443            Critical Care Progress Note     Emiliano Bateman : 1960 MRN:[id number] LOS:5  Rm: 2316/1     Principal Problem: Alcohol withdrawal seizure with delirium     Reason for follow up: All the medical problems listed below    Summary     Emiliano Bateman is a 63 y.o. male with PMH of hypertension, COPD, liver disease, Parkinson's, DMT2, seizure disorder who presented to the hospital after suffering a seizure at home, and was admitted with a principal diagnosis of Alcohol withdrawal seizure with delirium.  Information for HPI taken from chart review as patient is unable to cooperate at time of assessment due to acuity of illness.  His wife is at bedside, and states that he had a seizure today which prompted her to call EMS.  Of note patient was recently in our hospital in April for new onset seizures secondary to alcohol withdrawal.  At that time it was reported that patient drink 1 gallon of vodka daily.  Per patient wife, patient has decreased his drinking significantly since that hospitalization.  She states that his last drink was approximately 2 days ago.  Also, patient does have parkinsonian tremors, and was on medication before but stopped taking them due to side effects.  Per ED documentation patient was conversive when he came to the emergency room and denied any complaints of pain or headache.  He had no subjective fevers or recent illnesses.  Per report patient has had stated he felt \"puny\".  Per PTA meds he was not " restarted on medication for his Parkinson's on discharge from the hospital.     ED course: Shortly after arriving to the emergency department he had what was described as a generalized tonic-clonic seizure that lasted several minutes.  He was treated with IV Ativan and loaded with 1 g IV Keppra.  Lab showed CK of 226, anion gap 34.2 with glucose 263, CO2 13.8, WBC 12.81 without bandemia, and ammonia 71.  Was ordered 1 L saline bolus.  UA without WBCs or bacteria, however + glucose and + ketones.  ABG pending at time of note.  CT head and cervical spine were completed and negative for acute abnormality.    Significant Events     08/14/24 : Patient remains afebrile, all vital signs are within normal limits.  He has had 4.1 L of urine output over the last 24 hours and is net +10.3 L for the admission.  Increase Lasix to 40 mg IV push every 6 hours.  Chemistry panel shows that the glucose is running in the upper 100s, phosphorus low at 0.8 and being replaced.  CBC unremarkable.  Patient did not do well on spontaneous breathing trial earlier this morning, but is doing better this afternoon.  As long as he can go at least an hour, we will plan on extubating.  Assessment / Plan     Acute respiratory failure without hypoxia or without hypercapnia  Intubated for airway protection  Worsening encephalopathy secondary to acute DTs required intubation 8/10 for airway protection  RVP negative  Respiratory culture pending and NTD  Ventilator settings noted and adjusted as needed.   Close monitoring of ABG.   Minimize sedation/analgesia to keep RASS of 0 to -1.    Seizures, not in status  Probable alcohol withdrawal seizure activity  Ceribell placed in ED, highest reading of seizure burden at 7%   Continue IV Keppra  On phenobarbital taper per protocol  IV Ativan as needed  Seizure precaution  Now intubated and sedated     Alcohol withdrawal syndrome  Patient previously consumed a gallon of vodka daily  Patient's wife states he has  drastically reduced his drinking  Last drink was 2 days prior to admission  CIWA protocol.  Now intubated and sedated     Diabetic ketoacidosis without coma, with history of diabetes mellitus, type 2  Anion gap of 34 on admission.  Hemoglobin A1c 6.09  DKA protocol initiated  Insulin drip initiated per DKA protocol, now transitioned off  Adjust IV fluids as indicated  Accuchecks with sliding scale insulin     Essential hypertension  Home dose amlodipine on hold due to hypotension and pressor support     Leukocytosis  Patient met SIRS criteria with elevated WBC and elevated heart rate, however no clear etiology of infection is apparent  Blood cultures pending and NTD  UA negative  Procalcitonin 0.46 and lactic 4.5 on admission, trended down to 2.2.  No need for further serial monitoring  Now hypotensive requiring pressor support  Due to respiratory failure added empiric ceftriaxone 8/9 for 5 days  CXR no acute cardiopulmonary disease      History of Parkinson's  Patient not on home medication     COPD  Hold inhalers, not in acute exacerbation  Monitor ABG's as ordered.   Intubated for airway protection 8/11     Anxiety/Depression  Continue home Zoloft     Disposition: ICU        Critical Care Time:  34 mins.      Code status:   Code Status (Patient has no pulse and is not breathing): CPR (Attempt to Resuscitate)  Medical Interventions (Patient has pulse or is breathing): Full Support       Nutrition:   NPO Diet NPO Type: Tube Feeding   Tube Feeding: Formula: Diabetisource AC (Glucerna 1.2); Feeding Type: Continuous; Start at: 20 mL/hr; Then Advance By: Do Not Advance; Water Flush: 10 mL; Every: 1 hour; Water Bolus: None     VTE Prophylaxis:  Pharmacologic & mechanical VTE prophylaxis orders are present.         Subjective / Review of systems     Unable to be obtained secondary to patient's current condition.    Objective / Physical Exam     Vital signs:  Temp: 98.7 °F (37.1 °C)  BP: 90/60  Heart Rate: 88  Resp:  23  SpO2: 94 %  Weight: 65.8 kg (145 lb 1 oz)    Admission Weight: Weight: 64.4 kg (142 lb)  Current Weight: Weight: 65.8 kg (145 lb 1 oz)    Input/Output in last 24 hours:    Intake/Output Summary (Last 24 hours) at 8/14/2024 1443  Last data filed at 8/14/2024 1400  Gross per 24 hour   Intake 2988.41 ml   Output 3200 ml   Net -211.59 ml      Net IO Since Admission: 10,320.2 mL [08/14/24 1443]       GEN: Middle-age man who appears much older than stated age.  Intubated, minimally sedated, on vent.  NAD.  NEURO:  Brainstem reflexes intact.  No obvious focal deficit.  Moves all 4 ext.  HEENT:  N/AT.  PERRL.  MMM.  Oropharynx non-erythematous.  No drainage from the eyes/ears/nose.  No conjunctival petechiae.  No oral thrush.  ETT in place.  NECK:  Supple, NT, trachea midline.  No meningismus.    CHEST/LUNGS:  Breath sounds are clear and equal bilaterally.  No w/r/r.  Chest excursion equal bilaterally.    CARDIOVASCULAR:  RRR w/o murmur noted.  PMI not displaced.  GI:  Abdomen soft, NT, ND, +BS.    :  Normal external genitalia.  No see cath in place.  EXTREMITIES:  No deformity or amputation.  No cyanosis, edema, or asymmetry.  Pulses 2+ and equal in BLE's.    SKIN:  Warm, dry, and pink.  No rash, breakdown, or track marks noted.  LYMPHATICS/HEME:  No overt LAD or abnormal bruising.  No lymphedema.  MSK:  No joint abnormalities noted.    PSYCH: Unable to assess secondary to patient's current condition..           Radiology and Labs     Results from last 7 days   Lab Units 08/14/24  0543 08/13/24  0341 08/12/24  0441 08/11/24  0535 08/10/24  0539   WBC 10*3/mm3 7.97 7.64 5.45 7.87 7.71   HEMOGLOBIN g/dL 14.3 13.3 13.2 13.5 12.1*   HEMATOCRIT % 42.0 39.2 39.2 39.2 36.7*   PLATELETS 10*3/mm3 166 101* 66* 66* 73*      Results from last 7 days   Lab Units 08/08/24  2157   PROTIME Seconds 10.3   INR  0.94   APTT seconds 28.2*      Results from last 7 days   Lab Units 08/14/24  0543 08/13/24  0341 08/12/24  1809  08/12/24  0441 08/11/24  0535 08/10/24  0539 08/09/24  2105 08/09/24  1520 08/09/24  0435 08/09/24  0427 08/09/24  0255 08/08/24  2213   SODIUM mmol/L 136 134*  --  138 138 140  --   --  138  --  138 142   POTASSIUM mmol/L 3.7 4.0 4.3 3.3* 3.2* 3.5  --    < > 3.7  --  3.6 3.7   CHLORIDE mmol/L 98 101  --  100 97* 107  --   --  97*  --  97* 94*   CO2 mmol/L 32.2* 27.8  --  30.1* 32.2* 18.4*  --   --  25.4  --  23.3 13.8*   BUN mg/dL 18 15  --  11 9 10  --   --  11  --  12 15   CREATININE mg/dL 0.76 0.61*  --  0.58* 0.66* 0.63*  --   --  0.91   < > 0.92 1.40*   GLUCOSE mg/dL 204* 145*  --  166* 151* 106*  --   --  76  --  174* 246*   MAGNESIUM mg/dL 1.8  --   --   --   --   --   --   --  2.2  --  2.0 2.8*   PHOSPHORUS mg/dL 0.8*  --   --   --   --   --  3.7  --  1.5*  --  1.8*  --     < > = values in this interval not displayed.      Results from last 7 days   Lab Units 08/14/24  0543 08/13/24  0341 08/12/24  0441 08/11/24  0535 08/10/24  0539   ALK PHOS U/L 79 74 64 61 53   AST (SGOT) U/L 40 37 44* 75* 118*   ALT (SGPT) U/L 20 24 25 35 37     Results from last 7 days   Lab Units 08/10/24  1237 08/09/24  0427   PH, ARTERIAL pH units 7.267* 7.541*   PCO2, ARTERIAL mm Hg 37.8 33.5*   PO2 ART mm Hg 153.8* 89.6   O2 SATURATION ART % 99.1* 98.0   FIO2 % 60 40   HCO3 ART mmol/L 17.2* 28.7*   BASE EXCESS ART mmol/L -9.0* 6.3*       XR Chest 1 View    Result Date: 8/13/2024  Impression: 1.Increased left basilar opacity, which may be due to pneumonia and/or atelectasis. 2.Stable positioning of support tubes and lines, as above. Electronically Signed: Medina Campo  8/13/2024 2:26 PM EDT  Workstation ID: [id number]     Current medications     Scheduled Meds:   enoxaparin, 40 mg, Subcutaneous, Daily  folic acid, 1 mg, Nasogastric, Daily  furosemide, 40 mg, Intravenous, Q12H  insulin lispro, 4-24 Units, Subcutaneous, Q6H  lansoprazole, 30 mg, Nasogastric, Q AM  levETIRAcetam, 500 mg, Nasogastric, Q12H  miconazole, 1 Application,  Topical, Q12H  midodrine, 10 mg, Nasogastric, Q8H  QUEtiapine, 50 mg, Nasogastric, Q12H  sertraline, 25 mg, Nasogastric, Nightly  sodium chloride, 10 mL, Intravenous, Q12H  sodium chloride, 10 mL, Intravenous, Q12H  thiamine, 100 mg, Oral, Daily        Continuous Infusions:   dexmedetomidine, 0.2-1.5 mcg/kg/hr, Last Rate: 1.5 mcg/kg/hr (08/14/24 1441)          Plan discussed with RN. Reviewed all other data in the last 24 hours, including but not limited to vitals, labs, microbiology, imaging and pertinent notes from other providers.  .      Gil Bobby DO   Critical Care  08/14/24   14:43 EDT     Electronically signed by Gil Bobby DO at 08/14/24 1448          Consult Notes (last 48 hours)        Homer Lewis MD at 08/15/24 1248        Consult Orders    1. Inpatient Neurology Consult General [[id number]] ordered by Frank Gill MD at 08/15/24 0842                 Primary Care Provider: Meryl Thomas MD     Consult requested by: Admitting team    Reason for Consultation: Neurological evaluation /advise on antiepileptics    History taken from: patient chart RN    Chief complaint: He was admitted on eighth of this month for seizures and delirium and possible withdrawal    Subjective   SUBJECTIVE:    History of present illness: Background per H&P: Emiliano Bateman is a 63 y.o. year old male who was evaluated in room ICU 2316 at [facility]    Source of information is the patient but more than that the previous records and evaluation done by the team members    He was evaluated by neurology, Dr. Cade, on April 23 and 24 this year, 2024    He again presented with seizure-like activity, likely withdrawal related as his last drink apparently was 2 days before this presentation but I am trying to find out what has happened since April as does he have seizures otherwise?    I called his wife but she did not reply    He is on Keppra now and we will continue    He is improving,  and today when I saw him he is awake and alert and essentially oriented x 2 but very responsive    Mild generalized weakness but otherwise nonfocal    His ABGs yesterday were not the best but otherwise he is improving    CT head on admission was okay    Vital signs lately okay and labs improving    As per hospitalist team,  63-year-old male with a past medical history of hypertension, COPD, liver disease, Parkinson's, dementia, diabetes mellitus type 2, alcoholism, seizure disorder who was admitted through the emergency department on 8/8/2024 after seizure at home.  He also had a seizure subsequently in the emergency department.  Review of records shows he was admitted in April for new onset seizure secondary to alcohol withdrawal and placed on Keppra.   August 8/24 admission day in ED he was initially placed on IV Cerebyx also treated for diabetic ketoacidosis on insulin drip, eventually triggered sepsis blood cultures were negative and he was initially treated with IV ceftriaxone and IV vancomycin.  Currently on IV Flagyl. He required intubation on 8/10/2024 secondary to refractory seizures.  He also had an NG tube placed.  Patient was extubated today at 1632 is now stable on 4 L oxygen via nasal cannula.  We have been consulted for downgrade from critical care status for medical management.  Last ABG today at 1535 showed a pH of 7.433 pCO2 44.9 pO2 86.4, glucose 142 WBC 7.97.  He is awake and appears in no distress.  He follows commands but is not conversive.  No family at bedside.     As per admitting H&P,  Seizures, not in status  Probable alcohol withdrawal seizure activity  Ceribell placed in ED, highest reading of seizure burden at 7%   Continue IV Keppra  IV Ativan as needed  Seizure precaution     Alcohol withdrawal syndrome  Patient previously drank a gallon of vodka daily  Patient's wife states he has drastically reduced his drinking  Last drink was 2 days ago  [facility] protocol     Diabetic ketoacidosis  without coma, with history of diabetes mellitus, type 2  Anion gap of 34 on admission.  Check A1c; last 6.50 on 4/23  DKA protocol initiated  Insulin drip initiated per DKA protocol.  Adjust iv fluids based on sugars, sodium, and potassium per protocol.  Accuchecks every hour and serial labs as ordered.  Continue protocol until resolved per protocol recommendations.     Essential hypertension  Patient on home amlodipine, unable to take safely at this time -resume when appropriate  Given IV Lopressor  Sitter IV Cardene if needed     Leukocytosis  Patient meet SIRS criteria with elevated WBC and elevated heart rate, however no clear etiology of infection is apparent  Blood cultures pending  UA negative  Procalcitonin and lactic pending  Patient hypertensive requiring IV Lopressor  Do not feel etiology is infectious, will await lactic and procalcitonin -monitor off ABX for now  CXR pending to rule out possible aspiration pneumonia        History of Parkinson's  Patient not on home medication     COPD, not in exacerbation  Continue budesonide and duoneb scheduled and as needed.  Monitor ABG's as ordered. Oxygen supplementation as needed, use BIPAP as needed.  Wean off oxygen as tolerated.     Anxiety/Depression  Continue home Zoloft when able to take PO         Code Status (Patient has no pulse and is not breathing): CPR (Attempt to Resuscitate)  Medical Interventions (Patient has pulse or is breathing): Full Support        Nutrition:   NPO Diet NPO Type: Strict NPO      VTE Prophylaxis:  Mechanical VTE prophylaxis orders are present.           History of Present illness      Emiliano Bateman is a 63 y.o. male with PMH of hypertension, COPD, liver disease, Parkinson's, DMT2, seizure disorder who presented to the hospital after suffering a seizure at home, and was admitted with a principal diagnosis of Alcohol withdrawal seizure with delirium.  Information for HPI taken from chart review as patient is unable to cooperate  "at time of assessment due to acuity of illness.  His wife is at bedside, and states that he had a seizure today which prompted her to call EMS.  Of note patient was recently in our hospital in April for new onset seizures secondary to alcohol withdrawal.  At that time it was reported that patient drink 1 gallon of vodka daily.  Per patient wife, patient has decreased his drinking significantly since that hospitalization.  She states that his last drink was approximately 2 days ago.  Also, patient does have parkinsonian tremors, and was on medication before but stopped taking them due to side effects.  Per ED documentation patient was conversive when he came to the emergency room and denied any complaints of pain or headache.  He had no subjective fevers or recent illnesses.  Per report patient has had stated he felt \"puny\".  Per PTA meds he was not restarted on medication for his Parkinson's on discharge from the hospital.     ED course: Shortly after arriving to the emergency department he had what was described as a generalized tonic-clonic seizure that lasted several minutes.  He was treated with IV Ativan and loaded with 1 g IV Keppra.  Lab showed CK of 226, anion gap 34.2 with glucose 263, CO2 13.8, WBC 12.81 without bandemia, and ammonia 71.  Was ordered 1 L saline bolus.  UA without WBCs or bacteria, however + glucose and + ketones.  ABG pending at time of note.  CT head and cervical spine were completed and negative for acute abnormality.     ACP: Patient does not have advised her to follow this facility's.  Wife is at bedside and states he is a full code.      - Portions of the above HPI were copied from previous encounters and edited as appropriate. PMH as detailed below.     Review of Systems   Questionable considering his present condition mostly the confusional state    PATIENT HISTORY:  Past Medical History:   Diagnosis Date    Alcoholism 03/19/2020    Aneurysm of thoracic aorta 11/08/2019    3.9 cm   "    Anxiety 03/19/2020    Chronic obstructive lung disease 10/08/2020    xray [facility] 12/9/18      Hypertensive disorder 11/23/2020    Myocardial infarction 10/08/2020    angioplasty      Seizure disorder 04/23/2024    Steatosis of liver 10/09/2020   , History reviewed. No pertinent surgical history.,   Family History   Problem Relation Age of Onset    No Known Problems Mother     No Known Problems Father    ,   Social History     Tobacco Use    Smoking status: Unknown   Vaping Use    Vaping status: Never Used   Substance Use Topics    Alcohol use: Yes     Comment: 1 gallon of alcohol a day    Drug use: Never   ,   Prior to Admission medications    Medication Sig Start Date End Date Taking? Authorizing Provider   amLODIPine (NORVASC) 5 MG tablet Take 1 tablet by mouth Daily. 5/1/24   Jairo Guadarrama MD   levETIRAcetam (Keppra) 500 MG tablet Take 1 tablet by mouth 2 (Two) Times a Day. 5/1/24   Jairo Guadarrama MD   sertraline (ZOLOFT) 25 MG tablet Take 1 tablet by mouth Every Night. 5/1/24   Jairo Guadarrama MD   Thiamine Mononitrate 100 MG tablet Take 1 tablet by mouth Daily. 5/1/24   Jairo Guadarrama MD    Allergies:  Penicillins    Current Facility-Administered Medications   Medication Dose Route Frequency Provider Last Rate Last Admin    acetaminophen (TYLENOL) 160 MG/5ML oral solution 650 mg  650 mg Nasogastric Q6H PRN Gil Bobby DO   650 mg at 08/14/24 0827    acetaminophen (TYLENOL) suppository 650 mg  650 mg Rectal Q4H PRN Jannette Parker APRN        Calcium Replacement - Follow Nurse / BPA Driven Protocol   Does not apply PRN Jannette Parker APRN        dextrose (D50W) (25 g/50 mL) IV injection 25 g  25 g Intravenous Q15 Min PRN Gisele Boston MD        dextrose (GLUTOSE) oral gel 15 g  15 g Oral Q15 Min PRN Gisele Boston MD        Enoxaparin Sodium (LOVENOX) syringe 40 mg  40 mg Subcutaneous Daily Natan Eaton APRN   40 mg at 08/14/24 1547    folic acid (FOLVITE)  tablet 1 mg  1 mg Nasogastric Daily Gil Bobby, DO   1 mg at 08/15/24 0813    furosemide (LASIX) injection 40 mg  40 mg Intravenous Q6H Gil Bobby, DO   40 mg at 08/15/24 1144    glucagon (GLUCAGEN) injection 1 mg  1 mg Intramuscular Q15 Min PRN Gisele Boston MD        HYDROmorphone (DILAUDID) injection 1 mg  1 mg Intravenous Q4H PRN Jannette Parker APRN   1 mg at 08/14/24 2247    insulin lispro (HUMALOG/ADMELOG) injection 4-24 Units  4-24 Units Subcutaneous Q6H Gisele Boston MD   4 Units at 08/15/24 0544    lansoprazole (PREVACID SOLUTAB) disintegrating tablet Tablet Delayed Release Dispersible 30 mg  30 mg Nasogastric Q AM Gisele Boston MD   30 mg at 08/15/24 0544    levETIRAcetam (KEPPRA) 100 MG/ML oral solution 500 mg  500 mg Nasogastric Q12H Gisele Boston MD   500 mg at 08/15/24 0813    LORazepam (ATIVAN) injection 0.5 mg  0.5 mg Intravenous Q4H PRN Jannette Parker APRN   0.5 mg at 08/14/24 2039    Magnesium Standard Dose Replacement - Follow Nurse / BPA Driven Protocol   Does not apply PRN Jannette Parker APRN        miconazole (MICOTIN) 2 % powder 1 Application  1 Application Topical Q12H Vani Pena APRN   1 Application at 08/15/24 0813    midodrine (PROAMATINE) tablet 5 mg  5 mg Nasogastric Q8H Frank Gill MD   5 mg at 08/15/24 1201    nitroglycerin (NITROSTAT) SL tablet 0.4 mg  0.4 mg Sublingual Q5 Min PRN Jannette Parker APRN        ondansetron ODT (ZOFRAN-ODT) disintegrating tablet 4 mg  4 mg Oral Q6H PRN Jannette Parker APRN        Or    ondansetron (ZOFRAN) injection 4 mg  4 mg Intravenous Q6H PRN Jannette Parker APRN   4 mg at 08/15/24 0552    Phosphorus Replacement - Follow Nurse / BPA Driven Protocol   Does not apply PRN Jannette Parker APRN        Potassium Replacement - Follow Nurse / BPA Driven Protocol   Does not apply PRN Vani Lawson APRN        QUEtiapine (SEROquel) tablet 50 mg  50 mg Nasogastric Q12H  Gil Bobby, DO   50 mg at 08/15/24 0813    sertraline (ZOLOFT) tablet 25 mg  25 mg Nasogastric Nightly Natan Eaton APRN   25 mg at 08/14/24 2007    sodium chloride 0.9 % flush 10 mL  10 mL Intravenous PRN Joo Bobby MD        sodium chloride 0.9 % flush 10 mL  10 mL Intravenous Q12H Jannette Parker APRN   10 mL at 08/15/24 0813    sodium chloride 0.9 % flush 10 mL  10 mL Intravenous PRN Jannette Parker APRN        sodium chloride 0.9 % flush 10 mL  10 mL Intravenous Q12H Jannette Parker APRN   10 mL at 08/15/24 0813    sodium chloride 0.9 % flush 10 mL  10 mL Intravenous PRN Jannette Parker APRN   10 mL at 08/09/24 0424    sodium chloride 0.9 % infusion 40 mL  40 mL Intravenous PRN Jannette Parker APRN        thiamine (VITAMIN B-1) tablet 100 mg  100 mg Oral Daily Jannette Parker APRN   100 mg at 08/15/24 0813        ________________________________________________________     Objective   OBJECTIVE:  Upon today's exam, the gentleman is resting comfortably in bed in no acute distress but still in restraints     Neurologic Exam    The patient is awake and alert and oriented x self and he thinks he is at home  Not really oriented to time    Heme can name and he can follow commands and repeat     Cranial nerve examination demonstrate:  Full fields of vision to confrontation  Pupils are round, reactive to light and accommodation and size of about 3 mm  No ptosis or nystagmus  Funduscopic examination was not successful  Eye movements are conjugate     Sensation on the face and scalp are normal  Muscles of mastication are normal and symmetric  Muscles of  facial expression are normal and symmetric  Hearing is intact bilaterally  Head turning and shoulder shrugs were unremarkable  Tongue was midline  I could not visualize  oropharynx or uvula     Motor examination:  Normal bulk, tone and strength was 5-/5  No fasciculations     Sensory examination:  Intact for  soft touch, pain   Romberg was not evaluated     Reflexes:  0/4     Coordination:  He is in restraints, he does have some significant tremors     Gait:  Deferred     Toe signs:  [place]    ________________________________________________________   RESULTS REVIEW:    VITAL SIGNS:   Temp:  [98.1 °F (36.7 °C)-99.2 °F (37.3 °C)] 99.2 °F (37.3 °C)  Heart Rate:  [] 102  Resp:  [18-32] 18  BP: ()/(50-93) 130/87  FiO2 (%):  [40 %] 40 %     LABS:      Lab 08/15/24  0531 08/14/24  0543 08/13/24  0341 08/12/24  0441 08/11/24  0535 08/10/24  0539 08/09/24  0946 08/09/24  0611 08/09/24  0427 08/09/24  0255 08/08/24  2157   WBC 9.80 7.97 7.64 5.45 7.87   < >  --   --   --   --  12.81*   HEMOGLOBIN 13.5 14.3 13.3 13.2 13.5   < >  --   --   --   --  16.3   HEMOGLOBIN, POC  --   --   --   --   --   --   --   --  15.9  --   --    HEMATOCRIT 41.4 42.0 39.2 39.2 39.2   < >  --   --   --   --  48.0   HEMATOCRIT POC  --   --   --   --   --   --   --   --  47  --   --    PLATELETS 280 166 101* 66* 66*   < >  --   --   --   --  151   NEUTROS ABS 5.43 4.25 4.90 2.97 5.52   < >  --   --   --   --  10.44*   IMMATURE GRANS (ABS) 0.13* 0.09* 0.04 0.02 0.02   < >  --   --   --   --  0.18*   LYMPHS ABS 1.85 1.94 1.49 1.81 1.74   < >  --   --   --   --  0.97   MONOS ABS 2.22* 1.29* 0.96* 0.48 0.41   < >  --   --   --   --  0.83   EOS ABS 0.11 0.33 0.22 0.14 0.15   < >  --   --   --   --  0.30   .7* 101.0* 101.3* 101.6* 100.0*   < >  --   --   --   --  100.8*   PROCALCITONIN  --   --   --   --   --   --   --   --   --  0.46*  --    LACTATE  --   --   --   --   --   --  2.2* 2.9* 3.3* 4.5*  --    PROTIME  --   --   --   --   --   --   --   --   --   --  10.3   APTT  --   --   --   --   --   --   --   --   --   --  28.2*    < > = values in this interval not displayed.         Lab 08/15/24  0531 08/14/24 2015 08/14/24  0543 08/13/24  0341 08/12/24  1809 08/12/24  0441 08/11/24  0535 08/10/24  0539 08/09/24  2105 08/09/24  1520  08/09/24  0435 08/09/24  0427 08/09/24  0255 08/08/24 2213 08/08/24 2213   SODIUM 142  --  136 134*  --  138 138   < >  --   --  138  --  138  --  142   POTASSIUM 3.7  --  3.7 4.0 4.3 3.3* 3.2*   < >  --    < > 3.7  --  3.6  --  3.7   CHLORIDE 99  --  98 101  --  100 97*   < >  --   --  97*  --  97*  --  94*   CO2 32.8*  --  32.2* 27.8  --  30.1* 32.2*   < >  --   --  25.4  --  23.3  --  13.8*   ANION GAP 10.2  --  5.8 5.2  --  7.9 8.8   < >  --   --  15.6*  --  17.7*  --  34.2*   BUN 17  --  18 15  --  11 9   < >  --   --  11  --  12  --  15   CREATININE 0.82  --  0.76 0.61*  --  0.58* 0.66*   < >  --   --  0.91   < > 0.92  --  1.40*   EGFR 98.1  --  100.4 107.3  --  108.9 104.7   < >  --   --  94.1   < > 92.9  --  56.1*   GLUCOSE 145*  --  204* 145*  --  166* 151*   < >  --   --  76  --  174*  --  246*   CALCIUM 8.1*  --  8.1* 7.6*  --  7.2* 7.5*   < >  --   --  8.7  --  8.4*  --  9.1   MAGNESIUM  --   --  1.8  --   --   --   --   --   --   --  2.2  --  2.0  --  2.8*   PHOSPHORUS 3.4 3.0 0.8*  --   --   --   --   --  3.7  --  1.5*  --  1.8*   < >  --    HEMOGLOBIN A1C  --   --   --   --   --   --   --   --   --   --   --   --  6.09*  --   --     < > = values in this interval not displayed.         Lab 08/15/24  0531 08/14/24  0543 08/13/24  0341 08/12/24  0441 08/11/24  0535   TOTAL PROTEIN 6.5 6.0 5.3* 4.9* 5.4*   ALBUMIN 3.7 3.1* 2.8* 2.8* 3.1*   GLOBULIN 2.8 2.9 2.5 2.1 2.3   ALT (SGPT) 18 20 24 25 35   AST (SGOT) 31 40 37 44* 75*   BILIRUBIN 0.3 0.3 0.4 0.4 0.7   ALK PHOS 82 79 74 64 61         Lab 08/09/24  0435 08/09/24  0255 08/08/24  2157   HSTROP T 87* 75*  --    PROTIME  --   --  10.3   INR  --   --  0.94         Lab 08/10/24  0539   TRIGLYCERIDES 102             Lab 08/14/24  2320 08/14/24  1535 08/10/24  1237 08/09/24  0427   PH, ARTERIAL 7.390 7.433 7.267* 7.541*   PCO2, ARTERIAL 56.3* 44.9 37.8 33.5*   PO2 ART 71.1* 86.4 153.8* 89.6   O2 SATURATION ART 93.4* 96.8 99.1* 98.0   FIO2  --  40 60 40    HCO3 ART 34.1* 30.0* 17.2* 28.7*   BASE EXCESS ART 7.1* 4.9* -9.0* 6.3*     UA          4/22/2024    21:54 8/9/2024    01:19   Urinalysis   Squamous Epithelial Cells, UA  0-2    Specific Gravity, UA <=1.005  1.015    Ketones, UA Negative  15 mg/dL (1+)    Blood, UA Negative  Large (3+)    Leukocytes, UA Negative  Negative    Nitrite, UA Negative  Negative    RBC, UA  Too Numerous to Count    WBC, UA  0-2    Bacteria, UA  None Seen        Lab Results   Component Value Date    TSH 0.476 04/23/2024    LDL 84 04/23/2024    HGBA1C 6.09 (H) 08/09/2024    [id number] 04/23/2024       IMAGING STUDIES:  XR Abdomen KUB    Result Date: 8/14/2024  Impression: Esophagogastric tube tip overlies the proximal stomach. Electronically Signed: Xiang Tomas MD  8/14/2024 6:40 PM EDT  Workstation ID: [id number]    XR Chest 1 View    Result Date: 8/13/2024  Impression: 1.Increased left basilar opacity, which may be due to pneumonia and/or atelectasis. 2.Stable positioning of support tubes and lines, as above. Electronically Signed: Medina Campo  8/13/2024 2:26 PM EDT  Workstation ID: [id number]     I reviewed the patient's new clinical results.    ________________________________________________________     PROBLEM LIST:    Alcoholism    Anxiety    Chronic obstructive lung disease    Hypertensive disorder    Seizure due to alcohol withdrawal    Moderate malnutrition    Type 2 diabetes mellitus    Acute respiratory failure            ASSESSMENT/PLAN:  Alcoholism, possible Dts  Seizures which could be secondary to above, may be just withdrawal related but otherwise some of the patients could have seizures    I will continue Keppra and try to get some more information from his wife    It does not look like he is seizing anymore    Continue supportive care    Seizure precautions state laws may apply      I discussed the patient's findings and my recommendations with patient and nursing staff    Homer Lewis MD  08/15/24  12:48  EDT          Electronically signed by Homer Lewis MD at 08/15/24 1258       Sagrario Duff APRN at [phone]        Consult Orders    1. Inpatient Hospitalist Consult [[id number]] ordered by Jannette Parker APRN              Attestation signed by Lilibeth Estes DO at 08/15/24 0142    I have reviewed this documentation and agree.      Electronically signed by Lilibeth Estes DO, 08/15/24, 1:42 AM EDT.                      [facility] Medicine Services  Consult Note    Patient Name: Emiliano Bateman  : 1960  MRN: [id number]  Primary Care Physician:  Meryl Thomas MD  Referring Physician: Meryl Thomas MD  Date of admission: 2024  Date and Time of Care: 24 at 2350    Inpatient Hospitalist Consult  Consult performed by: Sagrario Duff APRN  Consult ordered by: Jannette Parker APRN            Reason for Consult/ Chief Complaint: Downgrade from critical care, medical management     Consult Requested By: Jannette Parker ARNMARILY    Subjective:     History of Present Illness: 63-year-old male with a past medical history of hypertension, COPD, liver disease, Parkinson's, dementia, diabetes mellitus type 2, alcoholism, seizure disorder who was admitted through the emergency department on 2024 after seizure at home.  He also had a seizure subsequently in the emergency department.  Review of records shows he was admitted in April for new onset seizure secondary to alcohol withdrawal and placed on Keppra.    admission day in ED he was initially placed on IV Cerebyx also treated for diabetic ketoacidosis on insulin drip, eventually triggered sepsis blood cultures were negative and he was initially treated with IV ceftriaxone and IV vancomycin.  Currently on IV Flagyl. He required intubation on 8/10/2024 secondary to refractory seizures.  He also had an NG tube placed.  Patient was extubated today at 1632 is now stable on 4 L oxygen via nasal cannula.  We  have been consulted for downgrade from critical care status for medical management.  Last ABG today at 1535 showed a pH of 7.433 pCO2 44.9 pO2 86.4, glucose 142 WBC 7.97.  He is awake and appears in no distress.  He follows commands but is not conversive.  No family at bedside.    Review of Systems:   Review of Systems   Unable to perform ROS: Other (patient not alert but not answering, follows commands)       Personal History:     Past Medical History:   Diagnosis Date    Alcoholism 03/19/2020    Aneurysm of thoracic aorta 11/08/2019    3.9 cm      Anxiety 03/19/2020    Chronic obstructive lung disease 10/08/2020    xray [facility] 12/9/18      Hypertensive disorder 11/23/2020    Myocardial infarction 10/08/2020    angioplasty      Seizure disorder 04/23/2024    Steatosis of liver 10/09/2020       History reviewed. No pertinent surgical history.    Family History: family history includes No Known Problems in his father and mother. Otherwise pertinent FHx was reviewed and not pertinent to current issue.    Social History:  reports current alcohol use. He reports that he does not use drugs.    Home Medications:   Thiamine Mononitrate, amLODIPine, levETIRAcetam, and sertraline    Allergies:  Allergies   Allergen Reactions    Penicillins Unknown - Low Severity     Childhood allergy; pt is unsure of reaction         Objective:     Vital Signs  Temp:  [98.3 °F (36.8 °C)-99.2 °F (37.3 °C)] 99.2 °F (37.3 °C)  Heart Rate:  [] 130  Resp:  [15-43] 22  BP: ()/(50-90) 124/90  Flow (L/min):  [4] 4  FiO2 (%):  [40 %] 40 %   Body mass index is 22.72 kg/m².    Physical Exam  Physical Exam  Vitals reviewed.   Constitutional:       Appearance: Normal appearance.   HENT:      Head: Normocephalic and atraumatic.      Right Ear: External ear normal.      Left Ear: External ear normal.      Nose: Nose normal.      Mouth/Throat:      Mouth: Mucous membranes are moist.   Eyes:      Extraocular Movements: Extraocular movements intact.    Cardiovascular:      Rate and Rhythm: Regular rhythm. Tachycardia present.      Pulses: Normal pulses.      Heart sounds: Normal heart sounds.   Pulmonary:      Effort: Pulmonary effort is normal.      Breath sounds: Normal breath sounds.   Abdominal:      Palpations: Abdomen is soft.   Genitourinary:     Comments: deferred  Musculoskeletal:         General: Normal range of motion.      Cervical back: Normal range of motion and neck supple.   Skin:     General: Skin is warm and dry.   Neurological:      Mental Status: He is alert.      Comments: Awake appears alert cooperative follows commands not answering questions.   Psychiatric:      Comments: Cooperative, follows commands awake alert not answering questions unable to assess thought content and judgment behavior currently stable         Scheduled Meds   enoxaparin, 40 mg, Subcutaneous, Daily  folic acid, 1 mg, Nasogastric, Daily  furosemide, 40 mg, Intravenous, Q6H  insulin lispro, 4-24 Units, Subcutaneous, Q6H  lansoprazole, 30 mg, Nasogastric, Q AM  levETIRAcetam, 500 mg, Nasogastric, Q12H  miconazole, 1 Application, Topical, Q12H  midodrine, 10 mg, Nasogastric, Q8H  QUEtiapine, 50 mg, Nasogastric, Q12H  sertraline, 25 mg, Nasogastric, Nightly  sodium chloride, 10 mL, Intravenous, Q12H  sodium chloride, 10 mL, Intravenous, Q12H  thiamine, 100 mg, Oral, Daily       PRN Meds     acetaminophen    acetaminophen    Calcium Replacement - Follow Nurse / BPA Driven Protocol    dextrose    dextrose    glucagon (human recombinant)    HYDROmorphone    LORazepam    Magnesium Standard Dose Replacement - Follow Nurse / BPA Driven Protocol    nitroglycerin    ondansetron ODT **OR** ondansetron    Phosphorus Replacement - Follow Nurse / BPA Driven Protocol    Potassium Replacement - Follow Nurse / BPA Driven Protocol    [COMPLETED] Insert Peripheral IV **AND** sodium chloride    sodium chloride    sodium chloride    sodium chloride   Infusions         Diagnostic Data     Results from last 7 days   Lab Units 08/14/24  0543   WBC 10*3/mm3 7.97   HEMOGLOBIN g/dL 14.3   HEMATOCRIT % 42.0   PLATELETS 10*3/mm3 166   GLUCOSE mg/dL 204*   CREATININE mg/dL 0.76   BUN mg/dL 18   SODIUM mmol/L 136   POTASSIUM mmol/L 3.7   AST (SGOT) U/L 40   ALT (SGPT) U/L 20   ALK PHOS U/L 79   BILIRUBIN mg/dL 0.3   ANION GAP mmol/L 5.8       XR Abdomen KUB    Result Date: 8/14/2024  Impression: Esophagogastric tube tip overlies the proximal stomach. Electronically Signed: Xiang Tomas MD  8/14/2024 6:40 PM EDT  Workstation ID: [id number]    XR Chest 1 View    Result Date: 8/13/2024  Impression: 1.Increased left basilar opacity, which may be due to pneumonia and/or atelectasis. 2.Stable positioning of support tubes and lines, as above. Electronically Signed: Medina Campo  8/13/2024 2:26 PM EDT  Workstation ID: [id number]       I reviewed the patient's new clinical results.    Assessment/Plan:     Active and Resolved Problems  Active Hospital Problems    Diagnosis  POA    Seizure due to alcohol withdrawal [F10.939, R56.9]  Yes     Priority: High    Type 2 diabetes mellitus [E11.9]  Yes     Priority: Medium    Acute respiratory failure [J96.00]  Yes     Priority: Medium    Moderate malnutrition [E44.0]  Yes    Hypertensive disorder [I10]  Yes    Chronic obstructive lung disease [J44.9]  Yes    Alcoholism [F10.20]  Yes    Anxiety [F41.9]  Yes      Resolved Hospital Problems   No resolved problems to display.     Seizures likely secondary to alcohol withdrawal, seizure precautions in place, on IV Keppra no longer on phenobarbital, IV Ativan as needed    Alcohol withdrawal syndrome, CIWA, precautions discontinued has been 7 days seizure precautions still in place Ativan as needed    Diabetes mellitus type 2, no longer in DKA, continue SSI as needed with Accu-Cheks 6 hours while on tube feeds    Acute respiratory failure, extubated yesterday 1632, currently stable on 4 L oxygen via nasal cannula    Moderate  malnutrition, currently on NG tube feeds    Essential hypertension, on Lasix 40 mg every 6 hours    COPD, not in exacerbation, on 4 L oxygen via nasal cannula    Anxiety depression, on home Zoloft    Alcoholism, on folic acid,thiamine, case management consulted    Leukocytosis, resolved, completed empiric IV ceftriaxone and IV vancomycin, blood cultures negative, continue IV Flagyl to finish      VTE Prophylaxis:  Pharmacologic & mechanical VTE prophylaxis orders are present.         Code status is   Code Status and Medical Interventions: CPR (Attempt to Resuscitate); Full Support   Ordered at: 08/09/24 0047     Code Status (Patient has no pulse and is not breathing):    CPR (Attempt to Resuscitate)     Medical Interventions (Patient has pulse or is breathing):    Full Support       Plan for disposition: pending clinical course     Time: 30 minutes        Signature: Electronically signed by KAREL Thao, 08/15/24, 00:20 EDT.  [facility] Hospitalist Team    Electronically signed by Lilibeth Estes DO at 08/15/24 0142          Nutrition Notes (most recent note)        Brie Church RD at 08/16/24 0904          Nutrition Services    Patient Name: Emiliano Bateman  YOB: 1960  MRN: [id number]  Admission date: 8/8/2024    Comment:    Moderate chronic disease related malnutrition related to multiple chronic diseases (alcoholism, parkinson's, COPD, fatty liver) as evidenced by greater than 10% wt loss in less than 6 months, PO intake meeting less than 75% of estimated energy intake for greater than 1 month, and moderate muscle wasting per NFPE.     RD to add Boost Glucose Control BID (Provides 380 kcals, 32 g protein if consumed)       CLINICAL NUTRITION ASSESSMENT      Reason for Assessment 8/16: Follow-up protocol  8/10: TF consult      H&P      Past Medical History:   Diagnosis Date    Alcoholism 03/19/2020    Aneurysm of thoracic aorta 11/08/2019    3.9 cm      Anxiety  03/19/2020    Chronic obstructive lung disease 10/08/2020    xray [facility] 12/9/18      Hypertensive disorder 11/23/2020    Myocardial infarction 10/08/2020    angioplasty      Seizure disorder 04/23/2024    Steatosis of liver 10/09/2020       History reviewed. No pertinent surgical history.     Current Problems   Seizures, not in status  Possible alcohol withdrawal seizure activity    Alcohol withdrawal syndrome  -pt previously drank a gallon of vodka daily  -pt's wife reports pt has drastically reduced his drinking  -CIWA protocol    Diabetic ketoacidosis without come  DM2    HTN    Parkinson's     COPD, not in exacerbation    Anxiety/depression    Extubated 8/14  NGT removed by pt        Encounter Information        Trending Narrative     8/16: Checking in on pt to monitor Nutrition POC and diet advancement tolerance. Pt was extubated on 8/14 and pt pulled NGT out yesterday. SLP evaluated pt yesterday and recommended pt be placed on Pureed diet with thin liquids.  Mechanical ground diet ordered last night per APRN. RD messaged RN to check in on how pt is tolerating po due to no intake records available at this time. RD reports that pt is tolerating breakfast without issues.     8/10: Pt admitted to [facility] after suffering a seizure at home. Admitting dx of alcohol withdrawal seizure with delirium. Pt recently hospitalized at [facility] in April for new onset seizures secondary to alcohol withdrawal. At that time, pt drinking 1 gallon of vodka daily. Pt's wife reports that pt's drinking has significantly decreased since that hospitalization. Pt discussed in ICU rounds on 8/9. Pt reportedly not taking his parkinson's medications because you cannot drink while on parkinson's medications. Pt on precedex and levo. NG placed for medication administration and nutrition. Pt did not meet criteria for malnutrition in April 2024. RD visited pt in person 8/9, NFPE completed, consistent with nutrition diagnosis of malnutrition using  "AND/ASPEN criteria. See MSA below.       Anthropometrics        Current Height, Weight Height: 170.2 cm (67\")  Weight: 59.5 kg (131 lb 2.8 oz) (pt weighed prior to side rails being paded) (08/16/24 0423)       Usual Body Weight (UBW) Unknown        Trending Weight Hx     This admission: 8/16: 131# - 2% weight gain since 8/10.   8/10: 128# - scale             PTA: 14% x 3.5 months    Wt Readings from Last 30 Encounters:   08/16/24 0423 59.5 kg (131 lb 2.8 oz)   08/15/24 1918 59.5 kg (131 lb 2.8 oz)   08/13/24 0558 65.8 kg (145 lb 1 oz)   08/12/24 2100 65.8 kg (145 lb 1 oz)   08/12/24 0706 67 kg (147 lb 11.3 oz)   08/09/24 0059 58.5 kg (128 lb 15.5 oz)   08/09/24 0055 58.8 kg (129 lb 10.1 oz)   08/08/24 2312 64.4 kg (142 lb)   05/01/24 0148 62.7 kg (138 lb 3.2 oz)   04/29/24 1454 62 kg (136 lb 11 oz)   04/28/24 2338 66.5 kg (146 lb 9.7 oz)   04/28/24 0336 65.3 kg (143 lb 15.4 oz)   04/27/24 0012 67.4 kg (148 lb 9.4 oz)   04/26/24 0300 69.4 kg (153 lb)   04/24/24 0515 67.8 kg (149 lb 7.6 oz)   04/22/24 2154 66.2 kg (146 lb)   05/21/22 1446 65.8 kg (145 lb)      BMI kg/m2 Body mass index is 20.54 kg/m².       Labs        Pertinent Labs Hypokalemia - replaced today    Results from last 7 days   Lab Units 08/16/24  0228 08/15/24  0531 08/14/24  0543   SODIUM mmol/L 136 142 136   POTASSIUM mmol/L 3.3* 3.7 3.7   CHLORIDE mmol/L 92* 99 98   CO2 mmol/L 31.0* 32.8* 32.2*   BUN mg/dL 17 17 18   CREATININE mg/dL 0.76 0.82 0.76   CALCIUM mg/dL 8.6 8.1* 8.1*   BILIRUBIN mg/dL 0.5 0.3 0.3   ALK PHOS U/L 79 82 79   ALT (SGPT) U/L 18 18 20   AST (SGOT) U/L 33 31 40   GLUCOSE mg/dL 112* 145* 204*     Results from last 7 days   Lab Units 08/16/24  0228 08/15/24  0531 08/14/24 2015 08/14/24  0543 08/11/24  0535 08/10/24  0539   MAGNESIUM mg/dL  --   --   --  1.8  --   --    PHOSPHORUS mg/dL  --  3.4   < > 0.8*  --   --    HEMOGLOBIN g/dL 13.2 13.5  --  14.3   < > 12.1*   HEMATOCRIT % 39.4 41.4  --  42.0   < > 36.7*   TRIGLYCERIDES " mg/dL  --   --   --   --   --  102    < > = values in this interval not displayed.     Lab Results   Component Value Date    HGBA1C 6.09 (H) 08/09/2024        Medications    Scheduled Medications enoxaparin, 40 mg, Subcutaneous, Daily  folic acid, 1 mg, Nasogastric, Daily  insulin lispro, 4-24 Units, Subcutaneous, 4x Daily With Meals & Nightly  levETIRAcetam, 500 mg, Intravenous, Q12H  miconazole, 1 Application, Topical, Q12H  pantoprazole, 40 mg, Intravenous, Q AM  QUEtiapine, 50 mg, Nasogastric, Q12H  sertraline, 25 mg, Nasogastric, Nightly  sodium chloride, 10 mL, Intravenous, Q12H  sodium chloride, 10 mL, Intravenous, Q12H  thiamine, 100 mg, Oral, Daily        Infusions        PRN Medications   acetaminophen    acetaminophen    Calcium Replacement - Follow Nurse / BPA Driven Protocol    dextrose    dextrose    glucagon (human recombinant)    HYDROmorphone    LORazepam    Magnesium Standard Dose Replacement - Follow Nurse / BPA Driven Protocol    nitroglycerin    OLANZapine (zyPREXA) 5 mg in sterile water (preservative free) 1 mL injection    ondansetron ODT **OR** ondansetron    Phosphorus Replacement - Follow Nurse / BPA Driven Protocol    Potassium Replacement - Follow Nurse / BPA Driven Protocol    [COMPLETED] Insert Peripheral IV **AND** sodium chloride    sodium chloride    sodium chloride    sodium chloride     Physical Findings        Trending Physical   Appearance, NFPE 8/16: Agree with previous assessment     8/10: NFPE completed, consistent with nutrition diagnosis of malnutrition using AND/ASPEN criteria. See MSA below.      --  Edema  None documented     Bowel Function Last documented BM 8/16     Tubes NO feeding tube in place      Chewing/Swallowing SLP recommends Pureed diet     Skin No pressure injuries per WOCN assessment        Estimated/Assessed Needs    Estimated 8/10/24 - remains appropriate   Energy Requirements    EST Needs, Method, Wt used 3987-4238 kcal/day (30-35 kcal/kg, 58.5 kg)        Protein Requirements    EST Needs, Method, Wt used  g/day (1.2-2.0 g/kg)       Fluid Requirements     Estimated Needs (mL/day) 1 mL/kcal or per MD     Fluid Deficit    Current Na Level (mEq/L)    Desired Na Level (mEq/L)    Estimated Fluid Deficit (L)       Current Nutrition Orders & Evaluation of Intake       Oral Nutrition     Food Allergies NKFA   Current PO Diet Diet: Regular/House, Diabetic; Consistent Carbohydrate; Texture: Mechanical Ground (NDD 2); Fluid Consistency: Thin (IDDSI 0)   Supplement -   PO Evaluation     Trending % PO Intake 8/16: No meals documented   8/10: NPO     Enteral Nutrition    Enteral Route    Order, Modulars, Flushes    Tolerance    TF Observation         Parenteral Nutrition     TPN Route    Total # Days on TPN    TPN Order, Lipid Details    MVI & Trace Element Freq    TPN Observation       Nutritional Risk Screening        NRS-2002 Score          Nutrition Diagnosis         Nutrition Dx Problem 1 Moderate chronic disease related malnutrition related to multiple chronic diseases (alcoholism, parkinson's, COPD, fatty liver) as evidenced by greater than 10% wt loss in less than 6 months, PO intake meeting less than 75% of estimated energy intake for greater than 1 month, and moderate muscle wasting per NFPE.       Nutrition Dx Problem 2        Intervention Goal         Intervention Goal(s) Tolerate po diet  PO intake > 75%     Nutrition Intervention        RD Action Encourage good po intake.    Add Boost Glucose Control BID (Provides 380 kcals, 32 g protein if consumed)        Nutrition Prescription          Diet Prescription Consistent CHO Mechanical ground diet    Supplement Prescription Boost Glucose Control BID (Provides 380 kcals, 32 g protein if consumed)        Enteral Prescription          TPN Prescription      Monitor/Evaluation        Monitor Per protocol, Pertinent labs, EN delivery/tolerance, Weight, Swallow function     Malnutrition Severity Assessment      Patient  meets criteria for : Moderate (non-severe) Malnutrition             Electronically signed by:  Brie Church RD  08/16/24 09:04 EDT        Electronically signed by Brie Church RD at 08/16/24 1007       Bernardo Simms OT   Occupational Therapist  Specialty:  Occupational Therapy  Therapy Treatment Note      Signed  Date of Service:  08/15/24 0945  Creation Time:  08/15/24 1131     Signed        Subjective: Pt agreeable to therapeutic plan of care.  Cognition: oriented to Person     Objective:      Precautions - wrist restraints (removed for therapy session with RN approval); NPO, NG tube, falls risk     Bed Mobility: Mod-A   Functional Transfers: Min-A, Assist x 2, and with rolling walker     Balance: supported and standing Mod-A  Functional Ambulation: Min-A, Mod-A, Assist x 2, and with rolling walker; took steps to HOB     Lower Body Dressing: Dependent  ADL Position: edge of bed sitting  ADL Comments: socks     Therapeutic Exercise - 10 Reps B UE AROM lying supine     Vitals: WNL  BP:   Supine 125/76  Sitting 138/78  Supine 109/78     Pain: 0 VAS      Education: Provided education on the importance of mobility in the acute care setting, ADL training, and Transfer Training        Assessment: Emiliano Bateman presents with ADL impairments affecting function including balance, cognition, endurance / activity tolerance, and strength. Demonstrated functioning below baseline abilities indicate the need for continued skilled intervention while inpatient. Wrist restraints removed, pt completed BUE AROM 1 x 10.  Pt t/f from supine to sitting on EOB with MOD A.  Pt required DEP to don socks.  Pt able to sit EOB x 10 min with MOD A progressing to SBA.  Pt completed sit to stand t/f using RW with MIN A x 2.  Pt able to completed lateral steps towards HOB with MIN-MOD A x 2 using RW.  Tolerating session today without incident. Will continue to follow and progress as tolerated.     "  Plan/Recommendations:   Moderate Intensity Therapy recommended post-acute care. This is recommended as therapy feels the patient would require 3-4 days per week and wouldn't tolerate \"3 hour daily\" rehab intensity. SNF would be the preferred choice. If the patient does not agree to SNF, arrange HH or OP depending on home bound status. If patient is medically complex, consider LTACH.. Pt requires no DME at discharge.      Pt desires Skilled Rehab placement at discharge. Pt cooperative; agreeable to therapeutic recommendations and plan of care.      Modified [place]: 4 = Moderately severe disability (Unable to attend to own bodily needs without assistance, and unable to walk unassisted)      Post-Tx Position: Supine with HOB Elevated, Alarms activated, and Call light and personal items within reach; soft wrist restraints replaced  PPE: Eli Scruggs, PT   Physical Therapist  Physical Therapy  Therapy Treatment Note      Signed  Date of Service:  08/15/24 1004  Creation Time:  08/15/24 1004     Signed        Subjective: Pt agreeable to therapeutic plan of care.  Patient resting in bed, is extubated.  He is alert and interactive.  He is oriented to person only, but after oriented is able to answer orientation question appropriately for another provider.  Cleared for treatment by nursing.     Objective:      Precautions - restraints- removed for treatment but replaced per nursing.    NG tube, NPO, falls     Bed mobility - Mod-A, increased time and cues for technique  Transfers - Min-A and Assist x 2 to CTS with RW, maintains static standing at bedside x3 minutes with modA posterior lean  Ambulation - lateral gait R x3 feet to HOB with RW and min-modAx2, assist for WS and stability as well as RW management.     Static and dynamic sitting EOB without back support with UE support and modA for balance xapprox 8 minutes, sitting balance after standing improves to close supervision x1-2 minutes      " "  Vitals:   Supine 125/76mmHG 100bpm  Sitting 138/78mmHG 127bpm  Supine 109/78mmHG 114bpm     Pain: 0 VAS       Education: Provided education on the importance of mobility in the acute care setting, Transfer Training, and Gait Training     Assessment: Emiliano Bateman presents with functional mobility impairments which indicate the need for skilled intervention. Marked improvement in alertness and participation now that he is extubated.  He is cooperative and appropriate throughout, confused and disoriented but following commands with increased time.  He requires min-modAx2 for functional transfer and lateral gait. Sitting balance improves within session. SNF remains the most appropriate d/c disposition, anticipate he will progress well with increased opportunities for movement.  Tolerating session today without incident. Will continue to follow and progress as tolerated.      Plan/Recommendations:   If medically appropriate, Moderate Intensity Therapy recommended post-acute care. This is recommended as therapy feels the patient would require 3-4 days per week and wouldn't tolerate \"3 hour daily\" rehab intensity. SNF would be the preferred choice. If the patient does not agree to SNF, arrange HH or OP depending on home bound status. If patient is medically complex, consider LTACH. Pt requires no DME at discharge.      Pt desires Skilled Rehab placement at discharge. Pt cooperative; agreeable to therapeutic recommendations and plan of care.            Basic Mobility 6-click:  Rollin = Total, A lot = 2, A little = 3; 4 = None  Supine>Sit:                      1 = Total, A lot = 2, A little = 3; 4 = None   Sit>Stand with arms:       1 = Total, A lot = 2, A little = 3; 4 = None  Bed>Chair:                      1 = Total, A lot = 2, A little = 3; 4 = None  Ambulate in room:           1 = Total, A lot = 2, A little = 3; 4 = None  3-5 Steps with railin = Total, A lot = 2, A little = " 3; 4 = None  Score: 6     Modified [place]: 4 = Moderately severe disability (Unable to attend to own bodily needs without assistance, and unable to walk unassisted)      Post-Tx Position: Supine with HOB Elevated, Alarms activated, and Call light and personal items within reach restraints in place  PPE: Isidra Moreno, SLP   Speech and Language Pathologist  Speech Therapy  Therapy Evaluation      Signed  Date of Service:  08/15/24 1357  Creation Time:  08/15/24 1357     Signed        Expand All Collapse All  Acute Care - Speech Language Pathology   Swallow Initial Evaluation  [facility]ar     Patient Name: Emiliano Bateman                    : 1960                    MRN: [id number]  Today's Date: 8/15/2024                                                                           Admit Date: 2024     Visit Dx:   Visit Diagnosis       ICD-10-CM ICD-9-CM   1. Status epilepticus  G40.901 345.3         Problem List       Patient Active Problem List   Diagnosis    Alcoholism    Anxiety    Chronic obstructive lung disease    Hypertensive disorder    Steatosis of liver    Status epilepticus    Seizure disorder    Hyperammonemia    Alcohol withdrawal seizure with delirium    Seizure due to alcohol withdrawal    Moderate malnutrition    Type 2 diabetes mellitus    Acute respiratory failure         Medical History        Past Medical History:   Diagnosis Date    Alcoholism 2020    Aneurysm of thoracic aorta 2019     3.9 cm      Anxiety 2020    Chronic obstructive lung disease 10/08/2020     xray [facility] 18      Hypertensive disorder 2020    Myocardial infarction 10/08/2020     angioplasty      Seizure disorder 2024    Steatosis of liver 10/09/2020         Surgical History   History reviewed. No pertinent surgical history.        SLP Recommendation and Plan  SLP Swallowing Diagnosis: suspect acute-on-chronic, oral dysphagia, suspected pharyngeal dysphagia  (08/15/24 1300)  SLP Diet Recommendation: puree, thin liquids (08/15/24 1300)  Recommended Precautions and Strategies: upright posture during/after eating, small bites of food and sips of liquid (08/15/24 1300)  SLP Rec. for Method of Medication Administration: meds crushed, meds whole, with puree (08/15/24 1300)  Monitor for Signs of Aspiration: yes, notify SLP if any concerns (08/15/24 1300)  Swallow Criteria for Skilled Therapeutic Interventions Met: demonstrates skilled criteria (08/15/24 1300)  Rehab Potential/Prognosis, Swallowing: good, to achieve stated therapy goals (08/15/24 1300)  Therapy Frequency (Swallow): 3 days per week, 4 days per week (08/15/24 1300)  Predicted Duration Therapy Intervention (Days): until discharge (08/15/24 1300)  Oral Care Recommendations: Oral Care BID/PRN (08/15/24 1300)     Emiliano Bateman is a 63 y.o male with PMH of Hypertension, COPD, anxiety, steatosis of liver, seizure disorder, and ETOH abuse seizure disorder with recent diagnosis of Parkinson's. Pt self-extubated on 08/14 and removed feeding tube. Pt remains confused, but able to follow directions. Pt familiar to  department - pt was seen in April with diet recommendation of puree/thins. Diet recommendation was advanced to [facility] Soft/Thins per pt request. Pt reports he did not follow recommendations once at home.      Pt seen on this date for clinical bedside swallow evaluation. Upon entry, pt in bed and required pt care from nurse before evaluation could be completed. Upon re-entry, pt sat up 90 degrees in bed. [facility] - however pt ROM was impacted by shakiness. Pt is edentulous and reported he does not have dentures. Pt stated he eats anything and everything he wants at home. PO observed: ice chip x2, water by spoon x1, water by straw x4, applesauce x2, peaches x1. Pt with good labial seal on straw and spoon. Pt demonstrated reduced rotary chew and slight uncoordinated oral manipulation - possibly due  to shakiness noted before PO. Pt had good oral clearance - no residues or pocketing. Pt with no cough, throat clear, or change in vocal quality following water or puree trials. Following peach, pt with large cough x3. Due to pt history and evaluation results, it is recommended pt be placed on puree/thins with meds whole/crushed in puree as tolerated. ST will continue to follow to ensure tolerance and make further recommendations as indicated.      SWALLOW EVALUATION (Last 72 Hours)         SLP Adult Swallow Evaluation         Row Name 08/15/24 1300           Rehab Evaluation     Document Type evaluation  -AA     Patient Observations alert;cooperative  -AA           General Information     Patient Profile Reviewed yes  -AA     Pertinent History Of Current Problem Emiliano Bateman is a 63 y.o. male with PMH of Hypertension, COPD, anxiety, steatosis of liver, seizure disorder, and  EtOH abuse seizure disorder admitted to [facility] with breakthrough seizure  HPI information is limited due to patient postictal following seizure activity; information obtained from ER report and family at bedside.  Patient reportedly stopped taking all of his medications several days ago following a diagnosis of Parkinson's disease.  Patient has a known history of seizure disorder and had a generalized tonic-clonic seizure that lasted several minutes without return to normal mental status near [facility]. Pt previously seen by ST at [facility] with diet recommendation of [facility] Soft/Thins.  -AA     Precautions/Limitations, Vision WFL;for purposes of eval  -AA     Precautions/Limitations, Hearing WFL;for purposes of eval  -AA     Prior Level of Function-Communication WFL  -AA     Prior Level of Function-Swallowing mechanical ground textures;thin liquids  -AA     Plans/Goals Discussed with patient  -AA           Oral Motor Structure and Function     Dentition Assessment edentulous, does not have dentures  -AA     Secretion Management  WNL/WFL  -AA     Mucosal Quality moist, healthy  -AA     Volitional Swallow WFL  -AA     Volitional Cough WFL  -AA           Oral Musculature and Cranial Nerve Assessment     Oral Motor General Assessment WFL;other (see comments)  ROM slightly impaired d/t shakiness.  -AA           General Eating/Swallowing Observations     Respiratory Support Currently in Use nasal cannula  -AA     O2 Liters 4L  -AA     Eating/Swallowing Skills fed by SLP;self-fed;other (see comments)  Pt attempted self feeding, very shaky and required assistance.  -AA     Positioning During Eating upright 90 degree;upright in bed  -AA     Utensils Used cup;straw  -AA     Consistencies Trialed ice chips;thin liquids;pureed;soft to chew textures  -AA           Clinical Swallow Eval     Oral Prep Phase impaired  -AA     Oral Transit WFL  -AA     Oral Residue WFL  -AA     Pharyngeal Phase suspected pharyngeal impairment  -AA     Esophageal Phase unremarkable  -AA           SLP Evaluation Clinical Impression     SLP Swallowing Diagnosis suspect acute-on-chronic;oral dysphagia;suspected pharyngeal dysphagia  -AA     Functional Impact risk of aspiration/pneumonia;risk of malnutrition;risk of dehydration  -AA     Rehab Potential/Prognosis, Swallowing good, to achieve stated therapy goals  -AA     Swallow Criteria for Skilled Therapeutic Interventions Met demonstrates skilled criteria  -AA           Recommendations     Therapy Frequency (Swallow) 3 days per week;4 days per week  -AA     Predicted Duration Therapy Intervention (Days) until discharge  -AA     SLP Diet Recommendation puree;thin liquids  -AA     Recommended Precautions and Strategies upright posture during/after eating;small bites of food and sips of liquid  -AA     Oral Care Recommendations Oral Care BID/PRN  -AA     SLP Rec. for Method of Medication Administration meds crushed;meds whole;with puree  -AA     Monitor for Signs of Aspiration yes;notify SLP if any concerns  -AA           Swallow  Goals (SLP)     Swallow LTGs Patient will demonstrate functional swallow for  -AA     Swallow STGs diet tolerance goal selection (SLP)  -AA     Diet Tolerance Goal Selection (SLP) Patient will tolerate therapeutic trials of  -AA           (LTG) Patient will demonstrate functional swallow for     Diet Texture (Demonstrate functional swallow) mechanical ground textures  -AA     [place] (Demonstrate functional swallow) with minimal cues (75-90% accuracy)  -AA     Time Frame (Demonstrate functional swallow) by discharge  -AA     Progress/Outcomes (Demonstrate functional swallow) new goal  -AA           (STG) Patient will tolerate therapeutic trials of     Consistencies Trialed (Tolerate therapeutic trials) mechanical ground textures;soft to chew (chopped) textures  -AA     Desired Outcome (Tolerate therapeutic trials) without signs/symptoms of aspiration  -AA     [place] (Tolerate therapeutic trials) with minimal cues (75-90% accuracy)  -AA     Time Frame (Tolerate therapeutic trials) by discharge  -AA     Progress/Outcomes (Tolerate therapeutic trials) new goal  -AA                  User Key  (r) = Recorded By, (t) = Taken By, (c) = Cosigned By        Initials Name Effective Dates     AA Isidra Putnam, DEBORA 06/18/24 -                           EDUCATION  The patient has been educated in the following areas:   Dysphagia (Swallowing Impairment).           SLP GOALS         Row Name 08/15/24 1300           (LTG) Patient will demonstrate functional swallow for     Diet Texture (Demonstrate functional swallow) mechanical ground textures  -AA     [place] (Demonstrate functional swallow) with minimal cues (75-90% accuracy)  -AA     Time Frame (Demonstrate functional swallow) by discharge  -AA     Progress/Outcomes (Demonstrate functional swallow) new goal  -AA           (STG) Patient will tolerate therapeutic trials of     Consistencies Trialed (Tolerate therapeutic trials) mechanical ground textures;soft to  chew (chopped) textures  -AA     Desired Outcome (Tolerate therapeutic trials) without signs/symptoms of aspiration  -AA     [place] (Tolerate therapeutic trials) with minimal cues (75-90% accuracy)  -AA     Time Frame (Tolerate therapeutic trials) by discharge  -AA     Progress/Outcomes (Tolerate therapeutic trials) new goal  -AA                  User Key  (r) = Recorded By, (t) = Taken By, (c) = Cosigned By        Initials Name Provider Type     AA Isidra Putnam, SLP Speech and Language Pathologist                DEBORA Bowles                 8/15/2024                Paola Bagley PT   Physical Therapist  Physical Therapy  Therapy Evaluation      Signed  Date of Service:  24 1357  Creation Time:  24     Signed        Expand All Collapse All  Patient Name: Emiliano Bateman                    : 1960                      MRN: [id number]                              Today's Date: 2024                                     Admit Date: 2024               Visit Dx:   Visit Diagnosis       ICD-10-CM ICD-9-CM   1. Status epilepticus  G40.901 345.3         Problem List       Patient Active Problem List   Diagnosis    Alcoholism    Anxiety    Chronic obstructive lung disease    Hypertensive disorder    Steatosis of liver    Status epilepticus    Seizure disorder    Hyperammonemia    Alcohol withdrawal seizure with delirium    Seizure due to alcohol withdrawal         Medical History        Past Medical History:   Diagnosis Date    Alcoholism 2020    Aneurysm of thoracic aorta 2019     3.9 cm      Anxiety 2020    Chronic obstructive lung disease 10/08/2020     xray [facility] 18      Hypertensive disorder 2020    Myocardial infarction 10/08/2020     angioplasty      Seizure disorder 2024    Steatosis of liver 10/09/2020         Surgical History   History reviewed. No pertinent surgical history.       General Information         Row Name 24  1344                 Physical Therapy Time and Intention     Document Type evaluation  -CL       Mode of Treatment physical therapy  -CL          Row Name 08/09/24 1351 08/09/24 1344            General Information     Patient Profile Reviewed -- yes  -CL     Existing Precautions/Restrictions seizures;NPO;fall  -CL --        Row Name 08/09/24 1344                 Living Environment     People in Home spouse  -CL       Name(s) of People in Home Spouse: Lili  -CL          Row Name 08/09/24 1344                 Cognition     Orientation Status (Cognition) unable/difficult to assess  -CL                       User Key  (r) = Recorded By, (t) = Taken By, (c) = Cosigned By        Initials Name Provider Type     CL Paola Bagley, PT Physical Therapist                            Mobility         Row Name 08/09/24 1350                 Bed Mobility     Bed Mobility bed mobility (all) activities  -CL       All Activities, [place] (Bed Mobility) dependent (less than 25% patient effort)  -CL          Row Name 08/09/24 1350                 Transfers     Comment, (Transfers) unsafe at this time  -CL                       User Key  (r) = Recorded By, (t) = Taken By, (c) = Cosigned By        Initials Name Provider Type     CL Paola Bagley, PT Physical Therapist                            Obj/Interventions         Row Name 08/09/24 1352                 Range of Motion Comprehensive     Comment, General Range of Motion BLEs WFL passively; pt unable to follow commands for active movements  -CL          Row Name 08/09/24 1352                 Strength Comprehensive (MMT)     Comment, General Manual Muscle Testing (MMT) Assessment Unable to assess  -CL          Row Name 08/09/24 1352                 Balance     Comment, Balance Attempted to place bed in fowlers; pt made no self correcting movements to assist with sitting positioning  -CL                       User Key  (r) = Recorded By, (t) = Taken By, (c) = Cosigned  By        Initials Name Provider Type     Paola Lea, PT Physical Therapist                            Goals/Plan         Row Name 08/09/24 1351                 Bed Mobility Goal 1 (PT)     Activity/Assistive Device (Bed Mobility Goal 1, PT) bed mobility activities, all  -CL       [place] Level/Cues Needed (Bed Mobility Goal 1, PT) moderate assist (50-74% patient effort)  -CL       Time Frame (Bed Mobility Goal 1, PT) long term goal (LTG);2 weeks  -CL          Row Name 08/09/24 4399                 Transfer Goal 1 (PT)     Activity/Assistive Device (Transfer Goal 1, PT) sit-to-stand/stand-to-sit;bed-to-chair/chair-to-bed  -CL       [place] Level/Cues Needed (Transfer Goal 1, PT) moderate assist (50-74% patient effort)  -CL       Time Frame (Transfer Goal 1, PT) long term goal (LTG);2 weeks  -CL          Row Name 08/09/24 1356                 Therapy Assessment/Plan (PT)     Planned Therapy Interventions (PT) balance training;bed mobility training;neuromuscular re-education;patient/family education;postural re-education;strengthening;transfer training  -CL                    User Key  (r) = Recorded By, (t) = Taken By, (c) = Cosigned By        Initials Name Provider Type     Paola Lea, PT Physical Therapist                         Clinical Impression         Row Name 08/09/24 9186                 Pain     Pretreatment Pain Rating 0/10 - no pain  -CL       Posttreatment Pain Rating 0/10 - no pain  -CL          Row Name 08/09/24 1357                 Plan of Care Review     Plan of Care Reviewed With patient  -CL       Outcome Evaluation Emiliano Bateman is a 63 y.o. male with hx of alcohol abuse, Dementia, COPD, HTN and Parkinson's disease. Who presents following a tonic clonic seizure.  He is being treated for Seizures, alcohol withdrawal and DKA.  Pt lives with wife. Prior functional level is not available at this time. Pt remains unresponsive throughout evaluation attempt.  PROM to  BLEs is WFL. Pt is dependent for bed mobility.  Pt is currently well below baseline and would benefit from PT intervention for mobilization and strengthening and to prevent loss of function given co-morbid Parkinsons.  He will most likely require SNF for continued PT at discharge.  -CL          Row Name 08/09/24 1353                 Therapy Assessment/Plan (PT)     Rehab Potential (PT) fair, will monitor progress closely  -CL       Criteria for Skilled Interventions Met (PT) yes;skilled treatment is necessary  -CL       Therapy Frequency (PT) 3 times/wk  -CL       Predicted Duration of Therapy Intervention (PT) Until discharge  -CL          Row Name 08/09/24 1353                 Positioning and Restraints     Pre-Treatment Position in bed  -CL       Post Treatment Position bed  -CL       In Bed notified nsg;supine;call light within reach  -CL                       User Key  (r) = Recorded By, (t) = Taken By, (c) = Cosigned By        Initials Name Provider Type     Paola Lea, PT Physical Therapist                            Outcome Measures         Row Name 08/09/24 1355                 How much help from another person do you currently need...     Turning from your back to your side while in flat bed without using bedrails? 1  -CL       Moving from lying on back to sitting on the side of a flat bed without bedrails? 1  -CL       Moving to and from a bed to a chair (including a wheelchair)? 1  -CL       Standing up from a chair using your arms (e.g., wheelchair, bedside chair)? 1  -CL       Climbing 3-5 steps with a railing? 1  -CL       To walk in hospital room? 1  -CL       AM-PAC 6 Clicks Score (PT) 6  -CL       Highest Level of Mobility Goal 2 --> Bed activities/dependent transfer  -CL                       User Key  (r) = Recorded By, (t) = Taken By, (c) = Cosigned By        Initials Name Provider Type     Paola Lea, PT Physical Therapist                          Physical Therapy  Education            Title: PT OT SLP Therapies (In Progress)         Topic: Physical Therapy (In Progress)         Point: Mobility training (In Progress)         Learning Progress Summary               Patient Acceptance, E,TB, NL by CL at 8/9/2024 5296                                                   User Key         Initials Effective Dates Name Provider Type Discipline     JESSICA 03/02/22 -  Paola Bagley, PT Physical Therapist PT                          PT Recommendation and Plan  Planned Therapy Interventions (PT): balance training, bed mobility training, neuromuscular re-education, patient/family education, postural re-education, strengthening, transfer training  Plan of Care Reviewed With: patient  Outcome Evaluation: Emiliano Bateman is a 63 y.o. male with hx of alcohol abuse, Dementia, COPD, HTN and Parkinson's disease. Who presents following a tonic clonic seizure.  He is being treated for Seizures, alcohol withdrawal and DKA.  Pt lives with wife. Prior functional level is not available at this time. Pt remains unresponsive throughout evaluation attempt.  PROM to BLEs is WFL. Pt is dependent for bed mobility.  Pt is currently well below baseline and would benefit from PT intervention for mobilization and strengthening and to prevent loss of function given co-morbid Parkinsons.  He will most likely require SNF for continued PT at discharge.      Time Calculation:   PT Evaluation Complexity  History, PT Evaluation Complexity: 3 or more personal factors and/or comorbidities  Examination of Body Systems (PT Eval Complexity): total of 3 or more elements  Clinical Presentation (PT Evaluation Complexity): evolving  Clinical Decision Making (PT Evaluation Complexity): moderate complexity  Overall Complexity (PT Evaluation Complexity): moderate complexity              PT G-Codes  AM-PAC 6 Clicks Score (PT): 6  PT Discharge Summary  Anticipated Discharge Disposition (PT): skilled nursing facility     Paola MAGALLANES  Kevyn, PT             8/9/2024

## 2024-08-16 NOTE — THERAPY TREATMENT NOTE
Acute Care - Speech Language Pathology   Swallow Treatment Note  [facility]     Patient Name: Emiliano Bateman  : 1960  MRN: [id number]  Today's Date: 2024               Admit Date: 2024    Visit Dx:     ICD-10-CM ICD-9-CM   1. Status epilepticus  G40.901 345.3     Patient Active Problem List   Diagnosis    Alcoholism    Anxiety    Chronic obstructive lung disease    Hypertensive disorder    Steatosis of liver    Status epilepticus    Seizure disorder    Hyperammonemia    Alcohol withdrawal seizure with delirium    Seizure due to alcohol withdrawal    Moderate malnutrition    Type 2 diabetes mellitus    Acute respiratory failure     Past Medical History:   Diagnosis Date    Alcoholism 2020    Aneurysm of thoracic aorta 2019    3.9 cm      Anxiety 2020    Chronic obstructive lung disease 10/08/2020    xray [facility] 18      Hypertensive disorder 2020    Myocardial infarction 10/08/2020    angioplasty      Seizure disorder 2024    Steatosis of liver 10/09/2020     History reviewed. No pertinent surgical history.    SLP Recommendation and Plan     SLP Diet Recommendation: puree, thin liquids (24)  Recommended Precautions and Strategies: upright posture during/after eating, small bites of food and sips of liquid (24)  SLP Rec. for Method of Medication Administration: meds crushed, meds whole, with puree (24)     Monitor for Signs of Aspiration: yes, notify SLP if any concerns (24)              Therapy Frequency (Swallow): 3 days per week, 4 days per week (24)  Predicted Duration Therapy Intervention (Days): until discharge (24)  Oral Care Recommendations: Oral Care BID/PRN (24)                        Treatment Assessment (SLP): no clinical signs of, pharyngeal dysphagia (24)     Plan for Continued Treatment (SLP): continue treatment per plan of care (24)                SWALLOW  EVALUATION (Last 72 Hours)       SLP Adult Swallow Evaluation       Row Name 08/16/24 1500       Rehab Evaluation    Document Type therapy note (daily note)  -LF    Subjective Information no complaints  -LF    Patient Observations alert;agree to therapy  -LF    Patient Effort fair  -LF    Comment Pt seen for skilled ST targeting dysphagia this date. Upon entry, pt awake and alert sitting upright in a recliner w/ meal tray at bedside. Pt reports diet tolerance. Pt only agreeable to trials of thin liquids by straw to assess diet tolerance. Pt self fed and independently took small sips. Oral phase WFL. No clinical s/s of aspiration observed at anytime. Recommend pt continue a puree and thin liquid diet per initial ST evaluation. Per chart review, pt w/ hx of refusing puree diet and requesting a mechanical soft diet. Pt should be upright at 90 degrees for all PO and take small bites/sips. ST will continue to follow for ongoing assessment of diet tolerance w/ further recs as indicated..  -LF    Symptoms Noted During/After Treatment none  -LF       General Information    Patient Profile Reviewed yes  -LF       SLP Treatment Clinical Impressions    Treatment Assessment (SLP) no clinical signs of;pharyngeal dysphagia  -LF    Plan for Continued Treatment (SLP) continue treatment per plan of care  -LF       Recommendations    Therapy Frequency (Swallow) 3 days per week;4 days per week  -LF    Predicted Duration Therapy Intervention (Days) until discharge  -    SLP Diet Recommendation puree;thin liquids  -LF    Recommended Precautions and Strategies upright posture during/after eating;small bites of food and sips of liquid  -LF    Oral Care Recommendations Oral Care BID/PRN  -LF    SLP Rec. for Method of Medication Administration meds crushed;meds whole;with puree  -LF    Monitor for Signs of Aspiration yes;notify SLP if any concerns  -LF       Swallow Goals (SLP)    Swallow LTGs Patient will demonstrate functional swallow  for  -LF    Swallow STGs diet tolerance goal selection (SLP)  -LF    Diet Tolerance Goal Selection (SLP) Patient will tolerate therapeutic trials of  -LF       (LTG) Patient will demonstrate functional swallow for    Diet Texture (Demonstrate functional swallow) mechanical ground textures  -LF    [place] (Demonstrate functional swallow) with minimal cues (75-90% accuracy)  -LF    Time Frame (Demonstrate functional swallow) by discharge  -LF    Barriers (Demonstrate functional swallow) Pt refusal. See above note.  -LF    Progress/Outcomes (Demonstrate functional swallow) goal ongoing  -LF       (STG) Patient will tolerate therapeutic trials of    Consistencies Trialed (Tolerate therapeutic trials) mechanical ground textures;soft to chew (chopped) textures  -LF    Desired Outcome (Tolerate therapeutic trials) without signs/symptoms of aspiration  -LF    [place] (Tolerate therapeutic trials) with minimal cues (75-90% accuracy)  -LF    Time Frame (Tolerate therapeutic trials) by discharge  -LF    Progress/Outcomes (Tolerate therapeutic trials) goal ongoing  -LF    Comment (Tolerate therapeutic trials) Pt refused trials of solids this date. See above note.  -LF              User Key  (r) = Recorded By, (t) = Taken By, (c) = Cosigned By      Initials Name Effective Dates    LF Camille Parada, DEBORA 06/16/21 -     AA Isidra Putnam SLP 06/18/24 -                     EDUCATION  The patient has been educated in the following areas:   Dysphagia (Swallowing Impairment) Oral Care/Hydration Modified Diet Instruction.                  Time Calculation:                DEBORA Leo  8/16/2024

## 2024-08-16 NOTE — SIGNIFICANT NOTE
Case Management/Social Work    Patient Name:  Emiliano Bateman  YOB: 1960  MRN: [id number]  Admit Date:  8/8/2024 08/16/24 1150   Post Acute Pre-Cert Documentation   Request Submitted by Facility - Type: Hospital   Post-Acute Authorization Type Submitted: SNF   Date Post Acute Pre-Cert Inititated per Facility 08/16/24   Verification from Payer Yes   Date Post Acute Pre-Cert Completed 08/16/24   Accepting Facility [facility]   Hospital Discharge Date Requested 08/16/24   All Clinicals Submitted? Yes   Had Accepting Facility at Time of Submission Yes   Response Received from Insurance? Approval   Response Communicated to:    Authorization Number: [id number]   Post Acute Pre-Cert Initiated Comment JENNIFER submitted SNF precert for [facility] via [facility] portal. Portal auth ID [id number]. SNF precert auto approved. Valid 8/18-8/21. CM made aware.           Electronically signed by:  Bernardo Burton CMA  08/16/24 11:56 EDT    Bernardo Burton  Case Management Associate  [address] [address]  P: [phone]  F: [phone]

## 2024-08-16 NOTE — CASE MANAGEMENT/SOCIAL WORK
Continued Stay Note  NATALIE Muro     Patient Name: Emiliano Bateman  MRN: [id number]  Today's Date: 8/16/2024    Admit Date: 8/8/2024    Plan: [facility].   Bed ready.   Precert approved 8/18-8/21.   PASRR approved. Will need to be safely med sitter free for 24 hours.   Discharge Plan       Row Name 08/16/24 1635       Plan    Plan [facility].   Bed ready.   Precert approved 8/18-8/21.   PASRR approved. Will need to be safely med sitter free for 24 hours.    Patient/Family in Agreement with Plan yes    Plan Comments Requested precert to be started for MARK Sunday.  Precert auto approved 8/16-8/21.  Barriers to discharge: IV abx.  Seizure precautions.  IV keppra.  PICC, Medsitter.  Neuro and DM educator following.             Expected Discharge Date and Time       Expected Discharge Date Expected Discharge Time    Aug 18, 2024           Keila Monge RN    Office Phone [phone]  Office Cell [phone]

## 2024-08-16 NOTE — CONSULTS
Nutrition Services    Patient Name: Emiliano Bateman  YOB: 1960  MRN: [id number]  Admission date: 8/8/2024    Comment:    Moderate chronic disease related malnutrition related to multiple chronic diseases (alcoholism, parkinson's, COPD, fatty liver) as evidenced by greater than 10% wt loss in less than 6 months, PO intake meeting less than 75% of estimated energy intake for greater than 1 month, and moderate muscle wasting per NFPE.     RD to add Boost Glucose Control BID (Provides 380 kcals, 32 g protein if consumed)       CLINICAL NUTRITION ASSESSMENT      Reason for Assessment 8/16: Follow-up protocol  8/10: TF consult      H&P      Past Medical History:   Diagnosis Date    Alcoholism 03/19/2020    Aneurysm of thoracic aorta 11/08/2019    3.9 cm      Anxiety 03/19/2020    Chronic obstructive lung disease 10/08/2020    xray CM 12/9/18      Hypertensive disorder 11/23/2020    Myocardial infarction 10/08/2020    angioplasty      Seizure disorder 04/23/2024    Steatosis of liver 10/09/2020       History reviewed. No pertinent surgical history.     Current Problems   Seizures, not in status  Possible alcohol withdrawal seizure activity    Alcohol withdrawal syndrome  -pt previously drank a gallon of vodka daily  -pt's wife reports pt has drastically reduced his drinking  -CIWA protocol    Diabetic ketoacidosis without come  DM2    HTN    Parkinson's     COPD, not in exacerbation    Anxiety/depression    Extubated 8/14  NGT removed by pt        Encounter Information        Trending Narrative     8/16: Checking in on pt to monitor Nutrition POC and diet advancement tolerance. Pt was extubated on 8/14 and pt pulled NGT out yesterday. SLP evaluated pt yesterday and recommended pt be placed on Pureed diet with thin liquids.  Mechanical ground diet ordered last night per APRN. RD messaged RN to check in on how pt is tolerating po due to no intake records available at this time. RD reports that pt is  "tolerating breakfast without issues.     8/10: Pt admitted to [facility] after suffering a seizure at home. Admitting dx of alcohol withdrawal seizure with delirium. Pt recently hospitalized at [facility] in April for new onset seizures secondary to alcohol withdrawal. At that time, pt drinking 1 gallon of vodka daily. Pt's wife reports that pt's drinking has significantly decreased since that hospitalization. Pt discussed in ICU rounds on 8/9. Pt reportedly not taking his parkinson's medications because you cannot drink while on parkinson's medications. Pt on precedex and levo. NG placed for medication administration and nutrition. Pt did not meet criteria for malnutrition in April 2024. RD visited pt in person 8/9, NFPE completed, consistent with nutrition diagnosis of malnutrition using AND/ASPEN criteria. See MSA below.       Anthropometrics        Current Height, Weight Height: 170.2 cm (67\")  Weight: 59.5 kg (131 lb 2.8 oz) (pt weighed prior to side rails being paded) (08/16/24 0423)       Usual Body Weight (UBW) Unknown        Trending Weight Hx     This admission: 8/16: 131# - 2% weight gain since 8/10.   8/10: 128# - scale             PTA: 14% x 3.5 months    Wt Readings from Last 30 Encounters:   08/16/24 0423 59.5 kg (131 lb 2.8 oz)   08/15/24 1918 59.5 kg (131 lb 2.8 oz)   08/13/24 0558 65.8 kg (145 lb 1 oz)   08/12/24 2100 65.8 kg (145 lb 1 oz)   08/12/24 0706 67 kg (147 lb 11.3 oz)   08/09/24 0059 58.5 kg (128 lb 15.5 oz)   08/09/24 0055 58.8 kg (129 lb 10.1 oz)   08/08/24 2312 64.4 kg (142 lb)   05/01/24 0148 62.7 kg (138 lb 3.2 oz)   04/29/24 1454 62 kg (136 lb 11 oz)   04/28/24 2338 66.5 kg (146 lb 9.7 oz)   04/28/24 0336 65.3 kg (143 lb 15.4 oz)   04/27/24 0012 67.4 kg (148 lb 9.4 oz)   04/26/24 0300 69.4 kg (153 lb)   04/24/24 0515 67.8 kg (149 lb 7.6 oz)   04/22/24 2154 66.2 kg (146 lb)   05/21/22 1446 65.8 kg (145 lb)      BMI kg/m2 Body mass index is 20.54 kg/m².       Labs        Pertinent Labs Hypokalemia " - replaced today    Results from last 7 days   Lab Units 08/16/24  0228 08/15/24  0531 08/14/24  0543   SODIUM mmol/L 136 142 136   POTASSIUM mmol/L 3.3* 3.7 3.7   CHLORIDE mmol/L 92* 99 98   CO2 mmol/L 31.0* 32.8* 32.2*   BUN mg/dL 17 17 18   CREATININE mg/dL 0.76 0.82 0.76   CALCIUM mg/dL 8.6 8.1* 8.1*   BILIRUBIN mg/dL 0.5 0.3 0.3   ALK PHOS U/L 79 82 79   ALT (SGPT) U/L 18 18 20   AST (SGOT) U/L 33 31 40   GLUCOSE mg/dL 112* 145* 204*     Results from last 7 days   Lab Units 08/16/24  0228 08/15/24  0531 08/14/24  2015 08/14/24  0543 08/11/24  0535 08/10/24  0539   MAGNESIUM mg/dL  --   --   --  1.8  --   --    PHOSPHORUS mg/dL  --  3.4   < > 0.8*  --   --    HEMOGLOBIN g/dL 13.2 13.5  --  14.3   < > 12.1*   HEMATOCRIT % 39.4 41.4  --  42.0   < > 36.7*   TRIGLYCERIDES mg/dL  --   --   --   --   --  102    < > = values in this interval not displayed.     Lab Results   Component Value Date    HGBA1C 6.09 (H) 08/09/2024        Medications    Scheduled Medications enoxaparin, 40 mg, Subcutaneous, Daily  folic acid, 1 mg, Nasogastric, Daily  insulin lispro, 4-24 Units, Subcutaneous, 4x Daily With Meals & Nightly  levETIRAcetam, 500 mg, Intravenous, Q12H  miconazole, 1 Application, Topical, Q12H  pantoprazole, 40 mg, Intravenous, Q AM  QUEtiapine, 50 mg, Nasogastric, Q12H  sertraline, 25 mg, Nasogastric, Nightly  sodium chloride, 10 mL, Intravenous, Q12H  sodium chloride, 10 mL, Intravenous, Q12H  thiamine, 100 mg, Oral, Daily        Infusions        PRN Medications   acetaminophen    acetaminophen    Calcium Replacement - Follow Nurse / BPA Driven Protocol    dextrose    dextrose    glucagon (human recombinant)    HYDROmorphone    LORazepam    Magnesium Standard Dose Replacement - Follow Nurse / BPA Driven Protocol    nitroglycerin    OLANZapine (zyPREXA) 5 mg in sterile water (preservative free) 1 mL injection    ondansetron ODT **OR** ondansetron    Phosphorus Replacement - Follow Nurse / BPA Driven Protocol     Potassium Replacement - Follow Nurse / BPA Driven Protocol    [COMPLETED] Insert Peripheral IV **AND** sodium chloride    sodium chloride    sodium chloride    sodium chloride     Physical Findings        Trending Physical   Appearance, NFPE 8/16: Agree with previous assessment     8/10: NFPE completed, consistent with nutrition diagnosis of malnutrition using AND/ASPEN criteria. See MSA below.      --  Edema  None documented     Bowel Function Last documented BM 8/16     Tubes NO feeding tube in place      Chewing/Swallowing SLP recommends Pureed diet     Skin No pressure injuries per WOCN assessment        Estimated/Assessed Needs    Estimated 8/10/24 - remains appropriate   Energy Requirements    EST Needs, Method, Wt used 3705-9831 kcal/day (30-35 kcal/kg, 58.5 kg)       Protein Requirements    EST Needs, Method, Wt used  g/day (1.2-2.0 g/kg)       Fluid Requirements     Estimated Needs (mL/day) 1 mL/kcal or per MD     Fluid Deficit    Current Na Level (mEq/L)    Desired Na Level (mEq/L)    Estimated Fluid Deficit (L)       Current Nutrition Orders & Evaluation of Intake       Oral Nutrition     Food Allergies NKFA   Current PO Diet Diet: Regular/House, Diabetic; Consistent Carbohydrate; Texture: Mechanical Ground (NDD 2); Fluid Consistency: Thin (IDDSI 0)   Supplement -   PO Evaluation     Trending % PO Intake 8/16: No meals documented   8/10: NPO     Enteral Nutrition    Enteral Route    Order, Modulars, Flushes    Tolerance    TF Observation         Parenteral Nutrition     TPN Route    Total # Days on TPN    TPN Order, Lipid Details    MVI & Trace Element Freq    TPN Observation       Nutritional Risk Screening        NRS-2002 Score          Nutrition Diagnosis         Nutrition Dx Problem 1 Moderate chronic disease related malnutrition related to multiple chronic diseases (alcoholism, parkinson's, COPD, fatty liver) as evidenced by greater than 10% wt loss in less than 6 months, PO intake meeting less  than 75% of estimated energy intake for greater than 1 month, and moderate muscle wasting per NFPE.       Nutrition Dx Problem 2        Intervention Goal         Intervention Goal(s) Tolerate po diet  PO intake > 75%     Nutrition Intervention        RD Action Encourage good po intake.    Add Boost Glucose Control BID (Provides 380 kcals, 32 g protein if consumed)        Nutrition Prescription          Diet Prescription Consistent CHO Mechanical ground diet    Supplement Prescription Boost Glucose Control BID (Provides 380 kcals, 32 g protein if consumed)        Enteral Prescription          TPN Prescription      Monitor/Evaluation        Monitor Per protocol, Pertinent labs, EN delivery/tolerance, Weight, Swallow function     Malnutrition Severity Assessment      Patient meets criteria for : Moderate (non-severe) Malnutrition             Electronically signed by:  Brie Church RD  08/16/24 09:04 EDT

## 2024-08-17 VITALS
HEART RATE: 94 BPM | HEIGHT: 67 IN | RESPIRATION RATE: 23 BRPM | WEIGHT: 131.17 LBS | OXYGEN SATURATION: 97 % | BODY MASS INDEX: 20.59 KG/M2 | SYSTOLIC BLOOD PRESSURE: 135 MMHG | TEMPERATURE: 98.1 F | DIASTOLIC BLOOD PRESSURE: 83 MMHG

## 2024-08-17 LAB
ALBUMIN SERPL-MCNC: 3.6 G/DL (ref 3.5–5.2)
ALBUMIN/GLOB SERPL: 1.1 G/DL
ALP SERPL-CCNC: 73 U/L (ref 39–117)
ALT SERPL W P-5'-P-CCNC: 25 U/L (ref 1–41)
ANION GAP SERPL CALCULATED.3IONS-SCNC: 11.6 MMOL/L (ref 5–15)
AST SERPL-CCNC: 39 U/L (ref 1–40)
BASOPHILS # BLD AUTO: 0.08 10*3/MM3 (ref 0–0.2)
BASOPHILS NFR BLD AUTO: 1.1 % (ref 0–1.5)
BILIRUB SERPL-MCNC: 0.5 MG/DL (ref 0–1.2)
BUN SERPL-MCNC: 13 MG/DL (ref 8–23)
BUN/CREAT SERPL: 19.4 (ref 7–25)
CALCIUM SPEC-SCNC: 8.8 MG/DL (ref 8.6–10.5)
CHLORIDE SERPL-SCNC: 103 MMOL/L (ref 98–107)
CO2 SERPL-SCNC: 27.4 MMOL/L (ref 22–29)
CREAT SERPL-MCNC: 0.67 MG/DL (ref 0.76–1.27)
DEPRECATED RDW RBC AUTO: 72.6 FL (ref 37–54)
EGFRCR SERPLBLD CKD-EPI 2021: 104.3 ML/MIN/1.73
EOSINOPHIL # BLD AUTO: 0.26 10*3/MM3 (ref 0–0.4)
EOSINOPHIL NFR BLD AUTO: 3.7 % (ref 0.3–6.2)
ERYTHROCYTE [DISTWIDTH] IN BLOOD BY AUTOMATED COUNT: 19.4 % (ref 12.3–15.4)
GLOBULIN UR ELPH-MCNC: 3.3 GM/DL
GLUCOSE BLDC GLUCOMTR-MCNC: 130 MG/DL (ref 70–105)
GLUCOSE BLDC GLUCOMTR-MCNC: 131 MG/DL (ref 70–105)
GLUCOSE SERPL-MCNC: 103 MG/DL (ref 65–99)
HCT VFR BLD AUTO: 42.1 % (ref 37.5–51)
HGB BLD-MCNC: 13.9 G/DL (ref 13–17.7)
IMM GRANULOCYTES # BLD AUTO: 0.04 10*3/MM3 (ref 0–0.05)
IMM GRANULOCYTES NFR BLD AUTO: 0.6 % (ref 0–0.5)
LYMPHOCYTES # BLD AUTO: 2.09 10*3/MM3 (ref 0.7–3.1)
LYMPHOCYTES NFR BLD AUTO: 29.4 % (ref 19.6–45.3)
MCH RBC QN AUTO: 33.8 PG (ref 26.6–33)
MCHC RBC AUTO-ENTMCNC: 33 G/DL (ref 31.5–35.7)
MCV RBC AUTO: 102.4 FL (ref 79–97)
MONOCYTES # BLD AUTO: 1.32 10*3/MM3 (ref 0.1–0.9)
MONOCYTES NFR BLD AUTO: 18.6 % (ref 5–12)
NEUTROPHILS NFR BLD AUTO: 3.32 10*3/MM3 (ref 1.7–7)
NEUTROPHILS NFR BLD AUTO: 46.6 % (ref 42.7–76)
NRBC BLD AUTO-RTO: 0 /100 WBC (ref 0–0.2)
PLATELET # BLD AUTO: 525 10*3/MM3 (ref 140–450)
PMV BLD AUTO: 9.4 FL (ref 6–12)
POTASSIUM SERPL-SCNC: 3.8 MMOL/L (ref 3.5–5.2)
PROT SERPL-MCNC: 6.9 G/DL (ref 6–8.5)
RBC # BLD AUTO: 4.11 10*6/MM3 (ref 4.14–5.8)
SODIUM SERPL-SCNC: 142 MMOL/L (ref 136–145)
WBC NRBC COR # BLD AUTO: 7.11 10*3/MM3 (ref 3.4–10.8)

## 2024-08-17 PROCEDURE — 25010000002 LORAZEPAM PER 2 MG: Performed by: NURSE PRACTITIONER

## 2024-08-17 PROCEDURE — 80053 COMPREHEN METABOLIC PANEL: CPT | Performed by: NURSE PRACTITIONER

## 2024-08-17 PROCEDURE — 25010000002 OLANZAPINE 10 MG RECONSTITUTED SOLUTION 1 EACH VIAL: Performed by: HOSPITALIST

## 2024-08-17 PROCEDURE — 82948 REAGENT STRIP/BLOOD GLUCOSE: CPT

## 2024-08-17 PROCEDURE — 85025 COMPLETE CBC W/AUTO DIFF WBC: CPT | Performed by: NURSE PRACTITIONER

## 2024-08-17 RX ORDER — METFORMIN HYDROCHLORIDE EXTENDED-RELEASE TABLETS 1000 MG/1
1000 TABLET, FILM COATED, EXTENDED RELEASE ORAL
Start: 2024-08-17 | End: 2024-09-16

## 2024-08-17 RX ORDER — LANCETS
1 EACH MISCELLANEOUS DAILY
Qty: 100 EACH | Refills: 2 | Status: SHIPPED | OUTPATIENT
Start: 2024-08-17

## 2024-08-17 RX ORDER — QUETIAPINE FUMARATE 25 MG/1
25 TABLET, FILM COATED ORAL EVERY 12 HOURS SCHEDULED
Start: 2024-08-17 | End: 2024-09-16

## 2024-08-17 RX ORDER — QUETIAPINE FUMARATE 25 MG/1
25 TABLET, FILM COATED ORAL EVERY 12 HOURS SCHEDULED
Status: DISCONTINUED | OUTPATIENT
Start: 2024-08-17 | End: 2024-08-17 | Stop reason: HOSPADM

## 2024-08-17 RX ORDER — BLOOD-GLUCOSE METER
1 EACH MISCELLANEOUS DAILY
Qty: 1 KIT | Refills: 0 | Status: SHIPPED | OUTPATIENT
Start: 2024-08-17

## 2024-08-17 RX ORDER — FOLIC ACID 1 MG/1
1 TABLET ORAL DAILY
Start: 2024-08-18 | End: 2024-09-17

## 2024-08-17 RX ORDER — BLOOD SUGAR DIAGNOSTIC
1 STRIP MISCELLANEOUS DAILY
Qty: 100 EACH | Refills: 2 | Status: SHIPPED | OUTPATIENT
Start: 2024-08-17

## 2024-08-17 RX ADMIN — Medication 10 ML: at 09:20

## 2024-08-17 RX ADMIN — Medication 100 MG: at 09:19

## 2024-08-17 RX ADMIN — OLANZAPINE 5 MG: 10 INJECTION, POWDER, FOR SOLUTION INTRAMUSCULAR at 01:09

## 2024-08-17 RX ADMIN — LORAZEPAM 0.5 MG: 2 INJECTION INTRAMUSCULAR; INTRAVENOUS at 06:03

## 2024-08-17 RX ADMIN — QUETIAPINE FUMARATE 50 MG: 25 TABLET ORAL at 09:19

## 2024-08-17 RX ADMIN — LEVETIRACETAM 500 MG: 500 TABLET, FILM COATED ORAL at 09:27

## 2024-08-17 RX ADMIN — Medication 10 ML: at 09:19

## 2024-08-17 RX ADMIN — PANTOPRAZOLE SODIUM 40 MG: 40 INJECTION, POWDER, FOR SOLUTION INTRAVENOUS at 05:18

## 2024-08-17 RX ADMIN — LORAZEPAM 0.5 MG: 2 INJECTION INTRAMUSCULAR; INTRAVENOUS at 00:08

## 2024-08-17 RX ADMIN — ANTI-FUNGAL POWDER MICONAZOLE NITRATE TALC FREE 1 APPLICATION: 1.42 POWDER TOPICAL at 09:20

## 2024-08-17 RX ADMIN — FOLIC ACID 1 MG: 1 TABLET ORAL at 09:19

## 2024-08-17 NOTE — PLAN OF CARE
Goal Outcome Evaluation:         AOX3. Patient agitated with attempts to get out of bed several times but easily redirected. Urinal at bedside. Patient education done on the use of the call light, and pt instructed to use the call light before attempting to get out of bed or for any other needs.      Problem: Fall Injury Risk  Goal: Absence of Fall and Fall-Related Injury  Intervention: Identify and Manage Contributors  Intervention: Promote Injury-Free Environment     Problem: Fall Injury Risk  Goal: Absence of Fall and Fall-Related Injury  Intervention: Promote Injury-Free Environment     Problem: Fall Injury Risk  Goal: Absence of Fall and Fall-Related Injury  8/17/2024 0817 by Darlene Cook RN  Outcome: Ongoing, Not Progressing  8/17/2024 0817 by Darlene Cook RN  Outcome: Ongoing, Progressing  8/17/2024 0816 by Darlene Cook RN  Outcome: Ongoing, Progressing  Intervention: Identify and Manage Contributors  Intervention: Promote Injury-Free Environment     Problem: Fall Injury Risk  Goal: Absence of Fall and Fall-Related Injury  8/17/2024 0817 by Darlene Cook RN  Outcome: Ongoing, Not Progressing  8/17/2024 0817 by Darlene Cook RN  Outcome: Ongoing, Progressing  8/17/2024 0816 by Darlene Cook RN  Outcome: Ongoing, Progressing  Intervention: Identify and Manage Contributors  Intervention: Promote Injury-Free Environment     Problem: Fall Injury Risk  Goal: Absence of Fall and Fall-Related Injury  Intervention: Identify and Manage Contributors     Problem: Fall Injury Risk  Goal: Absence of Fall and Fall-Related Injury  Intervention: Promote Injury-Free Environment     Problem: Adult Inpatient Plan of Care  Goal: Absence of Hospital-Acquired Illness or Injury  Intervention: Identify and Manage Fall Risk  Intervention: Prevent Skin Injury  Intervention: Prevent and Manage VTE (Venous Thromboembolism) Risk  Intervention: Prevent Infection  Goal: Optimal Comfort and Wellbeing  Intervention:  Monitor Pain and Promote Comfort  Intervention: Provide Person-Centered Care     Problem: Adult Inpatient Plan of Care  Goal: Absence of Hospital-Acquired Illness or Injury  Intervention: Identify and Manage Fall Risk  Intervention: Prevent Skin Injury  Intervention: Prevent and Manage VTE (Venous Thromboembolism) Risk  Intervention: Prevent Infection     Problem: Adult Inpatient Plan of Care  Goal: Absence of Hospital-Acquired Illness or Injury  Intervention: Prevent Skin Injury     Problem: Adult Inpatient Plan of Care  Goal: Absence of Hospital-Acquired Illness or Injury  Intervention: Prevent and Manage VTE (Venous Thromboembolism) Risk     Problem: Adult Inpatient Plan of Care  Goal: Absence of Hospital-Acquired Illness or Injury  Intervention: Prevent Infection     Problem: Skin Injury Risk Increased  Goal: Skin Health and Integrity  Intervention: Optimize Skin Protection     Problem: Skin Injury Risk Increased  Goal: Skin Health and Integrity  8/17/2024 0817 by Darlene Cook RN  Outcome: Ongoing, Not Progressing  8/17/2024 0817 by Darlene Cook, RN  Outcome: Ongoing, Progressing  8/17/2024 0816 by Darlene Cook, RN  Outcome: Ongoing, Progressing  Intervention: Optimize Skin Protection

## 2024-08-17 NOTE — CASE MANAGEMENT/SOCIAL WORK
Continued Stay Note  NATALIE Bhaskar     Patient Name: Emiliano Bateman  MRN: [id number]  Today's Date: 8/17/2024    Admit Date: 8/8/2024    Plan: [facility]. Bed ready. Precert approved 8/18-8/21. PASRR approved. [facility] to transport.   Discharge Plan       Row Name 08/17/24 1202       Plan    Plan [facility]. Bed ready. Precert approved 8/18-8/21. PASRR approved. [facility] to transport.    Plan Comments Verified with RN that pt will be sitter free for 24hrs at 1300 today. Pt will need EMS for transport. Notified Judy with plans for dc today. Medical necessity and EMS request forms in place.

## 2024-08-17 NOTE — DISCHARGE SUMMARY
[facility] Medicine Services  Discharge Summary    Date of Service: 24  Patient Name: Emiliano Bateman  : 1960  MRN: [id number]    Date of Admission: 2024  Discharge Diagnosis: #Seizure disorder with breakthrough seizures  #EtOH withdrawal syndrome c/b DT   #Acute encephalopathy.  Metabolic with delirium  #Dysphagia  #DM2 complicated by DKA  Date of Discharge:  24  Primary Care Physician: Meryl Thomas MD      Presenting Problem:   Status epilepticus [G40.901]  Seizure due to alcohol withdrawal [F10.939, R56.9]    Active and Resolved Hospital Problems:  Active Hospital Problems    Diagnosis POA    Type 2 diabetes mellitus [E11.9] Yes    Acute respiratory failure [J96.00] Yes    Moderate malnutrition [E44.0] Yes    Seizure due to alcohol withdrawal [F10.939, R56.9] Yes    Hypertensive disorder [I10] Yes    Chronic obstructive lung disease [J44.9] Yes    Alcoholism [F10.20] Yes    Anxiety [F41.9] Yes      Resolved Hospital Problems   No resolved problems to display.         Hospital Course     HPI:  Admitting team HPI   Emiliano Bateman is a 63 y.o. male with PMH of hypertension, COPD, liver disease, Parkinson's, DMT2, seizure disorder who presented to the hospital after suffering a seizure at home, and was admitted with a principal diagnosis of Alcohol withdrawal seizure with delirium.  Information for HPI taken from chart review as patient is unable to cooperate at time of assessment due to acuity of illness.  His wife is at bedside, and states that he had a seizure today which prompted her to call EMS.     Hospital Course:  63-year-old male with history of hypertension, COPD, DM2, EtOH abuse seizure disorder admitted to [facility] with breakthrough seizure        #Seizure disorder with breakthrough seizures  Concern for alcohol withdrawal seizures  Patient continues to drink EtOH.  Counseled on abstinence  Seen by neurology continue with current dose of Keppra       #EtOH  withdrawal syndrome c/b DT   Last reported drink 2 days prior to admission required intubation in ICU for airway protection now extubated     Status post phenobarb tapering dose  Now off CIWA monitoring  Continue thiamine folic acid  Now off supplemental oxygen monitor oxygen saturations       #Acute encephalopathy.  Metabolic with delirium  #Dysphagia    Finished course of antibiotics  Seroquel 25 twice daily  Continue modified diet per SLP  Off sitter             # Hypertension  Antihypertensives initially held started on midodrine which has been tapered off  Blood pressure stable resume amlodipine on discharge     #Leukocytosis  Finished a very course of antibiotics  Culture data nonrevealing      #DM2 complicated by DKA  DKA has since resolved  A1c 6.09 start metformin On DC  Check BG TID     #Parkinson's?  Not on any medications at home     #COPD  Not exacerbation  Continue home regimen     #Anxiety/depression  Continue Zoloft        DISCHARGE Follow Up Recommendations for labs and diagnostics: EtOH cessation      Reasons For Change In Medications and Indications for New Medications:      Day of Discharge     Vital Signs:  Temp:  [98.3 °F (36.8 °C)-98.6 °F (37 °C)] 98.5 °F (36.9 °C)  Heart Rate:  [84-95] 95  Resp:  [16-21] 21  BP: (106-142)/(68-94) 142/94  Flow (L/min):  [2] 2    Physical Exam:  Physical Exam   NAD   RRR S1-S2 audible   Lungs CTA   Abdomen soft nontender nondistended      Pertinent  and/or Most Recent Results     LAB RESULTS:      Lab 08/17/24  0531 08/16/24  0228 08/15/24  0531 08/14/24  0543 08/13/24  0341   WBC 7.11 10.58 9.80 7.97 7.64   HEMOGLOBIN 13.9 13.2 13.5 14.3 13.3   HEMATOCRIT 42.1 39.4 41.4 42.0 39.2   PLATELETS 525* 404 280 166 101*   NEUTROS ABS 3.32 5.77 5.43 4.25 4.90   IMMATURE GRANS (ABS) 0.04 0.08* 0.13* 0.09* 0.04   LYMPHS ABS 2.09 2.17 1.85 1.94 1.49   MONOS ABS 1.32* 2.28* 2.22* 1.29* 0.96*   EOS ABS 0.26 0.21 0.11 0.33 0.22   .4* 102.1* 102.7* 101.0* 101.3*          Lab 08/17/24  0531 08/16/24  1324 08/16/24  0228 08/15/24  0531 08/14/24  2015 08/14/24  0543 08/13/24  0341   SODIUM 142  --  136 142  --  136 134*   POTASSIUM 3.8 4.5 3.3* 3.7  --  3.7 4.0   CHLORIDE 103  --  92* 99  --  98 101   CO2 27.4  --  31.0* 32.8*  --  32.2* 27.8   ANION GAP 11.6  --  13.0 10.2  --  5.8 5.2   BUN 13  --  17 17  --  18 15   CREATININE 0.67*  --  0.76 0.82  --  0.76 0.61*   EGFR 104.3  --  100.4 98.1  --  100.4 107.3   GLUCOSE 103*  --  112* 145*  --  204* 145*   CALCIUM 8.8  --  8.6 8.1*  --  8.1* 7.6*   MAGNESIUM  --   --   --   --   --  1.8  --    PHOSPHORUS  --   --   --  3.4 3.0 0.8*  --          Lab 08/17/24  0531 08/16/24  0228 08/15/24  0531 08/14/24  0543 08/13/24  0341   TOTAL PROTEIN 6.9 6.7 6.5 6.0 5.3*   ALBUMIN 3.6 3.7 3.7 3.1* 2.8*   GLOBULIN 3.3 3.0 2.8 2.9 2.5   ALT (SGPT) 25 18 18 20 24   AST (SGOT) 39 33 31 40 37   BILIRUBIN 0.5 0.5 0.3 0.3 0.4   ALK PHOS 73 79 82 79 74                     Lab 08/14/24  2320 08/14/24  1535 08/10/24  1237   PH, ARTERIAL 7.390 7.433 7.267*   PCO2, ARTERIAL 56.3* 44.9 37.8   PO2 ART 71.1* 86.4 153.8*   O2 SATURATION ART 93.4* 96.8 99.1*   FIO2  --  40 60   HCO3 ART 34.1* 30.0* 17.2*   BASE EXCESS ART 7.1* 4.9* -9.0*     Brief Urine Lab Results  (Last result in the past 365 days)        Color   Clarity   Blood   Leuk Est   Nitrite   Protein   CREAT   Urine HCG        08/09/24 0119 Dark Yellow   Clear   Large (3+)   Negative   Negative   30 mg/dL (1+)                 Microbiology Results (last 10 days)       Procedure Component Value - Date/Time    Respiratory Culture - Sputum, ET Suction [[id number]] Collected: 08/10/24 1044    Lab Status: Final result Specimen: Sputum from ET Suction Updated: 08/12/24 1050     Respiratory Culture Scant growth (1+) Normal respiratory german. No S. aureus or Pseudomonas aeruginosa detected. Final report.     Gram Stain Many (4+) WBCs per low power field      Rare (1+) Epithelial cells per low power field       Rare (1+) Mixed bacterial morphotypes seen on Gram Stain    Respiratory Panel PCR w/COVID-19(SARS-CoV-2) KALLIE/STELLA/ROBBIN/PAD/COR/TRES In-House, NP Swab in UTM/VTM, 2 HR TAT - Swab, Nasopharynx [[id number]]  (Normal) Collected: 08/09/24 0946    Lab Status: Final result Specimen: Swab from Nasopharynx Updated: 08/09/24 1041     ADENOVIRUS, PCR Not Detected     Coronavirus 229E Not Detected     Coronavirus HKU1 Not Detected     Coronavirus NL63 Not Detected     Coronavirus OC43 Not Detected     COVID19 Not Detected     Human Metapneumovirus Not Detected     Human Rhinovirus/Enterovirus Not Detected     Influenza A PCR Not Detected     Influenza B PCR Not Detected     Parainfluenza Virus 1 Not Detected     Parainfluenza Virus 2 Not Detected     Parainfluenza Virus 3 Not Detected     Parainfluenza Virus 4 Not Detected     RSV, PCR Not Detected     Bordetella pertussis pcr Not Detected     Bordetella parapertussis PCR Not Detected     Chlamydophila pneumoniae PCR Not Detected     Mycoplasma pneumo by PCR Not Detected    Narrative:      In the setting of a positive respiratory panel with a viral infection PLUS a negative procalcitonin without other underlying concern for bacterial infection, consider observing off antibiotics or discontinuation of antibiotics and continue supportive care. If the respiratory panel is positive for atypical bacterial infection (Bordetella pertussis, Chlamydophila pneumoniae, or Mycoplasma pneumoniae), consider antibiotic de-escalation to target atypical bacterial infection.    MRSA Screen, PCR (Inpatient) - Swab, Nares [[id number]]  (Normal) Collected: 08/09/24 0121    Lab Status: Final result Specimen: Swab from Nares Updated: 08/09/24 0448     MRSA PCR No MRSA Detected    Narrative:      The negative predictive value of this diagnostic test is high and should only be used to consider de-escalating anti-MRSA therapy. A positive result may indicate colonization with MRSA and must be correlated  clinically.    Blood Culture - Blood, Arm, Left [[id number]]  (Normal) Collected: 08/09/24 0120    Lab Status: Final result Specimen: Blood from Arm, Left Updated: 08/14/24 0145     Blood Culture No growth at 5 days    Narrative:      Less than seven (7) mL's of blood was collected.  Insufficient quantity may yield false negative results.    Blood Culture - Blood, Arm, Right [[id number]]  (Normal) Collected: 08/08/24 2356    Lab Status: Final result Specimen: Blood from Arm, Right Updated: 08/14/24 0015     Blood Culture No growth at 5 days    Narrative:      Less than seven (7) mL's of blood was collected.  Insufficient quantity may yield false negative results.            XR Abdomen KUB    Result Date: 8/14/2024  Impression: Impression: Esophagogastric tube tip overlies the proximal stomach. Electronically Signed: Xiang Tomas MD  8/14/2024 6:40 PM EDT  Workstation ID: [id number]    XR Chest 1 View    Result Date: 8/13/2024  Impression: Impression: 1.Increased left basilar opacity, which may be due to pneumonia and/or atelectasis. 2.Stable positioning of support tubes and lines, as above. Electronically Signed: Medina Campo  8/13/2024 2:26 PM EDT  Workstation ID: [id number]    XR Chest 1 View    Result Date: 8/10/2024  Impression: Impression: Lines and tubes as characterized above. No definite acute cardiopulmonary abnormality. Electronically Signed: Robert Weston DO  8/10/2024 11:29 AM EDT  Workstation ID: [id number]    XR Abdomen KUB    Result Date: 8/9/2024  Impression: Impression: The tip of the nasogastric tube terminates in the fundus of the stomach. The sideport is located at the gastroesophageal junction. Electronically Signed: Patel Nation MD  8/9/2024 12:42 PM EDT  Workstation ID: [id number]    XR Chest 1 View    Result Date: 8/9/2024  Impression: Impression: No acute cardiopulmonary process. Electronically Signed: Rupinder Aguirre MD  8/9/2024 4:42 AM EDT  Workstation ID: [id number]    CT Head Without  Contrast    Result Date: 8/8/2024  Impression: Impression: No evidence of hemorrhage, mass effect or midline shift. No acute intracranial process identified. Limited evaluation due to motion artifact. No acute osseous abnormality of the cervical spine. Electronically Signed: Rupinder Aguirre MD  8/8/2024 11:25 PM EDT  Workstation ID: [id number]    CT Cervical Spine Without Contrast    Result Date: 8/8/2024  Impression: Impression: No evidence of hemorrhage, mass effect or midline shift. No acute intracranial process identified. Limited evaluation due to motion artifact. No acute osseous abnormality of the cervical spine. Electronically Signed: Rupinder Aguirre MD  8/8/2024 11:25 PM EDT  Workstation ID: [id number]                 Labs Pending at Discharge:      Procedures Performed           Consults:   Consults       Date and Time Order Name Status Description    8/15/2024  8:43 AM Inpatient Neurology Consult General Completed     8/14/2024 11:02 PM Inpatient Hospitalist Consult Completed     8/8/2024 11:20 PM Intensivist (on-call MD unless specified)                Discharge Details        Discharge Medications        New Medications        Instructions Start Date   Accu-Chek Guide Me w/Device kit   1 kit, Does not apply, Daily, Dx code: E11.9  NDC [address]-22  Bin#: [id number] Group #: [id number]  ID #: [id number]  Issuer #: ([id number])      Accu-Chek Guide test strip  Generic drug: glucose blood   1 each, Other, Daily, Use as instructed Dx code: E11.9      Accu-Chek Softclix Lancets lancets   1 each, Other, Daily, Use as instructed Dx code: E11.9      folic acid 1 MG tablet  Commonly known as: FOLVITE   1 mg, Oral, Daily   Start Date: August 18, 2024     metFORMIN 1000 MG (OSM) 24 hr tablet  Commonly known as: FORTAMET   1,000 mg, Oral, Daily With Breakfast      QUEtiapine 25 MG tablet  Commonly known as: SEROquel   25 mg, Oral, Every 12 Hours Scheduled             Continue These Medications        Instructions Start Date    amLODIPine 5 MG tablet  Commonly known as: NORVASC   5 mg, Oral, Every 24 Hours Scheduled      levETIRAcetam 500 MG tablet  Commonly known as: Keppra   500 mg, Oral, 2 Times Daily      sertraline 25 MG tablet  Commonly known as: ZOLOFT   25 mg, Oral, Nightly      Thiamine Mononitrate 100 MG tablet   100 mg, Oral, Daily               Allergies   Allergen Reactions    Penicillins Unknown - Low Severity     Childhood allergy; pt is unsure of reaction         Discharge Disposition:   Skilled Nursing Facility (DC - External)    Diet:  Hospital:  Diet Order   Procedures    Diet: Regular/House, Diabetic; Consistent Carbohydrate; Texture: Mechanical Ground (NDD 2); Fluid Consistency: Thin (IDDSI 0)         Discharge Activity:         CODE STATUS:  Code Status and Medical Interventions: CPR (Attempt to Resuscitate); Full Support   Ordered at: 08/09/24 0047     Code Status (Patient has no pulse and is not breathing):    CPR (Attempt to Resuscitate)     Medical Interventions (Patient has pulse or is breathing):    Full Support         No future appointments.        Time spent on Discharge including face to face service:  >30 minutes    Signature: Electronically signed by Frank Gill MD, 08/17/24, 10:35 EDT.  [facility] Hospitalist Team

## 2024-08-17 NOTE — PLAN OF CARE
Goal Outcome Evaluation:  Pt to discharge to [facility].  Spokw with wife this am and is aware.  Pt has been sitter free for 24 hrs.  Pt is confused and AAO x 2.   IV removed intact. Report called to [facility].  Fm aware.

## 2024-08-17 NOTE — CASE MANAGEMENT/SOCIAL WORK
"Physicians Statement of Medical Necessity for  Ambulance Transportation    GENERAL INFORMATION     Name: Emiliano Bateman  YOB: 1960  Medicare #:     [id number]     Transport Date: 8/17/2024 (Valid for round trips this date, or for scheduled repetitive trips for 60 days from the date signed below.)  Origin: [address] [place] [address]  Destination: [place] [address]  Is the Patient's stay covered under Medicare Part A (PPS/DRG?)Yes  Closest appropriate facility? Yes  If this a hosp-hosp transfer? No  Is this a hospice patient? No    MEDICAL NECESSITY QUESTIONAIRE    Ambulance Transportation is medically necessary only if other means of transportation are contraindicated or would be potentially harmful to the patient.  To meet this requirement, the patient must be either \"bed confined\" or suffer from a condition such that transport by means other than an ambulance is contraindicated by the patient's condition.  The following questions must be answered by the healthcare professional signing below for this form to be valid:     1) Describe the MEDICAL CONDITION (physical and/or mental) of this patient AT THE TIME OF AMBULANCE TRANSPORT that requires the patient to be transported in an ambulance, and why transport by other means is contraindicated by the patient's condition: confusion, 2L cont 02, weakness (unable to maintain seated position for length of transport).  Past Medical History:   Diagnosis Date    Alcoholism 03/19/2020    Aneurysm of thoracic aorta 11/08/2019    3.9 cm      Anxiety 03/19/2020    Chronic obstructive lung disease 10/08/2020    xray [facility] 12/9/18      Hypertensive disorder 11/23/2020    Myocardial infarction 10/08/2020    angioplasty      Seizure disorder 04/23/2024    Steatosis of liver 10/09/2020      History reviewed. No pertinent surgical history.   2) Is this patient \"bed confined\" as defined below?Yes   To be \"bed confined\" the patient must " satisfy all three of the following criteria:  (1) unable to get up from bed without assistance; AND (2) unable to ambulate;  AND (3) unable to sit in a chair or wheelchair.  3) Can this patient safely be transported by car or wheelchair van (I.e., may safely sit during transport, without an attendant or monitoring?)No   4. In addition to completing questions 1-3 above, please check any of the following conditions that apply*:          *Note: supporting documentation for any boxes checked must be maintained in the patient's medical records Patient is confused, Requires oxygen - unable to self administer, and Unable to tolerate seated position for time needed to transport      SIGNATURE OF PHYSICIAN OR OTHER AUTHORIZED HEALTHCARE PROFESSIONAL    I certify that the above information is true and correct based on my evaluation of this patient, and represent that the patient requires transport by ambulance and that other forms of transport are contraindicated.  I understand that this information will be used by the Centers for Medicare and Medicaid Services (CMS) to support the determiniation of medical necessity for ambulance services, and I represent that I have personal knowledge of the patient's condition at the time of transport.    x   If this box is checked, I also certify that the patient is physically or mentally incapable of signing the ambulance service's claim form and that the institution with which I am affiliated has furnished care, services or assistance to the patient.  My signature below is made on behalf of the patient pursuant to 42 .36(b)(4). In accordance with 42 .37, the specific reason(s) that the patient is physically or mentally incapable of signing the claim for is as follows: confusion    Signature of Physician or Healthcare Professional     Mariela Douglas [facility] Date/Time:   8/17/2024 1215     (For Scheduled repetitive transport, this form is not valid for transports performed  more than 60 days after this date).                                                                                                                                            --------------------------------------------------------------------------------------------  Printed Name and Credentials of Physician or Authorized Healthcare Professional     *Form must be signed by patient's attending physician for scheduled, repetitive transports,.  For non-repetitive ambulance transports, if unable to obtain the signature of the attending physician, any of the following may sign (please select below):     Physician  Clinical Nurse Specialist  Registered Nurse     Physician Assistant  Discharge Planner  Licensed Practical Nurse     Nurse Practitioner x

## 2024-08-18 NOTE — CASE MANAGEMENT/SOCIAL WORK
Case Management Discharge Note      Final Note: [facility]       Destination Coordination complete.      Service Provider Selected Services Address Phone Fax Patient Preferred    [facility] [place] [id number] [phone] --               Transportation Services  Ambulance: [facility]    Final Discharge Disposition Code: 03 - skilled nursing facility (SNF)

## 2024-09-06 ENCOUNTER — TELEPHONE (OUTPATIENT)
Dept: DIABETES SERVICES | Facility: HOSPITAL | Age: 64
End: 2024-09-06
Payer: MEDICARE

## 2024-09-09 ENCOUNTER — TELEPHONE (OUTPATIENT)
Dept: DIABETES SERVICES | Facility: HOSPITAL | Age: 64
End: 2024-09-09
Payer: MEDICARE

## 2024-09-09 NOTE — TELEPHONE ENCOUNTER
Pt states he did receive the Accuchek Guide meter, test strips and lancets from pharmacy. Pt states he has no questions for educator at this time.

## (undated) DEVICE — SUTURE VCRL + SZ 3-0 L36IN ABSRB UD L36MM CT-1 1/2 CIR VCP944H

## (undated) DEVICE — COVER,TABLE,60X90,STERILE: Brand: MEDLINE

## (undated) DEVICE — SPONGE GZ W4XL4IN RAYON POLY FILL CVR W/ NONWOVEN FAB

## (undated) DEVICE — GLOVE ORANGE PI 7   MSG9070

## (undated) DEVICE — DRAPE,U/ SHT,SPLIT,PLAS,STERIL: Brand: MEDLINE

## (undated) DEVICE — GOWN,SIRUS,NONRNF,SETINSLV,XL,20/CS: Brand: MEDLINE

## (undated) DEVICE — DRAPE C ARM W104XL188CM FLD MOB XR

## (undated) DEVICE — SCREW BNE L12MM DIA2.7MM CORT S STL ST FULL THRD FOR SM
Type: IMPLANTABLE DEVICE | Site: ANKLE | Status: NON-FUNCTIONAL
Removed: 2018-06-15

## (undated) DEVICE — BIT DRL L110MM DIA3.5MM QUIK CPL W/O STP REUSE

## (undated) DEVICE — SUTURE ETHLN SZ 3-0 L18IN NONABSORBABLE BLK FS-1 L24MM 3/8 663H

## (undated) DEVICE — Device

## (undated) DEVICE — Device: Brand: MEDICAL ACTION INDUSTRIES

## (undated) DEVICE — SUTURE VIC + LEN CP1 0 70CM VCP267H

## (undated) DEVICE — BIT DRL L165MM DIA2.8MM QUIK CPL W/O STP REUSE

## (undated) DEVICE — 3M™ WARMING BLANKET, UPPER BODY, 10 PER CASE, 42268: Brand: BAIR HUGGER™

## (undated) DEVICE — SOLUTION IV IRRIG POUR BRL 0.9% SODIUM CHL 2F7124

## (undated) DEVICE — BANDAGE,ELASTIC,ESMARK,STERILE,6"X9',LF: Brand: MEDLINE

## (undated) DEVICE — 3M™ IOBAN™ 2 ANTIMICROBIAL INCISE DRAPE 6650EZ: Brand: IOBAN™ 2

## (undated) DEVICE — 12FR FRAZIER SUCTION HANDLE: Brand: CARDINAL HEALTH

## (undated) DEVICE — GUIDEWIRE ORTH L150MM DIA1.25MM S STL THRD FOR 4MM CANN SCR

## (undated) DEVICE — POUCH INSTR W6.75XL11.5IN FRST 2 PKT ADH FOR ORTH AND

## (undated) DEVICE — BANDAGE,GAUZE,BULKEE II,4.5"X4.1YD,STRL: Brand: MEDLINE

## (undated) DEVICE — GLOVE ORANGE PI 7 1/2   MSG9075

## (undated) DEVICE — BIT DRL L140MM DIA2MM QUIK CPL 3 FLUT CALIB DEPTH MRK W/O

## (undated) DEVICE — BIT DRL L160MM DIA2.7MM CANN QUIK CPL ADJ STP REUSE FOR

## (undated) DEVICE — GAUZE,SPONGE,FLUFF,6"X6.75",STRL,5/TRAY: Brand: MEDLINE

## (undated) DEVICE — SCREW BNE L20MM DIA3.5MM CORT S STL ST NONCANNULATED LOK
Type: IMPLANTABLE DEVICE | Site: ANKLE | Status: NON-FUNCTIONAL
Removed: 2018-06-15

## (undated) DEVICE — GOWN,AURORA,NONREINFORCED,LARGE: Brand: MEDLINE

## (undated) DEVICE — DRESSING,GAUZE,XEROFORM,CURAD,1"X8",ST: Brand: CURAD

## (undated) DEVICE — DRAPE,REIN 53X77,STERILE: Brand: MEDLINE

## (undated) DEVICE — 3M™ STERI-DRAPE™ U-DRAPE 1015: Brand: STERI-DRAPE™

## (undated) DEVICE — SINGLE PORT MANIFOLD: Brand: NEPTUNE 2

## (undated) DEVICE — BIT DRL L110MM DIA2.5MM G QUIK CPL W/O STP REUSE

## (undated) DEVICE — STOCKINETTE,IMPERVIOUS,12X48,STERILE: Brand: MEDLINE

## (undated) DEVICE — STERILE HOOK LOCK LATEX FREE ELASTIC BANDAGE 6INX5YD: Brand: HOOK LOCK™

## (undated) DEVICE — SPONGE LAP W18XL18IN WHT COT 4 PLY FLD STRUNG RADPQ DISP ST